# Patient Record
Sex: FEMALE | Race: WHITE | NOT HISPANIC OR LATINO | Employment: OTHER | URBAN - METROPOLITAN AREA
[De-identification: names, ages, dates, MRNs, and addresses within clinical notes are randomized per-mention and may not be internally consistent; named-entity substitution may affect disease eponyms.]

---

## 2017-03-02 ENCOUNTER — ALLSCRIPTS OFFICE VISIT (OUTPATIENT)
Dept: OTHER | Facility: OTHER | Age: 82
End: 2017-03-02

## 2017-03-20 ENCOUNTER — HOSPITAL ENCOUNTER (INPATIENT)
Facility: HOSPITAL | Age: 82
LOS: 10 days | Discharge: HOME HEALTH | DRG: 871 | End: 2017-03-30
Attending: EMERGENCY MEDICINE | Admitting: INTERNAL MEDICINE
Payer: MEDICARE

## 2017-03-20 ENCOUNTER — GENERIC CONVERSION - ENCOUNTER (OUTPATIENT)
Dept: OTHER | Facility: OTHER | Age: 82
End: 2017-03-20

## 2017-03-20 ENCOUNTER — APPOINTMENT (EMERGENCY)
Dept: RADIOLOGY | Facility: HOSPITAL | Age: 82
DRG: 871 | End: 2017-03-20
Payer: MEDICARE

## 2017-03-20 ENCOUNTER — ALLSCRIPTS OFFICE VISIT (OUTPATIENT)
Dept: OTHER | Facility: OTHER | Age: 82
End: 2017-03-20

## 2017-03-20 DIAGNOSIS — I48.0 PAROXYSMAL ATRIAL FIBRILLATION (HCC): ICD-10-CM

## 2017-03-20 DIAGNOSIS — I50.33 ACUTE ON CHRONIC DIASTOLIC HEART FAILURE (HCC): ICD-10-CM

## 2017-03-20 DIAGNOSIS — R53.1 WEAKNESS: ICD-10-CM

## 2017-03-20 DIAGNOSIS — Z86.79 HISTORY OF CHF (CONGESTIVE HEART FAILURE): ICD-10-CM

## 2017-03-20 DIAGNOSIS — J18.9 PNEUMONIA: Primary | ICD-10-CM

## 2017-03-20 DIAGNOSIS — A41.9 SEPSIS (HCC): ICD-10-CM

## 2017-03-20 PROBLEM — I50.30 DIASTOLIC HEART FAILURE (HCC): Status: ACTIVE | Noted: 2017-03-20

## 2017-03-20 PROBLEM — N17.9 AKI (ACUTE KIDNEY INJURY) (HCC): Status: RESOLVED | Noted: 2017-03-20 | Resolved: 2017-03-20

## 2017-03-20 PROBLEM — Z79.01 LONG TERM CURRENT USE OF ANTICOAGULANT: Status: ACTIVE | Noted: 2017-03-20

## 2017-03-20 PROBLEM — I10 ESSENTIAL HYPERTENSION: Status: ACTIVE | Noted: 2017-03-20

## 2017-03-20 PROBLEM — N17.9 AKI (ACUTE KIDNEY INJURY) (HCC): Status: ACTIVE | Noted: 2017-03-20

## 2017-03-20 PROBLEM — N18.30 CHRONIC KIDNEY DISEASE, STAGE III (MODERATE) (HCC): Status: ACTIVE | Noted: 2017-03-20

## 2017-03-20 LAB
ALBUMIN SERPL BCP-MCNC: 3 G/DL (ref 3.5–5)
ALP SERPL-CCNC: 73 U/L (ref 46–116)
ALT SERPL W P-5'-P-CCNC: 22 U/L (ref 12–78)
ANION GAP SERPL CALCULATED.3IONS-SCNC: 7 MMOL/L (ref 4–13)
AST SERPL W P-5'-P-CCNC: 33 U/L (ref 5–45)
ATRIAL RATE: 131 BPM
BASOPHILS # BLD AUTO: 0.04 THOUSANDS/ΜL (ref 0–0.1)
BASOPHILS NFR BLD AUTO: 0 % (ref 0–1)
BILIRUB SERPL-MCNC: 0.7 MG/DL (ref 0.2–1)
BUN SERPL-MCNC: 50 MG/DL (ref 5–25)
CALCIUM SERPL-MCNC: 7.7 MG/DL (ref 8.3–10.1)
CHLORIDE SERPL-SCNC: 101 MMOL/L (ref 100–108)
CO2 SERPL-SCNC: 29 MMOL/L (ref 21–32)
CREAT SERPL-MCNC: 1.85 MG/DL (ref 0.6–1.3)
EOSINOPHIL # BLD AUTO: 0 THOUSAND/ΜL (ref 0–0.61)
EOSINOPHIL NFR BLD AUTO: 0 % (ref 0–6)
ERYTHROCYTE [DISTWIDTH] IN BLOOD BY AUTOMATED COUNT: 15 % (ref 11.6–15.1)
GFR SERPL CREATININE-BSD FRML MDRD: 25.4 ML/MIN/1.73SQ M
GLUCOSE SERPL-MCNC: 110 MG/DL (ref 65–140)
HCT VFR BLD AUTO: 39.4 % (ref 34.8–46.1)
HGB BLD-MCNC: 12.4 G/DL (ref 11.5–15.4)
LACTATE SERPL-SCNC: 1.6 MMOL/L (ref 0.5–2)
LYMPHOCYTES # BLD AUTO: 1.08 THOUSANDS/ΜL (ref 0.6–4.47)
LYMPHOCYTES NFR BLD AUTO: 8 % (ref 14–44)
MCH RBC QN AUTO: 27.9 PG (ref 26.8–34.3)
MCHC RBC AUTO-ENTMCNC: 31.5 G/DL (ref 31.4–37.4)
MCV RBC AUTO: 89 FL (ref 82–98)
MONOCYTES # BLD AUTO: 1.23 THOUSAND/ΜL (ref 0.17–1.22)
MONOCYTES NFR BLD AUTO: 9 % (ref 4–12)
NEUTROPHILS # BLD AUTO: 11.91 THOUSANDS/ΜL (ref 1.85–7.62)
NEUTS SEG NFR BLD AUTO: 83 % (ref 43–75)
NRBC BLD AUTO-RTO: 0 /100 WBCS
NT-PROBNP SERPL-MCNC: 5202 PG/ML
PLATELET # BLD AUTO: 149 THOUSANDS/UL (ref 149–390)
PMV BLD AUTO: 13.7 FL (ref 8.9–12.7)
POTASSIUM SERPL-SCNC: 4.2 MMOL/L (ref 3.5–5.3)
PROT SERPL-MCNC: 6.5 G/DL (ref 6.4–8.2)
QRS AXIS: 67 DEGREES
QRSD INTERVAL: 68 MS
QT INTERVAL: 334 MS
QTC INTERVAL: 462 MS
RBC # BLD AUTO: 4.45 MILLION/UL (ref 3.81–5.12)
SODIUM SERPL-SCNC: 137 MMOL/L (ref 136–145)
T WAVE AXIS: 25 DEGREES
TROPONIN I SERPL-MCNC: 0.02 NG/ML
VENTRICULAR RATE: 115 BPM
WBC # BLD AUTO: 14.37 THOUSAND/UL (ref 4.31–10.16)

## 2017-03-20 PROCEDURE — 94760 N-INVAS EAR/PLS OXIMETRY 1: CPT

## 2017-03-20 PROCEDURE — 93005 ELECTROCARDIOGRAM TRACING: CPT | Performed by: EMERGENCY MEDICINE

## 2017-03-20 PROCEDURE — 84484 ASSAY OF TROPONIN QUANT: CPT | Performed by: EMERGENCY MEDICINE

## 2017-03-20 PROCEDURE — 96375 TX/PRO/DX INJ NEW DRUG ADDON: CPT

## 2017-03-20 PROCEDURE — 36415 COLL VENOUS BLD VENIPUNCTURE: CPT | Performed by: EMERGENCY MEDICINE

## 2017-03-20 PROCEDURE — 99285 EMERGENCY DEPT VISIT HI MDM: CPT

## 2017-03-20 PROCEDURE — 96365 THER/PROPH/DIAG IV INF INIT: CPT

## 2017-03-20 PROCEDURE — 94664 DEMO&/EVAL PT USE INHALER: CPT

## 2017-03-20 PROCEDURE — 94640 AIRWAY INHALATION TREATMENT: CPT

## 2017-03-20 PROCEDURE — 71020 HB CHEST X-RAY 2VW FRONTAL&LATL: CPT

## 2017-03-20 PROCEDURE — 80053 COMPREHEN METABOLIC PANEL: CPT | Performed by: EMERGENCY MEDICINE

## 2017-03-20 PROCEDURE — 87040 BLOOD CULTURE FOR BACTERIA: CPT | Performed by: EMERGENCY MEDICINE

## 2017-03-20 PROCEDURE — 83605 ASSAY OF LACTIC ACID: CPT | Performed by: EMERGENCY MEDICINE

## 2017-03-20 PROCEDURE — 87798 DETECT AGENT NOS DNA AMP: CPT | Performed by: FAMILY MEDICINE

## 2017-03-20 PROCEDURE — 93005 ELECTROCARDIOGRAM TRACING: CPT

## 2017-03-20 PROCEDURE — 85025 COMPLETE CBC W/AUTO DIFF WBC: CPT | Performed by: EMERGENCY MEDICINE

## 2017-03-20 PROCEDURE — 83880 ASSAY OF NATRIURETIC PEPTIDE: CPT | Performed by: FAMILY MEDICINE

## 2017-03-20 RX ORDER — CALCIUM CARBONATE 200(500)MG
1000 TABLET,CHEWABLE ORAL DAILY PRN
Status: DISCONTINUED | OUTPATIENT
Start: 2017-03-20 | End: 2017-03-30 | Stop reason: HOSPADM

## 2017-03-20 RX ORDER — ACETYLCYSTEINE 200 MG/ML
3 SOLUTION ORAL; RESPIRATORY (INHALATION)
Status: DISCONTINUED | OUTPATIENT
Start: 2017-03-20 | End: 2017-03-21

## 2017-03-20 RX ORDER — MECLIZINE HYDROCHLORIDE 25 MG/1
25 TABLET ORAL 2 TIMES DAILY PRN
Status: DISCONTINUED | OUTPATIENT
Start: 2017-03-20 | End: 2017-03-30 | Stop reason: HOSPADM

## 2017-03-20 RX ORDER — HEPARIN SODIUM 5000 [USP'U]/ML
5000 INJECTION, SOLUTION INTRAVENOUS; SUBCUTANEOUS EVERY 8 HOURS SCHEDULED
Status: DISCONTINUED | OUTPATIENT
Start: 2017-03-20 | End: 2017-03-20

## 2017-03-20 RX ORDER — IPRATROPIUM BROMIDE AND ALBUTEROL SULFATE 2.5; .5 MG/3ML; MG/3ML
3 SOLUTION RESPIRATORY (INHALATION)
Status: DISCONTINUED | OUTPATIENT
Start: 2017-03-20 | End: 2017-03-24

## 2017-03-20 RX ORDER — FERROUS SULFATE 325(65) MG
325 TABLET ORAL
Status: DISCONTINUED | OUTPATIENT
Start: 2017-03-21 | End: 2017-03-30 | Stop reason: HOSPADM

## 2017-03-20 RX ORDER — IPRATROPIUM BROMIDE AND ALBUTEROL SULFATE 2.5; .5 MG/3ML; MG/3ML
3 SOLUTION RESPIRATORY (INHALATION)
Status: DISCONTINUED | OUTPATIENT
Start: 2017-03-20 | End: 2017-03-20

## 2017-03-20 RX ORDER — DILTIAZEM HYDROCHLORIDE 5 MG/ML
10 INJECTION INTRAVENOUS ONCE
Status: COMPLETED | OUTPATIENT
Start: 2017-03-20 | End: 2017-03-20

## 2017-03-20 RX ORDER — SODIUM CHLORIDE 9 MG/ML
75 INJECTION, SOLUTION INTRAVENOUS CONTINUOUS
Status: DISCONTINUED | OUTPATIENT
Start: 2017-03-20 | End: 2017-03-22

## 2017-03-20 RX ORDER — ACETYLCYSTEINE 200 MG/ML
3 SOLUTION ORAL; RESPIRATORY (INHALATION)
Status: DISCONTINUED | OUTPATIENT
Start: 2017-03-21 | End: 2017-03-20

## 2017-03-20 RX ORDER — SENNOSIDES 8.6 MG
1 TABLET ORAL
Status: DISCONTINUED | OUTPATIENT
Start: 2017-03-20 | End: 2017-03-30 | Stop reason: HOSPADM

## 2017-03-20 RX ORDER — ONDANSETRON 2 MG/ML
4 INJECTION INTRAMUSCULAR; INTRAVENOUS EVERY 6 HOURS PRN
Status: DISCONTINUED | OUTPATIENT
Start: 2017-03-20 | End: 2017-03-30 | Stop reason: HOSPADM

## 2017-03-20 RX ORDER — LEVALBUTEROL 1.25 MG/.5ML
1.25 SOLUTION, CONCENTRATE RESPIRATORY (INHALATION) EVERY 4 HOURS PRN
Status: DISCONTINUED | OUTPATIENT
Start: 2017-03-20 | End: 2017-03-24

## 2017-03-20 RX ORDER — ACETYLCYSTEINE 200 MG/ML
3 SOLUTION ORAL; RESPIRATORY (INHALATION)
Status: DISCONTINUED | OUTPATIENT
Start: 2017-03-20 | End: 2017-03-20

## 2017-03-20 RX ORDER — GUAIFENESIN/DEXTROMETHORPHAN 100-10MG/5
10 SYRUP ORAL EVERY 4 HOURS PRN
Status: DISCONTINUED | OUTPATIENT
Start: 2017-03-20 | End: 2017-03-30 | Stop reason: HOSPADM

## 2017-03-20 RX ORDER — DILTIAZEM HYDROCHLORIDE 120 MG/1
120 CAPSULE, COATED, EXTENDED RELEASE ORAL DAILY
Status: DISCONTINUED | OUTPATIENT
Start: 2017-03-21 | End: 2017-03-23

## 2017-03-20 RX ORDER — FUROSEMIDE 40 MG/1
40 TABLET ORAL DAILY
Status: DISCONTINUED | OUTPATIENT
Start: 2017-03-21 | End: 2017-03-22

## 2017-03-20 RX ORDER — METOPROLOL SUCCINATE 100 MG/1
100 TABLET, EXTENDED RELEASE ORAL DAILY
Status: DISCONTINUED | OUTPATIENT
Start: 2017-03-21 | End: 2017-03-30 | Stop reason: HOSPADM

## 2017-03-20 RX ADMIN — SODIUM CHLORIDE 75 ML/HR: 0.9 INJECTION, SOLUTION INTRAVENOUS at 18:27

## 2017-03-20 RX ADMIN — IPRATROPIUM BROMIDE AND ALBUTEROL SULFATE 3 ML: .5; 3 SOLUTION RESPIRATORY (INHALATION) at 20:02

## 2017-03-20 RX ADMIN — ACETYLCYSTEINE 600 MG: 200 SOLUTION ORAL; RESPIRATORY (INHALATION) at 20:02

## 2017-03-20 RX ADMIN — APIXABAN 2.5 MG: 2.5 TABLET, FILM COATED ORAL at 18:26

## 2017-03-20 RX ADMIN — DILTIAZEM HYDROCHLORIDE 10 MG: 5 INJECTION INTRAVENOUS at 13:13

## 2017-03-20 RX ADMIN — SODIUM CHLORIDE 1000 ML: 0.9 INJECTION, SOLUTION INTRAVENOUS at 13:06

## 2017-03-20 RX ADMIN — IPRATROPIUM BROMIDE AND ALBUTEROL SULFATE 3 ML: .5; 3 SOLUTION RESPIRATORY (INHALATION) at 18:15

## 2017-03-20 RX ADMIN — CEFTRIAXONE 1000 MG: 1 INJECTION, SOLUTION INTRAVENOUS at 13:24

## 2017-03-20 RX ADMIN — AZITHROMYCIN MONOHYDRATE 500 MG: 500 INJECTION, POWDER, LYOPHILIZED, FOR SOLUTION INTRAVENOUS at 13:37

## 2017-03-21 ENCOUNTER — APPOINTMENT (INPATIENT)
Dept: PHYSICAL THERAPY | Facility: HOSPITAL | Age: 82
DRG: 871 | End: 2017-03-21
Payer: MEDICARE

## 2017-03-21 ENCOUNTER — APPOINTMENT (INPATIENT)
Dept: CT IMAGING | Facility: HOSPITAL | Age: 82
DRG: 871 | End: 2017-03-21
Payer: MEDICARE

## 2017-03-21 LAB
ALBUMIN SERPL BCP-MCNC: 2.9 G/DL (ref 3.5–5)
ALP SERPL-CCNC: 71 U/L (ref 46–116)
ALT SERPL W P-5'-P-CCNC: 22 U/L (ref 12–78)
ANION GAP SERPL CALCULATED.3IONS-SCNC: 11 MMOL/L (ref 4–13)
AST SERPL W P-5'-P-CCNC: 39 U/L (ref 5–45)
BILIRUB SERPL-MCNC: 0.5 MG/DL (ref 0.2–1)
BUN SERPL-MCNC: 49 MG/DL (ref 5–25)
CALCIUM SERPL-MCNC: 7.8 MG/DL (ref 8.3–10.1)
CHLORIDE SERPL-SCNC: 103 MMOL/L (ref 100–108)
CO2 SERPL-SCNC: 28 MMOL/L (ref 21–32)
CREAT SERPL-MCNC: 1.79 MG/DL (ref 0.6–1.3)
ERYTHROCYTE [DISTWIDTH] IN BLOOD BY AUTOMATED COUNT: 15 % (ref 11.6–15.1)
FLUAV AG SPEC QL: DETECTED
FLUBV AG SPEC QL: ABNORMAL
GFR SERPL CREATININE-BSD FRML MDRD: 26.4 ML/MIN/1.73SQ M
GLUCOSE SERPL-MCNC: 99 MG/DL (ref 65–140)
HCT VFR BLD AUTO: 35 % (ref 34.8–46.1)
HGB BLD-MCNC: 10.7 G/DL (ref 11.5–15.4)
MCH RBC QN AUTO: 27.9 PG (ref 26.8–34.3)
MCHC RBC AUTO-ENTMCNC: 30.6 G/DL (ref 31.4–37.4)
MCV RBC AUTO: 91 FL (ref 82–98)
PLATELET # BLD AUTO: 132 THOUSANDS/UL (ref 149–390)
PMV BLD AUTO: 13.1 FL (ref 8.9–12.7)
POTASSIUM SERPL-SCNC: 4.4 MMOL/L (ref 3.5–5.3)
PROT SERPL-MCNC: 6.5 G/DL (ref 6.4–8.2)
RBC # BLD AUTO: 3.83 MILLION/UL (ref 3.81–5.12)
RSV B RNA SPEC QL NAA+PROBE: ABNORMAL
S PNEUM AG UR QL: NEGATIVE
SODIUM SERPL-SCNC: 142 MMOL/L (ref 136–145)
WBC # BLD AUTO: 8.64 THOUSAND/UL (ref 4.31–10.16)

## 2017-03-21 PROCEDURE — G8979 MOBILITY GOAL STATUS: HCPCS

## 2017-03-21 PROCEDURE — 80053 COMPREHEN METABOLIC PANEL: CPT | Performed by: FAMILY MEDICINE

## 2017-03-21 PROCEDURE — 85027 COMPLETE CBC AUTOMATED: CPT | Performed by: FAMILY MEDICINE

## 2017-03-21 PROCEDURE — 87086 URINE CULTURE/COLONY COUNT: CPT | Performed by: FAMILY MEDICINE

## 2017-03-21 PROCEDURE — 94760 N-INVAS EAR/PLS OXIMETRY 1: CPT

## 2017-03-21 PROCEDURE — 94640 AIRWAY INHALATION TREATMENT: CPT

## 2017-03-21 PROCEDURE — 87449 NOS EACH ORGANISM AG IA: CPT | Performed by: FAMILY MEDICINE

## 2017-03-21 PROCEDURE — G8978 MOBILITY CURRENT STATUS: HCPCS

## 2017-03-21 PROCEDURE — 92610 EVALUATE SWALLOWING FUNCTION: CPT

## 2017-03-21 PROCEDURE — 97162 PT EVAL MOD COMPLEX 30 MIN: CPT

## 2017-03-21 PROCEDURE — 94668 MNPJ CHEST WALL SBSQ: CPT

## 2017-03-21 PROCEDURE — 94669 MECHANICAL CHEST WALL OSCILL: CPT

## 2017-03-21 PROCEDURE — 71250 CT THORAX DX C-: CPT

## 2017-03-21 RX ORDER — OSELTAMIVIR PHOSPHATE 30 MG/1
30 CAPSULE ORAL EVERY 24 HOURS
Status: COMPLETED | OUTPATIENT
Start: 2017-03-21 | End: 2017-03-25

## 2017-03-21 RX ADMIN — IPRATROPIUM BROMIDE AND ALBUTEROL SULFATE 3 ML: .5; 3 SOLUTION RESPIRATORY (INHALATION) at 20:23

## 2017-03-21 RX ADMIN — FUROSEMIDE 40 MG: 40 TABLET ORAL at 09:45

## 2017-03-21 RX ADMIN — GUAIFENESIN AND DEXTROMETHORPHAN 10 ML: 100; 10 SYRUP ORAL at 18:42

## 2017-03-21 RX ADMIN — APIXABAN 2.5 MG: 2.5 TABLET, FILM COATED ORAL at 09:45

## 2017-03-21 RX ADMIN — OSELTAMIVIR PHOSPHATE 30 MG: 30 CAPSULE ORAL at 07:37

## 2017-03-21 RX ADMIN — FERROUS SULFATE TAB 325 MG (65 MG ELEMENTAL FE) 325 MG: 325 (65 FE) TAB at 07:37

## 2017-03-21 RX ADMIN — IPRATROPIUM BROMIDE AND ALBUTEROL SULFATE 3 ML: .5; 3 SOLUTION RESPIRATORY (INHALATION) at 14:26

## 2017-03-21 RX ADMIN — GUAIFENESIN AND DEXTROMETHORPHAN 10 ML: 100; 10 SYRUP ORAL at 22:33

## 2017-03-21 RX ADMIN — METOPROLOL SUCCINATE 100 MG: 100 TABLET, FILM COATED, EXTENDED RELEASE ORAL at 09:45

## 2017-03-21 RX ADMIN — AZITHROMYCIN MONOHYDRATE 500 MG: 500 INJECTION, POWDER, LYOPHILIZED, FOR SOLUTION INTRAVENOUS at 09:50

## 2017-03-21 RX ADMIN — SODIUM CHLORIDE 75 ML/HR: 0.9 INJECTION, SOLUTION INTRAVENOUS at 22:02

## 2017-03-21 RX ADMIN — SODIUM CHLORIDE 75 ML/HR: 0.9 INJECTION, SOLUTION INTRAVENOUS at 07:35

## 2017-03-21 RX ADMIN — APIXABAN 2.5 MG: 2.5 TABLET, FILM COATED ORAL at 18:30

## 2017-03-21 RX ADMIN — IPRATROPIUM BROMIDE AND ALBUTEROL SULFATE 3 ML: .5; 3 SOLUTION RESPIRATORY (INHALATION) at 02:24

## 2017-03-21 RX ADMIN — NIFEDIPINE 120 MG: 10 CAPSULE ORAL at 09:45

## 2017-03-21 RX ADMIN — IPRATROPIUM BROMIDE AND ALBUTEROL SULFATE 3 ML: .5; 3 SOLUTION RESPIRATORY (INHALATION) at 07:51

## 2017-03-21 RX ADMIN — CEFTRIAXONE 1000 MG: 1 INJECTION, POWDER, FOR SOLUTION INTRAMUSCULAR; INTRAVENOUS at 09:49

## 2017-03-22 ENCOUNTER — GENERIC CONVERSION - ENCOUNTER (OUTPATIENT)
Dept: OTHER | Facility: OTHER | Age: 82
End: 2017-03-22

## 2017-03-22 ENCOUNTER — APPOINTMENT (INPATIENT)
Dept: NON INVASIVE DIAGNOSTICS | Facility: HOSPITAL | Age: 82
DRG: 871 | End: 2017-03-22
Payer: MEDICARE

## 2017-03-22 LAB
ANION GAP SERPL CALCULATED.3IONS-SCNC: 9 MMOL/L (ref 4–13)
BACTERIA UR CULT: NORMAL
BASOPHILS # BLD MANUAL: 0 THOUSAND/UL (ref 0–0.1)
BASOPHILS NFR MAR MANUAL: 0 % (ref 0–1)
BUN SERPL-MCNC: 39 MG/DL (ref 5–25)
CALCIUM SERPL-MCNC: 7.7 MG/DL (ref 8.3–10.1)
CHLORIDE SERPL-SCNC: 102 MMOL/L (ref 100–108)
CO2 SERPL-SCNC: 26 MMOL/L (ref 21–32)
CREAT SERPL-MCNC: 1.3 MG/DL (ref 0.6–1.3)
EOSINOPHIL # BLD MANUAL: 0 THOUSAND/UL (ref 0–0.4)
EOSINOPHIL NFR BLD MANUAL: 0 % (ref 0–6)
ERYTHROCYTE [DISTWIDTH] IN BLOOD BY AUTOMATED COUNT: 14.9 % (ref 11.6–15.1)
GFR SERPL CREATININE-BSD FRML MDRD: 38.2 ML/MIN/1.73SQ M
GLUCOSE SERPL-MCNC: 87 MG/DL (ref 65–140)
HCT VFR BLD AUTO: 33.9 % (ref 34.8–46.1)
HGB BLD-MCNC: 10.4 G/DL (ref 11.5–15.4)
LYMPHOCYTES # BLD AUTO: 0.85 THOUSAND/UL (ref 0.6–4.47)
LYMPHOCYTES # BLD AUTO: 11 % (ref 14–44)
MCH RBC QN AUTO: 27.7 PG (ref 26.8–34.3)
MCHC RBC AUTO-ENTMCNC: 30.7 G/DL (ref 31.4–37.4)
MCV RBC AUTO: 90 FL (ref 82–98)
MONOCYTES # BLD AUTO: 0.31 THOUSAND/UL (ref 0–1.22)
MONOCYTES NFR BLD: 4 % (ref 4–12)
NEUTROPHILS # BLD MANUAL: 6.38 THOUSAND/UL (ref 1.85–7.62)
NEUTS BAND NFR BLD MANUAL: 1 % (ref 0–8)
NEUTS SEG NFR BLD AUTO: 82 % (ref 43–75)
NRBC BLD AUTO-RTO: 0 /100 WBCS
NT-PROBNP SERPL-MCNC: 3604 PG/ML
PLATELET # BLD AUTO: 152 THOUSANDS/UL (ref 149–390)
PLATELET BLD QL SMEAR: ADEQUATE
PMV BLD AUTO: 13 FL (ref 8.9–12.7)
POTASSIUM SERPL-SCNC: 3.9 MMOL/L (ref 3.5–5.3)
RBC # BLD AUTO: 3.75 MILLION/UL (ref 3.81–5.12)
SODIUM SERPL-SCNC: 137 MMOL/L (ref 136–145)
TOTAL CELLS COUNTED SPEC: 100
VARIANT LYMPHS # BLD AUTO: 2 %
WBC # BLD AUTO: 7.69 THOUSAND/UL (ref 4.31–10.16)

## 2017-03-22 PROCEDURE — 85007 BL SMEAR W/DIFF WBC COUNT: CPT | Performed by: GENERAL PRACTICE

## 2017-03-22 PROCEDURE — 93306 TTE W/DOPPLER COMPLETE: CPT

## 2017-03-22 PROCEDURE — 83880 ASSAY OF NATRIURETIC PEPTIDE: CPT | Performed by: NURSE PRACTITIONER

## 2017-03-22 PROCEDURE — 85027 COMPLETE CBC AUTOMATED: CPT | Performed by: GENERAL PRACTICE

## 2017-03-22 PROCEDURE — 80048 BASIC METABOLIC PNL TOTAL CA: CPT | Performed by: GENERAL PRACTICE

## 2017-03-22 PROCEDURE — 94760 N-INVAS EAR/PLS OXIMETRY 1: CPT

## 2017-03-22 PROCEDURE — 94640 AIRWAY INHALATION TREATMENT: CPT

## 2017-03-22 PROCEDURE — 94668 MNPJ CHEST WALL SBSQ: CPT

## 2017-03-22 RX ORDER — FUROSEMIDE 10 MG/ML
40 INJECTION INTRAMUSCULAR; INTRAVENOUS DAILY
Status: DISCONTINUED | OUTPATIENT
Start: 2017-03-22 | End: 2017-03-24

## 2017-03-22 RX ADMIN — NIFEDIPINE 120 MG: 10 CAPSULE ORAL at 11:43

## 2017-03-22 RX ADMIN — FUROSEMIDE 40 MG: 10 INJECTION, SOLUTION INTRAMUSCULAR; INTRAVENOUS at 11:44

## 2017-03-22 RX ADMIN — METOPROLOL SUCCINATE 100 MG: 100 TABLET, FILM COATED, EXTENDED RELEASE ORAL at 11:43

## 2017-03-22 RX ADMIN — IPRATROPIUM BROMIDE AND ALBUTEROL SULFATE 3 ML: .5; 3 SOLUTION RESPIRATORY (INHALATION) at 01:36

## 2017-03-22 RX ADMIN — APIXABAN 2.5 MG: 2.5 TABLET, FILM COATED ORAL at 17:34

## 2017-03-22 RX ADMIN — OSELTAMIVIR PHOSPHATE 30 MG: 30 CAPSULE ORAL at 11:43

## 2017-03-22 RX ADMIN — IPRATROPIUM BROMIDE AND ALBUTEROL SULFATE 3 ML: .5; 3 SOLUTION RESPIRATORY (INHALATION) at 20:35

## 2017-03-22 RX ADMIN — CEFTRIAXONE 1000 MG: 1 INJECTION, POWDER, FOR SOLUTION INTRAMUSCULAR; INTRAVENOUS at 11:52

## 2017-03-22 RX ADMIN — IPRATROPIUM BROMIDE AND ALBUTEROL SULFATE 3 ML: .5; 3 SOLUTION RESPIRATORY (INHALATION) at 16:19

## 2017-03-22 RX ADMIN — GUAIFENESIN AND DEXTROMETHORPHAN 10 ML: 100; 10 SYRUP ORAL at 16:56

## 2017-03-22 RX ADMIN — APIXABAN 2.5 MG: 2.5 TABLET, FILM COATED ORAL at 11:43

## 2017-03-22 RX ADMIN — FERROUS SULFATE TAB 325 MG (65 MG ELEMENTAL FE) 325 MG: 325 (65 FE) TAB at 11:47

## 2017-03-22 RX ADMIN — IPRATROPIUM BROMIDE AND ALBUTEROL SULFATE 3 ML: .5; 3 SOLUTION RESPIRATORY (INHALATION) at 07:37

## 2017-03-22 RX ADMIN — AZITHROMYCIN MONOHYDRATE 500 MG: 500 INJECTION, POWDER, LYOPHILIZED, FOR SOLUTION INTRAVENOUS at 12:39

## 2017-03-22 RX ADMIN — GUAIFENESIN AND DEXTROMETHORPHAN 10 ML: 100; 10 SYRUP ORAL at 11:43

## 2017-03-23 ENCOUNTER — APPOINTMENT (INPATIENT)
Dept: SPEECH THERAPY | Facility: HOSPITAL | Age: 82
DRG: 871 | End: 2017-03-23
Payer: MEDICARE

## 2017-03-23 ENCOUNTER — APPOINTMENT (INPATIENT)
Dept: PHYSICAL THERAPY | Facility: HOSPITAL | Age: 82
DRG: 871 | End: 2017-03-23
Payer: MEDICARE

## 2017-03-23 PROCEDURE — 97116 GAIT TRAINING THERAPY: CPT

## 2017-03-23 PROCEDURE — 92526 ORAL FUNCTION THERAPY: CPT

## 2017-03-23 PROCEDURE — 97530 THERAPEUTIC ACTIVITIES: CPT

## 2017-03-23 PROCEDURE — 94668 MNPJ CHEST WALL SBSQ: CPT

## 2017-03-23 PROCEDURE — 94669 MECHANICAL CHEST WALL OSCILL: CPT

## 2017-03-23 PROCEDURE — 94760 N-INVAS EAR/PLS OXIMETRY 1: CPT

## 2017-03-23 PROCEDURE — 94640 AIRWAY INHALATION TREATMENT: CPT

## 2017-03-23 PROCEDURE — 97110 THERAPEUTIC EXERCISES: CPT

## 2017-03-23 RX ORDER — DILTIAZEM HYDROCHLORIDE 180 MG/1
180 CAPSULE, COATED, EXTENDED RELEASE ORAL DAILY
Status: DISCONTINUED | OUTPATIENT
Start: 2017-03-24 | End: 2017-03-30 | Stop reason: HOSPADM

## 2017-03-23 RX ORDER — GUAIFENESIN 600 MG
600 TABLET, EXTENDED RELEASE 12 HR ORAL EVERY 12 HOURS SCHEDULED
Status: DISCONTINUED | OUTPATIENT
Start: 2017-03-23 | End: 2017-03-30 | Stop reason: HOSPADM

## 2017-03-23 RX ADMIN — APIXABAN 2.5 MG: 2.5 TABLET, FILM COATED ORAL at 17:26

## 2017-03-23 RX ADMIN — FUROSEMIDE 40 MG: 10 INJECTION, SOLUTION INTRAMUSCULAR; INTRAVENOUS at 10:54

## 2017-03-23 RX ADMIN — GUAIFENESIN 600 MG: 600 TABLET, EXTENDED RELEASE ORAL at 20:23

## 2017-03-23 RX ADMIN — IPRATROPIUM BROMIDE AND ALBUTEROL SULFATE 3 ML: .5; 3 SOLUTION RESPIRATORY (INHALATION) at 13:22

## 2017-03-23 RX ADMIN — METOPROLOL SUCCINATE 100 MG: 100 TABLET, FILM COATED, EXTENDED RELEASE ORAL at 10:53

## 2017-03-23 RX ADMIN — OSELTAMIVIR PHOSPHATE 30 MG: 30 CAPSULE ORAL at 10:58

## 2017-03-23 RX ADMIN — GUAIFENESIN 600 MG: 600 TABLET, EXTENDED RELEASE ORAL at 10:53

## 2017-03-23 RX ADMIN — IPRATROPIUM BROMIDE AND ALBUTEROL SULFATE 3 ML: .5; 3 SOLUTION RESPIRATORY (INHALATION) at 19:17

## 2017-03-23 RX ADMIN — IPRATROPIUM BROMIDE AND ALBUTEROL SULFATE 3 ML: .5; 3 SOLUTION RESPIRATORY (INHALATION) at 02:32

## 2017-03-23 RX ADMIN — GUAIFENESIN AND DEXTROMETHORPHAN 10 ML: 100; 10 SYRUP ORAL at 17:29

## 2017-03-23 RX ADMIN — IPRATROPIUM BROMIDE AND ALBUTEROL SULFATE 3 ML: .5; 3 SOLUTION RESPIRATORY (INHALATION) at 08:22

## 2017-03-23 RX ADMIN — FERROUS SULFATE TAB 325 MG (65 MG ELEMENTAL FE) 325 MG: 325 (65 FE) TAB at 10:54

## 2017-03-23 RX ADMIN — GUAIFENESIN AND DEXTROMETHORPHAN 10 ML: 100; 10 SYRUP ORAL at 11:03

## 2017-03-23 RX ADMIN — APIXABAN 2.5 MG: 2.5 TABLET, FILM COATED ORAL at 10:54

## 2017-03-23 RX ADMIN — NIFEDIPINE 120 MG: 10 CAPSULE ORAL at 10:54

## 2017-03-24 ENCOUNTER — APPOINTMENT (INPATIENT)
Dept: RADIOLOGY | Facility: HOSPITAL | Age: 82
DRG: 871 | End: 2017-03-24
Payer: MEDICARE

## 2017-03-24 LAB
ANION GAP SERPL CALCULATED.3IONS-SCNC: 10 MMOL/L (ref 4–13)
BUN SERPL-MCNC: 20 MG/DL (ref 5–25)
CALCIUM SERPL-MCNC: 8.5 MG/DL (ref 8.3–10.1)
CHLORIDE SERPL-SCNC: 99 MMOL/L (ref 100–108)
CO2 SERPL-SCNC: 28 MMOL/L (ref 21–32)
CREAT SERPL-MCNC: 0.98 MG/DL (ref 0.6–1.3)
GFR SERPL CREATININE-BSD FRML MDRD: 53 ML/MIN/1.73SQ M
GLUCOSE SERPL-MCNC: 143 MG/DL (ref 65–140)
POTASSIUM SERPL-SCNC: 4.5 MMOL/L (ref 3.5–5.3)
SODIUM SERPL-SCNC: 137 MMOL/L (ref 136–145)

## 2017-03-24 PROCEDURE — 80048 BASIC METABOLIC PNL TOTAL CA: CPT | Performed by: NURSE PRACTITIONER

## 2017-03-24 PROCEDURE — 71010 HB CHEST X-RAY 1 VIEW FRONTAL (PORTABLE): CPT

## 2017-03-24 PROCEDURE — 94760 N-INVAS EAR/PLS OXIMETRY 1: CPT

## 2017-03-24 PROCEDURE — 94640 AIRWAY INHALATION TREATMENT: CPT

## 2017-03-24 PROCEDURE — 94669 MECHANICAL CHEST WALL OSCILL: CPT

## 2017-03-24 RX ORDER — LEVALBUTEROL 1.25 MG/.5ML
1.25 SOLUTION, CONCENTRATE RESPIRATORY (INHALATION)
Status: DISCONTINUED | OUTPATIENT
Start: 2017-03-24 | End: 2017-03-24

## 2017-03-24 RX ORDER — IPRATROPIUM BROMIDE AND ALBUTEROL SULFATE 2.5; .5 MG/3ML; MG/3ML
3 SOLUTION RESPIRATORY (INHALATION) EVERY 4 HOURS PRN
Status: DISCONTINUED | OUTPATIENT
Start: 2017-03-24 | End: 2017-03-24

## 2017-03-24 RX ORDER — FUROSEMIDE 10 MG/ML
40 INJECTION INTRAMUSCULAR; INTRAVENOUS
Status: DISCONTINUED | OUTPATIENT
Start: 2017-03-24 | End: 2017-03-28

## 2017-03-24 RX ORDER — LEVALBUTEROL 1.25 MG/.5ML
1.25 SOLUTION, CONCENTRATE RESPIRATORY (INHALATION) EVERY 6 HOURS PRN
Status: DISCONTINUED | OUTPATIENT
Start: 2017-03-24 | End: 2017-03-24

## 2017-03-24 RX ORDER — LEVALBUTEROL 1.25 MG/.5ML
1.25 SOLUTION, CONCENTRATE RESPIRATORY (INHALATION)
Status: DISCONTINUED | OUTPATIENT
Start: 2017-03-24 | End: 2017-03-28

## 2017-03-24 RX ORDER — LEVALBUTEROL 1.25 MG/.5ML
1.25 SOLUTION, CONCENTRATE RESPIRATORY (INHALATION) EVERY 4 HOURS PRN
Status: DISCONTINUED | OUTPATIENT
Start: 2017-03-24 | End: 2017-03-30 | Stop reason: HOSPADM

## 2017-03-24 RX ADMIN — METOPROLOL SUCCINATE 100 MG: 100 TABLET, FILM COATED, EXTENDED RELEASE ORAL at 08:28

## 2017-03-24 RX ADMIN — GUAIFENESIN AND DEXTROMETHORPHAN 10 ML: 100; 10 SYRUP ORAL at 15:37

## 2017-03-24 RX ADMIN — OSELTAMIVIR PHOSPHATE 30 MG: 30 CAPSULE ORAL at 08:28

## 2017-03-24 RX ADMIN — METOPROLOL TARTRATE 2.5 MG: 5 INJECTION, SOLUTION INTRAVENOUS at 10:56

## 2017-03-24 RX ADMIN — IPRATROPIUM BROMIDE AND ALBUTEROL SULFATE 3 ML: .5; 3 SOLUTION RESPIRATORY (INHALATION) at 09:16

## 2017-03-24 RX ADMIN — GUAIFENESIN AND DEXTROMETHORPHAN 10 ML: 100; 10 SYRUP ORAL at 03:31

## 2017-03-24 RX ADMIN — APIXABAN 2.5 MG: 2.5 TABLET, FILM COATED ORAL at 17:07

## 2017-03-24 RX ADMIN — APIXABAN 2.5 MG: 2.5 TABLET, FILM COATED ORAL at 08:28

## 2017-03-24 RX ADMIN — LEVALBUTEROL HYDROCHLORIDE 1.25 MG: 1.25 SOLUTION, CONCENTRATE RESPIRATORY (INHALATION) at 15:13

## 2017-03-24 RX ADMIN — GUAIFENESIN 600 MG: 600 TABLET, EXTENDED RELEASE ORAL at 08:28

## 2017-03-24 RX ADMIN — FERROUS SULFATE TAB 325 MG (65 MG ELEMENTAL FE) 325 MG: 325 (65 FE) TAB at 08:28

## 2017-03-24 RX ADMIN — DILTIAZEM HYDROCHLORIDE 180 MG: 180 CAPSULE, COATED, EXTENDED RELEASE ORAL at 08:28

## 2017-03-24 RX ADMIN — FUROSEMIDE 40 MG: 10 INJECTION, SOLUTION INTRAMUSCULAR; INTRAVENOUS at 15:37

## 2017-03-24 RX ADMIN — IPRATROPIUM BROMIDE 0.5 MG: 0.5 SOLUTION RESPIRATORY (INHALATION) at 19:49

## 2017-03-24 RX ADMIN — IPRATROPIUM BROMIDE 0.5 MG: 0.5 SOLUTION RESPIRATORY (INHALATION) at 15:13

## 2017-03-24 RX ADMIN — LEVALBUTEROL HYDROCHLORIDE 1.25 MG: 1.25 SOLUTION, CONCENTRATE RESPIRATORY (INHALATION) at 19:49

## 2017-03-24 RX ADMIN — IPRATROPIUM BROMIDE AND ALBUTEROL SULFATE 3 ML: .5; 3 SOLUTION RESPIRATORY (INHALATION) at 01:45

## 2017-03-24 RX ADMIN — FUROSEMIDE 40 MG: 10 INJECTION, SOLUTION INTRAMUSCULAR; INTRAVENOUS at 08:28

## 2017-03-24 RX ADMIN — GUAIFENESIN 600 MG: 600 TABLET, EXTENDED RELEASE ORAL at 20:18

## 2017-03-25 LAB
ANION GAP SERPL CALCULATED.3IONS-SCNC: 6 MMOL/L (ref 4–13)
BACTERIA BLD CULT: NORMAL
BACTERIA BLD CULT: NORMAL
BUN SERPL-MCNC: 19 MG/DL (ref 5–25)
CALCIUM SERPL-MCNC: 8.7 MG/DL (ref 8.3–10.1)
CHLORIDE SERPL-SCNC: 98 MMOL/L (ref 100–108)
CO2 SERPL-SCNC: 33 MMOL/L (ref 21–32)
CREAT SERPL-MCNC: 1.02 MG/DL (ref 0.6–1.3)
GFR SERPL CREATININE-BSD FRML MDRD: 50.6 ML/MIN/1.73SQ M
GLUCOSE SERPL-MCNC: 147 MG/DL (ref 65–140)
POTASSIUM SERPL-SCNC: 4.4 MMOL/L (ref 3.5–5.3)
SODIUM SERPL-SCNC: 137 MMOL/L (ref 136–145)

## 2017-03-25 PROCEDURE — 94669 MECHANICAL CHEST WALL OSCILL: CPT

## 2017-03-25 PROCEDURE — 94760 N-INVAS EAR/PLS OXIMETRY 1: CPT

## 2017-03-25 PROCEDURE — 80048 BASIC METABOLIC PNL TOTAL CA: CPT | Performed by: GENERAL PRACTICE

## 2017-03-25 PROCEDURE — 94640 AIRWAY INHALATION TREATMENT: CPT

## 2017-03-25 RX ADMIN — OSELTAMIVIR PHOSPHATE 30 MG: 30 CAPSULE ORAL at 08:06

## 2017-03-25 RX ADMIN — FUROSEMIDE 40 MG: 10 INJECTION, SOLUTION INTRAMUSCULAR; INTRAVENOUS at 08:05

## 2017-03-25 RX ADMIN — METOPROLOL SUCCINATE 100 MG: 100 TABLET, FILM COATED, EXTENDED RELEASE ORAL at 08:06

## 2017-03-25 RX ADMIN — DILTIAZEM HYDROCHLORIDE 180 MG: 180 CAPSULE, COATED, EXTENDED RELEASE ORAL at 08:06

## 2017-03-25 RX ADMIN — IPRATROPIUM BROMIDE 0.5 MG: 0.5 SOLUTION RESPIRATORY (INHALATION) at 13:43

## 2017-03-25 RX ADMIN — APIXABAN 2.5 MG: 2.5 TABLET, FILM COATED ORAL at 08:06

## 2017-03-25 RX ADMIN — SENNOSIDES 8.6 MG: 8.6 TABLET, FILM COATED ORAL at 22:37

## 2017-03-25 RX ADMIN — FERROUS SULFATE TAB 325 MG (65 MG ELEMENTAL FE) 325 MG: 325 (65 FE) TAB at 08:06

## 2017-03-25 RX ADMIN — LEVALBUTEROL HYDROCHLORIDE 1.25 MG: 1.25 SOLUTION, CONCENTRATE RESPIRATORY (INHALATION) at 20:16

## 2017-03-25 RX ADMIN — FUROSEMIDE 40 MG: 10 INJECTION, SOLUTION INTRAMUSCULAR; INTRAVENOUS at 16:18

## 2017-03-25 RX ADMIN — LEVALBUTEROL HYDROCHLORIDE 1.25 MG: 1.25 SOLUTION, CONCENTRATE RESPIRATORY (INHALATION) at 07:24

## 2017-03-25 RX ADMIN — LEVALBUTEROL HYDROCHLORIDE 1.25 MG: 1.25 SOLUTION, CONCENTRATE RESPIRATORY (INHALATION) at 13:43

## 2017-03-25 RX ADMIN — GUAIFENESIN 600 MG: 600 TABLET, EXTENDED RELEASE ORAL at 08:06

## 2017-03-25 RX ADMIN — IPRATROPIUM BROMIDE 0.5 MG: 0.5 SOLUTION RESPIRATORY (INHALATION) at 07:24

## 2017-03-25 RX ADMIN — APIXABAN 2.5 MG: 2.5 TABLET, FILM COATED ORAL at 17:27

## 2017-03-25 RX ADMIN — GUAIFENESIN 600 MG: 600 TABLET, EXTENDED RELEASE ORAL at 22:00

## 2017-03-25 RX ADMIN — IPRATROPIUM BROMIDE 0.5 MG: 0.5 SOLUTION RESPIRATORY (INHALATION) at 20:16

## 2017-03-25 RX ADMIN — LEVALBUTEROL HYDROCHLORIDE 1.25 MG: 1.25 SOLUTION, CONCENTRATE RESPIRATORY (INHALATION) at 02:02

## 2017-03-25 RX ADMIN — IPRATROPIUM BROMIDE 0.5 MG: 0.5 SOLUTION RESPIRATORY (INHALATION) at 02:03

## 2017-03-26 LAB
ANION GAP SERPL CALCULATED.3IONS-SCNC: 4 MMOL/L (ref 4–13)
BASOPHILS # BLD AUTO: 0.04 THOUSANDS/ΜL (ref 0–0.1)
BASOPHILS NFR BLD AUTO: 1 % (ref 0–1)
BUN SERPL-MCNC: 17 MG/DL (ref 5–25)
CALCIUM SERPL-MCNC: 8.4 MG/DL (ref 8.3–10.1)
CHLORIDE SERPL-SCNC: 99 MMOL/L (ref 100–108)
CO2 SERPL-SCNC: 36 MMOL/L (ref 21–32)
CREAT SERPL-MCNC: 0.99 MG/DL (ref 0.6–1.3)
EOSINOPHIL # BLD AUTO: 0.19 THOUSAND/ΜL (ref 0–0.61)
EOSINOPHIL NFR BLD AUTO: 2 % (ref 0–6)
ERYTHROCYTE [DISTWIDTH] IN BLOOD BY AUTOMATED COUNT: 14.7 % (ref 11.6–15.1)
GFR SERPL CREATININE-BSD FRML MDRD: 52.3 ML/MIN/1.73SQ M
GLUCOSE SERPL-MCNC: 90 MG/DL (ref 65–140)
HCT VFR BLD AUTO: 33.8 % (ref 34.8–46.1)
HGB BLD-MCNC: 10.5 G/DL (ref 11.5–15.4)
LYMPHOCYTES # BLD AUTO: 0.62 THOUSANDS/ΜL (ref 0.6–4.47)
LYMPHOCYTES NFR BLD AUTO: 8 % (ref 14–44)
MCH RBC QN AUTO: 27.6 PG (ref 26.8–34.3)
MCHC RBC AUTO-ENTMCNC: 31.1 G/DL (ref 31.4–37.4)
MCV RBC AUTO: 89 FL (ref 82–98)
MONOCYTES # BLD AUTO: 0.8 THOUSAND/ΜL (ref 0.17–1.22)
MONOCYTES NFR BLD AUTO: 10 % (ref 4–12)
NEUTROPHILS # BLD AUTO: 6.16 THOUSANDS/ΜL (ref 1.85–7.62)
NEUTS SEG NFR BLD AUTO: 76 % (ref 43–75)
NRBC BLD AUTO-RTO: 0 /100 WBCS
PLATELET # BLD AUTO: 248 THOUSANDS/UL (ref 149–390)
PMV BLD AUTO: 12.7 FL (ref 8.9–12.7)
POTASSIUM SERPL-SCNC: 3.8 MMOL/L (ref 3.5–5.3)
RBC # BLD AUTO: 3.8 MILLION/UL (ref 3.81–5.12)
SODIUM SERPL-SCNC: 139 MMOL/L (ref 136–145)
WBC # BLD AUTO: 8.16 THOUSAND/UL (ref 4.31–10.16)

## 2017-03-26 PROCEDURE — 94760 N-INVAS EAR/PLS OXIMETRY 1: CPT

## 2017-03-26 PROCEDURE — 94669 MECHANICAL CHEST WALL OSCILL: CPT

## 2017-03-26 PROCEDURE — 94640 AIRWAY INHALATION TREATMENT: CPT

## 2017-03-26 PROCEDURE — 80048 BASIC METABOLIC PNL TOTAL CA: CPT | Performed by: GENERAL PRACTICE

## 2017-03-26 PROCEDURE — 85025 COMPLETE CBC W/AUTO DIFF WBC: CPT | Performed by: GENERAL PRACTICE

## 2017-03-26 RX ADMIN — LEVALBUTEROL HYDROCHLORIDE 1.25 MG: 1.25 SOLUTION, CONCENTRATE RESPIRATORY (INHALATION) at 20:54

## 2017-03-26 RX ADMIN — IPRATROPIUM BROMIDE 0.5 MG: 0.5 SOLUTION RESPIRATORY (INHALATION) at 07:35

## 2017-03-26 RX ADMIN — SENNOSIDES 8.6 MG: 8.6 TABLET, FILM COATED ORAL at 17:22

## 2017-03-26 RX ADMIN — FUROSEMIDE 40 MG: 10 INJECTION, SOLUTION INTRAMUSCULAR; INTRAVENOUS at 17:22

## 2017-03-26 RX ADMIN — LEVALBUTEROL HYDROCHLORIDE 1.25 MG: 1.25 SOLUTION, CONCENTRATE RESPIRATORY (INHALATION) at 07:35

## 2017-03-26 RX ADMIN — APIXABAN 2.5 MG: 2.5 TABLET, FILM COATED ORAL at 10:47

## 2017-03-26 RX ADMIN — DILTIAZEM HYDROCHLORIDE 180 MG: 180 CAPSULE, COATED, EXTENDED RELEASE ORAL at 10:46

## 2017-03-26 RX ADMIN — GUAIFENESIN 600 MG: 600 TABLET, EXTENDED RELEASE ORAL at 20:40

## 2017-03-26 RX ADMIN — IPRATROPIUM BROMIDE 0.5 MG: 0.5 SOLUTION RESPIRATORY (INHALATION) at 20:54

## 2017-03-26 RX ADMIN — LEVALBUTEROL HYDROCHLORIDE 1.25 MG: 1.25 SOLUTION, CONCENTRATE RESPIRATORY (INHALATION) at 13:46

## 2017-03-26 RX ADMIN — GUAIFENESIN 600 MG: 600 TABLET, EXTENDED RELEASE ORAL at 10:47

## 2017-03-26 RX ADMIN — APIXABAN 2.5 MG: 2.5 TABLET, FILM COATED ORAL at 17:22

## 2017-03-26 RX ADMIN — LEVALBUTEROL HYDROCHLORIDE 1.25 MG: 1.25 SOLUTION, CONCENTRATE RESPIRATORY (INHALATION) at 01:45

## 2017-03-26 RX ADMIN — IPRATROPIUM BROMIDE 0.5 MG: 0.5 SOLUTION RESPIRATORY (INHALATION) at 01:45

## 2017-03-26 RX ADMIN — FERROUS SULFATE TAB 325 MG (65 MG ELEMENTAL FE) 325 MG: 325 (65 FE) TAB at 10:46

## 2017-03-26 RX ADMIN — METOPROLOL SUCCINATE 100 MG: 100 TABLET, FILM COATED, EXTENDED RELEASE ORAL at 10:47

## 2017-03-26 RX ADMIN — FUROSEMIDE 40 MG: 10 INJECTION, SOLUTION INTRAMUSCULAR; INTRAVENOUS at 10:46

## 2017-03-26 RX ADMIN — IPRATROPIUM BROMIDE 0.5 MG: 0.5 SOLUTION RESPIRATORY (INHALATION) at 13:46

## 2017-03-27 PROCEDURE — 94640 AIRWAY INHALATION TREATMENT: CPT

## 2017-03-27 PROCEDURE — 94668 MNPJ CHEST WALL SBSQ: CPT

## 2017-03-27 PROCEDURE — 94760 N-INVAS EAR/PLS OXIMETRY 1: CPT

## 2017-03-27 RX ADMIN — LEVALBUTEROL HYDROCHLORIDE 1.25 MG: 1.25 SOLUTION, CONCENTRATE RESPIRATORY (INHALATION) at 02:24

## 2017-03-27 RX ADMIN — FERROUS SULFATE TAB 325 MG (65 MG ELEMENTAL FE) 325 MG: 325 (65 FE) TAB at 08:01

## 2017-03-27 RX ADMIN — SENNOSIDES 8.6 MG: 8.6 TABLET, FILM COATED ORAL at 16:55

## 2017-03-27 RX ADMIN — IPRATROPIUM BROMIDE 0.5 MG: 0.5 SOLUTION RESPIRATORY (INHALATION) at 07:18

## 2017-03-27 RX ADMIN — GUAIFENESIN 600 MG: 600 TABLET, EXTENDED RELEASE ORAL at 20:08

## 2017-03-27 RX ADMIN — LEVALBUTEROL HYDROCHLORIDE 1.25 MG: 1.25 SOLUTION, CONCENTRATE RESPIRATORY (INHALATION) at 13:53

## 2017-03-27 RX ADMIN — METOPROLOL SUCCINATE 100 MG: 100 TABLET, FILM COATED, EXTENDED RELEASE ORAL at 08:01

## 2017-03-27 RX ADMIN — DILTIAZEM HYDROCHLORIDE 180 MG: 180 CAPSULE, COATED, EXTENDED RELEASE ORAL at 08:04

## 2017-03-27 RX ADMIN — GUAIFENESIN 600 MG: 600 TABLET, EXTENDED RELEASE ORAL at 08:01

## 2017-03-27 RX ADMIN — IPRATROPIUM BROMIDE 0.5 MG: 0.5 SOLUTION RESPIRATORY (INHALATION) at 13:53

## 2017-03-27 RX ADMIN — LEVALBUTEROL HYDROCHLORIDE 1.25 MG: 1.25 SOLUTION, CONCENTRATE RESPIRATORY (INHALATION) at 07:18

## 2017-03-27 RX ADMIN — IPRATROPIUM BROMIDE 0.5 MG: 0.5 SOLUTION RESPIRATORY (INHALATION) at 02:25

## 2017-03-27 RX ADMIN — APIXABAN 2.5 MG: 2.5 TABLET, FILM COATED ORAL at 18:23

## 2017-03-27 RX ADMIN — LEVALBUTEROL HYDROCHLORIDE 1.25 MG: 1.25 SOLUTION, CONCENTRATE RESPIRATORY (INHALATION) at 19:28

## 2017-03-27 RX ADMIN — IPRATROPIUM BROMIDE 0.5 MG: 0.5 SOLUTION RESPIRATORY (INHALATION) at 19:28

## 2017-03-27 RX ADMIN — APIXABAN 2.5 MG: 2.5 TABLET, FILM COATED ORAL at 08:01

## 2017-03-27 RX ADMIN — FUROSEMIDE 40 MG: 10 INJECTION, SOLUTION INTRAMUSCULAR; INTRAVENOUS at 08:01

## 2017-03-27 RX ADMIN — FUROSEMIDE 40 MG: 10 INJECTION, SOLUTION INTRAMUSCULAR; INTRAVENOUS at 16:55

## 2017-03-28 ENCOUNTER — APPOINTMENT (INPATIENT)
Dept: PHYSICAL THERAPY | Facility: HOSPITAL | Age: 82
DRG: 871 | End: 2017-03-28
Payer: MEDICARE

## 2017-03-28 LAB
ANION GAP SERPL CALCULATED.3IONS-SCNC: 6 MMOL/L (ref 4–13)
BASOPHILS # BLD AUTO: 0.04 THOUSANDS/ΜL (ref 0–0.1)
BASOPHILS NFR BLD AUTO: 1 % (ref 0–1)
BUN SERPL-MCNC: 18 MG/DL (ref 5–25)
CALCIUM SERPL-MCNC: 8.7 MG/DL (ref 8.3–10.1)
CHLORIDE SERPL-SCNC: 98 MMOL/L (ref 100–108)
CO2 SERPL-SCNC: 36 MMOL/L (ref 21–32)
CREAT SERPL-MCNC: 0.96 MG/DL (ref 0.6–1.3)
EOSINOPHIL # BLD AUTO: 0.3 THOUSAND/ΜL (ref 0–0.61)
EOSINOPHIL NFR BLD AUTO: 4 % (ref 0–6)
ERYTHROCYTE [DISTWIDTH] IN BLOOD BY AUTOMATED COUNT: 14.6 % (ref 11.6–15.1)
GFR SERPL CREATININE-BSD FRML MDRD: 54.2 ML/MIN/1.73SQ M
GLUCOSE SERPL-MCNC: 84 MG/DL (ref 65–140)
HCT VFR BLD AUTO: 36.4 % (ref 34.8–46.1)
HGB BLD-MCNC: 11.2 G/DL (ref 11.5–15.4)
LYMPHOCYTES # BLD AUTO: 0.84 THOUSANDS/ΜL (ref 0.6–4.47)
LYMPHOCYTES NFR BLD AUTO: 12 % (ref 14–44)
MCH RBC QN AUTO: 27.4 PG (ref 26.8–34.3)
MCHC RBC AUTO-ENTMCNC: 30.8 G/DL (ref 31.4–37.4)
MCV RBC AUTO: 89 FL (ref 82–98)
MONOCYTES # BLD AUTO: 0.75 THOUSAND/ΜL (ref 0.17–1.22)
MONOCYTES NFR BLD AUTO: 11 % (ref 4–12)
NEUTROPHILS # BLD AUTO: 4.88 THOUSANDS/ΜL (ref 1.85–7.62)
NEUTS SEG NFR BLD AUTO: 69 % (ref 43–75)
NRBC BLD AUTO-RTO: 0 /100 WBCS
PLATELET # BLD AUTO: 276 THOUSANDS/UL (ref 149–390)
PMV BLD AUTO: 11.7 FL (ref 8.9–12.7)
POTASSIUM SERPL-SCNC: 3.4 MMOL/L (ref 3.5–5.3)
RBC # BLD AUTO: 4.09 MILLION/UL (ref 3.81–5.12)
SODIUM SERPL-SCNC: 140 MMOL/L (ref 136–145)
WBC # BLD AUTO: 7.05 THOUSAND/UL (ref 4.31–10.16)

## 2017-03-28 PROCEDURE — 97110 THERAPEUTIC EXERCISES: CPT

## 2017-03-28 PROCEDURE — 94640 AIRWAY INHALATION TREATMENT: CPT

## 2017-03-28 PROCEDURE — 85025 COMPLETE CBC W/AUTO DIFF WBC: CPT | Performed by: GENERAL PRACTICE

## 2017-03-28 PROCEDURE — 94668 MNPJ CHEST WALL SBSQ: CPT

## 2017-03-28 PROCEDURE — 97116 GAIT TRAINING THERAPY: CPT

## 2017-03-28 PROCEDURE — 94760 N-INVAS EAR/PLS OXIMETRY 1: CPT

## 2017-03-28 PROCEDURE — 80048 BASIC METABOLIC PNL TOTAL CA: CPT | Performed by: GENERAL PRACTICE

## 2017-03-28 RX ORDER — FUROSEMIDE 40 MG/1
40 TABLET ORAL
Status: DISCONTINUED | OUTPATIENT
Start: 2017-03-28 | End: 2017-03-30 | Stop reason: HOSPADM

## 2017-03-28 RX ORDER — POTASSIUM CHLORIDE 20 MEQ/1
40 TABLET, EXTENDED RELEASE ORAL ONCE
Status: COMPLETED | OUTPATIENT
Start: 2017-03-28 | End: 2017-03-28

## 2017-03-28 RX ORDER — LEVALBUTEROL 1.25 MG/.5ML
1.25 SOLUTION, CONCENTRATE RESPIRATORY (INHALATION)
Status: DISCONTINUED | OUTPATIENT
Start: 2017-03-28 | End: 2017-03-30 | Stop reason: HOSPADM

## 2017-03-28 RX ADMIN — FERROUS SULFATE TAB 325 MG (65 MG ELEMENTAL FE) 325 MG: 325 (65 FE) TAB at 08:04

## 2017-03-28 RX ADMIN — GUAIFENESIN 600 MG: 600 TABLET, EXTENDED RELEASE ORAL at 20:14

## 2017-03-28 RX ADMIN — IPRATROPIUM BROMIDE 0.5 MG: 0.5 SOLUTION RESPIRATORY (INHALATION) at 19:36

## 2017-03-28 RX ADMIN — IPRATROPIUM BROMIDE 0.5 MG: 0.5 SOLUTION RESPIRATORY (INHALATION) at 15:00

## 2017-03-28 RX ADMIN — LEVALBUTEROL HYDROCHLORIDE 1.25 MG: 1.25 SOLUTION, CONCENTRATE RESPIRATORY (INHALATION) at 07:26

## 2017-03-28 RX ADMIN — DILTIAZEM HYDROCHLORIDE 180 MG: 180 CAPSULE, COATED, EXTENDED RELEASE ORAL at 08:04

## 2017-03-28 RX ADMIN — POTASSIUM CHLORIDE 40 MEQ: 1500 TABLET, EXTENDED RELEASE ORAL at 12:06

## 2017-03-28 RX ADMIN — IPRATROPIUM BROMIDE 0.5 MG: 0.5 SOLUTION RESPIRATORY (INHALATION) at 07:26

## 2017-03-28 RX ADMIN — METOPROLOL SUCCINATE 100 MG: 100 TABLET, FILM COATED, EXTENDED RELEASE ORAL at 08:04

## 2017-03-28 RX ADMIN — APIXABAN 2.5 MG: 2.5 TABLET, FILM COATED ORAL at 17:11

## 2017-03-28 RX ADMIN — FUROSEMIDE 40 MG: 40 TABLET ORAL at 17:11

## 2017-03-28 RX ADMIN — APIXABAN 2.5 MG: 2.5 TABLET, FILM COATED ORAL at 08:04

## 2017-03-28 RX ADMIN — FUROSEMIDE 40 MG: 10 INJECTION, SOLUTION INTRAMUSCULAR; INTRAVENOUS at 08:04

## 2017-03-28 RX ADMIN — LEVALBUTEROL HYDROCHLORIDE 1.25 MG: 1.25 SOLUTION, CONCENTRATE RESPIRATORY (INHALATION) at 19:36

## 2017-03-28 RX ADMIN — GUAIFENESIN 600 MG: 600 TABLET, EXTENDED RELEASE ORAL at 08:04

## 2017-03-28 RX ADMIN — LEVALBUTEROL HYDROCHLORIDE 1.25 MG: 1.25 SOLUTION, CONCENTRATE RESPIRATORY (INHALATION) at 15:00

## 2017-03-29 ENCOUNTER — APPOINTMENT (INPATIENT)
Dept: PHYSICAL THERAPY | Facility: HOSPITAL | Age: 82
DRG: 871 | End: 2017-03-29
Payer: MEDICARE

## 2017-03-29 ENCOUNTER — APPOINTMENT (INPATIENT)
Dept: RADIOLOGY | Facility: HOSPITAL | Age: 82
DRG: 871 | End: 2017-03-29
Payer: MEDICARE

## 2017-03-29 LAB
ANION GAP SERPL CALCULATED.3IONS-SCNC: 7 MMOL/L (ref 4–13)
BUN SERPL-MCNC: 24 MG/DL (ref 5–25)
CALCIUM SERPL-MCNC: 9 MG/DL (ref 8.3–10.1)
CHLORIDE SERPL-SCNC: 99 MMOL/L (ref 100–108)
CO2 SERPL-SCNC: 32 MMOL/L (ref 21–32)
CREAT SERPL-MCNC: 1.18 MG/DL (ref 0.6–1.3)
GFR SERPL CREATININE-BSD FRML MDRD: 42.7 ML/MIN/1.73SQ M
GLUCOSE SERPL-MCNC: 149 MG/DL (ref 65–140)
POTASSIUM SERPL-SCNC: 4.2 MMOL/L (ref 3.5–5.3)
SODIUM SERPL-SCNC: 138 MMOL/L (ref 136–145)

## 2017-03-29 PROCEDURE — 94760 N-INVAS EAR/PLS OXIMETRY 1: CPT

## 2017-03-29 PROCEDURE — 97110 THERAPEUTIC EXERCISES: CPT

## 2017-03-29 PROCEDURE — 71020 HB CHEST X-RAY 2VW FRONTAL&LATL: CPT

## 2017-03-29 PROCEDURE — 97116 GAIT TRAINING THERAPY: CPT

## 2017-03-29 PROCEDURE — 94668 MNPJ CHEST WALL SBSQ: CPT

## 2017-03-29 PROCEDURE — 94640 AIRWAY INHALATION TREATMENT: CPT

## 2017-03-29 PROCEDURE — 80048 BASIC METABOLIC PNL TOTAL CA: CPT | Performed by: PHYSICIAN ASSISTANT

## 2017-03-29 RX ADMIN — DILTIAZEM HYDROCHLORIDE 180 MG: 180 CAPSULE, COATED, EXTENDED RELEASE ORAL at 08:05

## 2017-03-29 RX ADMIN — APIXABAN 2.5 MG: 2.5 TABLET, FILM COATED ORAL at 19:06

## 2017-03-29 RX ADMIN — METOPROLOL SUCCINATE 100 MG: 100 TABLET, FILM COATED, EXTENDED RELEASE ORAL at 08:05

## 2017-03-29 RX ADMIN — FUROSEMIDE 40 MG: 40 TABLET ORAL at 08:10

## 2017-03-29 RX ADMIN — GUAIFENESIN 600 MG: 600 TABLET, EXTENDED RELEASE ORAL at 08:05

## 2017-03-29 RX ADMIN — IPRATROPIUM BROMIDE 0.5 MG: 0.5 SOLUTION RESPIRATORY (INHALATION) at 19:56

## 2017-03-29 RX ADMIN — APIXABAN 2.5 MG: 2.5 TABLET, FILM COATED ORAL at 08:05

## 2017-03-29 RX ADMIN — IPRATROPIUM BROMIDE 0.5 MG: 0.5 SOLUTION RESPIRATORY (INHALATION) at 07:14

## 2017-03-29 RX ADMIN — GUAIFENESIN 600 MG: 600 TABLET, EXTENDED RELEASE ORAL at 20:36

## 2017-03-29 RX ADMIN — IPRATROPIUM BROMIDE 0.5 MG: 0.5 SOLUTION RESPIRATORY (INHALATION) at 14:29

## 2017-03-29 RX ADMIN — LEVALBUTEROL HYDROCHLORIDE 1.25 MG: 1.25 SOLUTION, CONCENTRATE RESPIRATORY (INHALATION) at 07:14

## 2017-03-29 RX ADMIN — FERROUS SULFATE TAB 325 MG (65 MG ELEMENTAL FE) 325 MG: 325 (65 FE) TAB at 08:05

## 2017-03-29 RX ADMIN — LEVALBUTEROL HYDROCHLORIDE 1.25 MG: 1.25 SOLUTION, CONCENTRATE RESPIRATORY (INHALATION) at 19:55

## 2017-03-29 RX ADMIN — LEVALBUTEROL HYDROCHLORIDE 1.25 MG: 1.25 SOLUTION, CONCENTRATE RESPIRATORY (INHALATION) at 14:29

## 2017-03-29 RX ADMIN — FUROSEMIDE 40 MG: 40 TABLET ORAL at 16:10

## 2017-03-30 ENCOUNTER — APPOINTMENT (INPATIENT)
Dept: PHYSICAL THERAPY | Facility: HOSPITAL | Age: 82
DRG: 871 | End: 2017-03-30
Payer: MEDICARE

## 2017-03-30 VITALS
SYSTOLIC BLOOD PRESSURE: 128 MMHG | WEIGHT: 154.98 LBS | OXYGEN SATURATION: 90 % | DIASTOLIC BLOOD PRESSURE: 70 MMHG | TEMPERATURE: 97.9 F | HEIGHT: 63 IN | HEART RATE: 92 BPM | RESPIRATION RATE: 18 BRPM | BODY MASS INDEX: 27.46 KG/M2

## 2017-03-30 PROCEDURE — 97110 THERAPEUTIC EXERCISES: CPT

## 2017-03-30 PROCEDURE — 94760 N-INVAS EAR/PLS OXIMETRY 1: CPT

## 2017-03-30 PROCEDURE — 94640 AIRWAY INHALATION TREATMENT: CPT

## 2017-03-30 PROCEDURE — 94668 MNPJ CHEST WALL SBSQ: CPT

## 2017-03-30 RX ORDER — DILTIAZEM HYDROCHLORIDE 180 MG/1
180 CAPSULE, COATED, EXTENDED RELEASE ORAL DAILY
Qty: 30 CAPSULE | Refills: 0 | Status: SHIPPED | OUTPATIENT
Start: 2017-03-30 | End: 2018-05-29

## 2017-03-30 RX ORDER — FUROSEMIDE 40 MG/1
40 TABLET ORAL
Qty: 60 TABLET | Refills: 0 | Status: SHIPPED | OUTPATIENT
Start: 2017-03-30 | End: 2018-10-27 | Stop reason: SDUPTHER

## 2017-03-30 RX ADMIN — APIXABAN 2.5 MG: 2.5 TABLET, FILM COATED ORAL at 09:23

## 2017-03-30 RX ADMIN — LEVALBUTEROL HYDROCHLORIDE 1.25 MG: 1.25 SOLUTION, CONCENTRATE RESPIRATORY (INHALATION) at 07:40

## 2017-03-30 RX ADMIN — IPRATROPIUM BROMIDE 0.5 MG: 0.5 SOLUTION RESPIRATORY (INHALATION) at 07:40

## 2017-03-30 RX ADMIN — FERROUS SULFATE TAB 325 MG (65 MG ELEMENTAL FE) 325 MG: 325 (65 FE) TAB at 07:43

## 2017-03-30 RX ADMIN — METOPROLOL SUCCINATE 100 MG: 100 TABLET, FILM COATED, EXTENDED RELEASE ORAL at 09:23

## 2017-03-30 RX ADMIN — DILTIAZEM HYDROCHLORIDE 180 MG: 180 CAPSULE, COATED, EXTENDED RELEASE ORAL at 09:22

## 2017-03-30 RX ADMIN — GUAIFENESIN 600 MG: 600 TABLET, EXTENDED RELEASE ORAL at 09:23

## 2017-03-30 RX ADMIN — FUROSEMIDE 40 MG: 40 TABLET ORAL at 07:43

## 2017-04-07 ENCOUNTER — ALLSCRIPTS OFFICE VISIT (OUTPATIENT)
Dept: OTHER | Facility: OTHER | Age: 82
End: 2017-04-07

## 2017-04-08 ENCOUNTER — ALLSCRIPTS OFFICE VISIT (OUTPATIENT)
Dept: OTHER | Facility: OTHER | Age: 82
End: 2017-04-08

## 2017-04-11 ENCOUNTER — GENERIC CONVERSION - ENCOUNTER (OUTPATIENT)
Dept: OTHER | Facility: OTHER | Age: 82
End: 2017-04-11

## 2017-04-14 ENCOUNTER — GENERIC CONVERSION - ENCOUNTER (OUTPATIENT)
Dept: OTHER | Facility: OTHER | Age: 82
End: 2017-04-14

## 2017-04-20 ENCOUNTER — ALLSCRIPTS OFFICE VISIT (OUTPATIENT)
Dept: OTHER | Facility: OTHER | Age: 82
End: 2017-04-20

## 2017-05-01 ENCOUNTER — ALLSCRIPTS OFFICE VISIT (OUTPATIENT)
Dept: OTHER | Facility: OTHER | Age: 82
End: 2017-05-01

## 2017-05-09 ENCOUNTER — ALLSCRIPTS OFFICE VISIT (OUTPATIENT)
Dept: OTHER | Facility: OTHER | Age: 82
End: 2017-05-09

## 2017-05-22 ENCOUNTER — HOSPITAL ENCOUNTER (OUTPATIENT)
Dept: RADIOLOGY | Facility: HOSPITAL | Age: 82
Discharge: HOME | End: 2017-05-22
Attending: INTERNAL MEDICINE
Payer: MEDICARE

## 2017-05-22 ENCOUNTER — TRANSCRIBE ORDERS (OUTPATIENT)
Dept: ADMINISTRATIVE | Facility: HOSPITAL | Age: 82
End: 2017-05-22

## 2017-05-22 DIAGNOSIS — J18.9 PNEUMONIA: ICD-10-CM

## 2017-05-22 PROCEDURE — 71020 HB CHEST X-RAY 2VW FRONTAL&LATL: CPT

## 2017-05-23 ENCOUNTER — GENERIC CONVERSION - ENCOUNTER (OUTPATIENT)
Dept: OTHER | Facility: OTHER | Age: 82
End: 2017-05-23

## 2017-05-30 ENCOUNTER — ALLSCRIPTS OFFICE VISIT (OUTPATIENT)
Dept: OTHER | Facility: OTHER | Age: 82
End: 2017-05-30

## 2017-08-10 ENCOUNTER — LAB REQUISITION (OUTPATIENT)
Dept: LAB | Facility: HOSPITAL | Age: 82
End: 2017-08-10
Payer: MEDICARE

## 2017-08-10 DIAGNOSIS — L98.9 DISORDER OF SKIN OR SUBCUTANEOUS TISSUE: ICD-10-CM

## 2017-08-10 PROCEDURE — 88305 TISSUE EXAM BY PATHOLOGIST: CPT | Performed by: DERMATOLOGY

## 2017-08-23 ENCOUNTER — GENERIC CONVERSION - ENCOUNTER (OUTPATIENT)
Dept: OTHER | Facility: OTHER | Age: 82
End: 2017-08-23

## 2017-08-31 ENCOUNTER — LAB REQUISITION (OUTPATIENT)
Dept: LAB | Facility: HOSPITAL | Age: 82
End: 2017-08-31
Payer: MEDICARE

## 2017-08-31 ENCOUNTER — ALLSCRIPTS OFFICE VISIT (OUTPATIENT)
Dept: OTHER | Facility: OTHER | Age: 82
End: 2017-08-31

## 2017-08-31 DIAGNOSIS — C44.722 SQUAMOUS CELL CARCINOMA OF SKIN OF RIGHT LOWER LIMB, INCLUDING HIP: ICD-10-CM

## 2017-08-31 PROCEDURE — 88305 TISSUE EXAM BY PATHOLOGIST: CPT | Performed by: DERMATOLOGY

## 2017-09-10 ENCOUNTER — GENERIC CONVERSION - ENCOUNTER (OUTPATIENT)
Dept: OTHER | Facility: OTHER | Age: 82
End: 2017-09-10

## 2017-10-24 ENCOUNTER — LAB REQUISITION (OUTPATIENT)
Dept: LAB | Facility: HOSPITAL | Age: 82
End: 2017-10-24
Payer: MEDICARE

## 2017-10-24 DIAGNOSIS — C44.722 SQUAMOUS CELL CARCINOMA OF SKIN OF RIGHT LOWER LIMB, INCLUDING HIP: ICD-10-CM

## 2017-10-24 PROCEDURE — 88305 TISSUE EXAM BY PATHOLOGIST: CPT | Performed by: DERMATOLOGY

## 2017-10-31 ENCOUNTER — GENERIC CONVERSION - ENCOUNTER (OUTPATIENT)
Dept: OTHER | Facility: OTHER | Age: 82
End: 2017-10-31

## 2018-01-12 NOTE — RESULT NOTES
PFT Results v2:   Diagnosis/Reason For Study: Cough   Referring Provider: Dr Shannon Puckett   Spirometry: Forced vital capacity: 1 31L and 78% Predicted Values  Forced expiratory volume in one second: 0 80L and 66% Predicted Value  FEV1/FVC ratio is 85% Predicted Values  F EF 25-75 percent, 0 42 L, 74% predicted    Post Bronchodilator Spirometry: Forced vital capacity : 1 42L and 85% Predicted Values  Forced expiratory volume in one second : 1 01L and 83% Predicted Value  FEV1/FVC ratio is 99% Predicted Values  F EF 25-75 percent, 0 77 L, 135% predicted    Lung Volumes: Total lung capacity : 3 24L and 70% Predicted Values  RV: 76% Predicted Values  RV/T% Predicted Values  DLCO:   DLCO 53% Predicted Values  DLCO corrected for volume is 89%    PFT Interpretation:   Patient had a full lung function testing with spirometry lung volumes and DLCO  Patient gave a good effort  Results meet the ATS standards for acceptability and repeat ability  The flow volume curve mainly demonstrates a restrictive pattern  There is a moderate restrictive ventilatory limitation with decreased FVC and FEV1 and a normal FEV1 over FVC ratio and decreased lung volumes  The total lung capacity is moderately decreased  There is also mild small airway obstructive ventilatory limitation with an appreciable response to the bronchodilators  The DLCO corrected for volume is normal   Restrictive ventilatory limitation consider CT of the chest  Small airway obstructive ventilatory limitation with response to the bronchodilators consistent with asthma  Clinical correlation is required  Future Appointments    Date/Time Provider Specialty Site   08/10/2016 11:00 AM SHERRY Sahni  Pulmonary Medicine [facility]   2016 10:30 AM SHERRY Jeffries  Internal Medicine [facility]   2016 12:05 PM SHERRY Chavez   Dermatology [facility]      Electronically signed by : SHERRY Whaley ; Jul 28 2016  7:12PM EST                       (Author)

## 2018-01-12 NOTE — RESULT NOTES
Verified Results  (1) TISSUE EXAM [id number] 12:33PM Gabriella Lutz Order Number: [id number]     Test Name Result Flag Reference   LAB AP CASE REPORT (Report)     Surgical Pathology Report             Case: [id number]                   Authorizing Provider: Stefania Delatorre MD     Collected:      10/24/2017 1233        Pathologist:      Lucie Zambrano MD        Received:      10/25/2017 1424        Specimen:  Skin, Other, right lower leg   LAB AP FINAL DIAGNOSIS      A  Skin, right lower leg, shave biopsy:  - Granulation tissue with surface ulceration  Interpretation performed at [facility], [address] [place] [address], [address]        Electronically signed by Lucie Zambrano MD on 10/31/2017 at 3:38 PM   LAB AP SURGICAL ADDITIONAL INFORMATION (Report)     All controls performed with the immunohistochemical stains reported above   reacted appropriately  These tests were developed and their performance   characteristics determined by [facility] or   [employer]  They may not be cleared or approved by the [place] S  Food and Drug Administration  The FDA has determined that such clearance   or approval is not necessary  These tests are used for clinical purposes  They should not be regarded as investigational or for research  This   laboratory has been approved by [facility] 88, designated as a high-complexity   laboratory and is qualified to perform these tests  LAB AP GROSS DESCRIPTION (Report)     A  The specimen is received in formalin, labeled with the patient's name   and hospital number, and is designated right lower leg, is a shave   biopsy of skin measuring 1 5 x 1 4 x 0 2 cm  The epidermal surface is   tan-brown, nodular, and raised  The resection margin is inked green  The   specimen is sectioned and entirely submitted with two pieces per cassette  Two cassettes      Note: The estimated total formalin fixation time based upon information   provided by the submitting clinician and the standard processing schedule   is over 72 hours        RL[facility]   LAB AP CLINICAL INFORMATION      TW Order Number: [id number]    R/O recurrent SCC and excess granulation tissue

## 2018-01-13 VITALS
SYSTOLIC BLOOD PRESSURE: 114 MMHG | DIASTOLIC BLOOD PRESSURE: 70 MMHG | HEART RATE: 96 BPM | WEIGHT: 132.13 LBS | OXYGEN SATURATION: 91 % | BODY MASS INDEX: 23.41 KG/M2 | HEIGHT: 63 IN

## 2018-01-13 VITALS
SYSTOLIC BLOOD PRESSURE: 118 MMHG | DIASTOLIC BLOOD PRESSURE: 70 MMHG | HEART RATE: 94 BPM | BODY MASS INDEX: 23.05 KG/M2 | WEIGHT: 130.13 LBS | OXYGEN SATURATION: 91 %

## 2018-01-13 VITALS
SYSTOLIC BLOOD PRESSURE: 122 MMHG | OXYGEN SATURATION: 95 % | HEART RATE: 80 BPM | WEIGHT: 128.13 LBS | DIASTOLIC BLOOD PRESSURE: 72 MMHG | HEIGHT: 63 IN | BODY MASS INDEX: 22.7 KG/M2

## 2018-01-14 VITALS
RESPIRATION RATE: 18 BRPM | SYSTOLIC BLOOD PRESSURE: 122 MMHG | WEIGHT: 127.38 LBS | HEART RATE: 88 BPM | DIASTOLIC BLOOD PRESSURE: 70 MMHG | TEMPERATURE: 97.5 F | OXYGEN SATURATION: 92 % | BODY MASS INDEX: 22.56 KG/M2

## 2018-01-14 VITALS
WEIGHT: 132.13 LBS | SYSTOLIC BLOOD PRESSURE: 116 MMHG | BODY MASS INDEX: 23.41 KG/M2 | DIASTOLIC BLOOD PRESSURE: 78 MMHG | TEMPERATURE: 98.3 F | HEART RATE: 80 BPM

## 2018-01-14 VITALS
HEIGHT: 63 IN | DIASTOLIC BLOOD PRESSURE: 68 MMHG | BODY MASS INDEX: 23.25 KG/M2 | SYSTOLIC BLOOD PRESSURE: 130 MMHG | HEART RATE: 80 BPM | WEIGHT: 131.19 LBS

## 2018-01-15 NOTE — PROGRESS NOTES
History of Present Illness  Care Coordination Encounter Information:   Type of Encounter: Telephonic   Contact: Initial Contact    Spoke to Patient  Care Coordination SL Nurse Sourav Graf:   The reason for call is to discuss outreach for follow up/needed services and coordination of meeting care plan treatment goals  Patient s/p discharge on 3/30/17 with diagnosis of CHF and Influenza A  Patient alert and in stable condition  Denied pain or discomfort  Stated that she is continuing to take nebulizer 3x of day as ordered  She has been walking better and getting stronger each day  Denied cough or SOB  Stated that she has a good support system at home  Does not have any questions at this time  Attempted to give patient contact information in case any questions or concerns may arise; patient then stated that wont be necessary and then hung up the phone  Active Problems    1  Anticoagulated by anticoagulation treatment (V58 61) (Z79 01)   2  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)   3  Asthmatic bronchitis, severe persistent, with acute exacerbation (493 92) (J45 51)   4  Atrophic vulvovaginitis (627 3) (N95 2)   5  Chronic kidney disease, stage 3 (585 3) (N18 3)   6  Cough variant asthma (493 82) (J45 991)   7  Diastolic congestive heart failure (428 30,428 0) (I50 30)   8  Encounter for screening for other nervous system disorder (V80 09) (Z13 858)   9  Essential (primary) hypertension (401 9) (I10)   10  Gait instability (781 2) (R26 81)   11  History of atrial fibrillation (V12 59) (Z86 79)   12  Inflamed seborrheic keratosis (702 11) (L82 0)   13  Iron deficiency anemia due to chronic blood loss (280 0) (D50 0)   14  Lichen sclerosus et atrophicus (701 0) (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 0)   15  Lung density on x-ray (518 89) (J98 4)   16  Mitral valve insufficiency, acquired (424 0) (I34 0)   17  Need for influenza vaccination (V04 81) (Z23)   18  Other nonspecific abnormal finding of lung field (793 19) (R91 8)   19   Positive H  pylori test (041 86) (A04 8)   20  Postmenopausal bleeding (627 1) (N95 0)   21  Screening for genitourinary condition (V81 6) (Z13 89)   22  Screening for neurological condition (V80 09) (Z13 89)   23  Screening for skin condition (V82 0) (Z13 89)   24  Seborrheic keratosis (702 19) (L82 1)   25  Tricuspid regurgitation (397 0) (I07 1)   26  Urine frequency (788 41) (R35 0)   27  Vertigo (780 4) (R42)    Past Medical History    1  History of Cough (786 2) (R05)   2  H/O nonmelanoma skin cancer (V10 83) (Z98 104)   3  History of atrial fibrillation (V12 59) (Z86 79)   4  History of Hypertension (401 9) (I10)   5  History of Knee pain, left (719 46) (M25 562)   6  History of Spontaneous vaginal delivery (650) (O80)   7  History of Urinary tract infection (599 0) (N39 0)   8  History of Vaginitis (616 10) (N76 0)   9  History of Wound infection (958 3) (T14 8,L08 9)    Surgical History    1  History of Appendectomy   2  History of Hysterectomy    Family History  Mother    1  Family history of Heart abnormality (746 9) (Q24 9)  Father    2  Family history of Heart abnormality (746 9) (Q24 9)    Social History    · Never a smoker   · Not currently sexually active    Current Meds    1  Ferrous Sulfate 325 (65 Fe) MG Oral Tablet; take 1 tablet twice a day; Therapy: 07WND6352 to (Last Rx:10Mar2016)  Requested for: 20Mar2017; Status: ACTIVE   - Renewal Denied Ordered    2  Ipratropium-Albuterol 0 5-2 5 (3) MG/3ML Inhalation Solution; USE 1 VIAL 3 TIMES A DAY   AND AS NEEDED; Therapy: 65JXE1859 to (Last Rx:07Apr2017)  Requested for: 07Apr2017 Ordered   3  Nebulizer Device; USE AS DIRECTED; Therapy: 95FFW0509 to (Last Rx:07Apr2017) Ordered   4  Nebulizer/Tubing/Mouthpiece KIT; USE AS DIRECTED; Therapy: 08UUX9493 to (Last Rx:07Apr2017) Ordered    5  Furosemide 40 MG Oral Tablet; TAKE 1 TABLET TWICE DAILY; Therapy: 86DTY2780 to (Evaluate:92Btz1366)  Requested for: 22YMQ5849; Last   Rx:24Mar2017 Ordered    6   AmLODIPine Besylate 5 MG Oral Tablet; TAKE 1 TABLET DAILY; Therapy: 65Scr0680 to (Evaluate:02Oct2017)  Requested for: 07Oct2016; Last   Rx:07Oct2016 Ordered    7  DilTIAZem HCl ER Coated Beads 120 MG Oral Capsule Extended Release 24 Hour;   TAKE 1 CAPSULE BY MOUTH EVERY DAY; Therapy: 92IAN6781 to (Evaluate:21May2017)  Requested for: 17Ora0232; Last   Rx:65Lqh4849 Ordered   8  Metoprolol Succinate  MG Oral Tablet Extended Release 24 Hour; TAKE 2    TABLETS DAILY; Therapy: 68Vhg7524 to (Evaluate:08Apr2017)  Requested for: 07Apr2017; Last   Rx:07Apr2017 Ordered    9  Eliquis 2 5 MG Oral Tablet; bid; Therapy: 76BNY6273 to (Last Rx:07Apr2017)  Requested for: 07Apr2017 Ordered    Allergies    1  Lactose   2  Penicillins    End of Encounter Meds    1  Ferrous Sulfate 325 (65 Fe) MG Oral Tablet; take 1 tablet twice a day; Therapy: 63YAY3977 to (Last Rx:10Mar2016)  Requested for: 20Mar2017; Status: ACTIVE   - Renewal Denied Ordered    2  Ipratropium-Albuterol 0 5-2 5 (3) MG/3ML Inhalation Solution; USE 1 VIAL 3 TIMES A DAY   AND AS NEEDED; Therapy: 41GRC2099 to (Last Rx:07Apr2017)  Requested for: 07Apr2017 Ordered   3  Nebulizer Device; USE AS DIRECTED; Therapy: 43WWE8205 to (Last Rx:07Apr2017) Ordered   4  Nebulizer/Tubing/Mouthpiece KIT; USE AS DIRECTED; Therapy: 22OCD7385 to (Last Rx:07Apr2017) Ordered    5  Furosemide 40 MG Oral Tablet; TAKE 1 TABLET TWICE DAILY; Therapy: 29HAY4162 to (Evaluate:28Nzg3546)  Requested for: 70VLW7056; Last   Rx:24Mar2017 Ordered    6  AmLODIPine Besylate 5 MG Oral Tablet; TAKE 1 TABLET DAILY; Therapy: 73Gaf6593 to (Evaluate:02Oct2017)  Requested for: 07Oct2016; Last   Rx:07Oct2016 Ordered    7  DilTIAZem HCl ER Coated Beads 120 MG Oral Capsule Extended Release 24 Hour;   TAKE 1 CAPSULE BY MOUTH EVERY DAY; Therapy: 69JOW4939 to (Evaluate:21May2017)  Requested for: 47Fxr1783; Last   Rx:77Xrr3437 Ordered   8   Metoprolol Succinate  MG Oral Tablet Extended Release 24 Hour; TAKE 2    TABLETS DAILY; Therapy: 15Dam6811 to (Evaluate:08Apr2017)  Requested for: 07Apr2017; Last   Rx:07Apr2017 Ordered    9  Eliquis 2 5 MG Oral Tablet; bid; Therapy: 89UGA1869 to (Last Rx:07Apr2017)  Requested for: 07Apr2017 Ordered    Future Appointments    Date/Time Provider Specialty Site   04/20/2017 11:00 AM SHERRY Huang  Pulmonary Medicine [facility] PULMON[facility]   09/06/2017 10:45 AM SHERRY Glynn  Dermatology [address]     Patient Care Team    Care Team Member Role Specialty Office Number   Marta ROWE  Dermatology [phone]   Wallace ROWE    Pulmonary Medicine [phone]     Signatures   Electronically signed by : Yasmin Joyner RN; Apr 14 2017 10:48AM EST                       (Author)

## 2018-01-16 NOTE — MISCELLANEOUS
Message  Return to work or school:  07/08/2016   She is able to return to work on  07/23/2016    She is able to perform activities of daily living without limitations  , She can perform work without limitations     Valdez Wang MD         Signatures   Electronically signed by : SHERRY Zazueta ; Jul 15 2016  5:36PM EST                       (Author)

## 2018-01-17 NOTE — RESULT NOTES
Verified Results  (1) CBC/PLT/DIFF 28Apr2016 09:53AM Noel Chávezidan Arm Order Number: [id number]    TW Order Number: [id number]     Test Name Result Flag Reference   WBC COUNT 6 43 Thousand/uL  4 31-10 16   RBC COUNT 4 84 Million/uL  3 81-5 12   HEMOGLOBIN 12 2 g/dL  11 5-15 4   HEMATOCRIT 40 5 %  34 8-46  1   MCV 84 fL  82-98   MCH 25 2 pg L 26 8-34 3   MCHC 30 1 g/dL L 31 4-37 4   RDW 20 7 % H 11 6-15 1   PLATELET COUNT 687 Thousands/uL  149-390   nRBC AUTOMATED 0 /100 WBCs     NEUTROPHILS RELATIVE PERCENT 72 %  43-75   LYMPHOCYTES RELATIVE PERCENT 13 % L 14-44   MONOCYTES RELATIVE PERCENT 11 %  4-12   EOSINOPHILS RELATIVE PERCENT 3 %  0-6   BASOPHILS RELATIVE PERCENT 1 %  0-1   NEUTROPHILS ABSOLUTE COUNT 4 64 Thousands/µL  1 85-7 62   LYMPHOCYTES ABSOLUTE COUNT 0 84 Thousands/µL  0 60-4 47   MONOCYTES ABSOLUTE COUNT 0 69 Thousand/µL  0 17-1 22   EOSINOPHILS ABSOLUTE COUNT 0 19 Thousand/µL  0 00-0 61   BASOPHILS ABSOLUTE COUNT 0 05 Thousands/µL  0 00-0 10     (1) FERRITIN 28Apr2016 09:53AM Cordnoe [place] Arm Order Number: [id number]    TW Order Number: [id number]     Test Name Result Flag Reference   FERRITIN 50 ng/mL  3-507

## 2018-01-17 NOTE — RESULT NOTES
Verified Results  (1) TISSUE EXAM [id number] 03:07PM Daysi Palomino Order Number: [id number]     Test Name Result Flag Reference   LAB AP CASE REPORT (Report)     Surgical Pathology Report             Case: [id number]                   Authorizing Provider: Wan Dickens MD     Collected:      08/31/2017 1507        Pathologist:      Kaleigh Baca MD        Received:      09/02/2017 7678        Specimen:  Skin, Other, R lower leg   LAB AP FINAL DIAGNOSIS (Report)     A  Skin, R lower leg, excision:  - Prior biopsy site reaction; atypical squamous proliferation   - Inked margins with no tumor seen  Note: See [id number]  Interpretation performed at [facility], [address] [place] [address], [address] [place] [address] Drive        Electronically signed by Kaleigh Baca MD on 9/8/2017 at 3:50 PM   LAB AP SURGICAL ADDITIONAL INFORMATION (Report)     All controls performed with the immunohistochemical stains reported above   reacted appropriately  These tests were developed and their performance   characteristics determined by [facility] or   [facility]  They may not be cleared or approved by the [place]  Food and Drug Administration  The FDA has determined that such clearance   or approval is not necessary  These tests are used for clinical purposes  They should not be regarded as investigational or for research  This   laboratory has been approved by CLIA 88, designated as a high-complexity   laboratory and is qualified to perform these tests  LAB AP GROSS DESCRIPTION (Report)     A  The specimen is received in formalin, labeled with the patient's name   and hospital number, and is designated right lower leg SCC check   margins  The specimen consists of a tan somewhat oval portion of skin   measuring 1 8 x 1 6 cm, excised to a depth of 0 1 cm  The specimen is   marked by one suture which according to the requisition slip indicates   12:00   The specimen is oriented and inked as follows: 12-3:00 yellow,   3-6:00 green, 6-9:00 orange, 9-12:00 blue  The skin surface appears   keratotic; no discrete lesion is seen  The specimen is sectioned in a 12   to 6:00 progression revealing somewhat firm tan cut surfaces  Entirely   submitted  Three cassettes  12 and 6:00 edges in cassette 1, en face;   center sequentially submitted in cassettes 2-3, 2 pieces in each cassette  Note: The estimated total formalin fixation time based upon information   provided by the submitting clinician and the standard processing schedule   is over 72 hours      [facility]   LAB AP CLINICAL INFORMATION       Order Number: [id number]

## 2018-01-17 NOTE — RESULT NOTES
Verified Results  (1) TISSUE EXAM [id number] 03:18PM Kayla Azevedo Order Number: [id number]     Test Name Result Flag Reference   LAB AP CASE REPORT (Report)     Surgical Pathology Report             Case: [id number]                   Authorizing Provider: Abilio Camarena MD     Collected:      08/10/2017 1518        Pathologist:      Samual Ormond, MD      Received:      08/12/2017 0954        Specimen:  Skin, Other, r LOWER LEG   LAB AP FINAL DIAGNOSIS      A  Skin, right lower leg, shave biopsy:  - Invasive squamous cell carcinoma, well-differentiated, present at the   peripheral border   and base of biopsy  Electronically signed by Samual Ormond, MD on 8/18/2017 at 8:33 PM   LAB AP SURGICAL ADDITIONAL INFORMATION (Report)     All controls performed with the immunohistochemical stains reported above   reacted appropriately  These tests were developed and their performance   characteristics determined by [facility] or   [facility]  They may not be cleared or approved by the [place]  Food and Drug Administration  The FDA has determined that such clearance   or approval is not necessary  These tests are used for clinical purposes  They should not be regarded as investigational or for research  This   laboratory has been approved by [place] 88, designated as a high-complexity   laboratory and is qualified to perform these tests  LAB AP GROSS DESCRIPTION (Report)     A  The specimen is received in formalin, labeled with the patient's name   and hospital number, and is designated right lower leg  The specimen   consists of a tan portion of skin measuring 0 8 x 0 7 x 0 1 cm  The   epithelial surface exhibits a tan-brown mottled papule measuring 0 6 x 0 5   x 0 2 cm which abuts the nearest peripheral margin  The apparent margin of   resection is painted with green ink   Entirely submitted in 2 cassettes   with the ends of resection in cassette 1 and the center bisected in   cassette 2 with the papule in both cassettes  Note: The estimated total formalin fixation time based upon information   provided by the submitting clinician and the standard processing schedule   is over 72 hours   [facility]   LAB AP CLINICAL INFORMATION       Order Number: [id number]  Question of verruca R/O atypia

## 2018-01-17 NOTE — MISCELLANEOUS
Assessment    1  Asthmatic bronchitis, severe persistent, with acute exacerbation (493 92) (J45 51)    Plan  Asthmatic bronchitis, severe persistent, with acute exacerbation    · Ipratropium-Albuterol 0 5-2 5 (3) MG/3ML Inhalation Solution; USE 1 VIAL 3 TIMES A  DAY AND AS NEEDED   Rx By: Valerie Bueno; Dispense: 0 Days ; #:2 X 3 ML Plas Cont (60 Plas Conts); Refill: 2; For: Asthmatic bronchitis, severe persistent, with acute exacerbation; MARKO = N; Print Rx   · Nebulizer Device; USE AS DIRECTED   Rx By: Valerie Bueno; Dispense: 0 Days ; #:1 Device; Refill: 0; For: Asthmatic bronchitis, severe persistent, with acute exacerbation; MARKO = N; Print Rx   · Nebulizer/Tubing/Mouthpiece KIT; USE AS DIRECTED   Rx By: Valerie Bueno; Dispense: 0 Days ; #:1 Kit; Refill: 0; For: Asthmatic bronchitis, severe persistent, with acute exacerbation; MARKO = N; Print Rx   · Respiratory Equipment Nebulizer; Status:Complete;   Done: [id number] 12:58PM   Perform:Not Applicable; EOC:[id number];FHOOSUP; For:Asthmatic bronchitis, severe persistent, with acute exacerbation; Ordered By:Aleena Zimmerman;  Health Maintenance    · Metoprolol Succinate  MG Oral Tablet Extended Release 24 Hour; TAKE  2  TABLETS DAILY   Rx By: Valerie Bueno; Dispense: 1 Days ; #:1 Tablet Extended Release 24 Hour; Refill: 0; For: Health Maintenance; MARKO = N; Verified Transmission to [facility] #[id number]; Msg to Pharmacy: This prescription is being sent to document dose change; Last Updated By: System, [employer]; 4/7/2017 12:57:57 PM  PMH: History of atrial fibrillation    · From  Eliquis 2 5 MG Oral Tablet Take 1 tablet twice daily To Eliquis 2 5 MG  Oral Tablet bid   Rx By: Valerie Bueno; Dispense: 0 Days ; #:180 Tablet; Refill: 3; For: PMH: History of atrial fibrillation; MARKO = N; Print Rx    Discussion/Summary  Discussion Summary:   Greatly improved    Reduce metoprolol to 2 tablets daily  I think she would benefit from nebulizer treatment with ipratropium plus albuterol 3 times a day  Follow his pulmonary is recommended  See me back here again in about a month  Come in tomorrow for an appointment with Katrin to demonstrate use of the nebulizer year  History of Present Illness  TCM Communication [facility]: The patient is being contacted for follow-up after hospitalization  She was hospitalized at [facility]  The date of admission: 3/20/17, date of discharge: 3/30/17  Diagnosis: influenza, CHF exac  She was discharged to home, with home health services  Medications reviewed and updated today  The patient is currently asymptomatic  Communication performed and completed by   HPI: Transition of care visit  A 80year-old was admitted to the hospital with dyspnea and wheezing  Found to have bronchitis, bronchopneumonia, concomitant exacerbation of CHF  Treated with antibiotics steroids and nebulizers  Discharged to home  Discharge diagnoses were   Influenza A less likely RLL pneumonia   Acute hypoxemic respiratory insufficiency due to Influenza A and acute on chronic diastolic CHF   Acute on chronic diastolic CHF exacerbation    HTN: Blood pressure stable    Ancillary testing documented:     Echocardiogram: EF 50% Aortic valve exhibiting sclerosis   Chest xray 3/20/17: RLL infiltrate with partial consolidation    CT chest: small bilateral pleural effusions with likely bibasilar compressive atelectasis    Chest xray 3/24/17: Bibasilar opacities with possible effusions and atelectasis  Mild pulmonary interstitial edema   Chest xray 3/29/17: patchy density RLL nearly resolved  Interstitial prominence mild congestion vs hypoinflation  Review of Systems  Complete-Female:   Constitutional: feeling poorly  ENT: no nasal discharge  Cardiovascular: no chest pain  Respiratory: cough, but as noted in HPI  Gastrointestinal: no abdominal pain  Musculoskeletal: arthralgias  Integumentary: no rashes  Neurological: no headache     Psychiatric: no anxiety and no depression  Active Problems    1  Anticoagulated by anticoagulation treatment (V58 61) (Z79 01)   2  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)   3  Asthmatic bronchitis, severe persistent, with acute exacerbation (493 92) (J45 51)   4  Atrophic vulvovaginitis (627 3) (N95 2)   5  Chronic kidney disease, stage 3 (585 3) (N18 3)   6  Cough variant asthma (493 82) (J45 991)   7  Diastolic congestive heart failure (428 30,428 0) (I50 30)   8  Encounter for screening for other nervous system disorder (V80 09) (Z13 858)   9  Essential (primary) hypertension (401 9) (I10)   10  Gait instability (781 2) (R26 81)   11  History of atrial fibrillation (V12 59) (Z86 79)   12  Inflamed seborrheic keratosis (702 11) (L82 0)   13  Iron deficiency anemia due to chronic blood loss (280 0) (D50 0)   14  Lichen sclerosus et atrophicus (701 0) (L90 0)   15  Lung density on x-ray (518 89) (J98 4)   16  Mitral valve insufficiency, acquired (424 0) (I34 0)   17  Need for influenza vaccination (V04 81) (Z23)   18  Other nonspecific abnormal finding of lung field (793 19) (R91 8)   19  Positive H  pylori test (041 86) (A04 8)   20  Postmenopausal bleeding (627 1) (N95 0)   21  Screening for genitourinary condition (V81 6) (Z13 89)   22  Screening for neurological condition (V80 09) (Z13 89)   23  Screening for skin condition (V82 0) (Z13 89)   24  Seborrheic keratosis (702 19) (L82 1)   25  Tricuspid regurgitation (397 0) (I07 1)   26  Urine frequency (788 41) (R35 0)   27  Vertigo (780 4) (R42)    Past Medical History    1  History of Cough (786 2) (R05)   2  H/O nonmelanoma skin cancer (V10 83) (A83 259)   3  History of atrial fibrillation (V12 59) (Z86 79)   4  History of Hypertension (401 9) (I10)   5  History of Knee pain, left (719 46) (M25 562)   6  History of Spontaneous vaginal delivery (650) (O80)   7  History of Urinary tract infection (599 0) (N39 0)   8  History of Vaginitis (616 10) (N76 0)   9   History of Wound infection (958 3) (T14 8,L08 9)    Surgical History    1  History of Appendectomy   2  History of Hysterectomy    Family History  Mother    1  Family history of Heart abnormality (746 9) (Q24 9)  Father    2  Family history of Heart abnormality (746 9) (Q24 9)  Family History Reviewed: The family history was reviewed and updated today  Social History    · Never a smoker   · Not currently sexually active    Current Meds   1  AmLODIPine Besylate 5 MG Oral Tablet; TAKE 1 TABLET DAILY; Therapy: 14Cxe1663 to (Evaluate:02Oct2017)  Requested for: 92YBT0543; Last   Rx:07Oct2016 Ordered   2  DilTIAZem HCl ER Coated Beads 120 MG Oral Capsule Extended Release 24 Hour;   TAKE 1 CAPSULE BY MOUTH EVERY DAY; Therapy: 02SEG9546 to (Evaluate:21May2017)  Requested for: 20Feb2017; Last   Rx:20Feb2017 Ordered   3  Eliquis 2 5 MG Oral Tablet; Take 1 tablet twice daily; Therapy: 08PMX0284 to ([phone])  Requested for: 71IZV9536; Last   Rx:01Jul2016 Ordered   4  Ferrous Sulfate 325 (65 Fe) MG Oral Tablet; take 1 tablet twice a day; Therapy: 60BZV9987 to (Last Rx:10Mar2016)  Requested for: 20Mar2017; Status: ACTIVE   - Renewal Denied Ordered   5  Furosemide 40 MG Oral Tablet; TAKE 1 TABLET TWICE DAILY; Therapy: 33GLI2820 to (Evaluate:22Jul2017)  Requested for: 60NUY2542; Last   Rx:24Mar2017 Ordered   6  Metoprolol Succinate  MG Oral Tablet Extended Release 24 Hour; TAKE 2 5   TABLETS DAILY; Therapy: 38Dvn8717 to (Evaluate:03Apr2017)  Requested for: 21Jan2017; Last   Rx:21Jan2017 Ordered    Allergies    1  Lactose   2  Penicillins    Physical Exam    Constitutional   General appearance: No acute distress, well appearing and well nourished  appears younger than stated age  Ears, Nose, Mouth, and Throat   External inspection of ears and nose: Normal     Pulmonary   Respiratory effort: No increased work of breathing or signs of respiratory distress      Auscultation of lungs: Abnormal   Scattered basilar rhonchi  Cardiovascular   Auscultation of heart: Abnormal   The rhythm was irregularly irregular  Skin   Skin and subcutaneous tissue: Normal without rashes or lesions  Neurologic   Sensation: No sensory loss  Psychiatric   Mood and affect: Normal     Diabetic Foot Screen: Normal        Future Appointments    Date/Time Provider Specialty Site   09/06/2017 10:45 AM SHERRY Chester   Dermatology [facility]     Signatures   Electronically signed by : SHERRY Jain ; Apr 7 2017  1:01PM EST                       (Author)

## 2018-01-22 VITALS
DIASTOLIC BLOOD PRESSURE: 70 MMHG | TEMPERATURE: 97.8 F | SYSTOLIC BLOOD PRESSURE: 136 MMHG | HEART RATE: 64 BPM | OXYGEN SATURATION: 90 % | WEIGHT: 128.25 LBS | BODY MASS INDEX: 22.72 KG/M2

## 2018-02-14 ENCOUNTER — TELEPHONE (OUTPATIENT)
Dept: INTERNAL MEDICINE CLINIC | Facility: CLINIC | Age: 83
End: 2018-02-14

## 2018-02-14 NOTE — TELEPHONE ENCOUNTER
Dr Telma Alvarez asked pt  To inform BM that she'shaving her tooth extracted on 2/15 11:15am  Dara Soulier She has to stop Eloquis today   Any issues, pls call her home [phone]

## 2018-03-09 ENCOUNTER — OFFICE VISIT (OUTPATIENT)
Dept: DERMATOLOGY | Facility: CLINIC | Age: 83
End: 2018-03-09
Payer: MEDICARE

## 2018-03-09 DIAGNOSIS — L30.8 PSORIASIFORM DERMATITIS: Primary | ICD-10-CM

## 2018-03-09 DIAGNOSIS — Z13.89 SCREENING FOR SKIN CONDITION: ICD-10-CM

## 2018-03-09 DIAGNOSIS — L82.0 INFLAMED SEBORRHEIC KERATOSIS: ICD-10-CM

## 2018-03-09 DIAGNOSIS — L82.1 SEBORRHEIC KERATOSIS: ICD-10-CM

## 2018-03-09 DIAGNOSIS — Z85.828 HISTORY OF SKIN CANCER: ICD-10-CM

## 2018-03-09 PROCEDURE — 17110 DESTRUCTION B9 LES UP TO 14: CPT | Performed by: DERMATOLOGY

## 2018-03-09 PROCEDURE — 99213 OFFICE O/P EST LOW 20 MIN: CPT | Performed by: DERMATOLOGY

## 2018-03-09 NOTE — PATIENT INSTRUCTIONS
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  From previous surgical site  With also evidence of question of psoriasis on both elbows   inflamed seborrheic keratosis lesion treated because the patient concern  And irritation  Seborrheic keratosis patient reassured these are normal growths we acquire with age no treatment needed  History of skin cancer in no recurrence nothing else atypical sunblock recommended follow-up in 6 months  Screening for dermatologic disorders nothing else of concern noted on complete exam follow-up in 6 months  Treatment with Cryotherapy    The doctor has treated your skin with nitrogen, which is 320 degrees Fahrenheit below zero  He has given the treated area "frostbite "    Stinging should subside within a few hours  You can take Tylenol for pain, if needed  Over the next few days, it is normal if the area becomes reddened, a blood blister, or swollen with fluid  If the lesion treated was near the eye - you could get a swollen eye over the next few days  Do not panic! This is all temporary, and will resolve with time  There is no special treatment - just keep the area clean  Makeup and BandAids can be used, if preferred  When the area starts to dry up and peel off, using Vaseline can help healing  It usually takes up to a month for it to heal   Some lesions are recurrent and may require repeat treatments  If a lesion has not healed in one month, please don't hesitate to contact us  If you have any further questions that are not answered here, please call us  [phone]    Thank you for allowing us to care for you

## 2018-03-09 NOTE — PROGRESS NOTES
[address] [facility] [address] [facility] DERMATOLOGY  [address] [place] [address]     MRN: [id number] : 1924  Encounter: [id number]  Patient Information: Marcos Akhtar  Chief complaint:  Irritation at previous surgical site +6 months checkup  History of present illness: 40-year-old female presents for overall checkup previous history of skin cancer patient concerned that the area we had previously treated on her right chin has not completely healed  Patient denies putting anything on it other than Vaseline does not scratch or rub at the area  In addition patient concerned regarding it irritated growth  On her left breast   Nothing else of concern noted   Past Medical History:   Diagnosis Date    A-fib ([address] )     Anemia     Asteatotic eczema     CHF (congestive heart failure) (HCC)     Hypercholesterolemia     Lichen sclerosus et atrophicus     Mitral valve insufficiency     Seborrheic keratoses     Tricuspid regurgitation     Vertigo      Past Surgical History:   Procedure Laterality Date    APPENDECTOMY      HYSTERECTOMY      DE COLONOSCOPY FLX DX W/COLLJ SPEC WHEN PFRMD N/A 2016    Procedure: EGD AND COLONOSCOPY;  Surgeon: Dinora Carmona MD;  [facility]; Service: Gastroenterology     Social History   History   Alcohol Use No     History   Drug Use No     History   Smoking Status    Never Smoker   Smokeless Tobacco    Never Used     Family History   Problem Relation Age of Onset    No Known Problems Mother     No Known Problems Father      Meds/Allergies   Allergies   Allergen Reactions    Lactose Intolerance (Gi)     Penicillins        Meds:  Prior to Admission medications    Medication Sig Start Date End Date Taking? Authorizing Provider   apixaban (ELIQUIS) 2 5 mg Take by mouth 2 (two) times a day      Historical Provider, MD   diltiazem (CARDIZEM CD) 180 mg 24 hr capsule Take 1 capsule by mouth daily for 30 days 3/30/17 4/29/17  GUNJAN Alvarez   ferrous sulfate 325 (65 Fe) mg tablet Take 325 mg by mouth daily with breakfast     Historical Provider, MD   furosemide (LASIX) 40 mg tablet Take 1 tablet by mouth 2 (two) times a day for 30 days 3/30/17 4/29/17  GUNJAN Alvarez   meclizine (ANTIVERT) 25 mg tablet Take 25 mg by mouth 2 (two) times a day as needed for dizziness  Historical Provider, MD   metoprolol succinate (TOPROL-XL) 100 mg 24 hr tablet Take 100 mg by mouth daily      Historical Provider, MD       Subjective:     Review of Systems:    General: negative for - chills, fatigue, fever,  weight gain or weight loss  Psychological: negative for - anxiety, behavioral disorder, concentration difficulties, decreased libido, depression, irritability, memory difficulties, mood swings, sleep disturbances or suicidal ideation  ENT: negative for - hearing difficulties , nasal congestion, nasal discharge, oral lesions, sinus pain, sneezing, sore throat  Allergy and Immunology: negative for - hives, insect bite sensitivity,  Hematological and Lymphatic: negative for - bleeding problems, blood clots,bruising, swollen lymph nodes  Endocrine: negative for - hair pattern changes, hot flashes, malaise/lethargy, mood swings, palpitations, polydipsia/polyuria, skin changes, temperature intolerance or unexpected weight change  Respiratory: negative for - cough, hemoptysis, orthopnea, shortness of breath, or wheezing  Cardiovascular: negative for - chest pain, dyspnea on exertion, edema,  Gastrointestinal: negative for - abdominal pain, nausea/vomiting  Genito-Urinary: negative for - dysuria, incontinence, irregular/heavy menses or urinary frequency/urgency  Musculoskeletal: negative for - gait disturbance, joint pain, joint stiffness, joint swelling, muscle pain, muscular weakness  Dermatological:  As in HPI  Neurological: negative for confusion, dizziness, headaches, impaired coordination/balance, memory loss, numbness/tingling, seizures, speech problems, tremors or weakness       Objective:   LMP  (LMP Unknown)     Physical Exam:    General Appearance:    Alert, cooperative, no distress   Head:    Normocephalic, without obvious abnormality, atraumatic           Skin:   A full skin exam was performed including scalp, head scalp, eyes, ears, nose, lips, neck, chest, axilla, abdomen, back, buttocks, bilateral upper extremities, bilateral lower extremities, hands, feet, fingers, toes, fingernails, and toenails  Previous sites of skin cancer well healed without recurrence scaly erythematous well-demarcated patch noted on the right chin also little bit on the elbows normal keratotic papules with greasy stuck on appearance it slightly inflamed keratotic papuleleft breast measure about 4 mm size     Assessment:     1  Psoriasiform dermatitis  triamcinolone (KENALOG) 0 1 % ointment   2  Inflamed seborrheic keratosis     3  Seborrheic keratosis     4  History of skin cancer     5  Screening for skin condition           Plan:    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  From previous surgical site  With also evidence of question of psoriasis on both elbows   inflamed seborrheic keratosis lesion treated because the patient concern  And irritation  Seborrheic keratosis patient reassured these are normal growths we acquire with age no treatment needed  History of skin cancer in no recurrence nothing else atypical sunblock recommended follow-up in 6 months  Screening for dermatologic disorders nothing else of concern noted on complete exam follow-up in 6 months    Scarlett Gray MD  3/9/2018,10:44 AM    Portions of the record may have been created with voice recognition software   Occasional wrong word or "sound a like" substitutions may have occurred due to the inherent limitations of voice recognition software   Read the chart carefully and recognize, using context, where substitutions have occurred

## 2018-03-12 DIAGNOSIS — I10 ESSENTIAL HYPERTENSION: Primary | ICD-10-CM

## 2018-03-13 ENCOUNTER — TELEPHONE (OUTPATIENT)
Dept: DERMATOLOGY | Facility: CLINIC | Age: 83
End: 2018-03-13

## 2018-03-13 DIAGNOSIS — L30.8 PSORIASIFORM DERMATITIS: ICD-10-CM

## 2018-03-13 RX ORDER — METOPROLOL SUCCINATE 100 MG/1
TABLET, EXTENDED RELEASE ORAL
Qty: 180 TABLET | Refills: 2 | Status: SHIPPED | OUTPATIENT
Start: 2018-03-13 | End: 2018-12-19 | Stop reason: SDUPTHER

## 2018-03-14 DIAGNOSIS — L30.8 PSORIASIFORM DERMATITIS: ICD-10-CM

## 2018-05-25 RX ORDER — HYDROCODONE BITARTRATE AND ACETAMINOPHEN 5; 300 MG/1; MG/1
TABLET ORAL
Refills: 0 | COMMUNITY
Start: 2018-02-15 | End: 2018-05-29 | Stop reason: ALTCHOICE

## 2018-05-25 RX ORDER — IPRATROPIUM BROMIDE AND ALBUTEROL SULFATE 2.5; .5 MG/3ML; MG/3ML
SOLUTION RESPIRATORY (INHALATION)
COMMUNITY
Start: 2017-04-07 | End: 2018-05-29 | Stop reason: CLARIF

## 2018-05-25 RX ORDER — SOFT LENS DISINFECTANT
SOLUTION, NON-ORAL MISCELLANEOUS
COMMUNITY
Start: 2017-04-07 | End: 2018-05-29 | Stop reason: CLARIF

## 2018-05-25 RX ORDER — FERROUS SULFATE 325(65) MG
1 TABLET ORAL 2 TIMES DAILY
COMMUNITY
Start: 2016-03-10 | End: 2018-05-29 | Stop reason: CLARIF

## 2018-05-25 RX ORDER — AMLODIPINE BESYLATE 5 MG/1
1 TABLET ORAL DAILY
COMMUNITY
Start: 2016-09-02 | End: 2018-12-01 | Stop reason: SDUPTHER

## 2018-05-25 RX ORDER — NEBULIZER ACCESSORIES
KIT MISCELLANEOUS
COMMUNITY
Start: 2017-04-07 | End: 2018-05-29 | Stop reason: CLARIF

## 2018-05-25 RX ORDER — CLINDAMYCIN HYDROCHLORIDE 150 MG/1
CAPSULE ORAL
Refills: 0 | COMMUNITY
Start: 2018-02-15 | End: 2018-05-29 | Stop reason: ALTCHOICE

## 2018-05-25 RX ORDER — DILTIAZEM HYDROCHLORIDE 120 MG/1
1 CAPSULE, COATED, EXTENDED RELEASE ORAL DAILY
COMMUNITY
Start: 2016-01-04 | End: 2018-07-29 | Stop reason: SDUPTHER

## 2018-05-29 ENCOUNTER — OFFICE VISIT (OUTPATIENT)
Dept: INTERNAL MEDICINE CLINIC | Facility: CLINIC | Age: 83
End: 2018-05-29
Payer: MEDICARE

## 2018-05-29 ENCOUNTER — APPOINTMENT (OUTPATIENT)
Dept: LAB | Facility: CLINIC | Age: 83
End: 2018-05-29
Payer: MEDICARE

## 2018-05-29 VITALS
HEIGHT: 63 IN | WEIGHT: 137 LBS | OXYGEN SATURATION: 92 % | HEART RATE: 69 BPM | SYSTOLIC BLOOD PRESSURE: 106 MMHG | BODY MASS INDEX: 24.27 KG/M2 | DIASTOLIC BLOOD PRESSURE: 58 MMHG

## 2018-05-29 DIAGNOSIS — E78.00 HYPERCHOLESTEROLEMIA: ICD-10-CM

## 2018-05-29 DIAGNOSIS — I48.0 PAROXYSMAL ATRIAL FIBRILLATION (HCC): ICD-10-CM

## 2018-05-29 DIAGNOSIS — I10 ESSENTIAL HYPERTENSION: ICD-10-CM

## 2018-05-29 DIAGNOSIS — I34.0 NON-RHEUMATIC MITRAL REGURGITATION: ICD-10-CM

## 2018-05-29 DIAGNOSIS — I50.33 ACUTE ON CHRONIC DIASTOLIC HEART FAILURE (HCC): Primary | ICD-10-CM

## 2018-05-29 DIAGNOSIS — Z79.01 LONG TERM CURRENT USE OF ANTICOAGULANT: ICD-10-CM

## 2018-05-29 DIAGNOSIS — I50.33 ACUTE ON CHRONIC DIASTOLIC HEART FAILURE (HCC): ICD-10-CM

## 2018-05-29 DIAGNOSIS — L30.8 PSORIASIFORM DERMATITIS: ICD-10-CM

## 2018-05-29 DIAGNOSIS — N18.30 CHRONIC KIDNEY DISEASE, STAGE III (MODERATE) (HCC): ICD-10-CM

## 2018-05-29 PROBLEM — I50.9 CHF (CONGESTIVE HEART FAILURE) (HCC): Status: ACTIVE | Noted: 2018-05-29

## 2018-05-29 LAB
ALBUMIN SERPL BCP-MCNC: 3.9 G/DL (ref 3.5–5)
ALP SERPL-CCNC: 91 U/L (ref 46–116)
ALT SERPL W P-5'-P-CCNC: 23 U/L (ref 12–78)
ANION GAP SERPL CALCULATED.3IONS-SCNC: 7 MMOL/L (ref 4–13)
AST SERPL W P-5'-P-CCNC: 27 U/L (ref 5–45)
BASOPHILS # BLD AUTO: 0.05 THOUSANDS/ΜL (ref 0–0.1)
BASOPHILS NFR BLD AUTO: 1 % (ref 0–1)
BILIRUB SERPL-MCNC: 0.48 MG/DL (ref 0.2–1)
BILIRUB UR QL STRIP: NEGATIVE
BUN SERPL-MCNC: 33 MG/DL (ref 5–25)
CALCIUM SERPL-MCNC: 9.3 MG/DL (ref 8.3–10.1)
CHLORIDE SERPL-SCNC: 101 MMOL/L (ref 100–108)
CLARITY UR: ABNORMAL
CO2 SERPL-SCNC: 33 MMOL/L (ref 21–32)
COLOR UR: YELLOW
CREAT SERPL-MCNC: 1.64 MG/DL (ref 0.6–1.3)
EOSINOPHIL # BLD AUTO: 0.23 THOUSAND/ΜL (ref 0–0.61)
EOSINOPHIL NFR BLD AUTO: 4 % (ref 0–6)
ERYTHROCYTE [DISTWIDTH] IN BLOOD BY AUTOMATED COUNT: 14.8 % (ref 11.6–15.1)
EST. AVERAGE GLUCOSE BLD GHB EST-MCNC: 117 MG/DL
GFR SERPL CREATININE-BSD FRML MDRD: 27 ML/MIN/1.73SQ M
GLUCOSE SERPL-MCNC: 97 MG/DL (ref 65–140)
GLUCOSE UR STRIP-MCNC: NEGATIVE MG/DL
HBA1C MFR BLD: 5.7 % (ref 4.2–6.3)
HCT VFR BLD AUTO: 37.8 % (ref 34.8–46.1)
HGB BLD-MCNC: 12 G/DL (ref 11.5–15.4)
HGB UR QL STRIP.AUTO: NEGATIVE
KETONES UR STRIP-MCNC: NEGATIVE MG/DL
LEUKOCYTE ESTERASE UR QL STRIP: ABNORMAL
LYMPHOCYTES # BLD AUTO: 0.93 THOUSANDS/ΜL (ref 0.6–4.47)
LYMPHOCYTES NFR BLD AUTO: 16 % (ref 14–44)
MCH RBC QN AUTO: 28.6 PG (ref 26.8–34.3)
MCHC RBC AUTO-ENTMCNC: 31.7 G/DL (ref 31.4–37.4)
MCV RBC AUTO: 90 FL (ref 82–98)
MONOCYTES # BLD AUTO: 0.73 THOUSAND/ΜL (ref 0.17–1.22)
MONOCYTES NFR BLD AUTO: 12 % (ref 4–12)
NEUTROPHILS # BLD AUTO: 4.03 THOUSANDS/ΜL (ref 1.85–7.62)
NEUTS SEG NFR BLD AUTO: 67 % (ref 43–75)
NITRITE UR QL STRIP: NEGATIVE
NRBC BLD AUTO-RTO: 0 /100 WBCS
NT-PROBNP SERPL-MCNC: 2826 PG/ML
PH UR STRIP.AUTO: 6 [PH] (ref 4.5–8)
PLATELET # BLD AUTO: 199 THOUSANDS/UL (ref 149–390)
PMV BLD AUTO: 13.4 FL (ref 8.9–12.7)
POTASSIUM SERPL-SCNC: 4.6 MMOL/L (ref 3.5–5.3)
PROT SERPL-MCNC: 7.7 G/DL (ref 6.4–8.2)
PROT UR STRIP-MCNC: NEGATIVE MG/DL
RBC # BLD AUTO: 4.19 MILLION/UL (ref 3.81–5.12)
SODIUM SERPL-SCNC: 141 MMOL/L (ref 136–145)
SP GR UR STRIP.AUTO: 1.01 (ref 1–1.03)
TSH SERPL DL<=0.05 MIU/L-ACNC: 1.78 UIU/ML (ref 0.36–3.74)
UROBILINOGEN UR QL STRIP.AUTO: 0.2 E.U./DL
WBC # BLD AUTO: 6 THOUSAND/UL (ref 4.31–10.16)

## 2018-05-29 PROCEDURE — 36415 COLL VENOUS BLD VENIPUNCTURE: CPT

## 2018-05-29 PROCEDURE — 99214 OFFICE O/P EST MOD 30 MIN: CPT | Performed by: INTERNAL MEDICINE

## 2018-05-29 PROCEDURE — 83036 HEMOGLOBIN GLYCOSYLATED A1C: CPT

## 2018-05-29 PROCEDURE — 80053 COMPREHEN METABOLIC PANEL: CPT

## 2018-05-29 PROCEDURE — 83880 ASSAY OF NATRIURETIC PEPTIDE: CPT

## 2018-05-29 PROCEDURE — 81001 URINALYSIS AUTO W/SCOPE: CPT | Performed by: INTERNAL MEDICINE

## 2018-05-29 PROCEDURE — 85025 COMPLETE CBC W/AUTO DIFF WBC: CPT

## 2018-05-29 PROCEDURE — 84443 ASSAY THYROID STIM HORMONE: CPT

## 2018-05-29 NOTE — PATIENT INSTRUCTIONS
89 but acting younger  Looking younger  I would like to see some routine laboratory testing but also echocardiogram to interrogate the state of the heart valves  Follow-up here in 6 months  Call if problems develop

## 2018-05-29 NOTE — PROGRESS NOTES
Assessment/Plan:       Diagnoses and all orders for this visit:    Acute on chronic diastolic heart failure (HCC)    Paroxysmal atrial fibrillation (HCC)    Essential hypertension    Chronic kidney disease, stage III (moderate)    Hypercholesterolemia    Long term current use of anticoagulant    Other orders  -     amLODIPine (NORVASC) 5 mg tablet; Take 1 tablet by mouth daily  -     diltiazem (CARDIZEM CD) 120 mg 24 hr capsule; Take 1 capsule by mouth daily  -     Discontinue: ciclopirox (LOPROX) 0 77 % cream;   -     Discontinue: clindamycin (CLEOCIN) 150 mg capsule; TAKE ONE CAPSULE 3 TIMES A DAY UNTIL FINISHED  -     Discontinue: ferrous sulfate 325 (65 Fe) mg tablet; Take 1 tablet by mouth 2 (two) times a day  -     Discontinue: HYDROcodone-acetaminophen (XODOL) 5-300 MG per tablet; TAKE 1/2-1 EVERY 6 HOURS AS NEEDED FOR PAIN  -     Discontinue: ipratropium-albuterol (DUO-NEB) 0 5-2 5 mg/3 mL; Inhale  -     Discontinue: Respiratory Therapy Supplies (NEBULIZER) JOSÉ MIGUEL; by Does not apply route  -     Discontinue: Respiratory Therapy Supplies (NEBULIZER/TUBING/MOUTHPIECE) KIT; by Does not apply route  -     triamcinolone (KENALOG) 0 1 % ointment; Apply topically 2 (two) times a day              Subjective:      Patient ID: Vincenzo Cazares is a 80 y o  female  Follow-up visit of a 68-year-old female who looks in X 20 years younger than stated age  The patient is hypertensive with paroxysmal atrial fibrillation  Her only complaint is that of feeling tired  She is on multiple medications on an noted that her blood pressure is a little bit low    Otherwise, feels well, active, independent in activities of daily living  The following portions of the patient's history were reviewed and updated as appropriate:   She has a past medical history of Anemia; Asteatotic eczema; Hypercholesterolemia;  Lichen sclerosus et atrophicus; Mitral valve insufficiency; Seborrheic keratoses; Tricuspid regurgitation; and Vertigo  ,   does not have any pertinent problems on file  ,   has a past surgical history that includes Appendectomy (11/14/1939); Hysterectomy (11/14/1962); and pr colonoscopy flx dx w/collj spec when pfrmd (N/A, 5/19/2016)  ,  family history includes Heart disease in her father and mother  ,   reports that she has never smoked  She has never used smokeless tobacco  She reports that she does not drink alcohol or use drugs  ,  is allergic to lactose intolerance (gi) and penicillins     Current Outpatient Prescriptions   Medication Sig Dispense Refill    amLODIPine (NORVASC) 5 mg tablet Take 1 tablet by mouth daily      apixaban (ELIQUIS) 2 5 mg Take by mouth 2 (two) times a day   diltiazem (CARDIZEM CD) 120 mg 24 hr capsule Take 1 capsule by mouth daily      ferrous sulfate 325 (65 Fe) mg tablet Take 325 mg by mouth daily with breakfast       furosemide (LASIX) 40 mg tablet Take 1 tablet by mouth 2 (two) times a day for 30 days 60 tablet 0    metoprolol succinate (TOPROL-XL) 100 mg 24 hr tablet TAKE 2 TABLETS EVERY  tablet 2    triamcinolone (KENALOG) 0 1 % ointment Apply topically 2 (two) times a day       No current facility-administered medications for this visit  Review of Systems   Constitutional: Positive for fatigue  Negative for chills and fever  HENT: Negative for sore throat and trouble swallowing  Eyes: Negative for pain  Respiratory: Negative for cough, shortness of breath and wheezing  Cardiovascular: Negative for chest pain and palpitations  Gastrointestinal: Negative for abdominal pain, diarrhea, nausea and vomiting  Endocrine: Negative for cold intolerance and heat intolerance  Genitourinary: Negative for dysuria, frequency and pelvic pain  Musculoskeletal: Negative for arthralgias and joint swelling  Skin: Negative for rash and wound  Allergic/Immunologic: Negative for immunocompromised state     Neurological: Negative for dizziness, seizures, syncope and headaches  Psychiatric/Behavioral: Negative for dysphoric mood  The patient is not nervous/anxious  Objective:  Vitals:    05/29/18 1509   BP: 106/58   Pulse: 69   SpO2: 92%      Physical Exam   Constitutional: She is oriented to person, place, and time  She appears well-developed and well-nourished  Alert and cooperative  Appears younger than stated age  In no distress at all  HENT:   Head: Normocephalic and atraumatic  Eyes: EOM are normal  Pupils are equal, round, and reactive to light  Neck: Normal range of motion  Neck supple  No tracheal deviation present  No thyromegaly present  Cardiovascular: Normal rate, regular rhythm and normal heart sounds  Exam reveals no gallop  No murmur heard  Pulmonary/Chest: No respiratory distress  She has no wheezes  She has no rales  Abdominal: Soft  Bowel sounds are normal  There is no tenderness  Musculoskeletal: Normal range of motion  She exhibits no tenderness or deformity  Neurological: She is alert and oriented to person, place, and time  Coordination normal    Skin: Skin is warm  Psychiatric: She has a normal mood and affect   Judgment normal

## 2018-05-30 ENCOUNTER — TELEPHONE (OUTPATIENT)
Dept: INTERNAL MEDICINE CLINIC | Facility: CLINIC | Age: 83
End: 2018-05-30

## 2018-05-30 DIAGNOSIS — N18.30 STAGE 3 CHRONIC KIDNEY DISEASE (HCC): Primary | ICD-10-CM

## 2018-05-30 LAB
BACTERIA UR QL AUTO: ABNORMAL /HPF
HYALINE CASTS #/AREA URNS LPF: ABNORMAL /LPF
MUCOUS THREADS UR QL AUTO: ABNORMAL
NON-SQ EPI CELLS URNS QL MICRO: ABNORMAL /HPF
RBC #/AREA URNS AUTO: ABNORMAL /HPF
WBC #/AREA URNS AUTO: ABNORMAL /HPF

## 2018-05-30 NOTE — TELEPHONE ENCOUNTER
----- Message from Lexie José MD sent at 5/30/2018  9:05 AM EDT -----  Since her last blood work, she has had a fall in kidney function and has progressed to chronic kidney disease stage 3  Also, microscopic blood noted in the urine  She needs to see a nephrologist for a consultative visit  Consult with Dr Miki Corona in the system

## 2018-06-14 ENCOUNTER — HOSPITAL ENCOUNTER (OUTPATIENT)
Dept: NON INVASIVE DIAGNOSTICS | Facility: CLINIC | Age: 83
Discharge: HOME | End: 2018-06-14
Payer: MEDICARE

## 2018-06-14 DIAGNOSIS — I10 ESSENTIAL HYPERTENSION: ICD-10-CM

## 2018-06-14 DIAGNOSIS — I50.33 ACUTE ON CHRONIC DIASTOLIC HEART FAILURE (HCC): ICD-10-CM

## 2018-06-14 DIAGNOSIS — E78.00 HYPERCHOLESTEROLEMIA: ICD-10-CM

## 2018-06-14 PROCEDURE — 93306 TTE W/DOPPLER COMPLETE: CPT

## 2018-06-15 PROCEDURE — 93306 TTE W/DOPPLER COMPLETE: CPT | Performed by: INTERNAL MEDICINE

## 2018-07-23 DIAGNOSIS — D50.9 IRON DEFICIENCY ANEMIA, UNSPECIFIED IRON DEFICIENCY ANEMIA TYPE: Primary | ICD-10-CM

## 2018-07-23 RX ORDER — FERROUS SULFATE 325(65) MG
TABLET ORAL
Qty: 60 TABLET | Refills: 3 | Status: SHIPPED | OUTPATIENT
Start: 2018-07-23 | End: 2019-05-18 | Stop reason: SDUPTHER

## 2018-07-29 DIAGNOSIS — I10 ESSENTIAL HYPERTENSION: Primary | ICD-10-CM

## 2018-07-30 RX ORDER — DILTIAZEM HYDROCHLORIDE 120 MG/1
CAPSULE, COATED, EXTENDED RELEASE ORAL
Qty: 90 CAPSULE | Refills: 1 | Status: SHIPPED | OUTPATIENT
Start: 2018-07-30 | End: 2019-01-29 | Stop reason: SDUPTHER

## 2018-08-10 ENCOUNTER — OFFICE VISIT (OUTPATIENT)
Dept: NEPHROLOGY | Facility: CLINIC | Age: 83
End: 2018-08-10
Payer: MEDICARE

## 2018-08-10 VITALS
TEMPERATURE: 97.9 F | HEIGHT: 64 IN | SYSTOLIC BLOOD PRESSURE: 120 MMHG | HEART RATE: 80 BPM | DIASTOLIC BLOOD PRESSURE: 80 MMHG | BODY MASS INDEX: 22.84 KG/M2 | WEIGHT: 133.8 LBS | RESPIRATION RATE: 16 BRPM

## 2018-08-10 DIAGNOSIS — I10 ESSENTIAL HYPERTENSION: ICD-10-CM

## 2018-08-10 DIAGNOSIS — E78.00 HYPERCHOLESTEROLEMIA: ICD-10-CM

## 2018-08-10 DIAGNOSIS — I48.0 PAROXYSMAL ATRIAL FIBRILLATION (HCC): ICD-10-CM

## 2018-08-10 DIAGNOSIS — N18.30 STAGE 3 CHRONIC KIDNEY DISEASE (HCC): ICD-10-CM

## 2018-08-10 DIAGNOSIS — N17.9 AKI (ACUTE KIDNEY INJURY) (HCC): ICD-10-CM

## 2018-08-10 DIAGNOSIS — N18.30 CHRONIC KIDNEY DISEASE, STAGE III (MODERATE) (HCC): Primary | ICD-10-CM

## 2018-08-10 PROCEDURE — 99214 OFFICE O/P EST MOD 30 MIN: CPT | Performed by: INTERNAL MEDICINE

## 2018-08-10 NOTE — LETTER
August 10, 2018     Davion Lemus MD  [address] [place] [address]    Patient: Ciara Puga   YOB: 1924   Date of Visit: 8/10/2018       Dear Dr Martin Vivas: Thank you for referring Radha Osorio to me for evaluation  Below are my notes for this consultation  If you have questions, please do not hesitate to call me  I look forward to following your patient along with you  Sincerely,        Annia Britt MD        CC: No Recipients  Annia Britt MD  8/10/2018 10:06 AM  Sign at close encounter  [address] 80 y o  female MRN: [id number]    Encounter: [id number] 8/10/2018    REASON FOR VISIT: Ciara Puga is a 80 y  o female who was referred by Davion Lemus MD for evaluation of No chief complaint on file  Jessa Sole HPI:    Tori Apt is 75-year-old woman with history of hypertension and atrial fibrillation also has CKD with recent worsening kidney function so she was advised to see me    She looks great for her age  Denies any complaint  Denies any changing in medication  Denies taking any nonsteroidal pain killer  Her weight is steady  Denies any nausea vomiting diarrhea  Denies any recent sickness  She does have history of atrial fibrillation and for that she is on beta-blocker and diltiazem along with blood thinner    She denies any shortness of breath or leg swelling but she is on diuretic        REVIEW OF SYSTEMS:    Review of Systems   Constitutional: Negative for activity change, appetite change, fatigue and unexpected weight change  HENT: Negative for congestion, ear discharge, postnasal drip, sinus pain and sinus pressure  Eyes: Negative for photophobia, pain and visual disturbance  Respiratory: Negative for apnea, cough, choking, chest tightness, shortness of breath and wheezing  Cardiovascular: Negative for chest pain, palpitations and leg swelling     Gastrointestinal: Negative for abdominal distention, abdominal pain, blood in stool, constipation, diarrhea and nausea  Endocrine: Negative for heat intolerance, polyphagia and polyuria  Genitourinary: Negative for decreased urine volume, difficulty urinating, flank pain and urgency  Musculoskeletal: Negative for arthralgias, back pain, gait problem, neck pain and neck stiffness  Skin: Negative for color change and wound  Allergic/Immunologic: Negative for food allergies and immunocompromised state  Neurological: Negative for tremors, seizures, facial asymmetry and weakness  Hematological: Negative for adenopathy  Does not bruise/bleed easily  Psychiatric/Behavioral: Negative for self-injury and suicidal ideas  PAST MEDICAL HISTORY:  Past Medical History:   Diagnosis Date    Anemia     Asteatotic eczema     Chronic kidney disease     Hypercholesterolemia     Lichen sclerosus et atrophicus     Mitral valve insufficiency     Seborrheic keratoses     Tricuspid regurgitation     Vertigo        PAST SURGICAL HISTORY:  Past Surgical History:   Procedure Laterality Date    APPENDECTOMY  11/14/1939    HYSTERECTOMY  11/14/1962    MS COLONOSCOPY FLX DX W/COLLJ SPEC WHEN PFRMD N/A 5/19/2016    Procedure: EGD AND COLONOSCOPY;  Surgeon: Jose Calixto MD;  Location: [facility];   Service: Gastroenterology       SOCIAL HISTORY:  History   Alcohol Use No     History   Drug Use No     History   Smoking Status    Never Smoker   Smokeless Tobacco    Never Used       FAMILY HISTORY:  Family History   Problem Relation Age of Onset    Heart disease Mother         Abnormality    Heart disease Father         Abnormality       MEDICATIONS:    Current Outpatient Prescriptions:     amLODIPine (NORVASC) 5 mg tablet, Take 1 tablet by mouth daily, Disp: , Rfl:     apixaban (ELIQUIS) 2 5 mg, Take by mouth 2 (two) times a day , Disp: , Rfl:     diltiazem (CARDIZEM CD) 120 mg 24 hr capsule, TAKE ONE CAPSULE BY MOUTH TWICE A DAY, Disp: 90 capsule, Rfl: 1    ferrous sulfate 325 (65 Fe) mg tablet, TAKE 1 TABLET TWICE A DAY, Disp: 60 tablet, Rfl: 3    furosemide (LASIX) 40 mg tablet, Take 1 tablet by mouth 2 (two) times a day for 30 days, Disp: 60 tablet, Rfl: 0    metoprolol succinate (TOPROL-XL) 100 mg 24 hr tablet, TAKE 2 TABLETS EVERY DAY, Disp: 180 tablet, Rfl: 2    triamcinolone (KENALOG) 0 1 % ointment, APPLY TOPICALLY 2 (TWO) TIMES A DAY TO RASH ON THE RIGHT LOWER LEG, Disp: 30 g, Rfl: 1    triamcinolone (KENALOG) 0 1 % ointment, Apply topically 2 (two) times a day, Disp: , Rfl:     PHYSICAL EXAM:  Vitals:    08/10/18 0954   BP: 120/80   BP Location: Right arm   Patient Position: Sitting   Pulse: 80   Resp: 16   Temp: 97 9 °F (36 6 °C)   TempSrc: Tympanic   Weight: 60 7 kg (133 lb 12 8 oz)   Height: 5' 4" (1 626 m)     Body mass index is 22 97 kg/m²  Physical Exam   Constitutional: She is oriented to person, place, and time  She appears well-developed  No distress  HENT:   Head: Normocephalic and atraumatic  Mouth/Throat: Oropharynx is clear and moist    Eyes: Conjunctivae and EOM are normal  Pupils are equal, round, and reactive to light  No scleral icterus  Neck: Normal range of motion  Neck supple  No JVD present  Cardiovascular: Normal rate, regular rhythm, normal heart sounds and intact distal pulses  No murmur heard  Pulmonary/Chest: Effort normal and breath sounds normal  No respiratory distress  She has no wheezes  She has no rales  Abdominal: Soft  Bowel sounds are normal  She exhibits no mass  There is no tenderness  Musculoskeletal: Normal range of motion  She exhibits no edema  Neurological: She is alert and oriented to person, place, and time  Skin: Skin is warm  No rash noted  Psychiatric: She has a normal mood and affect   Her behavior is normal        LAB RESULTS:  Results for orders placed or performed in visit on 05/29/18   CBC and differential   Result Value Ref Range    WBC 6 00 4 31 - 10 16 Thousand/uL    RBC 4 19 3 81 - 5 12 Million/uL    Hemoglobin 12 0 11 5 - 15 4 g/dL    Hematocrit 37 8 34 8 - 46 1 %    MCV 90 82 - 98 fL    MCH 28 6 26 8 - 34 3 pg    MCHC 31 7 31 4 - 37 4 g/dL    RDW 14 8 11 6 - 15 1 %    MPV 13 4 (H) 8 9 - 12 7 fL    Platelets 396 563 - 270 Thousands/uL    nRBC 0 /100 WBCs    Neutrophils Relative 67 43 - 75 %    Lymphocytes Relative 16 14 - 44 %    Monocytes Relative 12 4 - 12 %    Eosinophils Relative 4 0 - 6 %    Basophils Relative 1 0 - 1 %    Neutrophils Absolute 4 03 1 85 - 7 62 Thousands/µL    Lymphocytes Absolute 0 93 0 60 - 4 47 Thousands/µL    Monocytes Absolute 0 73 0 17 - 1 22 Thousand/µL    Eosinophils Absolute 0 23 0 00 - 0 61 Thousand/µL    Basophils Absolute 0 05 0 00 - 0 10 Thousands/µL   Comprehensive metabolic panel   Result Value Ref Range    Sodium 141 136 - 145 mmol/L    Potassium 4 6 3 5 - 5 3 mmol/L    Chloride 101 100 - 108 mmol/L    CO2 33 (H) 21 - 32 mmol/L    Anion Gap 7 4 - 13 mmol/L    BUN 33 (H) 5 - 25 mg/dL    Creatinine 1 64 (H) 0 60 - 1 30 mg/dL    Glucose 97 65 - 140 mg/dL    Calcium 9 3 8 3 - 10 1 mg/dL    AST 27 5 - 45 U/L    ALT 23 12 - 78 U/L    Alkaline Phosphatase 91 46 - 116 U/L    Total Protein 7 7 6 4 - 8 2 g/dL    Albumin 3 9 3 5 - 5 0 g/dL    Total Bilirubin 0 48 0 20 - 1 00 mg/dL    eGFR 27 ml/min/1 73sq m   TSH, 3rd generation with T4 reflex   Result Value Ref Range    TSH 3RD GENERATON 1 780 0 358 - 3 740 uIU/mL   HEMOGLOBIN A1C W/ EAG ESTIMATION   Result Value Ref Range    Hemoglobin A1C 5 7 4 2 - 6 3 %     mg/dl   NT-BNP PRO   Result Value Ref Range    NT-proBNP 2,826 (H) <450 pg/mL       ASSESSMENT and PLAN:    MARIA DEL CARMEN (acute kidney injury) (HCC)  Her baseline creatinine is about 1 but recent blood test shows about 1 6  Etiology seems to be unclear  There is no change in medication  She is on diuretic twice a day but she is taking that for long time  She is very reluctant to change any medication  I will repeat blood test and also get kidney ultrasound  Advised to continue what she is doing  If kidney function continue to deteriorate I make cut back diuretic as I do not see any signs of volume overload on her  I discussed that at length with her  Asymptomatic and progressive nature of kidney disease also discussed with  Chronic kidney disease, stage III (moderate)  Her baseline creatinine is about 0 9-1 making it stage 3 kidney disease  It may be age-related decline along with cardiac disease that may have cause it    Paroxysmal atrial fibrillation (HCC)  Rate is very well control       I will repeat blood test and urine test along with kidney ultrasound  Will not change anything at this point as she is not very interested in doing it  But if kidney function deteriorated I will cut back on diuretic  I discussed that with her at length  Thank you very much for the consultation I will see her back in couple of months    Portions of the record may have been created with voice recognition software  Occasional wrong word or "sound a like" substitutions may have occurred due to the inherent limitations of voice recognition software  Read the chart carefully and recognize, using context, where substitutions have occurred  If you have any questions, please contact the dictating provider

## 2018-08-10 NOTE — ASSESSMENT & PLAN NOTE
Her baseline creatinine is about 0 9-1 making it stage 3 kidney disease    It may be age-related decline along with cardiac disease that may have cause it

## 2018-08-10 NOTE — ASSESSMENT & PLAN NOTE
Her baseline creatinine is about 1 but recent blood test shows about 1 6  Etiology seems to be unclear  There is no change in medication  She is on diuretic twice a day but she is taking that for long time  She is very reluctant to change any medication  I will repeat blood test and also get kidney ultrasound  Advised to continue what she is doing  If kidney function continue to deteriorate I make cut back diuretic as I do not see any signs of volume overload on her  I discussed that at length with her  Asymptomatic and progressive nature of kidney disease also discussed with

## 2018-08-10 NOTE — PATIENT INSTRUCTIONS
Thank you for enrolling in [facility]  Please follow the instructions below to securely access your online medical record  [employer] allows you to send messages to your doctor, view your test results, renew your prescriptions, schedule appointments, and more  [address] [facility] uses Single Sign on (SSO) Technology for you to log in and access our [address]  [employer], including [employer]  No more remembering multiple user names and passwords! We are going to guide you through, step by step, to help you set up your [employer] account which will provide access to your Mobile [employer] account  How Do I Sign Up? 1  In your Internet browser, go to [email or web address]       2  Click on the [facility] patient account and then click Dont have an                 Account? Create one now      3  Enter your demographic information and chose a user name (email address) and password  Think of one that is secure and easy to remember  Enter a Referral code if you have one (this is not your [employer]Chart code ) Accept the Terms and Conditions and the Privacy Policy  4  Select your security questions that you will use to reset your password should you forget it  Click Submit  5  Enter your Mobile [employer] Activation Code exactly as it appears below  You will not need to use this code after you have completed the sign-up process  If you do not sign up before the expiration date, you must request a new code  [employer] Activation Code: [email or web address]  Expires: 8/24/2018  9:27 AM    6  Confirm your email address  An email confirmation was sent to you  Please open that email and click Confirm your Email   You should then be redirected to our [employer] Single sign on page, where you will log on with the user name and password you created! Proceed to the [employer] Icon to view your personal health information          Additional Information  If you have questions, you can e-mail [email or web address] or call [phone] to talk to our customer support staff  Remember, MyChart is NOT to be used for urgent needs  For medical emergencies, dial 911

## 2018-08-10 NOTE — PROGRESS NOTES
NEPHROLOGY OFFICE CONSULT  Ying Britt 80 y o  female MRN: [id number]    Encounter: [id number] 8/10/2018    REASON FOR VISIT: Ying Britt is a 80 y  o female who was referred by Candis Nicholson MD for evaluation of No chief complaint on file  Saida Perkins HPI:    Chon Puckett is 78-year-old woman with history of hypertension and atrial fibrillation also has CKD with recent worsening kidney function so she was advised to see me    She looks great for her age  Denies any complaint  Denies any changing in medication  Denies taking any nonsteroidal pain killer  Her weight is steady  Denies any nausea vomiting diarrhea  Denies any recent sickness  She does have history of atrial fibrillation and for that she is on beta-blocker and diltiazem along with blood thinner    She denies any shortness of breath or leg swelling but she is on diuretic        REVIEW OF SYSTEMS:    Review of Systems   Constitutional: Negative for activity change, appetite change, fatigue and unexpected weight change  HENT: Negative for congestion, ear discharge, postnasal drip, sinus pain and sinus pressure  Eyes: Negative for photophobia, pain and visual disturbance  Respiratory: Negative for apnea, cough, choking, chest tightness, shortness of breath and wheezing  Cardiovascular: Negative for chest pain, palpitations and leg swelling  Gastrointestinal: Negative for abdominal distention, abdominal pain, blood in stool, constipation, diarrhea and nausea  Endocrine: Negative for heat intolerance, polyphagia and polyuria  Genitourinary: Negative for decreased urine volume, difficulty urinating, flank pain and urgency  Musculoskeletal: Negative for arthralgias, back pain, gait problem, neck pain and neck stiffness  Skin: Negative for color change and wound  Allergic/Immunologic: Negative for food allergies and immunocompromised state  Neurological: Negative for tremors, seizures, facial asymmetry and weakness     Hematological: Negative for adenopathy  Does not bruise/bleed easily  Psychiatric/Behavioral: Negative for self-injury and suicidal ideas  PAST MEDICAL HISTORY:  Past Medical History:   Diagnosis Date    Anemia     Asteatotic eczema     Chronic kidney disease     Hypercholesterolemia     Lichen sclerosus et atrophicus     Mitral valve insufficiency     Seborrheic keratoses     Tricuspid regurgitation     Vertigo        PAST SURGICAL HISTORY:  Past Surgical History:   Procedure Laterality Date    APPENDECTOMY  11/14/1939    HYSTERECTOMY  11/14/1962    CT COLONOSCOPY FLX DX W/COLLJ SPEC WHEN PFRMD N/A 5/19/2016    Procedure: EGD AND COLONOSCOPY;  Surgeon: Uri Houston MD;  [facility];   Service: Gastroenterology       SOCIAL HISTORY:  History   Alcohol Use No     History   Drug Use No     History   Smoking Status    Never Smoker   Smokeless Tobacco    Never Used       FAMILY HISTORY:  Family History   Problem Relation Age of Onset    Heart disease Mother         Abnormality    Heart disease Father         Abnormality       MEDICATIONS:    Current Outpatient Prescriptions:     amLODIPine (NORVASC) 5 mg tablet, Take 1 tablet by mouth daily, Disp: , Rfl:     apixaban (ELIQUIS) 2 5 mg, Take by mouth 2 (two) times a day , Disp: , Rfl:     diltiazem (CARDIZEM CD) 120 mg 24 hr capsule, TAKE ONE CAPSULE BY MOUTH TWICE A DAY, Disp: 90 capsule, Rfl: 1    ferrous sulfate 325 (65 Fe) mg tablet, TAKE 1 TABLET TWICE A DAY, Disp: 60 tablet, Rfl: 3    furosemide (LASIX) 40 mg tablet, Take 1 tablet by mouth 2 (two) times a day for 30 days, Disp: 60 tablet, Rfl: 0    metoprolol succinate (TOPROL-XL) 100 mg 24 hr tablet, TAKE 2 TABLETS EVERY DAY, Disp: 180 tablet, Rfl: 2    triamcinolone (KENALOG) 0 1 % ointment, APPLY TOPICALLY 2 (TWO) TIMES A DAY TO RASH ON THE RIGHT LOWER LEG, Disp: 30 g, Rfl: 1    triamcinolone (KENALOG) 0 1 % ointment, Apply topically 2 (two) times a day, Disp: , Rfl:     PHYSICAL EXAM:  Vitals:    08/10/18 0954   BP: 120/80   BP Location: Right arm   Patient Position: Sitting   Pulse: 80   Resp: 16   Temp: 97 9 °F (36 6 °C)   TempSrc: Tympanic   Weight: 60 7 kg (133 lb 12 8 oz)   Height: 5' 4" (1 626 m)     Body mass index is 22 97 kg/m²  Physical Exam   Constitutional: She is oriented to person, place, and time  She appears well-developed  No distress  HENT:   Head: Normocephalic and atraumatic  Mouth/Throat: Oropharynx is clear and moist    Eyes: Conjunctivae and EOM are normal  Pupils are equal, round, and reactive to light  No scleral icterus  Neck: Normal range of motion  Neck supple  No JVD present  Cardiovascular: Normal rate, regular rhythm, normal heart sounds and intact distal pulses  No murmur heard  Pulmonary/Chest: Effort normal and breath sounds normal  No respiratory distress  She has no wheezes  She has no rales  Abdominal: Soft  Bowel sounds are normal  She exhibits no mass  There is no tenderness  Musculoskeletal: Normal range of motion  She exhibits no edema  Neurological: She is alert and oriented to person, place, and time  Skin: Skin is warm  No rash noted  Psychiatric: She has a normal mood and affect   Her behavior is normal        LAB RESULTS:  Results for orders placed or performed in visit on 05/29/18   CBC and differential   Result Value Ref Range    WBC 6 00 4 31 - 10 16 Thousand/uL    RBC 4 19 3 81 - 5 12 Million/uL    Hemoglobin 12 0 11 5 - 15 4 g/dL    Hematocrit 37 8 34 8 - 46 1 %    MCV 90 82 - 98 fL    MCH 28 6 26 8 - 34 3 pg    MCHC 31 7 31 4 - 37 4 g/dL    RDW 14 8 11 6 - 15 1 %    MPV 13 4 (H) 8 9 - 12 7 fL    Platelets 572 671 - 255 Thousands/uL    nRBC 0 /100 WBCs    Neutrophils Relative 67 43 - 75 %    Lymphocytes Relative 16 14 - 44 %    Monocytes Relative 12 4 - 12 %    Eosinophils Relative 4 0 - 6 %    Basophils Relative 1 0 - 1 %    Neutrophils Absolute 4 03 1 85 - 7 62 Thousands/µL    Lymphocytes Absolute 0 93 0 60 - 4 47 Thousands/µL    Monocytes Absolute 0 73 0 17 - 1 22 Thousand/µL    Eosinophils Absolute 0 23 0 00 - 0 61 Thousand/µL    Basophils Absolute 0 05 0 00 - 0 10 Thousands/µL   Comprehensive metabolic panel   Result Value Ref Range    Sodium 141 136 - 145 mmol/L    Potassium 4 6 3 5 - 5 3 mmol/L    Chloride 101 100 - 108 mmol/L    CO2 33 (H) 21 - 32 mmol/L    Anion Gap 7 4 - 13 mmol/L    BUN 33 (H) 5 - 25 mg/dL    Creatinine 1 64 (H) 0 60 - 1 30 mg/dL    Glucose 97 65 - 140 mg/dL    Calcium 9 3 8 3 - 10 1 mg/dL    AST 27 5 - 45 U/L    ALT 23 12 - 78 U/L    Alkaline Phosphatase 91 46 - 116 U/L    Total Protein 7 7 6 4 - 8 2 g/dL    Albumin 3 9 3 5 - 5 0 g/dL    Total Bilirubin 0 48 0 20 - 1 00 mg/dL    eGFR 27 ml/min/1 73sq m   TSH, 3rd generation with T4 reflex   Result Value Ref Range    TSH 3RD GENERATON 1 780 0 358 - 3 740 uIU/mL   HEMOGLOBIN A1C W/ EAG ESTIMATION   Result Value Ref Range    Hemoglobin A1C 5 7 4 2 - 6 3 %     mg/dl   NT-BNP PRO   Result Value Ref Range    NT-proBNP 2,826 (H) <450 pg/mL       ASSESSMENT and PLAN:    MARIA DEL CARMEN (acute kidney injury) (HCC)  Her baseline creatinine is about 1 but recent blood test shows about 1 6  Etiology seems to be unclear  There is no change in medication  She is on diuretic twice a day but she is taking that for long time  She is very reluctant to change any medication  I will repeat blood test and also get kidney ultrasound  Advised to continue what she is doing  If kidney function continue to deteriorate I make cut back diuretic as I do not see any signs of volume overload on her  I discussed that at length with her  Asymptomatic and progressive nature of kidney disease also discussed with  Chronic kidney disease, stage III (moderate)  Her baseline creatinine is about 0 9-1 making it stage 3 kidney disease    It may be age-related decline along with cardiac disease that may have cause it    Paroxysmal atrial fibrillation (HCC)  Rate is very well control I will repeat blood test and urine test along with kidney ultrasound  Will not change anything at this point as she is not very interested in doing it  But if kidney function deteriorated I will cut back on diuretic  I discussed that with her at length  Thank you very much for the consultation I will see her back in couple of months    Portions of the record may have been created with voice recognition software  Occasional wrong word or "sound a like" substitutions may have occurred due to the inherent limitations of voice recognition software  Read the chart carefully and recognize, using context, where substitutions have occurred  If you have any questions, please contact the dictating provider

## 2018-09-13 ENCOUNTER — OFFICE VISIT (OUTPATIENT)
Dept: DERMATOLOGY | Facility: CLINIC | Age: 83
End: 2018-09-13
Payer: MEDICARE

## 2018-09-13 DIAGNOSIS — Z13.89 SCREENING FOR SKIN CONDITION: ICD-10-CM

## 2018-09-13 DIAGNOSIS — L30.8 PSORIASIFORM DERMATITIS: Primary | ICD-10-CM

## 2018-09-13 DIAGNOSIS — L82.1 VERRUCOUS KERATOSIS: ICD-10-CM

## 2018-09-13 DIAGNOSIS — L82.1 SEBORRHEIC KERATOSIS: ICD-10-CM

## 2018-09-13 DIAGNOSIS — Z85.828 HISTORY OF SKIN CANCER: ICD-10-CM

## 2018-09-13 PROCEDURE — 99213 OFFICE O/P EST LOW 20 MIN: CPT | Performed by: DERMATOLOGY

## 2018-09-13 PROCEDURE — 17110 DESTRUCTION B9 LES UP TO 14: CPT | Performed by: DERMATOLOGY

## 2018-09-13 NOTE — PROGRESS NOTES
[address] [facility] DERMATOLOGY  [facility] [address]  [facility] [address]  [phone]  [phone]     MRN: [id number] : 1924  Encounter: [id number]  Patient Information: Judy Garza  Chief complaint:6 month skin check    History of present illness:  55-year-old female with previous history of skin cancer presents for overall checkup still irritated by the lesion on her right leg will we had previously excised the skin cancer now and complain of a new growth on her left posterior thigh  Past Medical History:   Diagnosis Date    Anemia     Asteatotic eczema     Chronic kidney disease     Hypercholesterolemia     Lichen sclerosus et atrophicus     Mitral valve insufficiency     Seborrheic keratoses     Tricuspid regurgitation     Vertigo      Past Surgical History:   Procedure Laterality Date    APPENDECTOMY  1939    HYSTERECTOMY  1962    IN COLONOSCOPY FLX DX W/COLLJ SPEC WHEN PFRMD N/A 2016    Procedure: EGD AND COLONOSCOPY;  Surgeon: Felice Martinez MD;  [facility]; Service: Gastroenterology     Social History   History   Alcohol Use No     History   Drug Use No     History   Smoking Status    Never Smoker   Smokeless Tobacco    Never Used     Family History   Problem Relation Age of Onset    Heart disease Mother         Abnormality    Heart disease Father         Abnormality     Meds/Allergies   Allergies   Allergen Reactions    Lactose Intolerance (Gi)     Penicillins        Meds:  Prior to Admission medications    Medication Sig Start Date End Date Taking? Authorizing Provider   amLODIPine (NORVASC) 5 mg tablet Take 1 tablet by mouth daily 16   Historical Provider, MD   apixaban (ELIQUIS) 2 5 mg Take by mouth 2 (two) times a day      Historical Provider, MD   diltiazem (CARDIZEM CD) 120 mg 24 hr capsule TAKE ONE CAPSULE BY MOUTH TWICE A DAY 18   Claudia Piper MD   ferrous sulfate 325 (65 Fe) mg tablet TAKE 1 TABLET TWICE A DAY 7/23/18   Maxime Burrows PA-C   furosemide (LASIX) 40 mg tablet Take 1 tablet by mouth 2 (two) times a day for 30 days 3/30/17 8/10/18  GUNJAN [place]   metoprolol succinate (TOPROL-XL) 100 mg 24 hr tablet TAKE 2 TABLETS EVERY DAY 3/13/18   Maxime Burrows PA-C   triamcinolone (KENALOG) 0 1 % ointment APPLY TOPICALLY 2 (TWO) TIMES A DAY TO RASH ON THE RIGHT LOWER LEG 5/29/18   Ariana Clark MD   triamcinolone (KENALOG) 0 1 % ointment Apply topically 2 (two) times a day    Historical Provider, MD       Subjective:     Review of Systems:    General: negative for - chills, fatigue, fever,  weight gain or weight loss  Psychological: negative for - anxiety, behavioral disorder, concentration difficulties, decreased libido, depression, irritability, memory difficulties, mood swings, sleep disturbances or suicidal ideation  ENT: negative for - hearing difficulties , nasal congestion, nasal discharge, oral lesions, sinus pain, sneezing, sore throat  Allergy and Immunology: negative for - hives, insect bite sensitivity,  Hematological and Lymphatic: negative for - bleeding problems, blood clots,bruising, swollen lymph nodes  Endocrine: negative for - hair pattern changes, hot flashes, malaise/lethargy, mood swings, palpitations, polydipsia/polyuria, skin changes, temperature intolerance or unexpected weight change  Respiratory: negative for - cough, hemoptysis, orthopnea, shortness of breath, or wheezing  Cardiovascular: negative for - chest pain, dyspnea on exertion, edema,  Gastrointestinal: negative for - abdominal pain, nausea/vomiting  Genito-Urinary: negative for - dysuria, incontinence, irregular/heavy menses or urinary frequency/urgency  Musculoskeletal: negative for - gait disturbance, joint pain, joint stiffness, joint swelling, muscle pain, muscular weakness  Dermatological:  As in HPI  Neurological: negative for confusion, dizziness, headaches, impaired coordination/balance, memory loss, numbness/tingling, seizures, speech problems, tremors or weakness       Objective:   LMP  (LMP Unknown)     Physical Exam:    General Appearance:    Alert, cooperative, no distress   Head:    Normocephalic, without obvious abnormality, atraumatic           Skin:   A full skin exam was performed including scalp, head scalp, eyes, ears, nose, lips, neck, chest, axilla, abdomen, back, buttocks, bilateral upper extremities, bilateral lower extremities, hands, feet, fingers, toes, fingernails, and toenails  Normal keratotic papules with greasy stuck on appearance area again on the right scalp leg scar appears slightly inflamed keratotic no sign of any recurrence lesion verrucous keratotic area noted on left posterior thigh nothing else atypical noted  Cryotherapy Procedure Note    Pre-operative Diagnosis: verrucous keratosis    Plan:  1  Instructed to keep the area dry and clean thereafter  Apply petrolatum if area gets crusty  2  Recommended that the patient use acetaminophen  as needed for pain  Locations:  Left posterior thigh    Indications: Destruction of  Skin lesion secondary to irritation    Patient informed of risks (permanent scarring, infection, light or dark discoloration, bleeding, infection, weakness, numbness and recurrence of the lesion) and benefits of the procedure and verbal informed consent obtained  The areas are treated with liquid nitrogen therapy, frozen until ice ball extended 2 mm beyond lesion, allowed to thaw, and treated again  The patient tolerated procedure well  The patient was instructed on post-op care, warned that there may be blister formation, redness and pain  Recommend OTC analgesia as needed for pain  Condition:  Stable    Complications:  none  Assessment:     1  Psoriasiform dermatitis     2  Verrucous keratosis     3  Screening for skin condition     4  History of skin cancer     5   Seborrheic keratosis           Plan:    psoriasiform dermatitis still looks inflamed probably from patient irritation will continue with topical steroid at this time no other intervention necessary   verrucous keratosis lesion treated because the patient concern and irritation  Seborrheic keratosis patient reassured these are normal growths we acquire with age no treatment needed  History of skin cancer in no recurrence nothing else atypical sunblock recommended follow-up in 6 months  Screening for dermatologic disorders nothing else of concern noted on complete exam follow-up in 6 months    Addie Israel MD  9/13/2018,11:08 AM    Portions of the record may have been created with voice recognition software   Occasional wrong word or "sound a like" substitutions may have occurred due to the inherent limitations of voice recognition software   Read the chart carefully and recognize, using context, where substitutions have occurred

## 2018-09-13 NOTE — PATIENT INSTRUCTIONS
psoriasiform dermatitis still looks inflamed probably from patient irritation will continue with topical steroid at this time no other intervention necessary   verrucous keratosis lesion treated because the patient concern and irritation  Seborrheic keratosis patient reassured these are normal growths we acquire with age no treatment needed  History of skin cancer in no recurrence nothing else atypical sunblock recommended follow-up in 6 months  Screening for dermatologic disorders nothing else of concern noted on complete exam follow-up in 6 months  Treatment with Cryotherapy    The doctor has treated your skin with nitrogen, which is 320 degrees Fahrenheit below zero  He has given the treated area "frostbite "    Stinging should subside within a few hours  You can take Tylenol for pain, if needed  Over the next few days, it is normal if the area becomes reddened, a blood blister, or swollen with fluid  If the lesion treated was near the eye - you could get a swollen eye over the next few days  Do not panic! This is all temporary, and will resolve with time  There is no special treatment - just keep the area clean  Makeup and BandAids can be used, if preferred  When the area starts to dry up and peel off, using Vaseline can help healing  It usually takes up to a month for it to heal   Some lesions are recurrent and may require repeat treatments  If a lesion has not healed in one month, please don't hesitate to contact us  If you have any further questions that are not answered here, please call us  [phone]    Thank you for allowing us to care for you

## 2018-09-27 ENCOUNTER — HOSPITAL ENCOUNTER (OUTPATIENT)
Dept: ULTRASOUND IMAGING | Facility: HOSPITAL | Age: 83
Discharge: HOME | End: 2018-09-27
Attending: INTERNAL MEDICINE
Payer: MEDICARE

## 2018-09-27 DIAGNOSIS — N18.30 STAGE 3 CHRONIC KIDNEY DISEASE (HCC): ICD-10-CM

## 2018-09-27 PROCEDURE — 76770 US EXAM ABDO BACK WALL COMP: CPT

## 2018-10-10 ENCOUNTER — APPOINTMENT (OUTPATIENT)
Dept: LAB | Facility: CLINIC | Age: 83
End: 2018-10-10
Payer: MEDICARE

## 2018-10-10 ENCOUNTER — TRANSCRIBE ORDERS (OUTPATIENT)
Dept: LAB | Facility: CLINIC | Age: 83
End: 2018-10-10

## 2018-10-10 DIAGNOSIS — N18.30 CHRONIC KIDNEY DISEASE, STAGE III (MODERATE) (HCC): Primary | ICD-10-CM

## 2018-10-10 DIAGNOSIS — N18.30 CHRONIC KIDNEY DISEASE, STAGE III (MODERATE) (HCC): ICD-10-CM

## 2018-10-10 DIAGNOSIS — N18.30 STAGE 3 CHRONIC KIDNEY DISEASE (HCC): ICD-10-CM

## 2018-10-10 LAB
ANION GAP SERPL CALCULATED.3IONS-SCNC: 7 MMOL/L (ref 4–13)
BASOPHILS # BLD AUTO: 0.05 THOUSANDS/ΜL (ref 0–0.1)
BASOPHILS NFR BLD AUTO: 1 % (ref 0–1)
BUN SERPL-MCNC: 32 MG/DL (ref 5–25)
CALCIUM SERPL-MCNC: 9.2 MG/DL (ref 8.3–10.1)
CHLORIDE SERPL-SCNC: 100 MMOL/L (ref 100–108)
CO2 SERPL-SCNC: 31 MMOL/L (ref 21–32)
CREAT SERPL-MCNC: 1.48 MG/DL (ref 0.6–1.3)
EOSINOPHIL # BLD AUTO: 0.13 THOUSAND/ΜL (ref 0–0.61)
EOSINOPHIL NFR BLD AUTO: 2 % (ref 0–6)
ERYTHROCYTE [DISTWIDTH] IN BLOOD BY AUTOMATED COUNT: 16.6 % (ref 11.6–15.1)
GFR SERPL CREATININE-BSD FRML MDRD: 30 ML/MIN/1.73SQ M
GLUCOSE P FAST SERPL-MCNC: 95 MG/DL (ref 65–99)
HCT VFR BLD AUTO: 32.5 % (ref 34.8–46.1)
HGB BLD-MCNC: 9.7 G/DL (ref 11.5–15.4)
IMM GRANULOCYTES # BLD AUTO: 0.06 THOUSAND/UL (ref 0–0.2)
IMM GRANULOCYTES NFR BLD AUTO: 1 % (ref 0–2)
LYMPHOCYTES # BLD AUTO: 0.69 THOUSANDS/ΜL (ref 0.6–4.47)
LYMPHOCYTES NFR BLD AUTO: 10 % (ref 14–44)
MCH RBC QN AUTO: 27.9 PG (ref 26.8–34.3)
MCHC RBC AUTO-ENTMCNC: 29.8 G/DL (ref 31.4–37.4)
MCV RBC AUTO: 93 FL (ref 82–98)
MONOCYTES # BLD AUTO: 0.68 THOUSAND/ΜL (ref 0.17–1.22)
MONOCYTES NFR BLD AUTO: 10 % (ref 4–12)
NEUTROPHILS # BLD AUTO: 5.1 THOUSANDS/ΜL (ref 1.85–7.62)
NEUTS SEG NFR BLD AUTO: 76 % (ref 43–75)
NRBC BLD AUTO-RTO: 0 /100 WBCS
PHOSPHATE SERPL-MCNC: 3.5 MG/DL (ref 2.3–4.1)
PLATELET # BLD AUTO: 190 THOUSANDS/UL (ref 149–390)
PMV BLD AUTO: 13.3 FL (ref 8.9–12.7)
POTASSIUM SERPL-SCNC: 3.3 MMOL/L (ref 3.5–5.3)
PTH-INTACT SERPL-MCNC: 90.6 PG/ML (ref 18.4–80.1)
RBC # BLD AUTO: 3.48 MILLION/UL (ref 3.81–5.12)
SODIUM SERPL-SCNC: 138 MMOL/L (ref 136–145)
WBC # BLD AUTO: 6.71 THOUSAND/UL (ref 4.31–10.16)

## 2018-10-10 PROCEDURE — 36415 COLL VENOUS BLD VENIPUNCTURE: CPT

## 2018-10-10 PROCEDURE — 83970 ASSAY OF PARATHORMONE: CPT

## 2018-10-10 PROCEDURE — 84100 ASSAY OF PHOSPHORUS: CPT

## 2018-10-10 PROCEDURE — 85025 COMPLETE CBC W/AUTO DIFF WBC: CPT

## 2018-10-10 PROCEDURE — 80048 BASIC METABOLIC PNL TOTAL CA: CPT

## 2018-10-11 ENCOUNTER — APPOINTMENT (OUTPATIENT)
Dept: LAB | Facility: CLINIC | Age: 83
End: 2018-10-11
Payer: MEDICARE

## 2018-10-11 LAB
BACTERIA UR QL AUTO: ABNORMAL /HPF
BILIRUB UR QL STRIP: NEGATIVE
CLARITY UR: ABNORMAL
COLOR UR: YELLOW
CREAT UR-MCNC: 99.3 MG/DL
GLUCOSE UR STRIP-MCNC: NEGATIVE MG/DL
HGB UR QL STRIP.AUTO: NEGATIVE
HYALINE CASTS #/AREA URNS LPF: ABNORMAL /LPF
KETONES UR STRIP-MCNC: NEGATIVE MG/DL
LEUKOCYTE ESTERASE UR QL STRIP: ABNORMAL
NITRITE UR QL STRIP: NEGATIVE
NON-SQ EPI CELLS URNS QL MICRO: ABNORMAL /HPF
PH UR STRIP.AUTO: 6 [PH] (ref 4.5–8)
PROT UR STRIP-MCNC: NEGATIVE MG/DL
PROT UR-MCNC: 14 MG/DL
PROT/CREAT UR: 0.14 MG/G{CREAT} (ref 0–0.1)
RBC #/AREA URNS AUTO: ABNORMAL /HPF
SP GR UR STRIP.AUTO: 1.02 (ref 1–1.03)
UROBILINOGEN UR QL STRIP.AUTO: 0.2 E.U./DL
WBC #/AREA URNS AUTO: ABNORMAL /HPF

## 2018-10-11 PROCEDURE — 84156 ASSAY OF PROTEIN URINE: CPT | Performed by: INTERNAL MEDICINE

## 2018-10-11 PROCEDURE — 82570 ASSAY OF URINE CREATININE: CPT | Performed by: INTERNAL MEDICINE

## 2018-10-11 PROCEDURE — 81001 URINALYSIS AUTO W/SCOPE: CPT | Performed by: INTERNAL MEDICINE

## 2018-10-18 ENCOUNTER — OFFICE VISIT (OUTPATIENT)
Dept: NEPHROLOGY | Facility: CLINIC | Age: 83
End: 2018-10-18
Payer: MEDICARE

## 2018-10-18 VITALS
SYSTOLIC BLOOD PRESSURE: 130 MMHG | WEIGHT: 136 LBS | TEMPERATURE: 97.7 F | BODY MASS INDEX: 23.22 KG/M2 | RESPIRATION RATE: 16 BRPM | DIASTOLIC BLOOD PRESSURE: 70 MMHG | HEART RATE: 80 BPM | HEIGHT: 64 IN

## 2018-10-18 DIAGNOSIS — I48.0 PAROXYSMAL ATRIAL FIBRILLATION (HCC): ICD-10-CM

## 2018-10-18 DIAGNOSIS — N18.30 ANEMIA DUE TO STAGE 3 CHRONIC KIDNEY DISEASE (HCC): ICD-10-CM

## 2018-10-18 DIAGNOSIS — D63.1 ANEMIA DUE TO STAGE 3 CHRONIC KIDNEY DISEASE (HCC): ICD-10-CM

## 2018-10-18 DIAGNOSIS — I10 ESSENTIAL HYPERTENSION: ICD-10-CM

## 2018-10-18 DIAGNOSIS — N18.30 CHRONIC KIDNEY DISEASE, STAGE III (MODERATE) (HCC): Primary | ICD-10-CM

## 2018-10-18 PROBLEM — N18.9 ANEMIA DUE TO CHRONIC KIDNEY DISEASE: Status: ACTIVE | Noted: 2018-10-18

## 2018-10-18 PROCEDURE — 99213 OFFICE O/P EST LOW 20 MIN: CPT | Performed by: INTERNAL MEDICINE

## 2018-10-18 NOTE — PROGRESS NOTES
NEPHROLOGY OFFICE FOLLOW UP  Irma Fall 80 y o  female MRN: [id number]    Encounter: [id number] 10/18/2018    REASON FOR VISIT: Irma Fall is a 80 y o  female who is here on 10/18/2018 for Follow-up and CKD III       HPI:    Catherine Tiwari came in today for follow-up of CK D  80year-old woman looks quite good for her age  Denies any complaint        REVIEW OF SYSTEMS:    Review of Systems   Constitutional: Negative for activity change and fatigue  HENT: Negative for congestion and ear discharge  Eyes: Negative for photophobia, pain and visual disturbance  Respiratory: Negative for apnea, choking, chest tightness and wheezing  Cardiovascular: Negative for chest pain, palpitations and leg swelling  Gastrointestinal: Negative for abdominal distention, abdominal pain, blood in stool and nausea  Endocrine: Negative for heat intolerance and polyphagia  Genitourinary: Negative for difficulty urinating, flank pain and urgency  Musculoskeletal: Positive for arthralgias and back pain  Negative for neck pain and neck stiffness  Skin: Negative for color change and wound  Allergic/Immunologic: Negative for food allergies and immunocompromised state  Neurological: Negative for seizures and facial asymmetry  Hematological: Negative for adenopathy  Does not bruise/bleed easily  Psychiatric/Behavioral: Negative for self-injury and suicidal ideas           PAST MEDICAL HISTORY:  Past Medical History:   Diagnosis Date    Anemia     Asteatotic eczema     Chronic kidney disease     Hypercholesterolemia     Hypertension     Lichen sclerosus et atrophicus     Mitral valve insufficiency     Seborrheic keratoses     Tricuspid regurgitation     Vertigo        PAST SURGICAL HISTORY:  Past Surgical History:   Procedure Laterality Date    APPENDECTOMY  11/14/1939    HYSTERECTOMY  11/14/1962    MD COLONOSCOPY FLX DX W/COLLJ SPEC WHEN PFRMD N/A 5/19/2016    Procedure: EGD AND COLONOSCOPY;  Surgeon: Jean Carlos Bellamy MD;  [facility]; Service: Gastroenterology       SOCIAL HISTORY:  History   Alcohol Use No     History   Drug Use No     History   Smoking Status    Never Smoker   Smokeless Tobacco    Never Used       FAMILY HISTORY:  Family History   Problem Relation Age of Onset    Heart disease Mother         Abnormality    Heart disease Father         Abnormality       MEDICATIONS:    Current Outpatient Prescriptions:     amLODIPine (NORVASC) 5 mg tablet, Take 1 tablet by mouth daily, Disp: , Rfl:     apixaban (ELIQUIS) 2 5 mg, Take by mouth 2 (two) times a day , Disp: , Rfl:     diltiazem (CARDIZEM CD) 120 mg 24 hr capsule, TAKE ONE CAPSULE BY MOUTH TWICE A DAY, Disp: 90 capsule, Rfl: 1    ferrous sulfate 325 (65 Fe) mg tablet, TAKE 1 TABLET TWICE A DAY, Disp: 60 tablet, Rfl: 3    furosemide (LASIX) 40 mg tablet, Take 1 tablet by mouth 2 (two) times a day for 30 days, Disp: 60 tablet, Rfl: 0    metoprolol succinate (TOPROL-XL) 100 mg 24 hr tablet, TAKE 2 TABLETS EVERY DAY, Disp: 180 tablet, Rfl: 2    triamcinolone (KENALOG) 0 1 % ointment, APPLY TOPICALLY 2 (TWO) TIMES A DAY TO RASH ON THE RIGHT LOWER LEG, Disp: 30 g, Rfl: 1    PHYSICAL EXAM:  Vitals:    10/18/18 0841   BP: 130/70   BP Location: Right arm   Patient Position: Sitting   Pulse: 80   Resp: 16   Temp: 97 7 °F (36 5 °C)   TempSrc: Oral   Weight: 61 7 kg (136 lb)   Height: 5' 4" (1 626 m)     Body mass index is 23 34 kg/m²  Physical Exam   Constitutional: She is oriented to person, place, and time  She appears well-developed  No distress  HENT:   Head: Normocephalic  Mouth/Throat: Oropharynx is clear and moist    Eyes: Pupils are equal, round, and reactive to light  Conjunctivae are normal  No scleral icterus  Neck: Neck supple  No JVD present  Cardiovascular: Normal rate, regular rhythm and normal heart sounds  No murmur heard  Pulmonary/Chest: Effort normal and breath sounds normal  No respiratory distress   She has no wheezes  She has no rales  Abdominal: Soft  Bowel sounds are normal  She exhibits no distension and no mass  There is no tenderness  Musculoskeletal: Normal range of motion  She exhibits no edema  Neurological: She is alert and oriented to person, place, and time  Skin: Skin is warm  No rash noted  Psychiatric: She has a normal mood and affect   Her behavior is normal        LAB RESULTS:  Results for orders placed or performed in visit on 97/13/88   Basic metabolic panel   Result Value Ref Range    Sodium 138 136 - 145 mmol/L    Potassium 3 3 (L) 3 5 - 5 3 mmol/L    Chloride 100 100 - 108 mmol/L    CO2 31 21 - 32 mmol/L    ANION GAP 7 4 - 13 mmol/L    BUN 32 (H) 5 - 25 mg/dL    Creatinine 1 48 (H) 0 60 - 1 30 mg/dL    Glucose, Fasting 95 65 - 99 mg/dL    Calcium 9 2 8 3 - 10 1 mg/dL    eGFR 30 ml/min/1 73sq m   CBC and differential   Result Value Ref Range    WBC 6 71 4 31 - 10 16 Thousand/uL    RBC 3 48 (L) 3 81 - 5 12 Million/uL    Hemoglobin 9 7 (L) 11 5 - 15 4 g/dL    Hematocrit 32 5 (L) 34 8 - 46 1 %    MCV 93 82 - 98 fL    MCH 27 9 26 8 - 34 3 pg    MCHC 29 8 (L) 31 4 - 37 4 g/dL    RDW 16 6 (H) 11 6 - 15 1 %    MPV 13 3 (H) 8 9 - 12 7 fL    Platelets 641 656 - 865 Thousands/uL    nRBC 0 /100 WBCs    Neutrophils Relative 76 (H) 43 - 75 %    Immat GRANS % 1 0 - 2 %    Lymphocytes Relative 10 (L) 14 - 44 %    Monocytes Relative 10 4 - 12 %    Eosinophils Relative 2 0 - 6 %    Basophils Relative 1 0 - 1 %    Neutrophils Absolute 5 10 1 85 - 7 62 Thousands/µL    Immature Grans Absolute 0 06 0 00 - 0 20 Thousand/uL    Lymphocytes Absolute 0 69 0 60 - 4 47 Thousands/µL    Monocytes Absolute 0 68 0 17 - 1 22 Thousand/µL    Eosinophils Absolute 0 13 0 00 - 0 61 Thousand/µL    Basophils Absolute 0 05 0 00 - 0 10 Thousands/µL   Phosphorus   Result Value Ref Range    Phosphorus 3 5 2 3 - 4 1 mg/dL   PTH, intact   Result Value Ref Range    PTH 90 6 (H) 18 4 - 80 1 pg/mL       ASSESSMENT and PLAN:      Chronic kidney disease, stage III (moderate)  Renal function is actually better with GFR of 30  Possible hypertensive nephrosclerosis  Renal ultrasound significant for cyst only  Discuss hydration with her and avoiding any nephrotoxic medicine    Essential hypertension  Very well controlled with present medication which I will continue    Anemia due to chronic kidney disease  Her hemoglobin is decreasing  She denies any acute loss of blood  Advised her to follow up with primary doctor  I will see her back in 6 months  I will get blood test before that visit  Will advise to monitor hemoglobin closely        Portions of the record may have been created with voice recognition software  Occasional wrong word or "sound a like" substitutions may have occurred due to the inherent limitations of voice recognition software  Read the chart carefully and recognize, using context, where substitutions have occurred  If you have any questions, please contact the dictating provider

## 2018-10-18 NOTE — PATIENT INSTRUCTIONS
Chronic Kidney Disease   AMBULATORY CARE:   Chronic kidney disease (CKD)  is the gradual and permanent loss of kidney function  Normally, the kidneys remove fluid, chemicals, and waste from your blood  These wastes are turned into urine by your kidneys  CKD may worsen over time and lead to kidney failure  Common symptoms include the following:   · Changes in how often you need to urinate    · Swelling in your arms, legs, or feet    · Shortness of breath    · Fatigue or weakness    · Bad or bitter taste in your mouth    · Nausea, vomiting, or loss of appetite  Seek care immediately if:   · You are confused and very drowsy  · You have a seizure  · You have shortness of breath  Contact your healthcare provider if:   · You suddenly gain or lose more weight than your healthcare provider has told you is okay  · You have itchy skin or a rash  · You urinate more or less than you normally do  · You have blood in your urine  · You have nausea and repeated vomiting  · You have fatigue or muscle weakness  · You have hiccups that will not stop  · You have questions or concerns about your condition or care  Treatment for CKD:  Medicines may be given to decrease blood pressure and get rid of extra fluid  You may also receive medicine to manage health conditions that may occur with CKD  Dialysis is a treatment to remove chemicals and waste from your blood when your kidneys can no longer do this  Surgery may be needed to create an arteriovenous fistula (AVF) in your arm or insert a catheter into your abdomen so that you can receive dialysis  A kidney transplant may be done if your CKD becomes severe  Manage CKD:   · Maintain a healthy weight  Ask your healthcare provider how much you should weigh  Ask him to help you create a weight loss plan if you are overweight  · Exercise 30 to 60 minutes a day, 4 to 7 times a week, or as directed  Ask about the best exercise plan for you   Regular exercise can help you manage CKD, high blood pressure, and diabetes  · Follow your healthcare provider's advice about what to eat and drink  He may tell you to eat food low in sodium (salt), potassium, phosphorus, or protein  You may need to see a dietitian if you need help planning meals  Ask how much liquid to drink each day and which liquids are best for you  · Limit alcohol  Ask how much alcohol is safe for you to drink  A drink of alcohol is 12 ounces of beer, 5 ounces of wine, or 1½ ounces of liquor  · Do not smoke  Nicotine and other chemicals in cigarettes and cigars can cause lung and kidney damage  Ask your healthcare provider for information if you currently smoke and need help to quit  E-cigarettes or smokeless tobacco still contain nicotine  Talk to your healthcare provider before you use these products  · Ask your healthcare provider if you need vaccines  Infections such as pneumonia, influenza, and hepatitis can be more harmful or more likely to occur in a person who has CKD  Vaccines reduce your risk of infection with these viruses  Follow up with your healthcare provider as directed:  Write down your questions so you remember to ask them during your visits  © 2017 [employer] Information is for End User's use only and may not be sold, redistributed or otherwise used for commercial purposes  All illustrations and images included in CareNotes® are the copyrighted property of [employer]  or Mihcael Martinez  The above information is an  only  It is not intended as medical advice for individual conditions or treatments  Talk to your doctor, nurse or pharmacist before following any medical regimen to see if it is safe and effective for you

## 2018-10-18 NOTE — LETTER
October 18, 2018     Beau Canseco MD  [address] [facility] [address]    Patient: Buster Weber   YOB: 1924   Date of Visit: 10/18/2018       Dear Dr Enrike Valencia: Thank you for referring Cara Salazar to me for evaluation  Below are my notes for this consultation  If you have questions, please do not hesitate to call me  I look forward to following your patient along with you  Sincerely,        Марина Eagle MD        CC: No Recipients  Марина Eagle MD  10/18/2018  9:06 AM  Sign at close encounter  [address] 80 y o  female MRN: [id number]    Encounter: [id number] 10/18/2018    REASON FOR VISIT: Buster Weber is a 80 y o  female who is here on 10/18/2018 for Follow-up and CKD III       HPI:    Sveta Gleason came in today for follow-up of CK D  80year-old woman looks quite good for her age  Denies any complaint        REVIEW OF SYSTEMS:    Review of Systems   Constitutional: Negative for activity change and fatigue  HENT: Negative for congestion and ear discharge  Eyes: Negative for photophobia, pain and visual disturbance  Respiratory: Negative for apnea, choking, chest tightness and wheezing  Cardiovascular: Negative for chest pain, palpitations and leg swelling  Gastrointestinal: Negative for abdominal distention, abdominal pain, blood in stool and nausea  Endocrine: Negative for heat intolerance and polyphagia  Genitourinary: Negative for difficulty urinating, flank pain and urgency  Musculoskeletal: Positive for arthralgias and back pain  Negative for neck pain and neck stiffness  Skin: Negative for color change and wound  Allergic/Immunologic: Negative for food allergies and immunocompromised state  Neurological: Negative for seizures and facial asymmetry  Hematological: Negative for adenopathy  Does not bruise/bleed easily  Psychiatric/Behavioral: Negative for self-injury and suicidal ideas           PAST MEDICAL HISTORY:  Past Medical History:   Diagnosis Date    Anemia     Asteatotic eczema     Chronic kidney disease     Hypercholesterolemia     Hypertension     Lichen sclerosus et atrophicus     Mitral valve insufficiency     Seborrheic keratoses     Tricuspid regurgitation     Vertigo        PAST SURGICAL HISTORY:  Past Surgical History:   Procedure Laterality Date    APPENDECTOMY  11/14/1939    HYSTERECTOMY  11/14/1962    MI COLONOSCOPY FLX DX W/COLLJ SPEC WHEN PFRMD N/A 5/19/2016    Procedure: EGD AND COLONOSCOPY;  Surgeon: Angel Harper MD;  Location: [facility];   Service: Gastroenterology       SOCIAL HISTORY:  History   Alcohol Use No     History   Drug Use No     History   Smoking Status    Never Smoker   Smokeless Tobacco    Never Used       FAMILY HISTORY:  Family History   Problem Relation Age of Onset    Heart disease Mother         Abnormality    Heart disease Father         Abnormality       MEDICATIONS:    Current Outpatient Prescriptions:     amLODIPine (NORVASC) 5 mg tablet, Take 1 tablet by mouth daily, Disp: , Rfl:     apixaban (ELIQUIS) 2 5 mg, Take by mouth 2 (two) times a day , Disp: , Rfl:     diltiazem (CARDIZEM CD) 120 mg 24 hr capsule, TAKE ONE CAPSULE BY MOUTH TWICE A DAY, Disp: 90 capsule, Rfl: 1    ferrous sulfate 325 (65 Fe) mg tablet, TAKE 1 TABLET TWICE A DAY, Disp: 60 tablet, Rfl: 3    furosemide (LASIX) 40 mg tablet, Take 1 tablet by mouth 2 (two) times a day for 30 days, Disp: 60 tablet, Rfl: 0    metoprolol succinate (TOPROL-XL) 100 mg 24 hr tablet, TAKE 2 TABLETS EVERY DAY, Disp: 180 tablet, Rfl: 2    triamcinolone (KENALOG) 0 1 % ointment, APPLY TOPICALLY 2 (TWO) TIMES A DAY TO RASH ON THE RIGHT LOWER LEG, Disp: 30 g, Rfl: 1    PHYSICAL EXAM:  Vitals:    10/18/18 0841   BP: 130/70   BP Location: Right arm   Patient Position: Sitting   Pulse: 80   Resp: 16   Temp: 97 7 °F (36 5 °C)   TempSrc: Oral   Weight: 61 7 kg (136 lb)   Height: 5' 4" (1 626 m) Body mass index is 23 34 kg/m²  Physical Exam   Constitutional: She is oriented to person, place, and time  She appears well-developed  No distress  HENT:   Head: Normocephalic  Mouth/Throat: Oropharynx is clear and moist    Eyes: Pupils are equal, round, and reactive to light  Conjunctivae are normal  No scleral icterus  Neck: Neck supple  No JVD present  Cardiovascular: Normal rate, regular rhythm and normal heart sounds  No murmur heard  Pulmonary/Chest: Effort normal and breath sounds normal  No respiratory distress  She has no wheezes  She has no rales  Abdominal: Soft  Bowel sounds are normal  She exhibits no distension and no mass  There is no tenderness  Musculoskeletal: Normal range of motion  She exhibits no edema  Neurological: She is alert and oriented to person, place, and time  Skin: Skin is warm  No rash noted  Psychiatric: She has a normal mood and affect   Her behavior is normal        LAB RESULTS:  Results for orders placed or performed in visit on 20/40/42   Basic metabolic panel   Result Value Ref Range    Sodium 138 136 - 145 mmol/L    Potassium 3 3 (L) 3 5 - 5 3 mmol/L    Chloride 100 100 - 108 mmol/L    CO2 31 21 - 32 mmol/L    ANION GAP 7 4 - 13 mmol/L    BUN 32 (H) 5 - 25 mg/dL    Creatinine 1 48 (H) 0 60 - 1 30 mg/dL    Glucose, Fasting 95 65 - 99 mg/dL    Calcium 9 2 8 3 - 10 1 mg/dL    eGFR 30 ml/min/1 73sq m   CBC and differential   Result Value Ref Range    WBC 6 71 4 31 - 10 16 Thousand/uL    RBC 3 48 (L) 3 81 - 5 12 Million/uL    Hemoglobin 9 7 (L) 11 5 - 15 4 g/dL    Hematocrit 32 5 (L) 34 8 - 46 1 %    MCV 93 82 - 98 fL    MCH 27 9 26 8 - 34 3 pg    MCHC 29 8 (L) 31 4 - 37 4 g/dL    RDW 16 6 (H) 11 6 - 15 1 %    MPV 13 3 (H) 8 9 - 12 7 fL    Platelets 570 821 - 053 Thousands/uL    nRBC 0 /100 WBCs    Neutrophils Relative 76 (H) 43 - 75 %    Immat GRANS % 1 0 - 2 %    Lymphocytes Relative 10 (L) 14 - 44 %    Monocytes Relative 10 4 - 12 %    Eosinophils Relative 2 0 - 6 %    Basophils Relative 1 0 - 1 %    Neutrophils Absolute 5 10 1 85 - 7 62 Thousands/µL    Immature Grans Absolute 0 06 0 00 - 0 20 Thousand/uL    Lymphocytes Absolute 0 69 0 60 - 4 47 Thousands/µL    Monocytes Absolute 0 68 0 17 - 1 22 Thousand/µL    Eosinophils Absolute 0 13 0 00 - 0 61 Thousand/µL    Basophils Absolute 0 05 0 00 - 0 10 Thousands/µL   Phosphorus   Result Value Ref Range    Phosphorus 3 5 2 3 - 4 1 mg/dL   PTH, intact   Result Value Ref Range    PTH 90 6 (H) 18 4 - 80 1 pg/mL       ASSESSMENT and PLAN:      Chronic kidney disease, stage III (moderate)  Renal function is actually better with GFR of 30  Possible hypertensive nephrosclerosis  Renal ultrasound significant for cyst only  Discuss hydration with her and avoiding any nephrotoxic medicine    Essential hypertension  Very well controlled with present medication which I will continue    Anemia due to chronic kidney disease  Her hemoglobin is decreasing  She denies any acute loss of blood  Advised her to follow up with primary doctor  I will see her back in 6 months  I will get blood test before that visit  Will advise to monitor hemoglobin closely        Portions of the record may have been created with voice recognition software  Occasional wrong word or "sound a like" substitutions may have occurred due to the inherent limitations of voice recognition software  Read the chart carefully and recognize, using context, where substitutions have occurred  If you have any questions, please contact the dictating provider

## 2018-10-18 NOTE — ASSESSMENT & PLAN NOTE
Her hemoglobin is decreasing  She denies any acute loss of blood  Advised her to follow up with primary doctor

## 2018-10-18 NOTE — ASSESSMENT & PLAN NOTE
Renal function is actually better with GFR of 30  Possible hypertensive nephrosclerosis  Renal ultrasound significant for cyst only    Discuss hydration with her and avoiding any nephrotoxic medicine

## 2018-10-23 DIAGNOSIS — I48.20 CHRONIC ATRIAL FIBRILLATION (HCC): Primary | ICD-10-CM

## 2018-10-23 NOTE — TELEPHONE ENCOUNTER
Patient has an appointment on 11/26 with Dr Nkechi Adkins but states she needs an application to renew Apixaban (Eliquis) 2 5 mg

## 2018-10-27 DIAGNOSIS — R60.9 EDEMA, UNSPECIFIED TYPE: Primary | ICD-10-CM

## 2018-10-29 RX ORDER — FUROSEMIDE 40 MG/1
TABLET ORAL
Qty: 60 TABLET | Refills: 3 | Status: SHIPPED | OUTPATIENT
Start: 2018-10-29 | End: 2019-06-30 | Stop reason: SDUPTHER

## 2018-11-05 DIAGNOSIS — I48.20 CHRONIC ATRIAL FIBRILLATION (HCC): ICD-10-CM

## 2018-11-05 NOTE — TELEPHONE ENCOUNTER
PT IS HERE NOW -   WILL WAIT FOR RX    NEED WRITTEN RX FOR ELIQUIS 2 5 MG -  PT WILL SEND TO [facility]  -   SAID SHE DOES THIS EVERY YEAR    RX MUST BE WRITTEN FOR 1 YEAR

## 2018-11-05 NOTE — PROGRESS NOTES
Assessment/Plan:       Diagnoses and all orders for this visit:    Chronic atrial fibrillation (HCC)  -     apixaban (ELIQUIS) 2 5 mg; Take 1 tablet (2 5 mg total) by mouth 2 (two) times a day          There are no Patient Instructions on file for this visit  Subjective:      Patient ID: Marcos Akhtar is a 80 y o  female  HPI    The following portions of the patient's history were reviewed and updated as appropriate:   She has a past medical history of Anemia; Asteatotic eczema; Chronic kidney disease; Hypercholesterolemia; Hypertension; Lichen sclerosus et atrophicus; Mitral valve insufficiency; Seborrheic keratoses; Tricuspid regurgitation; and Vertigo  ,   does not have any pertinent problems on file  ,   has a past surgical history that includes Appendectomy (11/14/1939); Hysterectomy (11/14/1962); and pr colonoscopy flx dx w/collj spec when pfrmd (N/A, 5/19/2016)  ,  family history includes Heart disease in her father and mother  ,   reports that she has never smoked  She has never used smokeless tobacco  She reports that she does not drink alcohol or use drugs  ,  is allergic to lactose intolerance (gi) and penicillins     Current Outpatient Prescriptions   Medication Sig Dispense Refill    amLODIPine (NORVASC) 5 mg tablet Take 1 tablet by mouth daily      apixaban (ELIQUIS) 2 5 mg Take 1 tablet (2 5 mg total) by mouth 2 (two) times a day 180 tablet 0    diltiazem (CARDIZEM CD) 120 mg 24 hr capsule TAKE ONE CAPSULE BY MOUTH TWICE A DAY 90 capsule 1    ferrous sulfate 325 (65 Fe) mg tablet TAKE 1 TABLET TWICE A DAY 60 tablet 3    furosemide (LASIX) 40 mg tablet TAKE 1 TABLET TWICE DAILY  60 tablet 3    metoprolol succinate (TOPROL-XL) 100 mg 24 hr tablet TAKE 2 TABLETS EVERY  tablet 2    triamcinolone (KENALOG) 0 1 % ointment APPLY TOPICALLY 2 (TWO) TIMES A DAY TO RASH ON THE RIGHT LOWER LEG 30 g 1     No current facility-administered medications for this visit          Review of Systems Objective: There were no vitals filed for this visit     Physical Exam

## 2018-11-23 RX ORDER — A/SINGAPORE/GP1908/2015 IVR-180 (H1N1) (AN A/MICHIGAN/45/2015 (H1N1)PDM09-LIKE VIRUS), A/HONG KONG/4801/2014, NYMC X-263B (H3N2) (AN A/HONG KONG/4801/2014-LIKE VIRUS), AND B/BRISBANE/60/2008, WILD TYPE (A B/BRISBANE/60/2008-LIKE VIRUS) 15; 15; 15 UG/.5ML; UG/.5ML; UG/.5ML
INJECTION, SUSPENSION INTRAMUSCULAR
Refills: 0 | COMMUNITY
Start: 2018-10-26 | End: 2018-11-26 | Stop reason: ALTCHOICE

## 2018-11-26 ENCOUNTER — OFFICE VISIT (OUTPATIENT)
Dept: INTERNAL MEDICINE CLINIC | Facility: CLINIC | Age: 83
End: 2018-11-26
Payer: MEDICARE

## 2018-11-26 VITALS
WEIGHT: 134 LBS | DIASTOLIC BLOOD PRESSURE: 66 MMHG | HEART RATE: 64 BPM | OXYGEN SATURATION: 91 % | SYSTOLIC BLOOD PRESSURE: 130 MMHG | BODY MASS INDEX: 22.88 KG/M2 | HEIGHT: 64 IN

## 2018-11-26 DIAGNOSIS — I51.89 DIASTOLIC DYSFUNCTION: ICD-10-CM

## 2018-11-26 DIAGNOSIS — I48.0 PAROXYSMAL ATRIAL FIBRILLATION (HCC): Primary | ICD-10-CM

## 2018-11-26 DIAGNOSIS — I34.0 NON-RHEUMATIC MITRAL REGURGITATION: ICD-10-CM

## 2018-11-26 DIAGNOSIS — Z79.01 LONG TERM CURRENT USE OF ANTICOAGULANT: ICD-10-CM

## 2018-11-26 DIAGNOSIS — I10 ESSENTIAL HYPERTENSION: ICD-10-CM

## 2018-11-26 DIAGNOSIS — N18.30 CHRONIC KIDNEY DISEASE, STAGE III (MODERATE) (HCC): ICD-10-CM

## 2018-11-26 PROBLEM — N17.9 AKI (ACUTE KIDNEY INJURY) (HCC): Status: RESOLVED | Noted: 2017-03-20 | Resolved: 2018-11-26

## 2018-11-26 PROCEDURE — 99214 OFFICE O/P EST MOD 30 MIN: CPT | Performed by: INTERNAL MEDICINE

## 2018-11-26 NOTE — PROGRESS NOTES
Assessment/Plan:       Diagnoses and all orders for this visit:    Paroxysmal atrial fibrillation ([address] )    Essential hypertension    Non-rheumatic mitral regurgitation    Chronic kidney disease, stage III (moderate) (HCC)    Long term current use of anticoagulant    Diastolic dysfunction    Other orders  -     Discontinue: FLUAD 0 5 ML PRADEEP; inject 0 5 milliliter intramuscularly          Patient Instructions   Doing well  Eliquis is renewed  Follow-up here yearly but continue to follow with Nephrology  Subjective:      Patient ID: Ron Angeles is a 80 y o  female  A 19-year-old female who is chronic diagnoses which are treated and stable  Essential hypertension  Chronic kidney disease stage 3 which is followed by Nephrology  Some mild osteoarthritis  Paroxysmal atrial fibrillation, currently in sinus rhythm  On a 2 5 mg Eliquis twice a day for stroke prophylaxis  Globally she feels well and has no complaints to offer  The following portions of the patient's history were reviewed and updated as appropriate:   She has a past medical history of Anemia; Asteatotic eczema; Chronic kidney disease; Hypercholesterolemia; Lichen sclerosus et atrophicus; Mitral valve insufficiency; Seborrheic keratoses; Tricuspid regurgitation; and Vertigo  ,   does not have any pertinent problems on file  ,   has a past surgical history that includes Appendectomy (11/14/1939); Hysterectomy (11/14/1962); and pr colonoscopy flx dx w/collj spec when pfrmd (N/A, 5/19/2016)  ,  family history includes Heart disease in her father and mother  ,   reports that she has never smoked  She has never used smokeless tobacco  She reports that she does not drink alcohol or use drugs  ,  is allergic to lactose intolerance (gi) and penicillins     Current Outpatient Prescriptions   Medication Sig Dispense Refill    amLODIPine (NORVASC) 5 mg tablet Take 1 tablet by mouth daily      apixaban (ELIQUIS) 2 5 mg Take 1 tablet (2 5 mg total) by mouth 2 (two) times a day 180 tablet 0    diltiazem (CARDIZEM CD) 120 mg 24 hr capsule TAKE ONE CAPSULE BY MOUTH TWICE A DAY 90 capsule 1    ferrous sulfate 325 (65 Fe) mg tablet TAKE 1 TABLET TWICE A DAY 60 tablet 3    furosemide (LASIX) 40 mg tablet TAKE 1 TABLET TWICE DAILY  60 tablet 3    metoprolol succinate (TOPROL-XL) 100 mg 24 hr tablet TAKE 2 TABLETS EVERY  tablet 2    triamcinolone (KENALOG) 0 1 % ointment APPLY TOPICALLY 2 (TWO) TIMES A DAY TO RASH ON THE RIGHT LOWER LEG 30 g 1     No current facility-administered medications for this visit  Review of Systems   Constitutional: Negative for chills and fever  HENT: Negative for sore throat and trouble swallowing  Eyes: Negative for pain  Respiratory: Negative for cough, shortness of breath and wheezing  Cardiovascular: Negative for chest pain and leg swelling  Gastrointestinal: Negative for abdominal pain, diarrhea, nausea and vomiting  Endocrine: Negative for cold intolerance and heat intolerance  Genitourinary: Negative for dysuria, frequency and pelvic pain  Musculoskeletal: Negative for arthralgias and joint swelling  Skin: Negative for rash and wound  Allergic/Immunologic: Negative for immunocompromised state  Neurological: Negative for dizziness, seizures, syncope and headaches  Psychiatric/Behavioral: Negative for dysphoric mood  The patient is not nervous/anxious  Objective:  Vitals:    11/26/18 1304   BP: 130/66   Pulse: 64   SpO2: 91%      Physical Exam   Constitutional: She is oriented to person, place, and time  She appears well-developed and well-nourished  No distress  Elderly patient who appears stated age  HENT:   Head: Normocephalic and atraumatic  Eyes: Pupils are equal, round, and reactive to light  EOM are normal    Neck: Normal range of motion  Neck supple  No tracheal deviation present  No thyromegaly present     Cardiovascular: Normal rate, regular rhythm and normal heart sounds  Exam reveals no gallop  No murmur heard  Pulmonary/Chest: No respiratory distress  She has no wheezes  She has no rales  Abdominal: Soft  Bowel sounds are normal  There is no tenderness  Musculoskeletal: Normal range of motion  She exhibits edema and deformity  She exhibits no tenderness  Mild osteoarthrosis deformities    Edema of both ankles  Neurological: She is alert and oriented to person, place, and time  Coordination normal    Skin: Skin is warm  Psychiatric: She has a normal mood and affect   Judgment normal

## 2018-11-27 ENCOUNTER — OFFICE VISIT (OUTPATIENT)
Dept: DERMATOLOGY | Facility: CLINIC | Age: 83
End: 2018-11-27
Payer: MEDICARE

## 2018-11-27 DIAGNOSIS — Z85.828 HISTORY OF SKIN CANCER: ICD-10-CM

## 2018-11-27 DIAGNOSIS — L98.9 UNKNOWN SKIN LESION: Primary | ICD-10-CM

## 2018-11-27 DIAGNOSIS — L82.1 SEBORRHEIC KERATOSIS: ICD-10-CM

## 2018-11-27 DIAGNOSIS — Z13.89 SCREENING FOR SKIN CONDITION: ICD-10-CM

## 2018-11-27 PROCEDURE — 88305 TISSUE EXAM BY PATHOLOGIST: CPT | Performed by: PATHOLOGY

## 2018-11-27 PROCEDURE — 11100 PR BIOPSY OF SKIN LESION: CPT | Performed by: DERMATOLOGY

## 2018-11-27 PROCEDURE — 99213 OFFICE O/P EST LOW 20 MIN: CPT | Performed by: DERMATOLOGY

## 2018-11-27 NOTE — PROGRESS NOTES
[address] [facility]  [address] [place] [address]  [phone]  [phone]     MRN: [id number] : 1924  Encounter: [id number]  Patient Information: Bart Hwang  Chief complaint:  Skin lesion on the left ankle    History of present illness:  17-year-old female presents for overall skin check with previous history of skin cancer patient concerned regarding lesion that she had pointed out to me when she was here in September which has not healed and has appear to break down further  In addition patient concerned regarding lesion on her right posterior leg as well  Past Medical History:   Diagnosis Date    Anemia     Asteatotic eczema     Chronic kidney disease     Hypercholesterolemia     Lichen sclerosus et atrophicus     Mitral valve insufficiency     Seborrheic keratoses     Tricuspid regurgitation     Vertigo      Past Surgical History:   Procedure Laterality Date    APPENDECTOMY  1939    HYSTERECTOMY  1962    TX COLONOSCOPY FLX DX W/COLLJ SPEC WHEN PFRMD N/A 2016    Procedure: EGD AND COLONOSCOPY;  Surgeon: Vero Goldberg MD;  Location[facility]; Service: Gastroenterology     Social History   History   Alcohol Use No     History   Drug Use No     History   Smoking Status    Never Smoker   Smokeless Tobacco    Never Used     Family History   Problem Relation Age of Onset    Heart disease Mother         Abnormality    Heart disease Father         Abnormality     Meds/Allergies   Allergies   Allergen Reactions    Lactose Intolerance (Gi)     Penicillins        Meds:  Prior to Admission medications    Medication Sig Start Date End Date Taking?  Authorizing Provider   amLODIPine (NORVASC) 5 mg tablet Take 1 tablet by mouth daily 16  Yes Historical Provider, MD   apixaban (ELIQUIS) 2 5 mg Take 1 tablet (2 5 mg total) by mouth 2 (two) times a day 18  Yes Roberto Spence MD   diltiazem (CARDIZEM CD) 120 mg 24 hr capsule TAKE ONE CAPSULE BY MOUTH TWICE A DAY 7/30/18  Yes Syed Das MD   ferrous sulfate 325 (65 Fe) mg tablet TAKE 1 TABLET TWICE A DAY 7/23/18  Yes Justin Barboza PA-C   furosemide (LASIX) 40 mg tablet TAKE 1 TABLET TWICE DAILY   10/29/18  Yes Jordan Osborne MD   metoprolol succinate (TOPROL-XL) 100 mg 24 hr tablet TAKE 2 TABLETS EVERY DAY 3/13/18  Yes Roddy Santacruz PA-C   triamcinolone (KENALOG) 0 1 % ointment APPLY TOPICALLY 2 (TWO) TIMES A DAY TO RASH ON THE RIGHT LOWER LEG 5/29/18  Yes Mumtaz Bajwa MD       Subjective:     Review of Systems:    General: negative for - chills, fatigue, fever,  weight gain or weight loss  Psychological: negative for - anxiety, behavioral disorder, concentration difficulties, decreased libido, depression, irritability, memory difficulties, mood swings, sleep disturbances or suicidal ideation  ENT: negative for - hearing difficulties , nasal congestion, nasal discharge, oral lesions, sinus pain, sneezing, sore throat  Allergy and Immunology: negative for - hives, insect bite sensitivity,  Hematological and Lymphatic: negative for - bleeding problems, blood clots,bruising, swollen lymph nodes  Endocrine: negative for - hair pattern changes, hot flashes, malaise/lethargy, mood swings, palpitations, polydipsia/polyuria, skin changes, temperature intolerance or unexpected weight change  Respiratory: negative for - cough, hemoptysis, orthopnea, shortness of breath, or wheezing  Cardiovascular: negative for - chest pain, dyspnea on exertion, edema,  Gastrointestinal: negative for - abdominal pain, nausea/vomiting  Genito-Urinary: negative for - dysuria, incontinence, irregular/heavy menses or urinary frequency/urgency  Musculoskeletal: negative for - gait disturbance, joint pain, joint stiffness, joint swelling, muscle pain, muscular weakness  Dermatological:  As in HPI  Neurological: negative for confusion, dizziness, headaches, impaired coordination/balance, memory loss, numbness/tingling, seizures, speech problems, tremors or weakness       Objective:   LMP  (LMP Unknown)     Physical Exam:    General Appearance:    Alert, cooperative, no distress   Head:    Normocephalic, without obvious abnormality, atraumatic           Skin:   A full skin exam was performed including scalp, head scalp, eyes, ears, nose, lips, neck, chest, axilla, abdomen, back, buttocks, bilateral upper extremities, bilateral lower extremities, hands, feet, fingers, toes, fingernails, and toenails 12 mm erythematous eroded area noted on the left ankle normal keratotic papules greasy stuck on appearance previous site of skin cancer well healed keratotic erythematous area noted on the right posterior leg as well      Shave Biopsy Procedure Note    Pre-operative Diagnosis:  Rule out basal cell carcinoma    Plan:  1  Instructed to keep the wound dry and covered for 24 and clean thereafter  2  Warning signs of infection were reviewed  3  Recommended that the patient use OTC acetaminophen as needed for pain  4  Return  Pending results of biopsy(ies)    Locations:  Left ankle    Indications:  Nonhealing wound    Anesthesia: Lidocaine 1% without epinephrine without added sodium bicarbonate    Procedure Details     Patient informed of the risks (including bleeding and infection) and benefits of the   procedure and Verbal informed consent obtained  The lesion and surrounding area were given a sterile prep using alcohol and draped in the usual sterile fashion  A Blue blade razor was used to obtain a specimen  Hemostasis achieved with aluminum chloride  Petrolatum and a sterile dressing applied  The specimen was sent for pathologic examination  The patient tolerated the procedure(s) well  Complications:  none  Assessment:     1  Unknown skin lesion     2  Seborrheic keratosis     3  Screening for skin condition     4   History of skin cancer           Plan:   Skin lesion  area on the left leg could be just an ulceration and has not healed from trauma since that this is not healed we went ahead and biopsy this to rule out malignancy lesion on the right posterior leg could be psoriasiform in nature will hold off on biopsy at this time and if not resolving will consider biopsy if we find out the diagnosis of the left leg  Seborrheic keratosis patient reassured these are normal growths we acquire with age no treatment needed  History of skin cancer in no recurrence nothing else atypical sunblock recommended follow-up in 6 months  Screening for dermatologic disorders nothing else of concern noted on complete exam follow-up in 6 months  Ha Mcclain MD  11/27/2018,3:55 PM    Portions of the record may have been created with voice recognition software   Occasional wrong word or "sound a like" substitutions may have occurred due to the inherent limitations of voice recognition software   Read the chart carefully and recognize, using context, where substitutions have occurred

## 2018-11-27 NOTE — PATIENT INSTRUCTIONS
Skin lesion  area on the left leg could be just an ulceration and has not healed from trauma since that this is not healed we went ahead and biopsy this to rule out malignancy lesion on the right posterior leg could be psoriasiform in nature will hold off on biopsy at this time and if not resolving will consider biopsy if we find out the diagnosis of the left leg  Seborrheic keratosis patient reassured these are normal growths we acquire with age no treatment needed  History of skin cancer in no recurrence nothing else atypical sunblock recommended follow-up in 6 months  Screening for dermatologic disorders nothing else of concern noted on complete exam follow-up in 6 months

## 2018-12-01 DIAGNOSIS — I10 ESSENTIAL HYPERTENSION: Primary | ICD-10-CM

## 2018-12-03 ENCOUNTER — TELEPHONE (OUTPATIENT)
Dept: DERMATOLOGY | Facility: CLINIC | Age: 83
End: 2018-12-03

## 2018-12-03 RX ORDER — AMLODIPINE BESYLATE 5 MG/1
TABLET ORAL
Qty: 90 TABLET | Refills: 3 | Status: SHIPPED | OUTPATIENT
Start: 2018-12-03 | End: 2019-12-30 | Stop reason: SDUPTHER

## 2018-12-03 NOTE — TELEPHONE ENCOUNTER
Notified patient  She is wondering what she should do because the sore on her ankle is not going away

## 2018-12-03 NOTE — TELEPHONE ENCOUNTER
----- Message from Corey Ramírez MD sent at 11/30/2018 12:45 PM EST -----  Please call her of normal pathology

## 2018-12-19 ENCOUNTER — CLINICAL SUPPORT (OUTPATIENT)
Dept: DERMATOLOGY | Facility: CLINIC | Age: 83
End: 2018-12-19
Payer: MEDICARE

## 2018-12-19 DIAGNOSIS — I10 ESSENTIAL HYPERTENSION: ICD-10-CM

## 2018-12-19 DIAGNOSIS — L30.8 PSORIASIFORM DERMATITIS: Primary | ICD-10-CM

## 2018-12-19 DIAGNOSIS — L97.321 SKIN ULCER OF LEFT ANKLE, LIMITED TO BREAKDOWN OF SKIN (HCC): ICD-10-CM

## 2018-12-19 PROCEDURE — 99213 OFFICE O/P EST LOW 20 MIN: CPT | Performed by: DERMATOLOGY

## 2018-12-19 RX ORDER — BETAMETHASONE DIPROPIONATE 0.5 MG/G
OINTMENT TOPICAL 2 TIMES DAILY
Qty: 15 G | Refills: 1 | Status: SHIPPED | OUTPATIENT
Start: 2018-12-19 | End: 2019-12-05 | Stop reason: SDUPTHER

## 2018-12-19 NOTE — PATIENT INSTRUCTIONS
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

## 2018-12-19 NOTE — PROGRESS NOTES
[facility] DERMATOLOGY  [address] [facility] [address]  [phone]  [phone]     MRN: [id number] : 1924  Encounter: [id number]  Patient Information: Tamia Tapia    Subjective:   51-year-old female presents for follow-up for the area on her left ankle that has not healed biopsy revealed an ulcer no sign of any significant pathology     Objective:   LMP  (LMP Unknown)     Physical Exam:    General Appearance:    Alert, cooperative, no distress   Skin:  Erosion noted on left ankle also scaling erythema excoriated area on the right shin     Assessment:     1  Psoriasiform dermatitis  betamethasone, augmented, (DIPROLENE) 0 05 % ointment   2  Skin ulcer of left ankle, limited to breakdown of skin ([address] )           Plan:   Psoriasiform dermatitis will go ahead and switch her to a stronger topical steroid to see if this will help  Skin ulcer probable pressure ulcer will go ahead and place an Allevyn dressing and see if this will help to heal will recheck in 1 week      Prior to Admission medications    Medication Sig Start Date End Date Taking? Authorizing Provider   amLODIPine (NORVASC) 5 mg tablet TAKE 1 TABLET DAILY  12/3/18   Sigrid Zimmer PA-C   apixaban (ELIQUIS) 2 5 mg Take 1 tablet (2 5 mg total) by mouth 2 (two) times a day 18   Arlin Booker MD   diltiazem (CARDIZEM CD) 120 mg 24 hr capsule TAKE ONE CAPSULE BY MOUTH TWICE A DAY 18   Arlin Booker MD   ferrous sulfate 325 (65 Fe) mg tablet TAKE 1 TABLET TWICE A DAY 18   Sigrid Zimmer PA-C   furosemide (LASIX) 40 mg tablet TAKE 1 TABLET TWICE DAILY   10/29/18   Guanako Moser MD   metoprolol succinate (TOPROL-XL) 100 mg 24 hr tablet TAKE 2 TABLETS EVERY DAY 3/13/18   Sigrid Zimmer PA-C   triamcinolone (KENALOG) 0 1 % ointment APPLY TOPICALLY 2 (TWO) TIMES A DAY TO RASH ON THE RIGHT LOWER LEG 18   Herb Morrow MD     Allergies   Allergen Reactions    Lactose Intolerance (Gi) Fidencio Green MD  12/19/2018,11:10 AM    Portions of the record may have been created with voice recognition software   Occasional wrong word or "sound a like" substitutions may have occurred due to the inherent limitations of voice recognition software   Read the chart carefully and recognize, using context, where substitutions have occurred

## 2018-12-20 RX ORDER — METOPROLOL SUCCINATE 100 MG/1
TABLET, EXTENDED RELEASE ORAL
Qty: 180 TABLET | Refills: 2 | Status: SHIPPED | OUTPATIENT
Start: 2018-12-20 | End: 2019-09-28 | Stop reason: SDUPTHER

## 2019-01-03 ENCOUNTER — CLINICAL SUPPORT (OUTPATIENT)
Dept: DERMATOLOGY | Facility: CLINIC | Age: 84
End: 2019-01-03
Payer: MEDICARE

## 2019-01-03 DIAGNOSIS — I87.322 STASIS DERMATITIS OF LEFT LOWER EXTREMITY DUE TO PERIPHERAL VENOUS HYPERTENSION: ICD-10-CM

## 2019-01-03 DIAGNOSIS — L30.8 PSORIASIFORM DERMATITIS: Primary | ICD-10-CM

## 2019-01-03 PROCEDURE — 99212 OFFICE O/P EST SF 10 MIN: CPT | Performed by: DERMATOLOGY

## 2019-01-03 NOTE — PATIENT INSTRUCTIONS
Psoriasiform process improved patient advise issues petrolatum on the area  Stasis problem the areas improved again advised just use petrolatum on the area if his area still looks suspicious may clinically will take a look again in 3 months if this still a problem may consider another biopsy

## 2019-01-03 NOTE — PROGRESS NOTES
[facility] DERMATOLOGY  [address] [facility] [address]  [phone]  [phone]     MRN: [id number] : 1924  Encounter: [id number]  Patient Information: Sonido Whittington  Chief complaint:  Recheck of lesion on left leg and also psoriasiform rash on the right  shin  History of present illness:  71-year-old female presents for follow-up for previously biopsied area on the left ankle  Biopsy did not show any signs of any atypia except consistent with the old stasis process  Psoriasiform process has improved on the shin  Past Medical History:   Diagnosis Date    Anemia     Asteatotic eczema     Chronic kidney disease     Hypercholesterolemia     Lichen sclerosus et atrophicus     Mitral valve insufficiency     Seborrheic keratoses     Tricuspid regurgitation     Vertigo      Past Surgical History:   Procedure Laterality Date    APPENDECTOMY  1939    HYSTERECTOMY  1962    MT COLONOSCOPY FLX DX W/COLLJ SPEC WHEN PFRMD N/A 2016    Procedure: EGD AND COLONOSCOPY;  Surgeon: Rosaura Sykes MD;  [facility]; Service: Gastroenterology     Social History   History   Alcohol Use No     History   Drug Use No     History   Smoking Status    Never Smoker   Smokeless Tobacco    Never Used     Family History   Problem Relation Age of Onset    Heart disease Mother         Abnormality    Heart disease Father         Abnormality     Meds/Allergies   Allergies   Allergen Reactions    Lactose Intolerance (Gi)     Penicillins        Meds:  Prior to Admission medications    Medication Sig Start Date End Date Taking? Authorizing Provider   amLODIPine (NORVASC) 5 mg tablet TAKE 1 TABLET DAILY   12/3/18   [place] Dominique PA-C   apixaban (ELIQUIS) 2 5 mg Take 1 tablet (2 5 mg total) by mouth 2 (two) times a day 18   Avani Garcia MD   betamethasone, augmented, (DIPROLENE) 0 05 % ointment Apply topically 2 (two) times a day Rash on right leg 12/19/18   Leo Padilla MD   diltiazem (CARDIZEM CD) 120 mg 24 hr capsule TAKE ONE CAPSULE BY MOUTH TWICE A DAY 7/30/18   Whitney Renee MD   ferrous sulfate 325 (65 Fe) mg tablet TAKE 1 TABLET TWICE A DAY 7/23/18   Deyvi Shipman PA-C   furosemide (LASIX) 40 mg tablet TAKE 1 TABLET TWICE DAILY   10/29/18   Elfego Bear MD   metoprolol succinate (TOPROL-XL) 100 mg 24 hr tablet TAKE 2 TABLETS EVERY DAY 12/20/18   Deyvi Shipman PA-C   triamcinolone (KENALOG) 0 1 % ointment APPLY TOPICALLY 2 (TWO) TIMES A DAY TO RASH ON THE RIGHT LOWER LEG 5/29/18   Leo Padilla MD       Subjective:     Review of Systems:    General: negative for - chills, fatigue, fever,  weight gain or weight loss  Psychological: negative for - anxiety, behavioral disorder, concentration difficulties, decreased libido, depression, irritability, memory difficulties, mood swings, sleep disturbances or suicidal ideation  ENT: negative for - hearing difficulties , nasal congestion, nasal discharge, oral lesions, sinus pain, sneezing, sore throat  Allergy and Immunology: negative for - hives, insect bite sensitivity,  Hematological and Lymphatic: negative for - bleeding problems, blood clots,bruising, swollen lymph nodes  Endocrine: negative for - hair pattern changes, hot flashes, malaise/lethargy, mood swings, palpitations, polydipsia/polyuria, skin changes, temperature intolerance or unexpected weight change  Respiratory: negative for - cough, hemoptysis, orthopnea, shortness of breath, or wheezing  Cardiovascular: negative for - chest pain, dyspnea on exertion, edema,  Gastrointestinal: negative for - abdominal pain, nausea/vomiting  Genito-Urinary: negative for - dysuria, incontinence, irregular/heavy menses or urinary frequency/urgency  Musculoskeletal: negative for - gait disturbance, joint pain, joint stiffness, joint swelling, muscle pain, muscular weakness  Dermatological:  As in HPI  Neurological: negative for confusion, dizziness, headaches, impaired coordination/balance, memory loss, numbness/tingling, seizures, speech problems, tremors or weakness       Objective:   LMP  (LMP Unknown)     Physical Exam:    General Appearance:    Alert, cooperative, no distress   Head:    Normocephalic, without obvious abnormality, atraumatic           Skin:   A full skin exam was performed including scalp, head scalp, eyes, ears, nose, lips, neck, chest, axilla, abdomen, back, buttocks, bilateral upper extremities, bilateral lower extremities, hands, feet, fingers, toes, fingernails, and toenails area on right shin appears to be improved with postinflammatory hyperpigmentation no signs of any rash at this time area on the left leg has he healed over no sign of any on ulceration at this point the skin still looks shiny     Assessment:     1  Psoriasiform dermatitis     2  Stasis dermatitis of left lower extremity due to peripheral venous hypertension           Plan:   Psoriasiform process improved patient advise issues petrolatum on the area  Stasis problem the areas improved again advised just use petrolatum on the area if his area still looks suspicious may clinically will take a look again in 3 months if this still a problem may consider another biopsy    Dimple Acosta MD  1/3/2019,2:24 PM    Portions of the record may have been created with voice recognition software   Occasional wrong word or "sound a like" substitutions may have occurred due to the inherent limitations of voice recognition software   Read the chart carefully and recognize, using context, where substitutions have occurred

## 2019-01-11 ENCOUNTER — TELEPHONE (OUTPATIENT)
Dept: NEPHROLOGY | Facility: CLINIC | Age: 84
End: 2019-01-11

## 2019-01-11 NOTE — TELEPHONE ENCOUNTER
LMOM for patient to call the office to reschedule the appointment that was canceled dated 4/18/19 due to the provider will not be in the office

## 2019-01-29 DIAGNOSIS — I10 ESSENTIAL HYPERTENSION: ICD-10-CM

## 2019-01-29 RX ORDER — DILTIAZEM HYDROCHLORIDE 120 MG/1
CAPSULE, COATED, EXTENDED RELEASE ORAL
Qty: 90 CAPSULE | Refills: 1 | Status: SHIPPED | OUTPATIENT
Start: 2019-01-29 | End: 2019-02-14 | Stop reason: SDUPTHER

## 2019-02-14 ENCOUNTER — CONSULT (OUTPATIENT)
Dept: INTERNAL MEDICINE CLINIC | Facility: CLINIC | Age: 84
End: 2019-02-14
Payer: MEDICARE

## 2019-02-14 ENCOUNTER — TELEPHONE (OUTPATIENT)
Dept: INTERNAL MEDICINE CLINIC | Facility: CLINIC | Age: 84
End: 2019-02-14

## 2019-02-14 VITALS
BODY MASS INDEX: 23.21 KG/M2 | TEMPERATURE: 97.8 F | HEART RATE: 70 BPM | WEIGHT: 135.2 LBS | OXYGEN SATURATION: 94 % | DIASTOLIC BLOOD PRESSURE: 70 MMHG | SYSTOLIC BLOOD PRESSURE: 120 MMHG

## 2019-02-14 DIAGNOSIS — I10 ESSENTIAL HYPERTENSION: ICD-10-CM

## 2019-02-14 DIAGNOSIS — Z01.818 PREOPERATIVE CLEARANCE: Primary | ICD-10-CM

## 2019-02-14 DIAGNOSIS — H26.9 CATARACT OF BOTH EYES, UNSPECIFIED CATARACT TYPE: ICD-10-CM

## 2019-02-14 PROCEDURE — 99214 OFFICE O/P EST MOD 30 MIN: CPT | Performed by: PHYSICIAN ASSISTANT

## 2019-02-14 RX ORDER — DILTIAZEM HYDROCHLORIDE 120 MG/1
120 CAPSULE, COATED, EXTENDED RELEASE ORAL 2 TIMES DAILY
Qty: 60 CAPSULE | Refills: 2 | Status: SHIPPED | OUTPATIENT
Start: 2019-02-14 | End: 2019-06-30 | Stop reason: SDUPTHER

## 2019-02-14 NOTE — PROGRESS NOTES
Assessment/Plan:    Preoperative clearance-cataract surgery, right eye on February 20th, left eye on March 6th  Dr Mary Reno  I see no contraindications for the patient to proceed with the planned surgery  The patient will not be stopping her Eliquis  No problem-specific Assessment & Plan notes found for this encounter  Diagnoses and all orders for this visit:    Essential hypertension  -     diltiazem (CARDIZEM CD) 120 mg 24 hr capsule; Take 1 capsule (120 mg total) by mouth 2 (two) times a day          Subjective:      Patient ID: Sonido Whittington is a 80 y o  female  The patient is here for surgical clearance  She will be having bilateral cataract surgery, right eye on February 20th, left eye on March 6th, Dr Mary Reno  She feels well and has no acute complaints  This patient will be turning 89 on February 17th  She looks great! The following portions of the patient's history were reviewed and updated as appropriate: allergies, current medications, past medical history, past social history and problem list     Review of Systems   Constitutional: Negative for chills, fatigue and fever  HENT: Negative for congestion, ear pain and sore throat  Eyes: Positive for visual disturbance  Respiratory: Negative for cough and shortness of breath  Cardiovascular: Negative for chest pain and palpitations  Gastrointestinal: Negative for abdominal pain, diarrhea and nausea  Objective:      /70 (BP Location: Left arm, Patient Position: Sitting, Cuff Size: Standard)   Pulse 70   Temp 97 8 °F (36 6 °C)   Wt 61 3 kg (135 lb 3 2 oz)   LMP  (LMP Unknown)   SpO2 94%   BMI 23 21 kg/m²          Physical Exam   Constitutional: She appears well-developed and well-nourished  HENT:   Head: Normocephalic and atraumatic  Mouth/Throat: Oropharynx is clear and moist  No oropharyngeal exudate  Neck: Normal range of motion     Cardiovascular: Normal rate, regular rhythm and normal heart sounds  Pulmonary/Chest: Effort normal and breath sounds normal  No respiratory distress  Abdominal: Soft  Bowel sounds are normal  There is no tenderness  Lymphadenopathy:     She has no cervical adenopathy  Neurological: She is alert  Vitals reviewed

## 2019-02-15 ENCOUNTER — DOCUMENTATION (OUTPATIENT)
Dept: INTERNAL MEDICINE CLINIC | Facility: CLINIC | Age: 84
End: 2019-02-15

## 2019-03-29 ENCOUNTER — OFFICE VISIT (OUTPATIENT)
Dept: DERMATOLOGY | Facility: CLINIC | Age: 84
End: 2019-03-29
Payer: MEDICARE

## 2019-03-29 DIAGNOSIS — Z85.828 HISTORY OF SKIN CANCER: ICD-10-CM

## 2019-03-29 DIAGNOSIS — Z13.89 SCREENING FOR SKIN CONDITION: ICD-10-CM

## 2019-03-29 DIAGNOSIS — L82.1 SEBORRHEIC KERATOSIS: ICD-10-CM

## 2019-03-29 DIAGNOSIS — L30.8 PSORIASIFORM DERMATITIS: ICD-10-CM

## 2019-03-29 DIAGNOSIS — L30.8 ASTEATOTIC DERMATITIS: Primary | ICD-10-CM

## 2019-03-29 PROCEDURE — 99213 OFFICE O/P EST LOW 20 MIN: CPT | Performed by: DERMATOLOGY

## 2019-03-29 NOTE — PATIENT INSTRUCTIONS
Asteatotic dermatitis suggested just petrolatum or will also use triamcinolone at this point to get this under control  Seborrheic keratosis patient reassured these are normal growths we acquire with age no treatment needed  History of skin cancer in no recurrence nothing else atypical sunblock recommended follow-up in 6 months  Screening for dermatologic disorders nothing else of concern noted on complete exam follow-up in 6 months

## 2019-03-29 NOTE — PROGRESS NOTES
[facility] DERMATOLOGY  [address] [place] [address]  [phone]     MRN: [id number] : 1924  Encounter: [id number]  Patient Information: Tamia Tapia  Chief complaint:  Skin cancer checkup    History of present illness:  61-year-old female with previous history of skin cancer an Ace asteatotic type eczema presents for overall checkup concerned regarding dry skin on her legs and itching no other concerns noted  Past Medical History:   Diagnosis Date    Anemia     Asteatotic eczema     Chronic kidney disease     Hypercholesterolemia     Lichen sclerosus et atrophicus     Mitral valve insufficiency     Seborrheic keratoses     Tricuspid regurgitation     Vertigo      Past Surgical History:   Procedure Laterality Date    APPENDECTOMY  1939    HYSTERECTOMY  1962    CT COLONOSCOPY FLX DX W/COLLJ SPEC WHEN PFRMD N/A 2016    Procedure: EGD AND COLONOSCOPY;  Surgeon: Lexus Crawford MD;  [facility]; Service: Gastroenterology     Social History   Social History     Substance and Sexual Activity   Alcohol Use No     Social History     Substance and Sexual Activity   Drug Use No     Social History     Tobacco Use   Smoking Status Never Smoker   Smokeless Tobacco Never Used     Family History   Problem Relation Age of Onset    Heart disease Mother         Abnormality    Heart disease Father         Abnormality     Meds/Allergies   Allergies   Allergen Reactions    Lactose Intolerance (Gi)     Penicillins        Meds:  Prior to Admission medications    Medication Sig Start Date End Date Taking? Authorizing Provider   amLODIPine (NORVASC) 5 mg tablet TAKE 1 TABLET DAILY   12/3/18   Sigrid Zimmer PA-C   apixaban (ELIQUIS) 2 5 mg Take 1 tablet (2 5 mg total) by mouth 2 (two) times a day 18   Arlin Booker MD   betamethasone, augmented, (DIPROLENE) 0 05 % ointment Apply topically 2 (two) times a day Rash on right leg 12/19/18   Scarlett Gray MD   diltiazem [facility] CD) 120 mg 24 hr capsule Take 1 capsule (120 mg total) by mouth 2 (two) times a day 2/14/19   Katrin Rodriguez PA-C   ferrous sulfate 325 (65 Fe) mg tablet TAKE 1 TABLET TWICE A DAY 7/23/18   Jensen Sultana PA-C   furosemide (LASIX) 40 mg tablet TAKE 1 TABLET TWICE DAILY   10/29/18   Olivia Diaz MD   metoprolol succinate (TOPROL-XL) 100 mg 24 hr tablet TAKE 2 TABLETS EVERY DAY 12/20/18   Jensen Sultana PA-C   triamcinolone (KENALOG) 0 1 % ointment APPLY TOPICALLY 2 (TWO) TIMES A DAY TO RASH ON THE RIGHT LOWER LEG  Patient not taking: Reported on 2/14/2019 5/29/18   Scarlett Gray MD       Subjective:     Review of Systems:    General: negative for - chills, fatigue, fever,  weight gain or weight loss  Psychological: negative for - anxiety, behavioral disorder, concentration difficulties, decreased libido, depression, irritability, memory difficulties, mood swings, sleep disturbances or suicidal ideation  ENT: negative for - hearing difficulties , nasal congestion, nasal discharge, oral lesions, sinus pain, sneezing, sore throat  Allergy and Immunology: negative for - hives, insect bite sensitivity,  Hematological and Lymphatic: negative for - bleeding problems, blood clots,bruising, swollen lymph nodes  Endocrine: negative for - hair pattern changes, hot flashes, malaise/lethargy, mood swings, palpitations, polydipsia/polyuria, skin changes, temperature intolerance or unexpected weight change  Respiratory: negative for - cough, hemoptysis, orthopnea, shortness of breath, or wheezing  Cardiovascular: negative for - chest pain, dyspnea on exertion, edema,  Gastrointestinal: negative for - abdominal pain, nausea/vomiting  Genito-Urinary: negative for - dysuria, incontinence, irregular/heavy menses or urinary frequency/urgency  Musculoskeletal: negative for - gait disturbance, joint pain, joint stiffness, joint swelling, muscle pain, muscular weakness  Dermatological:  As in HPI  Neurological: negative for confusion, dizziness, headaches, impaired coordination/balance, memory loss, numbness/tingling, seizures, speech problems, tremors or weakness       Objective:   LMP  (LMP Unknown)     Physical Exam:    General Appearance:    Alert, cooperative, no distress   Head:    Normocephalic, without obvious abnormality, atraumatic           Skin:   A full skin exam was performed including scalp, head scalp, eyes, ears, nose, lips, neck, chest, axilla, abdomen, back, buttocks, bilateral upper extremities, bilateral lower extremities, hands, feet, fingers, toes, fingernails, and toenails previous sites of skin cancer well healed without recurrence normal keratotic papules with greasy stuck on appearance overall dry scaly platelike scaling especially on the lower legs with some excoriations and erythema     Assessment:     1  Asteatotic dermatitis     2  Screening for skin condition     3  Seborrheic keratosis     4  History of skin cancer           Plan:   Asteatotic dermatitis suggested just petrolatum or will also use triamcinolone at this point to get this under control  Seborrheic keratosis patient reassured these are normal growths we acquire with age no treatment needed  History of skin cancer in no recurrence nothing else atypical sunblock recommended follow-up in 6 months  Screening for dermatologic disorders nothing else of concern noted on complete exam follow-up in 6 months    Gelacio Elizabeth MD  3/29/2019,12:03 PM    Portions of the record may have been created with voice recognition software   Occasional wrong word or "sound a like" substitutions may have occurred due to the inherent limitations of voice recognition software   Read the chart carefully and recognize, using context, where substitutions have occurred

## 2019-04-22 ENCOUNTER — TELEPHONE (OUTPATIENT)
Dept: NEPHROLOGY | Facility: CLINIC | Age: 84
End: 2019-04-22

## 2019-05-07 ENCOUNTER — APPOINTMENT (OUTPATIENT)
Dept: LAB | Facility: CLINIC | Age: 84
End: 2019-05-07
Payer: MEDICARE

## 2019-05-07 ENCOUNTER — TELEPHONE (OUTPATIENT)
Dept: NEPHROLOGY | Facility: CLINIC | Age: 84
End: 2019-05-07

## 2019-05-07 DIAGNOSIS — N18.30 CKD (CHRONIC KIDNEY DISEASE) STAGE 3, GFR 30-59 ML/MIN (HCC): Primary | ICD-10-CM

## 2019-05-07 LAB
ANION GAP SERPL CALCULATED.3IONS-SCNC: 5 MMOL/L (ref 4–13)
BACTERIA UR QL AUTO: ABNORMAL /HPF
BASOPHILS # BLD AUTO: 0.05 THOUSANDS/ΜL (ref 0–0.1)
BASOPHILS NFR BLD AUTO: 1 % (ref 0–1)
BILIRUB UR QL STRIP: NEGATIVE
BUN SERPL-MCNC: 32 MG/DL (ref 5–25)
CALCIUM SERPL-MCNC: 8.9 MG/DL (ref 8.3–10.1)
CHLORIDE SERPL-SCNC: 101 MMOL/L (ref 100–108)
CLARITY UR: ABNORMAL
CO2 SERPL-SCNC: 30 MMOL/L (ref 21–32)
COLOR UR: YELLOW
CREAT SERPL-MCNC: 1.53 MG/DL (ref 0.6–1.3)
CREAT UR-MCNC: 52.6 MG/DL
EOSINOPHIL # BLD AUTO: 0.24 THOUSAND/ΜL (ref 0–0.61)
EOSINOPHIL NFR BLD AUTO: 4 % (ref 0–6)
ERYTHROCYTE [DISTWIDTH] IN BLOOD BY AUTOMATED COUNT: 14.8 % (ref 11.6–15.1)
GFR SERPL CREATININE-BSD FRML MDRD: 29 ML/MIN/1.73SQ M
GLUCOSE P FAST SERPL-MCNC: 101 MG/DL (ref 65–99)
GLUCOSE UR STRIP-MCNC: NEGATIVE MG/DL
HCT VFR BLD AUTO: 34.6 % (ref 34.8–46.1)
HGB BLD-MCNC: 10.6 G/DL (ref 11.5–15.4)
HGB UR QL STRIP.AUTO: ABNORMAL
IMM GRANULOCYTES # BLD AUTO: 0.04 THOUSAND/UL (ref 0–0.2)
IMM GRANULOCYTES NFR BLD AUTO: 1 % (ref 0–2)
KETONES UR STRIP-MCNC: NEGATIVE MG/DL
LEUKOCYTE ESTERASE UR QL STRIP: ABNORMAL
LYMPHOCYTES # BLD AUTO: 0.54 THOUSANDS/ΜL (ref 0.6–4.47)
LYMPHOCYTES NFR BLD AUTO: 9 % (ref 14–44)
MCH RBC QN AUTO: 27.4 PG (ref 26.8–34.3)
MCHC RBC AUTO-ENTMCNC: 30.6 G/DL (ref 31.4–37.4)
MCV RBC AUTO: 89 FL (ref 82–98)
MONOCYTES # BLD AUTO: 0.8 THOUSAND/ΜL (ref 0.17–1.22)
MONOCYTES NFR BLD AUTO: 13 % (ref 4–12)
NEUTROPHILS # BLD AUTO: 4.55 THOUSANDS/ΜL (ref 1.85–7.62)
NEUTS SEG NFR BLD AUTO: 72 % (ref 43–75)
NITRITE UR QL STRIP: NEGATIVE
NON-SQ EPI CELLS URNS QL MICRO: ABNORMAL /HPF
NRBC BLD AUTO-RTO: 0 /100 WBCS
PH UR STRIP.AUTO: 7 [PH]
PHOSPHATE SERPL-MCNC: 3.9 MG/DL (ref 2.3–4.1)
PLATELET # BLD AUTO: 198 THOUSANDS/UL (ref 149–390)
PMV BLD AUTO: 13.2 FL (ref 8.9–12.7)
POTASSIUM SERPL-SCNC: 4.2 MMOL/L (ref 3.5–5.3)
PROT UR STRIP-MCNC: NEGATIVE MG/DL
PROT UR-MCNC: 14 MG/DL
PROT/CREAT UR: 0.27 MG/G{CREAT} (ref 0–0.1)
PTH-INTACT SERPL-MCNC: 104.9 PG/ML (ref 18.4–80.1)
RBC # BLD AUTO: 3.87 MILLION/UL (ref 3.81–5.12)
RBC #/AREA URNS AUTO: ABNORMAL /HPF
SODIUM SERPL-SCNC: 136 MMOL/L (ref 136–145)
SP GR UR STRIP.AUTO: 1.01 (ref 1–1.03)
UROBILINOGEN UR QL STRIP.AUTO: 0.2 E.U./DL
WBC # BLD AUTO: 6.22 THOUSAND/UL (ref 4.31–10.16)
WBC #/AREA URNS AUTO: ABNORMAL /HPF

## 2019-05-07 PROCEDURE — 81001 URINALYSIS AUTO W/SCOPE: CPT | Performed by: INTERNAL MEDICINE

## 2019-05-07 PROCEDURE — 82570 ASSAY OF URINE CREATININE: CPT | Performed by: INTERNAL MEDICINE

## 2019-05-07 PROCEDURE — 84100 ASSAY OF PHOSPHORUS: CPT

## 2019-05-07 PROCEDURE — 83970 ASSAY OF PARATHORMONE: CPT

## 2019-05-07 PROCEDURE — 80048 BASIC METABOLIC PNL TOTAL CA: CPT

## 2019-05-07 PROCEDURE — 84156 ASSAY OF PROTEIN URINE: CPT | Performed by: INTERNAL MEDICINE

## 2019-05-07 PROCEDURE — 36415 COLL VENOUS BLD VENIPUNCTURE: CPT

## 2019-05-07 PROCEDURE — 85025 COMPLETE CBC W/AUTO DIFF WBC: CPT

## 2019-05-14 ENCOUNTER — OFFICE VISIT (OUTPATIENT)
Dept: NEPHROLOGY | Facility: CLINIC | Age: 84
End: 2019-05-14
Payer: MEDICARE

## 2019-05-14 VITALS
SYSTOLIC BLOOD PRESSURE: 120 MMHG | WEIGHT: 128 LBS | HEIGHT: 64 IN | DIASTOLIC BLOOD PRESSURE: 80 MMHG | RESPIRATION RATE: 18 BRPM | HEART RATE: 68 BPM | BODY MASS INDEX: 21.85 KG/M2 | TEMPERATURE: 98 F

## 2019-05-14 DIAGNOSIS — D63.1 ANEMIA DUE TO STAGE 3 CHRONIC KIDNEY DISEASE (HCC): ICD-10-CM

## 2019-05-14 DIAGNOSIS — I10 ESSENTIAL HYPERTENSION: ICD-10-CM

## 2019-05-14 DIAGNOSIS — N18.30 ANEMIA DUE TO STAGE 3 CHRONIC KIDNEY DISEASE (HCC): ICD-10-CM

## 2019-05-14 DIAGNOSIS — N18.30 CHRONIC KIDNEY DISEASE, STAGE III (MODERATE) (HCC): Primary | ICD-10-CM

## 2019-05-14 DIAGNOSIS — I48.0 PAROXYSMAL ATRIAL FIBRILLATION (HCC): ICD-10-CM

## 2019-05-14 PROCEDURE — 99213 OFFICE O/P EST LOW 20 MIN: CPT | Performed by: INTERNAL MEDICINE

## 2019-05-14 RX ORDER — BRINZOLAMIDE/BRIMONIDINE TARTRATE 10; 2 MG/ML; MG/ML
SUSPENSION/ DROPS OPHTHALMIC
Refills: 2 | COMMUNITY
Start: 2019-02-26 | End: 2021-10-06

## 2019-05-18 DIAGNOSIS — D50.9 IRON DEFICIENCY ANEMIA, UNSPECIFIED IRON DEFICIENCY ANEMIA TYPE: ICD-10-CM

## 2019-05-20 RX ORDER — FERROUS SULFATE 325(65) MG
TABLET ORAL
Qty: 60 TABLET | Refills: 3 | Status: SHIPPED | OUTPATIENT
Start: 2019-05-20 | End: 2020-02-17

## 2019-06-30 DIAGNOSIS — I10 ESSENTIAL HYPERTENSION: ICD-10-CM

## 2019-06-30 DIAGNOSIS — R60.9 EDEMA, UNSPECIFIED TYPE: ICD-10-CM

## 2019-07-01 RX ORDER — FUROSEMIDE 40 MG/1
TABLET ORAL
Qty: 60 TABLET | Refills: 3 | Status: SHIPPED | OUTPATIENT
Start: 2019-07-01 | End: 2021-07-02 | Stop reason: SDUPTHER

## 2019-07-01 RX ORDER — DILTIAZEM HYDROCHLORIDE 120 MG/1
120 CAPSULE, COATED, EXTENDED RELEASE ORAL 2 TIMES DAILY
Qty: 60 CAPSULE | Refills: 2 | Status: SHIPPED | OUTPATIENT
Start: 2019-07-01 | End: 2020-01-02

## 2019-09-28 DIAGNOSIS — I10 ESSENTIAL HYPERTENSION: ICD-10-CM

## 2019-09-30 DIAGNOSIS — I10 ESSENTIAL HYPERTENSION: ICD-10-CM

## 2019-09-30 RX ORDER — METOPROLOL SUCCINATE 100 MG/1
TABLET, EXTENDED RELEASE ORAL
Qty: 180 TABLET | Refills: 2 | Status: SHIPPED | OUTPATIENT
Start: 2019-09-30 | End: 2019-09-30 | Stop reason: SDUPTHER

## 2019-09-30 RX ORDER — METOPROLOL SUCCINATE 100 MG/1
200 TABLET, EXTENDED RELEASE ORAL DAILY
Qty: 180 TABLET | Refills: 2 | Status: SHIPPED | OUTPATIENT
Start: 2019-09-30 | End: 2020-02-11 | Stop reason: SDUPTHER

## 2019-10-10 ENCOUNTER — OFFICE VISIT (OUTPATIENT)
Dept: DERMATOLOGY | Facility: CLINIC | Age: 84
End: 2019-10-10
Payer: MEDICARE

## 2019-10-10 DIAGNOSIS — Z85.828 HISTORY OF SKIN CANCER: ICD-10-CM

## 2019-10-10 DIAGNOSIS — L30.8 ASTEATOTIC DERMATITIS: ICD-10-CM

## 2019-10-10 DIAGNOSIS — L57.0 ACTINIC KERATOSIS: Primary | ICD-10-CM

## 2019-10-10 DIAGNOSIS — Z13.89 SCREENING FOR SKIN CONDITION: ICD-10-CM

## 2019-10-10 DIAGNOSIS — L82.1 SEBORRHEIC KERATOSIS: ICD-10-CM

## 2019-10-10 PROCEDURE — 17000 DESTRUCT PREMALG LESION: CPT | Performed by: DERMATOLOGY

## 2019-10-10 PROCEDURE — 99213 OFFICE O/P EST LOW 20 MIN: CPT | Performed by: DERMATOLOGY

## 2019-10-10 NOTE — PATIENT INSTRUCTIONS
Actinic Keratosis:  Patient advised lesions are precancers  These should resolve with cryosurgery if not to let us know sun block recommended  Asteatotic eczema lichen simplex chronicus advised patient to use the topical salve until this process resolved  Seborrheic keratosis patient reassured these are normal growths we acquire with age no treatment needed  History of skin cancer in no recurrence nothing else atypical sunblock recommended follow-up in 6 months  Screening for dermatologic disorders nothing else of concern noted on complete exam follow-up in 6 months  Treatment with Cryotherapy    The doctor has treated your skin with nitrogen, which is 320 degrees Fahrenheit below zero  He has given the treated area "frostbite "    Stinging should subside within a few hours  You can take Tylenol for pain, if needed  Over the next few days, it is normal if the area becomes reddened, a blood blister, or swollen with fluid  If the lesion treated was near the eye - you could get a swollen eye over the next few days  Do not panic! This is all temporary, and will resolve with time  There is no special treatment - just keep the area clean  Makeup and BandAids can be used, if preferred  When the area starts to dry up and peel off, using Vaseline can help healing  It usually takes up to a month for it to heal   Some lesions are recurrent and may require repeat treatments  If a lesion has not healed in one month, please don't hesitate to contact us  If you have any further questions that are not answered here, please call us  [phone]    Thank you for allowing us to care for you

## 2019-10-10 NOTE — PROGRESS NOTES
[facility] DERMATOLOGY  [address] [place] [address]  [phone]  [phone]     MRN: [id number] : 1924  Encounter: [id number]  Patient Information: Marius Schaumann  Chief complaint:  Itching lesion on left lower leg and six-month checkup for history of skin cancer    History of present illness:  27-year-old female with previous history of skin cancer presents for overall checkup patient continues to have problems with irritation in the instep of her left foot  Also concerns regarding potential skin cancer no specific concerns or changes noted  Past Medical History:   Diagnosis Date    Anemia     Asteatotic eczema     Atrial fibrillation (HCC)     Chronic kidney disease     Hypercholesterolemia     Hypertension     Lichen sclerosus et atrophicus     Mitral valve insufficiency     Seborrheic keratoses     Tricuspid regurgitation     Vertigo      Past Surgical History:   Procedure Laterality Date    APPENDECTOMY  1939    CATARACT EXTRACTION, BILATERAL  2019    HYSTERECTOMY  1962    WA COLONOSCOPY FLX DX W/COLLJ SPEC WHEN PFRMD N/A 2016    Procedure: EGD AND COLONOSCOPY;  Surgeon: Isaias Brown MD;  [facility]; Service: Gastroenterology     Social History   Social History     Substance and Sexual Activity   Alcohol Use No     Social History     Substance and Sexual Activity   Drug Use No     Social History     Tobacco Use   Smoking Status Never Smoker   Smokeless Tobacco Never Used     Family History   Problem Relation Age of Onset    Heart disease Mother         Abnormality    Heart disease Father         Abnormality    No Known Problems Sister     No Known Problems Brother      Meds/Allergies   Allergies   Allergen Reactions    Lactose Intolerance (Gi)     Penicillins        Meds:  Prior to Admission medications    Medication Sig Start Date End Date Taking?  Authorizing Provider   amLODIPine (NORVASC) 5 mg tablet TAKE 1 TABLET DAILY  12/3/18  Yes Niharika Roldan PA-C   apixaban (ELIQUIS) 2 5 mg Take 1 tablet (2 5 mg total) by mouth 2 (two) times a day 11/26/18  Yes Judi Starks MD   betamethasone, augmented, (DIPROLENE) 0 05 % ointment Apply topically 2 (two) times a day Rash on right leg 12/19/18  Yes Jesusita Gallagher MD   diltiazem (CARDIZEM CD) 120 mg 24 hr capsule TAKE 1 CAPSULE (120 MG TOTAL) BY MOUTH 2 (TWO) TIMES A DAY 7/1/19  Yes Niharika Roldan PA-C   ferrous sulfate 325 (65 Fe) mg tablet TAKE 1 TABLET TWICE A DAY 5/20/19  Yes Justin Barboza PA-C   furosemide (LASIX) 40 mg tablet TAKE 1 TABLET TWICE DAILY   7/1/19  Yes Nikki Valle MD   metoprolol succinate (TOPROL-XL) 100 mg 24 hr tablet Take 2 tablets (200 mg total) by mouth daily 9/30/19  Yes Niharika Roldan PA-C   SIMBRINZA 1-0 2 % SUSP INSTILL 1 DROP INTO BOTH EYES TWICE A DAY 2/26/19  Yes Charley Garcia MD   triamcinolone (KENALOG) 0 1 % ointment Apply topically 2 (two) times a day To rash on the right lower leg 3/29/19  Yes Jesusita Gallagher MD       Subjective:     Review of Systems:    General: negative for - chills, fatigue, fever,  weight gain or weight loss  Psychological: negative for - anxiety, behavioral disorder, concentration difficulties, decreased libido, depression, irritability, memory difficulties, mood swings, sleep disturbances or suicidal ideation  ENT: negative for - hearing difficulties , nasal congestion, nasal discharge, oral lesions, sinus pain, sneezing, sore throat  Allergy and Immunology: negative for - hives, insect bite sensitivity,  Hematological and Lymphatic: negative for - bleeding problems, blood clots,bruising, swollen lymph nodes  Endocrine: negative for - hair pattern changes, hot flashes, malaise/lethargy, mood swings, palpitations, polydipsia/polyuria, skin changes, temperature intolerance or unexpected weight change  Respiratory: negative for - cough, hemoptysis, orthopnea, shortness of breath, or wheezing  Cardiovascular: negative for - chest pain, dyspnea on exertion, edema,  Gastrointestinal: negative for - abdominal pain, nausea/vomiting  Genito-Urinary: negative for - dysuria, incontinence, irregular/heavy menses or urinary frequency/urgency  Musculoskeletal: negative for - gait disturbance, joint pain, joint stiffness, joint swelling, muscle pain, muscular weakness  Dermatological:  As in HPI  Neurological: negative for confusion, dizziness, headaches, impaired coordination/balance, memory loss, numbness/tingling, seizures, speech problems, tremors or weakness       Objective:   LMP  (LMP Unknown)     Physical Exam:    General Appearance:    Alert, cooperative, no distress   Head:    Normocephalic, without obvious abnormality, atraumatic           Skin:   A full skin exam was performed including scalp, head scalp, eyes, ears, nose, lips, neck, chest, axilla, abdomen, back, buttocks, bilateral upper extremities, bilateral lower extremities, hands, feet, fingers, toes, fingernails, and toenails scaling erythematous area noted on the left upper eyelid normal keratotic papules with greasy stuck on appearance previous sites skin cancer well healed scaling lichenification noted on the left instep and several spots on the legs nothing else remarkable on exam  Physical Exam   HENT:   Head:          Cryotherapy Procedure Note    Pre-operative Diagnosis: actinic keratosis    Plan:  1  Instructed to keep the area dry and clean thereafter  Apply petrolatum if area gets crusty  2  Recommended that the patient use acetaminophen  as needed for pain  Locations:  Left upper eyelid    Indications: Destruction of actinic keratosis x1    Patient informed of risks (permanent scarring, infection, light or dark discoloration, bleeding, infection, weakness, numbness and recurrence of the lesion) and benefits of the procedure and verbal informed consent obtained      The areas are treated with liquid nitrogen therapy, frozen until ice ball extended 2 mm beyond lesion, allowed to thaw, and treated again  The patient tolerated procedure well  The patient was instructed on post-op care, warned that there may be blister formation, redness and pain  Recommend OTC analgesia as needed for pain  Condition:  Stable    Complications:  none  Assessment:     1  Actinic keratosis     2  Seborrheic keratosis     3  Screening for skin condition     4  History of skin cancer     5  Asteatotic dermatitis           Plan:   Actinic Keratosis:  Patient advised lesions are precancers  These should resolve with cryosurgery if not to let us know sun block recommended  Asteatotic eczema lichen simplex chronicus advised patient to use the topical salve until this process resolved  Seborrheic keratosis patient reassured these are normal growths we acquire with age no treatment needed  History of skin cancer in no recurrence nothing else atypical sunblock recommended follow-up in 6 months  Screening for dermatologic disorders nothing else of concern noted on complete exam follow-up in 6 months      Valencia Au MD  10/10/2019,10:26 AM    Portions of the record may have been created with voice recognition software   Occasional wrong word or "sound a like" substitutions may have occurred due to the inherent limitations of voice recognition software   Read the chart carefully and recognize, using context, where substitutions have occurred

## 2019-11-12 ENCOUNTER — TELEPHONE (OUTPATIENT)
Dept: NEPHROLOGY | Facility: CLINIC | Age: 84
End: 2019-11-12

## 2019-11-12 NOTE — TELEPHONE ENCOUNTER
Patient of Dr Jayesh Lauernt called to reschedule her appointment for 2019 6 month follow with Dr Jayesh Laurent because of her having family coming up  Patient needs to have new lab orders put in the system because of the ones that she has are going to  on 2019 and the patients new appointment is for 2019  Patient understands that her orders are in the system and she will go a couple of day before appointment to have the blood work done   Jasmyn Kinney,

## 2019-11-13 NOTE — TELEPHONE ENCOUNTER
Patient has a nose bleed on Saturday and also on Monday    She on blood thinner "Eliquis" wants to know if she should keep taking Eliquis

## 2019-11-13 NOTE — TELEPHONE ENCOUNTER
If the nosebleed has  She should probably restart the Eliquis and see him tomorrow    She has not seen him in a year

## 2019-11-14 ENCOUNTER — OFFICE VISIT (OUTPATIENT)
Dept: INTERNAL MEDICINE CLINIC | Facility: CLINIC | Age: 84
End: 2019-11-14
Payer: MEDICARE

## 2019-11-14 ENCOUNTER — APPOINTMENT (OUTPATIENT)
Dept: LAB | Facility: CLINIC | Age: 84
End: 2019-11-14
Payer: MEDICARE

## 2019-11-14 VITALS
BODY MASS INDEX: 22.98 KG/M2 | HEIGHT: 64 IN | HEART RATE: 65 BPM | WEIGHT: 134.6 LBS | OXYGEN SATURATION: 90 % | DIASTOLIC BLOOD PRESSURE: 68 MMHG | SYSTOLIC BLOOD PRESSURE: 140 MMHG

## 2019-11-14 DIAGNOSIS — I48.0 PAROXYSMAL ATRIAL FIBRILLATION (HCC): ICD-10-CM

## 2019-11-14 DIAGNOSIS — I10 ESSENTIAL HYPERTENSION: ICD-10-CM

## 2019-11-14 DIAGNOSIS — Z79.01 LONG TERM CURRENT USE OF ANTICOAGULANT: ICD-10-CM

## 2019-11-14 DIAGNOSIS — N18.30 CHRONIC KIDNEY DISEASE, STAGE III (MODERATE) (HCC): ICD-10-CM

## 2019-11-14 DIAGNOSIS — R04.0 EPISTAXIS: ICD-10-CM

## 2019-11-14 LAB
25(OH)D3 SERPL-MCNC: 27.7 NG/ML (ref 30–100)
ANION GAP SERPL CALCULATED.3IONS-SCNC: 8 MMOL/L (ref 4–13)
BACTERIA UR QL AUTO: ABNORMAL /HPF
BASOPHILS # BLD AUTO: 0.05 THOUSANDS/ΜL (ref 0–0.1)
BASOPHILS NFR BLD AUTO: 1 % (ref 0–1)
BILIRUB UR QL STRIP: NEGATIVE
BUN SERPL-MCNC: 22 MG/DL (ref 5–25)
CALCIUM SERPL-MCNC: 9.1 MG/DL (ref 8.3–10.1)
CHLORIDE SERPL-SCNC: 103 MMOL/L (ref 100–108)
CLARITY UR: ABNORMAL
CO2 SERPL-SCNC: 30 MMOL/L (ref 21–32)
COLOR UR: YELLOW
CREAT SERPL-MCNC: 1.39 MG/DL (ref 0.6–1.3)
CREAT UR-MCNC: 47.3 MG/DL
EOSINOPHIL # BLD AUTO: 0.14 THOUSAND/ΜL (ref 0–0.61)
EOSINOPHIL NFR BLD AUTO: 2 % (ref 0–6)
ERYTHROCYTE [DISTWIDTH] IN BLOOD BY AUTOMATED COUNT: 16 % (ref 11.6–15.1)
GFR SERPL CREATININE-BSD FRML MDRD: 32 ML/MIN/1.73SQ M
GLUCOSE P FAST SERPL-MCNC: 87 MG/DL (ref 65–99)
GLUCOSE UR STRIP-MCNC: NEGATIVE MG/DL
HCT VFR BLD AUTO: 38.8 % (ref 34.8–46.1)
HGB BLD-MCNC: 11.4 G/DL (ref 11.5–15.4)
HGB UR QL STRIP.AUTO: NEGATIVE
HYALINE CASTS #/AREA URNS LPF: ABNORMAL /LPF
IMM GRANULOCYTES # BLD AUTO: 0.04 THOUSAND/UL (ref 0–0.2)
IMM GRANULOCYTES NFR BLD AUTO: 1 % (ref 0–2)
KETONES UR STRIP-MCNC: NEGATIVE MG/DL
LEUKOCYTE ESTERASE UR QL STRIP: ABNORMAL
LYMPHOCYTES # BLD AUTO: 0.67 THOUSANDS/ΜL (ref 0.6–4.47)
LYMPHOCYTES NFR BLD AUTO: 8 % (ref 14–44)
MCH RBC QN AUTO: 27.1 PG (ref 26.8–34.3)
MCHC RBC AUTO-ENTMCNC: 29.4 G/DL (ref 31.4–37.4)
MCV RBC AUTO: 92 FL (ref 82–98)
MONOCYTES # BLD AUTO: 1.13 THOUSAND/ΜL (ref 0.17–1.22)
MONOCYTES NFR BLD AUTO: 14 % (ref 4–12)
NEUTROPHILS # BLD AUTO: 6.17 THOUSANDS/ΜL (ref 1.85–7.62)
NEUTS SEG NFR BLD AUTO: 74 % (ref 43–75)
NITRITE UR QL STRIP: NEGATIVE
NON-SQ EPI CELLS URNS QL MICRO: ABNORMAL /HPF
NRBC BLD AUTO-RTO: 0 /100 WBCS
PH UR STRIP.AUTO: 7 [PH]
PHOSPHATE SERPL-MCNC: 3.3 MG/DL (ref 2.3–4.1)
PLATELET # BLD AUTO: 179 THOUSANDS/UL (ref 149–390)
PMV BLD AUTO: 12.8 FL (ref 8.9–12.7)
POTASSIUM SERPL-SCNC: 4.4 MMOL/L (ref 3.5–5.3)
PROT UR STRIP-MCNC: NEGATIVE MG/DL
PROT UR-MCNC: 18 MG/DL
PROT/CREAT UR: 0.38 MG/G{CREAT} (ref 0–0.1)
PTH-INTACT SERPL-MCNC: 143.4 PG/ML (ref 18.4–80.1)
RBC # BLD AUTO: 4.21 MILLION/UL (ref 3.81–5.12)
RBC #/AREA URNS AUTO: ABNORMAL /HPF
SODIUM SERPL-SCNC: 141 MMOL/L (ref 136–145)
SP GR UR STRIP.AUTO: 1.01 (ref 1–1.03)
UROBILINOGEN UR QL STRIP.AUTO: 0.2 E.U./DL
WBC # BLD AUTO: 8.2 THOUSAND/UL (ref 4.31–10.16)
WBC #/AREA URNS AUTO: ABNORMAL /HPF

## 2019-11-14 PROCEDURE — 85025 COMPLETE CBC W/AUTO DIFF WBC: CPT

## 2019-11-14 PROCEDURE — 82306 VITAMIN D 25 HYDROXY: CPT

## 2019-11-14 PROCEDURE — 36415 COLL VENOUS BLD VENIPUNCTURE: CPT

## 2019-11-14 PROCEDURE — 83970 ASSAY OF PARATHORMONE: CPT

## 2019-11-14 PROCEDURE — 99213 OFFICE O/P EST LOW 20 MIN: CPT | Performed by: INTERNAL MEDICINE

## 2019-11-14 PROCEDURE — 82570 ASSAY OF URINE CREATININE: CPT

## 2019-11-14 PROCEDURE — 81001 URINALYSIS AUTO W/SCOPE: CPT

## 2019-11-14 PROCEDURE — 84100 ASSAY OF PHOSPHORUS: CPT

## 2019-11-14 PROCEDURE — 80048 BASIC METABOLIC PNL TOTAL CA: CPT

## 2019-11-14 PROCEDURE — 84156 ASSAY OF PROTEIN URINE: CPT

## 2019-11-14 NOTE — PROGRESS NOTES
Assessment/Plan:       Diagnoses and all orders for this visit:    Epistaxis    Long term current use of anticoagulant    Essential hypertension    Paroxysmal atrial fibrillation (HCC)                Subjective:      Patient ID: Bethany Vega is a 80 y o  female  A  77-year-old female who looks and acts to decade younger presents with complaint of 2 episodes of nosebleed   Her concern is that she takes Eliquis  This happened a week ago; 1st on 1 not side and then on another  There was some associated stuffy nose symptoms  They have not recurred  Indication is paroxysmal atrial fibrillation    Globally she looks well and has no complaints today at all  The following portions of the patient's history were reviewed and updated as appropriate:   She has a past medical history of Anemia, Asteatotic eczema, Hypercholesterolemia, Lichen sclerosus et atrophicus, Mitral valve insufficiency, Seborrheic keratoses, Tricuspid regurgitation, and Vertigo  ,  does not have any pertinent problems on file  ,   has a past surgical history that includes Appendectomy (11/14/1939); Hysterectomy (11/14/1962); pr colonoscopy flx dx w/collj spec when pfrmd (N/A, 5/19/2016); and Cataract extraction, bilateral (04/2019)  ,  family history includes Heart disease in her father and mother; No Known Problems in her brother and sister  ,   reports that she has never smoked  She has never used smokeless tobacco  She reports that she does not drink alcohol or use drugs  ,  is allergic to lactose intolerance (gi) and penicillins     Current Outpatient Medications   Medication Sig Dispense Refill    amLODIPine (NORVASC) 5 mg tablet TAKE 1 TABLET DAILY   90 tablet 3    apixaban (ELIQUIS) 2 5 mg Take 1 tablet (2 5 mg total) by mouth 2 (two) times a day 180 tablet 3    betamethasone, augmented, (DIPROLENE) 0 05 % ointment Apply topically 2 (two) times a day Rash on right leg 15 g 1    diltiazem (CARDIZEM CD) 120 mg 24 hr capsule TAKE 1 CAPSULE (120 MG TOTAL) BY MOUTH 2 (TWO) TIMES A DAY 60 capsule 2    ferrous sulfate 325 (65 Fe) mg tablet TAKE 1 TABLET TWICE A DAY 60 tablet 3    furosemide (LASIX) 40 mg tablet TAKE 1 TABLET TWICE DAILY  60 tablet 3    metoprolol succinate (TOPROL-XL) 100 mg 24 hr tablet Take 2 tablets (200 mg total) by mouth daily 180 tablet 2    SIMBRINZA 1-0 2 % SUSP INSTILL 1 DROP INTO BOTH EYES TWICE A DAY  2    triamcinolone (KENALOG) 0 1 % ointment Apply topically 2 (two) times a day To rash on the right lower leg 30 g 1     No current facility-administered medications for this visit  Review of Systems   HENT: Positive for nosebleeds  Respiratory: Negative for cough, shortness of breath and wheezing  Cardiovascular: Negative for chest pain, palpitations and leg swelling  Neurological: Negative for headaches  Objective:  Vitals:    11/14/19 1020   BP: 140/68   Pulse: 65   SpO2: 90%      Physical Exam   Constitutional: She is oriented to person, place, and time  Alert female patient who looks decade younger than stated age   Cardiovascular: Normal rate and regular rhythm  Pulmonary/Chest: Effort normal and breath sounds normal    Neurological: She is alert and oriented to person, place, and time  Patient Instructions    2 episodes of epistaxis resolved spontaneously with no recurrence      No reason to discontinue Eliquis; if this recurs please call me and I will send her to ENT for cauterization

## 2019-11-14 NOTE — PATIENT INSTRUCTIONS
2 episodes of epistaxis resolved spontaneously with no recurrence      No reason to discontinue Eliquis; if this recurs please call me and I will send her to ENT for cauterization

## 2019-11-22 DIAGNOSIS — I48.20 CHRONIC ATRIAL FIBRILLATION (HCC): ICD-10-CM

## 2019-11-22 RX ORDER — APIXABAN 2.5 MG/1
TABLET, FILM COATED ORAL
Qty: 180 TABLET | Refills: 3 | Status: SHIPPED | OUTPATIENT
Start: 2019-11-22 | End: 2019-11-26 | Stop reason: SDUPTHER

## 2019-11-26 ENCOUNTER — OFFICE VISIT (OUTPATIENT)
Dept: NEPHROLOGY | Facility: CLINIC | Age: 84
End: 2019-11-26
Payer: MEDICARE

## 2019-11-26 VITALS
HEART RATE: 68 BPM | DIASTOLIC BLOOD PRESSURE: 70 MMHG | SYSTOLIC BLOOD PRESSURE: 120 MMHG | HEIGHT: 61 IN | BODY MASS INDEX: 23.03 KG/M2 | RESPIRATION RATE: 16 BRPM | WEIGHT: 122 LBS | TEMPERATURE: 97.8 F

## 2019-11-26 DIAGNOSIS — I10 ESSENTIAL HYPERTENSION: ICD-10-CM

## 2019-11-26 DIAGNOSIS — I48.0 PAROXYSMAL ATRIAL FIBRILLATION (HCC): ICD-10-CM

## 2019-11-26 DIAGNOSIS — M89.9 CHRONIC KIDNEY DISEASE-MINERAL AND BONE DISORDER: ICD-10-CM

## 2019-11-26 DIAGNOSIS — N18.30 CHRONIC KIDNEY DISEASE, STAGE III (MODERATE) (HCC): Primary | ICD-10-CM

## 2019-11-26 DIAGNOSIS — N18.9 CHRONIC KIDNEY DISEASE-MINERAL AND BONE DISORDER: ICD-10-CM

## 2019-11-26 DIAGNOSIS — E83.9 CHRONIC KIDNEY DISEASE-MINERAL AND BONE DISORDER: ICD-10-CM

## 2019-11-26 PROCEDURE — 99213 OFFICE O/P EST LOW 20 MIN: CPT | Performed by: INTERNAL MEDICINE

## 2019-11-26 NOTE — ASSESSMENT & PLAN NOTE
Creatinine stable at 1 39 with GFR of 32   Advised continues hydration and avoidance of any nephrotoxic medicine

## 2019-11-26 NOTE — PATIENT INSTRUCTIONS
Chronic Kidney Disease   AMBULATORY CARE:   Chronic kidney disease (CKD)  is the gradual and permanent loss of kidney function  Normally, the kidneys remove fluid, chemicals, and waste from your blood  These wastes are turned into urine by your kidneys  CKD may worsen over time and lead to kidney failure  Common symptoms include the following:   · Changes in how often you need to urinate    · Swelling in your arms, legs, or feet    · Shortness of breath    · Fatigue or weakness    · Bad or bitter taste in your mouth    · Nausea, vomiting, or loss of appetite  Seek care immediately if:   · You are confused and very drowsy  · You have a seizure  · You have shortness of breath  Contact your healthcare provider if:   · You suddenly gain or lose more weight than your healthcare provider has told you is okay  · You have itchy skin or a rash  · You urinate more or less than you normally do  · You have blood in your urine  · You have nausea and repeated vomiting  · You have fatigue or muscle weakness  · You have hiccups that will not stop  · You have questions or concerns about your condition or care  Treatment for CKD:  Medicines may be given to decrease blood pressure and get rid of extra fluid  You may also receive medicine to manage health conditions that may occur with CKD  Dialysis is a treatment to remove chemicals and waste from your blood when your kidneys can no longer do this  Surgery may be needed to create an arteriovenous fistula (AVF) in your arm or insert a catheter into your abdomen so that you can receive dialysis  A kidney transplant may be done if your CKD becomes severe  Manage CKD:   · Maintain a healthy weight  Ask your healthcare provider how much you should weigh  Ask him to help you create a weight loss plan if you are overweight  · Exercise 30 to 60 minutes a day, 4 to 7 times a week, or as directed  Ask about the best exercise plan for you   Regular exercise can help you manage CKD, high blood pressure, and diabetes  · Follow your healthcare provider's advice about what to eat and drink  He may tell you to eat food low in sodium (salt), potassium, phosphorus, or protein  You may need to see a dietitian if you need help planning meals  Ask how much liquid to drink each day and which liquids are best for you  · Limit alcohol  Ask how much alcohol is safe for you to drink  A drink of alcohol is 12 ounces of beer, 5 ounces of wine, or 1½ ounces of liquor  · Do not smoke  Nicotine and other chemicals in cigarettes and cigars can cause lung and kidney damage  Ask your healthcare provider for information if you currently smoke and need help to quit  E-cigarettes or smokeless tobacco still contain nicotine  Talk to your healthcare provider before you use these products  · Ask your healthcare provider if you need vaccines  Infections such as pneumonia, influenza, and hepatitis can be more harmful or more likely to occur in a person who has CKD  Vaccines reduce your risk of infection with these viruses  Follow up with your healthcare provider as directed:  Write down your questions so you remember to ask them during your visits  © 2017 [employer] Information is for End User's use only and may not be sold, redistributed or otherwise used for commercial purposes  All illustrations and images included in CareNotes® are the copyrighted property of [employer]  or Michael Martinez  The above information is an  only  It is not intended as medical advice for individual conditions or treatments  Talk to your doctor, nurse or pharmacist before following any medical regimen to see if it is safe and effective for you

## 2019-11-29 ENCOUNTER — TELEPHONE (OUTPATIENT)
Dept: INTERNAL MEDICINE CLINIC | Facility: CLINIC | Age: 84
End: 2019-11-29

## 2019-11-29 NOTE — TELEPHONE ENCOUNTER
Patient states that Dr Walt Beard told her to let him know if she has another nose bleed  She states she had a bad one last night  So let her know what she needs to do    [phone]

## 2019-12-02 ENCOUNTER — TELEPHONE (OUTPATIENT)
Dept: INTERNAL MEDICINE CLINIC | Facility: CLINIC | Age: 84
End: 2019-12-02

## 2019-12-05 DIAGNOSIS — L30.8 PSORIASIFORM DERMATITIS: ICD-10-CM

## 2019-12-05 RX ORDER — BETAMETHASONE DIPROPIONATE 0.5 MG/G
OINTMENT TOPICAL 2 TIMES DAILY
Qty: 15 G | Refills: 1 | Status: SHIPPED | OUTPATIENT
Start: 2019-12-05 | End: 2020-02-11 | Stop reason: ALTCHOICE

## 2019-12-10 ENCOUNTER — TELEPHONE (OUTPATIENT)
Dept: INTERNAL MEDICINE CLINIC | Facility: CLINIC | Age: 84
End: 2019-12-10

## 2019-12-10 NOTE — TELEPHONE ENCOUNTER
Pt left form in bin - today - REGINE Lopez    Needs filled out and signed by Dr Shane Shelton    Please fax when completed - last form was not signed by dr Mayela Welch -     Please call pt to let her know this has been done

## 2019-12-12 NOTE — TELEPHONE ENCOUNTER
Patient called requesting the update on the form she dropped off on 12/10      Please contact Bell Mack when completed at  [phone]

## 2019-12-14 NOTE — TELEPHONE ENCOUNTER
Please call pt first Monday morning   Very concerned about her RX being faxed to [facility] [place]  [phone]

## 2019-12-16 ENCOUNTER — TELEPHONE (OUTPATIENT)
Dept: INTERNAL MEDICINE CLINIC | Facility: CLINIC | Age: 84
End: 2019-12-16

## 2019-12-30 ENCOUNTER — OFFICE VISIT (OUTPATIENT)
Dept: DERMATOLOGY | Facility: CLINIC | Age: 84
End: 2019-12-30
Payer: MEDICARE

## 2019-12-30 DIAGNOSIS — L98.9 UNKNOWN SKIN LESION: Primary | ICD-10-CM

## 2019-12-30 DIAGNOSIS — I10 ESSENTIAL HYPERTENSION: ICD-10-CM

## 2019-12-30 PROCEDURE — 88305 TISSUE EXAM BY PATHOLOGIST: CPT | Performed by: PATHOLOGY

## 2019-12-30 PROCEDURE — 99212 OFFICE O/P EST SF 10 MIN: CPT | Performed by: DERMATOLOGY

## 2019-12-30 PROCEDURE — 11102 TANGNTL BX SKIN SINGLE LES: CPT | Performed by: DERMATOLOGY

## 2019-12-30 RX ORDER — AMLODIPINE BESYLATE 5 MG/1
TABLET ORAL
Qty: 90 TABLET | Refills: 3 | Status: SHIPPED | OUTPATIENT
Start: 2019-12-30 | End: 2020-02-11

## 2019-12-30 NOTE — PATIENT INSTRUCTIONS
Skin lesion question porokeratosis await results of biopsy if further treatment indicated  Wound care instructions given to patient

## 2019-12-30 NOTE — PROGRESS NOTES
[facility] DERMATOLOGY  [address] [place] [address]  [phone]     MRN: [id number] : 1924  Encounter: [id number]  Patient Information: Omari Morgan    Subjective:     26-year-old female who was here 2 months ago overall checkup presents for concerns regarding a new spot that developed on right lower leg  Patient notes that this is been present for few weeks     Objective:   LMP  (LMP Unknown)     Physical Exam:    General Appearance:    Alert, cooperative, no distress   Skin:   12 mm erythematous scaling keratotic area noted on the right leg    Shave Biopsy Procedure Note    Pre-operative Diagnosis:  Rule out porokeratosis    Plan:  1  Instructed to keep the wound dry and covered for 24 and clean thereafter  2  Warning signs of infection were reviewed  3  Recommended that the patient use OTC acetaminophen as needed for pain  4  Return  Pending results of biopsy(ies)    Locations:  Right lower leg    Indications: * delineate lesion rule out atypia    Anesthesia: Lidocaine 1% with epinephrine without added sodium bicarbonate    Procedure Details     Patient informed of the risks (including bleeding and infection) and benefits of the   procedure and Verbal informed consent obtained  The lesion and surrounding area were given a sterile prep using alcohol and draped in the usual sterile fashion  A Blue blade razor was used to obtain a specimen  Hemostasis achieved with aluminum chloride  Petrolatum and a sterile dressing applied  The specimen was sent for pathologic examination  The patient tolerated the procedure(s) well  Complications:  none  Assessment:     1  Unknown skin lesion           Plan:   Skin lesion question porokeratosis await results of biopsy if further treatment indicated  Wound care instructions given to patient        Prior to Admission medications    Medication Sig Start Date End Date Taking?  Authorizing Provider   amLODIPine (NORVASC) 5 mg tablet TAKE 1 TABLET DAILY  12/30/19  Yes Deloris Sanchez MD   apixaban (ELIQUIS) 2 5 mg Take 1 tablet (2 5 mg total) by mouth 2 (two) times a day 11/26/18  Yes Deloris Sanchez MD   betamethasone, augmented, (DIPROLENE) 0 05 % ointment Apply topically 2 (two) times a day Rash on right leg 12/5/19  Yes Dio Jerome MD   diltiazem (CARDIZEM CD) 120 mg 24 hr capsule TAKE 1 CAPSULE (120 MG TOTAL) BY MOUTH 2 (TWO) TIMES A DAY 7/1/19  Yes Madina Cobos PA-C   ferrous sulfate 325 (65 Fe) mg tablet TAKE 1 TABLET TWICE A DAY 5/20/19  Yes Madina Cobos PA-C   furosemide (LASIX) 40 mg tablet TAKE 1 TABLET TWICE DAILY  7/1/19  Yes Princess Jaffe MD   metoprolol succinate (TOPROL-XL) 100 mg 24 hr tablet Take 2 tablets (200 mg total) by mouth daily 9/30/19  Yes Madina Cobos PA-C   SIMBRINZA 1-0 2 % SUSP INSTILL 1 DROP INTO BOTH EYES TWICE A DAY 2/26/19  Yes Historical Provider, MD   triamcinolone (KENALOG) 0 1 % ointment Apply topically 2 (two) times a day To rash on the right lower leg 3/29/19  Yes Dio Jerome MD   amLODIPine (NORVASC) 5 mg tablet TAKE 1 TABLET DAILY  12/3/18 12/30/19  Madina Cobos PA-C     Allergies   Allergen Reactions    Lactose Intolerance (Gi)     Penicillins        Dio Jerome MD  12/30/2019,3:22 PM    Portions of the record may have been created with voice recognition software   Occasional wrong word or "sound a like" substitutions may have occurred due to the inherent limitations of voice recognition software   Read the chart carefully and recognize, using context, where substitutions have occurred

## 2020-01-02 DIAGNOSIS — I10 ESSENTIAL HYPERTENSION: ICD-10-CM

## 2020-01-02 RX ORDER — DILTIAZEM HYDROCHLORIDE 120 MG/1
CAPSULE, EXTENDED RELEASE ORAL
Qty: 60 CAPSULE | Refills: 0 | Status: SHIPPED | OUTPATIENT
Start: 2020-01-02 | End: 2020-01-31

## 2020-01-06 ENCOUNTER — OFFICE VISIT (OUTPATIENT)
Dept: DERMATOLOGY | Facility: CLINIC | Age: 85
End: 2020-01-06

## 2020-01-06 DIAGNOSIS — L98.9 UNKNOWN SKIN LESION: Primary | ICD-10-CM

## 2020-01-06 PROCEDURE — 99024 POSTOP FOLLOW-UP VISIT: CPT | Performed by: DERMATOLOGY

## 2020-01-06 NOTE — PROGRESS NOTES
[facility] DERMATOLOGY  [address] [place] [address]  [phone]  [phone]     MRN: [id number] : 1924  Encounter: [id number]  Patient Information: Stephanie Melvin    Subjective:     60-year-old female presents for follow-up secondary to previously biopsied question of porokeratosis on her right lower leg patient was concerned that the areas red     Objective:   LMP  (LMP Unknown)     Physical Exam:    General Appearance:    Alert, cooperative, no distress   Skin:   Previous site of biopsy with crusting noted with some redness around the area     Assessment:     1  Unknown skin lesion           Plan:   Previous site of biopsy advised patient that if the inflammation is reactive to the biopsy site advised her to clean the area daily with peroxide place petrolatum on the area and cover with a Band-Aid until completely heals and this may take several weeks we will call her with results of biopsy when it is available      Prior to Admission medications    Medication Sig Start Date End Date Taking? Authorizing Provider   amLODIPine (NORVASC) 5 mg tablet TAKE 1 TABLET DAILY  19  Yes Delores Savage MD   apixaban (ELIQUIS) 2 5 mg Take 1 tablet (2 5 mg total) by mouth 2 (two) times a day 18  Yes Delores Savage MD   betamethasone, augmented, (DIPROLENE) 0 05 % ointment Apply topically 2 (two) times a day Rash on right leg 19  Yes Aslhy Shelby MD   DILT- MG 24 hr capsule TAKE 1 CAPSULE (120 MG TOTAL) BY MOUTH 2 (TWO) TIMES A DAY 20  Yes Ion Aleman PA-C   ferrous sulfate 325 (65 Fe) mg tablet TAKE 1 TABLET TWICE A DAY 19  Yes Ion Aleman PA-C   furosemide (LASIX) 40 mg tablet TAKE 1 TABLET TWICE DAILY   19  Yes Stefano Alston MD   metoprolol succinate (TOPROL-XL) 100 mg 24 hr tablet Take 2 tablets (200 mg total) by mouth daily 19  Yes Ion Aleman PA-C   SIMBRINZA 1-0 2 % SUSP INSTILL 1 DROP INTO BOTH EYES TWICE A DAY 2/26/19  Yes Historical Provider, MD   triamcinolone (KENALOG) 0 1 % ointment Apply topically 2 (two) times a day To rash on the right lower leg 3/29/19  Yes Norma Murphy MD     Allergies   Allergen Reactions    Lactose Intolerance (Gi)     Penicillins        Norma Murphy MD  1/6/2020,3:50 PM    Portions of the record may have been created with voice recognition software   Occasional wrong word or "sound a like" substitutions may have occurred due to the inherent limitations of voice recognition software   Read the chart carefully and recognize, using context, where substitutions have occurred

## 2020-01-06 NOTE — PATIENT INSTRUCTIONS
Previous site of biopsy advised patient that if the inflammation is reactive to the biopsy site advised her to clean the area daily with peroxide place petrolatum on the area and cover with a Band-Aid until completely heals and this may take several weeks we will call her with results of biopsy when it is available

## 2020-01-20 ENCOUNTER — TELEPHONE (OUTPATIENT)
Dept: DERMATOLOGY | Facility: CLINIC | Age: 85
End: 2020-01-20

## 2020-01-20 NOTE — TELEPHONE ENCOUNTER
----- Message from Jesusita Gallagher MD sent at 1/18/2020 11:46 AM EST -----  Please call her of normal pathology

## 2020-01-31 DIAGNOSIS — I10 ESSENTIAL HYPERTENSION: ICD-10-CM

## 2020-01-31 RX ORDER — DILTIAZEM HYDROCHLORIDE 120 MG/1
CAPSULE, EXTENDED RELEASE ORAL
Qty: 60 CAPSULE | Refills: 0 | Status: SHIPPED | OUTPATIENT
Start: 2020-01-31 | End: 2020-02-11 | Stop reason: SDUPTHER

## 2020-02-07 ENCOUNTER — TELEPHONE (OUTPATIENT)
Dept: INTERNAL MEDICINE CLINIC | Facility: CLINIC | Age: 85
End: 2020-02-07

## 2020-02-07 NOTE — TELEPHONE ENCOUNTER
Pt had left hip surgery got 3 pins put in because she had fractured it  She also had a slight fracture in her right knee as well  Daughter of the patient called requesting that Dr Bambi Neely see her on the 11th around 11am because she has an appt at 10am with the ortho surgeon Dr Lore Cruz and she does not want to have to keep bringing her mom out  Can Dr Bambi Neely see Claire Romero around that time on 2/11?     Please contact Nae Casarez and let her know at  [phone]

## 2020-02-11 ENCOUNTER — OFFICE VISIT (OUTPATIENT)
Dept: INTERNAL MEDICINE CLINIC | Facility: CLINIC | Age: 85
End: 2020-02-11
Payer: MEDICARE

## 2020-02-11 VITALS — SYSTOLIC BLOOD PRESSURE: 142 MMHG | DIASTOLIC BLOOD PRESSURE: 72 MMHG | HEART RATE: 61 BPM | OXYGEN SATURATION: 93 %

## 2020-02-11 DIAGNOSIS — I10 ESSENTIAL HYPERTENSION: ICD-10-CM

## 2020-02-11 DIAGNOSIS — N18.30 CHRONIC KIDNEY DISEASE, STAGE III (MODERATE) (HCC): ICD-10-CM

## 2020-02-11 DIAGNOSIS — I34.0 NONRHEUMATIC MITRAL VALVE REGURGITATION: Primary | ICD-10-CM

## 2020-02-11 PROCEDURE — 3078F DIAST BP <80 MM HG: CPT | Performed by: INTERNAL MEDICINE

## 2020-02-11 PROCEDURE — 99213 OFFICE O/P EST LOW 20 MIN: CPT | Performed by: INTERNAL MEDICINE

## 2020-02-11 PROCEDURE — 3077F SYST BP >= 140 MM HG: CPT | Performed by: INTERNAL MEDICINE

## 2020-02-11 PROCEDURE — 4040F PNEUMOC VAC/ADMIN/RCVD: CPT | Performed by: INTERNAL MEDICINE

## 2020-02-11 PROCEDURE — 1160F RVW MEDS BY RX/DR IN RCRD: CPT | Performed by: INTERNAL MEDICINE

## 2020-02-11 PROCEDURE — 1036F TOBACCO NON-USER: CPT | Performed by: INTERNAL MEDICINE

## 2020-02-11 RX ORDER — LIDOCAINE 4 G/G
1 PATCH TOPICAL DAILY
COMMUNITY
Start: 2020-02-06 | End: 2021-10-06

## 2020-02-11 RX ORDER — DILTIAZEM HYDROCHLORIDE 120 MG/1
120 CAPSULE, EXTENDED RELEASE ORAL DAILY
Qty: 90 CAPSULE | Refills: 3
Start: 2020-02-11 | End: 2021-05-03 | Stop reason: SDUPTHER

## 2020-02-11 RX ORDER — DOCUSATE SODIUM 100 MG/1
100 CAPSULE, LIQUID FILLED ORAL 2 TIMES DAILY
COMMUNITY
Start: 2020-01-19 | End: 2021-10-06

## 2020-02-11 RX ORDER — METOPROLOL SUCCINATE 100 MG/1
100 TABLET, EXTENDED RELEASE ORAL DAILY
Qty: 90 TABLET | Refills: 3
Start: 2020-02-11 | End: 2020-10-05

## 2020-02-11 RX ORDER — OXYCODONE HYDROCHLORIDE AND ACETAMINOPHEN 5; 325 MG/1; MG/1
1 TABLET ORAL EVERY 4 HOURS PRN
COMMUNITY
Start: 2020-02-05 | End: 2020-02-15

## 2020-02-11 RX ORDER — POTASSIUM CHLORIDE 750 MG/1
TABLET, EXTENDED RELEASE ORAL
COMMUNITY
Start: 2020-02-05 | End: 2020-02-20 | Stop reason: SDUPTHER

## 2020-02-11 NOTE — PATIENT INSTRUCTIONS
Doing well status post ORIF left hip  Antihypertensives have been adjusted and blood pressure control is good  On Eliquis for anticoagulation at an appropriate age related dose     Revisit with me in several months

## 2020-02-15 DIAGNOSIS — D50.9 IRON DEFICIENCY ANEMIA, UNSPECIFIED IRON DEFICIENCY ANEMIA TYPE: ICD-10-CM

## 2020-02-17 RX ORDER — FERROUS SULFATE 325(65) MG
TABLET ORAL
Qty: 60 TABLET | Refills: 3 | Status: SHIPPED | OUTPATIENT
Start: 2020-02-17 | End: 2020-12-22

## 2020-02-20 DIAGNOSIS — I10 ESSENTIAL HYPERTENSION: Primary | ICD-10-CM

## 2020-02-20 RX ORDER — POTASSIUM CHLORIDE 750 MG/1
10 TABLET, EXTENDED RELEASE ORAL DAILY
Qty: 90 TABLET | Refills: 1 | Status: SHIPPED | OUTPATIENT
Start: 2020-02-20 | End: 2021-01-05

## 2020-02-20 NOTE — TELEPHONE ENCOUNTER
Requesting a refill of KLOR-CON M10 10 MEQ tablet     Sent to [facility] in [place]

## 2020-07-23 ENCOUNTER — TELEPHONE (OUTPATIENT)
Dept: INTERNAL MEDICINE CLINIC | Facility: CLINIC | Age: 85
End: 2020-07-23

## 2020-07-23 ENCOUNTER — APPOINTMENT (OUTPATIENT)
Dept: LAB | Facility: CLINIC | Age: 85
End: 2020-07-23
Payer: MEDICARE

## 2020-07-23 ENCOUNTER — OFFICE VISIT (OUTPATIENT)
Dept: INTERNAL MEDICINE CLINIC | Facility: CLINIC | Age: 85
End: 2020-07-23
Payer: MEDICARE

## 2020-07-23 VITALS
BODY MASS INDEX: 22.66 KG/M2 | HEART RATE: 86 BPM | WEIGHT: 120 LBS | DIASTOLIC BLOOD PRESSURE: 84 MMHG | SYSTOLIC BLOOD PRESSURE: 126 MMHG | OXYGEN SATURATION: 94 % | HEIGHT: 61 IN

## 2020-07-23 DIAGNOSIS — E78.00 HYPERCHOLESTEROLEMIA: ICD-10-CM

## 2020-07-23 DIAGNOSIS — N18.30 CHRONIC KIDNEY DISEASE, STAGE III (MODERATE) (HCC): ICD-10-CM

## 2020-07-23 DIAGNOSIS — I48.0 PAROXYSMAL ATRIAL FIBRILLATION (HCC): ICD-10-CM

## 2020-07-23 DIAGNOSIS — S02.85XA FRACTURE OF RIGHT ORBITAL WALL (HCC): ICD-10-CM

## 2020-07-23 DIAGNOSIS — R34 OLIGURIA: ICD-10-CM

## 2020-07-23 DIAGNOSIS — D63.1 ANEMIA DUE TO STAGE 3 CHRONIC KIDNEY DISEASE (HCC): ICD-10-CM

## 2020-07-23 DIAGNOSIS — N39.0 URINARY TRACT INFECTION WITHOUT HEMATURIA, SITE UNSPECIFIED: ICD-10-CM

## 2020-07-23 DIAGNOSIS — N39.0 URINARY TRACT INFECTION WITHOUT HEMATURIA, SITE UNSPECIFIED: Primary | ICD-10-CM

## 2020-07-23 DIAGNOSIS — N18.30 ANEMIA DUE TO STAGE 3 CHRONIC KIDNEY DISEASE (HCC): ICD-10-CM

## 2020-07-23 DIAGNOSIS — Z79.01 LONG TERM CURRENT USE OF ANTICOAGULANT: ICD-10-CM

## 2020-07-23 DIAGNOSIS — I10 ESSENTIAL HYPERTENSION: ICD-10-CM

## 2020-07-23 LAB
ANION GAP SERPL CALCULATED.3IONS-SCNC: 8 MMOL/L (ref 4–13)
BASOPHILS # BLD AUTO: 0.03 THOUSANDS/ΜL (ref 0–0.1)
BASOPHILS NFR BLD AUTO: 0 % (ref 0–1)
BUN SERPL-MCNC: 41 MG/DL (ref 5–25)
CALCIUM SERPL-MCNC: 9.4 MG/DL (ref 8.3–10.1)
CHLORIDE SERPL-SCNC: 97 MMOL/L (ref 100–108)
CO2 SERPL-SCNC: 29 MMOL/L (ref 21–32)
CREAT SERPL-MCNC: 1.68 MG/DL (ref 0.6–1.3)
EOSINOPHIL # BLD AUTO: 0.11 THOUSAND/ΜL (ref 0–0.61)
EOSINOPHIL NFR BLD AUTO: 2 % (ref 0–6)
ERYTHROCYTE [DISTWIDTH] IN BLOOD BY AUTOMATED COUNT: 14.9 % (ref 11.6–15.1)
GFR SERPL CREATININE-BSD FRML MDRD: 25 ML/MIN/1.73SQ M
GLUCOSE P FAST SERPL-MCNC: 116 MG/DL (ref 65–99)
HCT VFR BLD AUTO: 39.2 % (ref 34.8–46.1)
HGB BLD-MCNC: 12 G/DL (ref 11.5–15.4)
IMM GRANULOCYTES # BLD AUTO: 0.07 THOUSAND/UL (ref 0–0.2)
IMM GRANULOCYTES NFR BLD AUTO: 1 % (ref 0–2)
LYMPHOCYTES # BLD AUTO: 0.75 THOUSANDS/ΜL (ref 0.6–4.47)
LYMPHOCYTES NFR BLD AUTO: 10 % (ref 14–44)
MCH RBC QN AUTO: 28.7 PG (ref 26.8–34.3)
MCHC RBC AUTO-ENTMCNC: 30.6 G/DL (ref 31.4–37.4)
MCV RBC AUTO: 94 FL (ref 82–98)
MONOCYTES # BLD AUTO: 0.51 THOUSAND/ΜL (ref 0.17–1.22)
MONOCYTES NFR BLD AUTO: 7 % (ref 4–12)
NEUTROPHILS # BLD AUTO: 5.92 THOUSANDS/ΜL (ref 1.85–7.62)
NEUTS SEG NFR BLD AUTO: 80 % (ref 43–75)
NRBC BLD AUTO-RTO: 0 /100 WBCS
PLATELET # BLD AUTO: 175 THOUSANDS/UL (ref 149–390)
PMV BLD AUTO: 13.8 FL (ref 8.9–12.7)
POTASSIUM SERPL-SCNC: 3.9 MMOL/L (ref 3.5–5.3)
RBC # BLD AUTO: 4.18 MILLION/UL (ref 3.81–5.12)
SODIUM SERPL-SCNC: 134 MMOL/L (ref 136–145)
WBC # BLD AUTO: 7.39 THOUSAND/UL (ref 4.31–10.16)

## 2020-07-23 PROCEDURE — 3079F DIAST BP 80-89 MM HG: CPT | Performed by: PHYSICIAN ASSISTANT

## 2020-07-23 PROCEDURE — 4040F PNEUMOC VAC/ADMIN/RCVD: CPT | Performed by: PHYSICIAN ASSISTANT

## 2020-07-23 PROCEDURE — 1036F TOBACCO NON-USER: CPT | Performed by: PHYSICIAN ASSISTANT

## 2020-07-23 PROCEDURE — 85025 COMPLETE CBC W/AUTO DIFF WBC: CPT

## 2020-07-23 PROCEDURE — 1160F RVW MEDS BY RX/DR IN RCRD: CPT | Performed by: PHYSICIAN ASSISTANT

## 2020-07-23 PROCEDURE — 80048 BASIC METABOLIC PNL TOTAL CA: CPT

## 2020-07-23 PROCEDURE — 3008F BODY MASS INDEX DOCD: CPT | Performed by: PHYSICIAN ASSISTANT

## 2020-07-23 PROCEDURE — 36415 COLL VENOUS BLD VENIPUNCTURE: CPT

## 2020-07-23 PROCEDURE — 3074F SYST BP LT 130 MM HG: CPT | Performed by: PHYSICIAN ASSISTANT

## 2020-07-23 PROCEDURE — 99214 OFFICE O/P EST MOD 30 MIN: CPT | Performed by: PHYSICIAN ASSISTANT

## 2020-07-23 NOTE — TELEPHONE ENCOUNTER

## 2020-07-23 NOTE — TELEPHONE ENCOUNTER
Patient is calling back only wants to see Dr Saloni Beck ,needs to know when she when can she be see, has a  waiting   Yosvany Collazo

## 2020-07-23 NOTE — TELEPHONE ENCOUNTER
COMING  IN  AT  2  TO  SEE  ANASTACIO   SHOULD  SHE  HAVE   A URINE  TEST  DONE  BEFORE  HER  APPT   THE  LAB  CLOSES  AT  3:00

## 2020-07-23 NOTE — PROGRESS NOTES
Assessment/Plan: currently comfortable  Workup as below follow-up depending on her symptoms and labs       Diagnoses and all orders for this visit:    Urinary tract infection without hematuria, site unspecified  -     UA w Reflex to Microscopic w Reflex to Culture  -     CBC and differential; Future  -     Basic metabolic panel; Future    Essential hypertension    Paroxysmal atrial fibrillation (HCC)    Chronic kidney disease, stage III (moderate) (HCC)    Anemia due to stage 3 chronic kidney disease (HCC)    Hypercholesterolemia    Long term current use of anticoagulant    Fracture of right orbital wall (HCC)    Oliguria  -     CBC and differential; Future  -     Basic metabolic panel; Future    Other orders  -     Cancel: UA w Reflex to Microscopic w Reflex to Culture        No problem-specific Assessment & Plan notes found for this encounter  Subjective:      Patient ID: Nathalie Osman is a 80 y o  female  She has noted he decreasing volume of urine  Also decreasing amount of nocturia  She last voided about 3 hours before this visit  She is wondering if she might have a urinary tract infection she has no burning with urination or blood in the urine she notes that her urine color is somewhat light  No back pain she feels very well otherwise good appetite sleeping well ambulatory with a walker  Had a fractured hip a few months ago still using walker  Anticoagulated because of Afib hypertension well controlled with meds      The following portions of the patient's history were reviewed and updated as appropriate:   She has a past medical history of Anemia, Asteatotic eczema, Hypercholesterolemia, Lichen sclerosus et atrophicus, Mitral valve insufficiency, Seborrheic keratoses, Tricuspid regurgitation, and Vertigo  ,  does not have any pertinent problems on file  ,   has a past surgical history that includes Appendectomy (11/14/1939);  Hysterectomy (11/14/1962); pr colonoscopy flx dx w/collj spec when pfrmd (N/A, 5/19/2016); Cataract extraction, bilateral (04/2019); and ORIF hip fracture (Left, 2020)  ,  family history includes Heart disease in her father and mother; No Known Problems in her brother and sister  ,   reports that she has never smoked  She has never used smokeless tobacco  She reports that she does not drink alcohol or use drugs  ,  is allergic to lactose intolerance (gi) and penicillins     Current Outpatient Medications   Medication Sig Dispense Refill    apixaban (ELIQUIS) 2 5 mg Take 1 tablet (2 5 mg total) by mouth 2 (two) times a day 180 tablet 3    diltiazem (DILT-XR) 120 MG 24 hr capsule Take 1 capsule (120 mg total) by mouth daily 90 capsule 3    docusate sodium (COLACE) 100 mg capsule Take 100 mg by mouth 2 (two) times a day      ferrous sulfate 325 (65 Fe) mg tablet TAKE 1 TABLET TWICE A DAY 60 tablet 3    furosemide (LASIX) 40 mg tablet TAKE 1 TABLET TWICE DAILY  60 tablet 3    KLOR-CON M10 10 MEQ tablet Take 1 tablet (10 mEq total) by mouth daily 90 tablet 1    Lidocaine 4 % PTCH Place 1 patch on the skin daily      metoprolol succinate (TOPROL-XL) 100 mg 24 hr tablet Take 1 tablet (100 mg total) by mouth daily 90 tablet 3    SIMBRINZA 1-0 2 % SUSP INSTILL 1 DROP INTO BOTH EYES TWICE A DAY  2     No current facility-administered medications for this visit  Review of Systems   Constitutional: Negative for activity change, appetite change, chills, diaphoresis, fatigue, fever and unexpected weight change  HENT: Negative for congestion, dental problem, drooling, ear discharge, ear pain, facial swelling, hearing loss, nosebleeds, postnasal drip, rhinorrhea, sinus pressure, sinus pain, sneezing, sore throat, tinnitus, trouble swallowing and voice change  Eyes: Negative for photophobia, pain, discharge, redness, itching and visual disturbance  Respiratory: Negative for apnea, cough, choking, chest tightness, shortness of breath, wheezing and stridor      Cardiovascular: Negative for chest pain, palpitations and leg swelling  Gastrointestinal: Negative for abdominal distention, abdominal pain, anal bleeding, blood in stool, constipation, diarrhea, nausea, rectal pain and vomiting  Endocrine: Negative for cold intolerance, heat intolerance, polydipsia, polyphagia and polyuria  Genitourinary: Positive for decreased urine volume  Negative for difficulty urinating, dysuria, enuresis, flank pain, frequency, genital sores, hematuria and urgency  Musculoskeletal: Positive for gait problem  Negative for arthralgias, back pain, joint swelling, myalgias, neck pain and neck stiffness  Skin: Negative for color change, pallor, rash and wound  Neurological: Negative for dizziness, tremors, seizures, syncope, facial asymmetry, speech difficulty, weakness, light-headedness, numbness and headaches  Hematological: Negative for adenopathy  Does not bruise/bleed easily  Psychiatric/Behavioral: Negative for agitation, behavioral problems, confusion, decreased concentration, dysphoric mood, hallucinations, self-injury, sleep disturbance and suicidal ideas  The patient is not nervous/anxious and is not hyperactive  Objective:  Vitals:    07/23/20 1359   BP: 126/84   BP Location: Left arm   Patient Position: Sitting   Cuff Size: Standard   Pulse: 86   SpO2: 94%   Weight: 54 4 kg (120 lb)   Height: 5' 1" (1 549 m)     Body mass index is 22 67 kg/m²  Physical Exam   Constitutional: She is oriented to person, place, and time  She appears well-developed  Walking with a walker   HENT:   Head: Normocephalic  Right Ear: External ear normal    Left Ear: External ear normal    Mouth/Throat: Oropharynx is clear and moist    Eyes: Pupils are equal, round, and reactive to light  Conjunctivae are normal    Neck: Neck supple  No JVD present  No thyromegaly present  Cardiovascular: Normal rate, regular rhythm and intact distal pulses  Murmur heard    Pulmonary/Chest: Effort normal and breath sounds normal  No stridor  No respiratory distress  She has no wheezes  Abdominal: Soft  Bowel sounds are normal  She exhibits no distension  There is no tenderness  There is no guarding  Musculoskeletal: Normal range of motion  She exhibits no edema  Lymphadenopathy:     She has no cervical adenopathy  Neurological: She is alert and oriented to person, place, and time  She has normal reflexes  She displays normal reflexes  No cranial nerve deficit or sensory deficit  She exhibits normal muscle tone  Coordination normal    Skin: Skin is warm and dry  Psychiatric: She has a normal mood and affect  Her behavior is normal  Judgment and thought content normal    Vitals reviewed

## 2020-07-23 NOTE — TELEPHONE ENCOUNTER
Patient states she is having a problem urinating and needs to see Dr Arboleda Life  She asked that we let him know  She has someone that can bring her in between 1 and 2PM     Let her know if she can come in

## 2020-07-24 ENCOUNTER — TELEPHONE (OUTPATIENT)
Dept: INTERNAL MEDICINE CLINIC | Facility: CLINIC | Age: 85
End: 2020-07-24

## 2020-07-24 ENCOUNTER — APPOINTMENT (OUTPATIENT)
Dept: LAB | Facility: CLINIC | Age: 85
End: 2020-07-24
Payer: MEDICARE

## 2020-07-24 DIAGNOSIS — N39.0 URINARY TRACT INFECTION WITHOUT HEMATURIA, SITE UNSPECIFIED: Primary | ICD-10-CM

## 2020-07-24 DIAGNOSIS — N39.0 URINARY TRACT INFECTION WITHOUT HEMATURIA, SITE UNSPECIFIED: ICD-10-CM

## 2020-07-24 LAB
BACTERIA UR QL AUTO: ABNORMAL /HPF
BILIRUB UR QL STRIP: NEGATIVE
CLARITY UR: ABNORMAL
COLOR UR: YELLOW
GLUCOSE UR STRIP-MCNC: NEGATIVE MG/DL
HGB UR QL STRIP.AUTO: ABNORMAL
HYALINE CASTS #/AREA URNS LPF: ABNORMAL /LPF
KETONES UR STRIP-MCNC: NEGATIVE MG/DL
LEUKOCYTE ESTERASE UR QL STRIP: ABNORMAL
NITRITE UR QL STRIP: NEGATIVE
NON-SQ EPI CELLS URNS QL MICRO: ABNORMAL /HPF
PH UR STRIP.AUTO: 6 [PH]
PROT UR STRIP-MCNC: ABNORMAL MG/DL
RBC #/AREA URNS AUTO: ABNORMAL /HPF
SP GR UR STRIP.AUTO: 1.01 (ref 1–1.03)
UROBILINOGEN UR QL STRIP.AUTO: 0.2 E.U./DL
WBC #/AREA URNS AUTO: ABNORMAL /HPF

## 2020-07-24 PROCEDURE — 87086 URINE CULTURE/COLONY COUNT: CPT

## 2020-07-24 PROCEDURE — 81001 URINALYSIS AUTO W/SCOPE: CPT | Performed by: PHYSICIAN ASSISTANT

## 2020-07-24 RX ORDER — CIPROFLOXACIN 250 MG/1
250 TABLET, FILM COATED ORAL EVERY 24 HOURS
Qty: 5 TABLET | Refills: 0 | Status: SHIPPED | OUTPATIENT
Start: 2020-07-24 | End: 2020-07-29

## 2020-07-24 RX ORDER — CIPROFLOXACIN 250 MG/1
250 TABLET, FILM COATED ORAL EVERY 24 HOURS
Qty: 5 TABLET | Refills: 0 | Status: SHIPPED | OUTPATIENT
Start: 2020-07-24 | End: 2020-07-24 | Stop reason: SDUPTHER

## 2020-07-24 NOTE — TELEPHONE ENCOUNTER
----- Message from Victorina Thomas PA-C sent at 7/24/2020 10:58 AM EDT -----    UA is positive I sent in Cipro please call her

## 2020-07-24 NOTE — TELEPHONE ENCOUNTER
----- Message from Colton Dominguez PA-C sent at 7/24/2020 10:58 AM EDT -----   UA is positive I sent in Cipro please call her

## 2020-07-28 LAB
BACTERIA UR CULT: ABNORMAL
BACTERIA UR CULT: ABNORMAL

## 2020-10-03 DIAGNOSIS — I10 ESSENTIAL HYPERTENSION: ICD-10-CM

## 2020-10-05 RX ORDER — METOPROLOL SUCCINATE 100 MG/1
TABLET, EXTENDED RELEASE ORAL
Qty: 180 TABLET | Refills: 0 | Status: SHIPPED | OUTPATIENT
Start: 2020-10-05 | End: 2021-01-11

## 2020-10-12 ENCOUNTER — OFFICE VISIT (OUTPATIENT)
Dept: INTERNAL MEDICINE CLINIC | Facility: CLINIC | Age: 85
End: 2020-10-12
Payer: MEDICARE

## 2020-10-12 VITALS
BODY MASS INDEX: 23.41 KG/M2 | DIASTOLIC BLOOD PRESSURE: 80 MMHG | OXYGEN SATURATION: 92 % | WEIGHT: 124 LBS | SYSTOLIC BLOOD PRESSURE: 120 MMHG | HEART RATE: 110 BPM | HEIGHT: 61 IN | TEMPERATURE: 98 F

## 2020-10-12 DIAGNOSIS — R39.9 UTI SYMPTOMS: Primary | ICD-10-CM

## 2020-10-12 PROCEDURE — 99213 OFFICE O/P EST LOW 20 MIN: CPT | Performed by: INTERNAL MEDICINE

## 2020-10-12 RX ORDER — CIPROFLOXACIN 250 MG/1
250 TABLET, FILM COATED ORAL EVERY 12 HOURS SCHEDULED
Qty: 14 TABLET | Refills: 0 | Status: SHIPPED | OUTPATIENT
Start: 2020-10-12 | End: 2020-10-19

## 2020-12-04 DIAGNOSIS — I48.0 PAROXYSMAL ATRIAL FIBRILLATION (HCC): ICD-10-CM

## 2020-12-22 DIAGNOSIS — D50.9 IRON DEFICIENCY ANEMIA, UNSPECIFIED IRON DEFICIENCY ANEMIA TYPE: ICD-10-CM

## 2020-12-22 RX ORDER — FERROUS SULFATE 325(65) MG
TABLET ORAL
Qty: 180 TABLET | Refills: 1 | Status: SHIPPED | OUTPATIENT
Start: 2020-12-22 | End: 2021-05-25

## 2021-01-03 DIAGNOSIS — I10 ESSENTIAL HYPERTENSION: ICD-10-CM

## 2021-01-05 RX ORDER — POTASSIUM CHLORIDE 750 MG/1
TABLET, EXTENDED RELEASE ORAL
Qty: 90 TABLET | Refills: 1 | Status: SHIPPED | OUTPATIENT
Start: 2021-01-05 | End: 2022-01-26

## 2021-01-11 DIAGNOSIS — I10 ESSENTIAL HYPERTENSION: ICD-10-CM

## 2021-01-11 RX ORDER — METOPROLOL SUCCINATE 100 MG/1
TABLET, EXTENDED RELEASE ORAL
Qty: 180 TABLET | Refills: 0 | Status: SHIPPED | OUTPATIENT
Start: 2021-01-11 | End: 2021-11-02

## 2021-03-08 ENCOUNTER — TELEPHONE (OUTPATIENT)
Dept: INTERNAL MEDICINE CLINIC | Facility: CLINIC | Age: 86
End: 2021-03-08

## 2021-03-08 NOTE — TELEPHONE ENCOUNTER
Patient has dental appointment for fillings on 3/17/2021 with Dr Brian Elizabeth   They are requesting a letter if she needs to be  premedicate before her procedure  because she of her  hip replacement         the office fax number is  [phone]

## 2021-03-22 NOTE — RESULT NOTES
A&O x4, VSS on 3L/NC per home 02 baseline with sat in the 90s. LS diminished crackles at the bases. RLE drsg CDI with rookie boots on. 2+ doppler PD/PT. Denies SOB/numbness/tingling with extremities. Up with  2 assist/lift. Pain managed with prn Dilaudid po. Plans for possible discharge today.    Verified Results  (1) COMPREHENSIVE METABOLIC PANEL [id number] 57:37XV Navdeep Amor Order Number: [id number]      National Kidney Disease Education Program recommendations are as follows:  GFR calculation is accurate only with a steady state creatinine  Chronic Kidney disease less than 60 ml/min/1 73 sq  meters  Kidney failure less than 15 ml/min/1 73 sq  meters  Test Name Result Flag Reference   GLUCOSE,RANDM 116 mg/dL     SODIUM 141 mmol/L  136-145   POTASSIUM 4 1 mmol/L  3 5-5 3   CHLORIDE 106 mmol/L  100-108   CARBON DIOXIDE 28 mmol/L  21-32   ANION GAP (CALC) 7 mmol/L  4-13   BLOOD UREA NITROGEN 31 mg/dL H 5-25   CREATININE 1 30 mg/dL  0 60-1 30   Standardized to IDMS reference method   CALCIUM 8 3 mg/dL  8 3-10 1   BILI, TOTAL 0 77 mg/dL  0 20-1 00   ALK PHOSPHATAS 88 U/L     ALT (SGPT) 20 U/L  12-78   AST(SGOT) 23 U/L  5-45   ALBUMIN 3 6 g/dL  3 5-5 0   TOTAL PROTEIN 6 9 g/dL  6 4-8 2   eGFR Non-African American 38 3 ml/min/1 73sq m       (1) CBC/PLT/DIFF [id number] 11:03AM [facility] Order Number: [id number]     Order Number: [id number]     Test Name Result Flag Reference   WBC COUNT 5 53 Thousand/uL  4 31-10 16   RBC COUNT 4 03 Million/uL  3 81-5 12   HEMOGLOBIN 9 2 g/dL L 11 5-15 4   HEMATOCRIT 31 3 % L 34 8-46  1   MCV 78 fL L 82-98   MCH 22 8 pg L 26 8-34 3   MCHC 29 4 g/dL L 31 4-37 4   RDW 18 3 % H 11 6-15 1   MPV 11 4 fL  8 9-12 7   PLATELET COUNT 067 Thousands/uL  149-390   nRBC AUTOMATED 0 /100 WBCs     NEUTROPHILS RELATIVE PERCENT 74 %  43-75   LYMPHOCYTES RELATIVE PERCENT 12 % L 14-44   MONOCYTES RELATIVE PERCENT 11 %  4-12   EOSINOPHILS RELATIVE PERCENT 2 %  0-6   BASOPHILS RELATIVE PERCENT 1 %  0-1   NEUTROPHILS ABSOLUTE COUNT 4 03 Thousands/µL  1 85-7 62   LYMPHOCYTES ABSOLUTE COUNT 0 65 Thousands/µL  0 60-4 47   MONOCYTES ABSOLUTE COUNT 0 62 Thousand/µL  0 17-1 22   EOSINOPHILS ABSOLUTE COUNT 0 13 Thousand/µL  0 00-0 61   BASOPHILS ABSOLUTE COUNT 0 06 Thousands/µL  0 00-0 10

## 2021-04-22 ENCOUNTER — APPOINTMENT (OUTPATIENT)
Dept: LAB | Facility: CLINIC | Age: 86
End: 2021-04-22
Payer: MEDICARE

## 2021-04-22 ENCOUNTER — OFFICE VISIT (OUTPATIENT)
Dept: INTERNAL MEDICINE CLINIC | Facility: CLINIC | Age: 86
End: 2021-04-22
Payer: MEDICARE

## 2021-04-22 VITALS
TEMPERATURE: 96.1 F | HEART RATE: 76 BPM | BODY MASS INDEX: 24.62 KG/M2 | OXYGEN SATURATION: 94 % | SYSTOLIC BLOOD PRESSURE: 162 MMHG | WEIGHT: 130.4 LBS | DIASTOLIC BLOOD PRESSURE: 72 MMHG | HEIGHT: 61 IN

## 2021-04-22 DIAGNOSIS — I10 ESSENTIAL HYPERTENSION: Primary | ICD-10-CM

## 2021-04-22 DIAGNOSIS — R53.83 FATIGUE, UNSPECIFIED TYPE: ICD-10-CM

## 2021-04-22 DIAGNOSIS — I10 ESSENTIAL HYPERTENSION: ICD-10-CM

## 2021-04-22 DIAGNOSIS — E78.00 HYPERCHOLESTEROLEMIA: ICD-10-CM

## 2021-04-22 DIAGNOSIS — I50.32 CHRONIC DIASTOLIC CONGESTIVE HEART FAILURE (HCC): ICD-10-CM

## 2021-04-22 DIAGNOSIS — I48.0 PAROXYSMAL ATRIAL FIBRILLATION (HCC): ICD-10-CM

## 2021-04-22 DIAGNOSIS — R53.83 OTHER FATIGUE: ICD-10-CM

## 2021-04-22 DIAGNOSIS — N18.30 STAGE 3 CHRONIC KIDNEY DISEASE, UNSPECIFIED WHETHER STAGE 3A OR 3B CKD (HCC): ICD-10-CM

## 2021-04-22 DIAGNOSIS — S02.85XA FRACTURE OF RIGHT ORBITAL WALL (HCC): ICD-10-CM

## 2021-04-22 DIAGNOSIS — Z23 ENCOUNTER FOR IMMUNIZATION: ICD-10-CM

## 2021-04-22 DIAGNOSIS — R05.9 COUGH: ICD-10-CM

## 2021-04-22 LAB
ALBUMIN SERPL BCP-MCNC: 4 G/DL (ref 3.5–5)
ALP SERPL-CCNC: 81 U/L (ref 46–116)
ALT SERPL W P-5'-P-CCNC: 24 U/L (ref 12–78)
ANION GAP SERPL CALCULATED.3IONS-SCNC: 10 MMOL/L (ref 4–13)
AST SERPL W P-5'-P-CCNC: 25 U/L (ref 5–45)
BACTERIA UR QL AUTO: ABNORMAL /HPF
BASOPHILS # BLD AUTO: 0.08 THOUSANDS/ΜL (ref 0–0.1)
BASOPHILS NFR BLD AUTO: 1 % (ref 0–1)
BILIRUB SERPL-MCNC: 0.71 MG/DL (ref 0.2–1)
BILIRUB UR QL STRIP: NEGATIVE
BUN SERPL-MCNC: 44 MG/DL (ref 5–25)
CALCIUM SERPL-MCNC: 9.4 MG/DL (ref 8.3–10.1)
CHLORIDE SERPL-SCNC: 100 MMOL/L (ref 100–108)
CHOLEST SERPL-MCNC: 151 MG/DL (ref 50–200)
CLARITY UR: CLEAR
CO2 SERPL-SCNC: 26 MMOL/L (ref 21–32)
COLOR UR: YELLOW
CREAT SERPL-MCNC: 2.08 MG/DL (ref 0.6–1.3)
EOSINOPHIL # BLD AUTO: 0.09 THOUSAND/ΜL (ref 0–0.61)
EOSINOPHIL NFR BLD AUTO: 1 % (ref 0–6)
ERYTHROCYTE [DISTWIDTH] IN BLOOD BY AUTOMATED COUNT: 16.3 % (ref 11.6–15.1)
GFR SERPL CREATININE-BSD FRML MDRD: 20 ML/MIN/1.73SQ M
GLUCOSE P FAST SERPL-MCNC: 115 MG/DL (ref 65–99)
GLUCOSE UR STRIP-MCNC: NEGATIVE MG/DL
HCT VFR BLD AUTO: 32.4 % (ref 34.8–46.1)
HDLC SERPL-MCNC: 69 MG/DL
HGB BLD-MCNC: 9.5 G/DL (ref 11.5–15.4)
HGB UR QL STRIP.AUTO: NEGATIVE
HYALINE CASTS #/AREA URNS LPF: ABNORMAL /LPF
IMM GRANULOCYTES # BLD AUTO: 0.14 THOUSAND/UL (ref 0–0.2)
IMM GRANULOCYTES NFR BLD AUTO: 2 % (ref 0–2)
KETONES UR STRIP-MCNC: NEGATIVE MG/DL
LDLC SERPL CALC-MCNC: 69 MG/DL (ref 0–100)
LEUKOCYTE ESTERASE UR QL STRIP: ABNORMAL
LYMPHOCYTES # BLD AUTO: 0.77 THOUSANDS/ΜL (ref 0.6–4.47)
LYMPHOCYTES NFR BLD AUTO: 9 % (ref 14–44)
MCH RBC QN AUTO: 27.6 PG (ref 26.8–34.3)
MCHC RBC AUTO-ENTMCNC: 29.3 G/DL (ref 31.4–37.4)
MCV RBC AUTO: 94 FL (ref 82–98)
MONOCYTES # BLD AUTO: 0.9 THOUSAND/ΜL (ref 0.17–1.22)
MONOCYTES NFR BLD AUTO: 10 % (ref 4–12)
NEUTROPHILS # BLD AUTO: 7.12 THOUSANDS/ΜL (ref 1.85–7.62)
NEUTS SEG NFR BLD AUTO: 77 % (ref 43–75)
NITRITE UR QL STRIP: NEGATIVE
NON-SQ EPI CELLS URNS QL MICRO: ABNORMAL /HPF
NONHDLC SERPL-MCNC: 82 MG/DL
NRBC BLD AUTO-RTO: 0 /100 WBCS
PH UR STRIP.AUTO: 6 [PH]
PLATELET # BLD AUTO: 243 THOUSANDS/UL (ref 149–390)
PMV BLD AUTO: 13 FL (ref 8.9–12.7)
POTASSIUM SERPL-SCNC: 4.5 MMOL/L (ref 3.5–5.3)
PROT SERPL-MCNC: 7.6 G/DL (ref 6.4–8.2)
PROT UR STRIP-MCNC: ABNORMAL MG/DL
RBC # BLD AUTO: 3.44 MILLION/UL (ref 3.81–5.12)
RBC #/AREA URNS AUTO: ABNORMAL /HPF
SODIUM SERPL-SCNC: 136 MMOL/L (ref 136–145)
SP GR UR STRIP.AUTO: 1.01 (ref 1–1.03)
TRIGL SERPL-MCNC: 64 MG/DL
TSH SERPL DL<=0.05 MIU/L-ACNC: 2.44 UIU/ML (ref 0.36–3.74)
UROBILINOGEN UR QL STRIP.AUTO: 0.2 E.U./DL
WBC # BLD AUTO: 9.1 THOUSAND/UL (ref 4.31–10.16)
WBC #/AREA URNS AUTO: ABNORMAL /HPF

## 2021-04-22 PROCEDURE — 85025 COMPLETE CBC W/AUTO DIFF WBC: CPT

## 2021-04-22 PROCEDURE — U0003 INFECTIOUS AGENT DETECTION BY NUCLEIC ACID (DNA OR RNA); SEVERE ACUTE RESPIRATORY SYNDROME CORONAVIRUS 2 (SARS-COV-2) (CORONAVIRUS DISEASE [COVID-19]), AMPLIFIED PROBE TECHNIQUE, MAKING USE OF HIGH THROUGHPUT TECHNOLOGIES AS DESCRIBED BY CMS-2020-01-R: HCPCS | Performed by: PHYSICIAN ASSISTANT

## 2021-04-22 PROCEDURE — 80053 COMPREHEN METABOLIC PANEL: CPT

## 2021-04-22 PROCEDURE — 84443 ASSAY THYROID STIM HORMONE: CPT

## 2021-04-22 PROCEDURE — 36415 COLL VENOUS BLD VENIPUNCTURE: CPT

## 2021-04-22 PROCEDURE — 81001 URINALYSIS AUTO W/SCOPE: CPT | Performed by: PHYSICIAN ASSISTANT

## 2021-04-22 PROCEDURE — 80061 LIPID PANEL: CPT

## 2021-04-22 PROCEDURE — 99214 OFFICE O/P EST MOD 30 MIN: CPT | Performed by: PHYSICIAN ASSISTANT

## 2021-04-22 PROCEDURE — U0005 INFEC AGEN DETEC AMPLI PROBE: HCPCS | Performed by: PHYSICIAN ASSISTANT

## 2021-04-22 NOTE — PROGRESS NOTES
Assessment/Plan: exhaustion  Physical exam unremarkable no other symptoms getting a workup including a COVID test follow-up depending on test results  Puffy upper eyelids could be allergic recommend that she see an eye doctor       Diagnoses and all orders for this visit:    Essential hypertension  -     CBC and differential; Future  -     Comprehensive metabolic panel; Future  -     Lipid panel; Future  -     UA w Reflex to Microscopic w Reflex to Culture  -     TSH, 3rd generation; Future    Encounter for immunization    Paroxysmal atrial fibrillation (HCC)  -     CBC and differential; Future  -     Comprehensive metabolic panel; Future  -     Lipid panel; Future  -     UA w Reflex to Microscopic w Reflex to Culture  -     TSH, 3rd generation; Future    Stage 3 chronic kidney disease, unspecified whether stage 3a or 3b CKD  -     CBC and differential; Future  -     Comprehensive metabolic panel; Future  -     Lipid panel; Future  -     UA w Reflex to Microscopic w Reflex to Culture  -     TSH, 3rd generation; Future    Hypercholesterolemia  -     CBC and differential; Future  -     Comprehensive metabolic panel; Future  -     Lipid panel; Future  -     UA w Reflex to Microscopic w Reflex to Culture  -     TSH, 3rd generation; Future    Other fatigue  -     CBC and differential; Future  -     Comprehensive metabolic panel; Future  -     Lipid panel; Future  -     UA w Reflex to Microscopic w Reflex to Culture  -     TSH, 3rd generation; Future    Chronic diastolic congestive heart failure (HCC)  -     CBC and differential; Future  -     Comprehensive metabolic panel; Future  -     Lipid panel; Future  -     UA w Reflex to Microscopic w Reflex to Culture  -     TSH, 3rd generation;  Future    Fracture of right orbital wall (HCC)    Cough  -     Novel Coronavirus (COVID-19), PCR [facility] Collected in Office    Fatigue, unspecified type  -     Novel Coronavirus (COVID-19), PCR [facility] Collected in Office  -     Novel Coronavirus (Covid-19),PCR [facility] - Collected in Office        No problem-specific Assessment & Plan notes found for this encounter  Subjective:      Patient ID: Manav Nascimento is a 80 y o  female  She has been feeling very tired for the past month  Exhausted for the past week  She sleeps well she feels tired all the time her appetite is good no trouble with urination or bowel movement weight has been stable  She has no complain of any pain number shortness of breath coughing chest pain palpitations or dizziness history of Afib anticoagulated  History of CKD 3 which has been followed and stable she has noted puffy upper eyelids for the past week  She uses eyedrops for glaucoma every day  No redness irritation of her eyes no change in her vision      The following portions of the patient's history were reviewed and updated as appropriate:   She has a past medical history of Anemia, Asteatotic eczema, Hypercholesterolemia, Lichen sclerosus et atrophicus, Mitral valve insufficiency, Seborrheic keratoses, Tricuspid regurgitation, and Vertigo  ,  does not have any pertinent problems on file  ,   has a past surgical history that includes Appendectomy (11/14/1939); Hysterectomy (11/14/1962); pr colonoscopy flx dx w/collj spec when pfrmd (N/A, 5/19/2016); Cataract extraction, bilateral (04/2019); and ORIF hip fracture (Left, 2020)  ,  family history includes Heart disease in her father and mother; No Known Problems in her brother and sister  ,   reports that she has never smoked  She has never used smokeless tobacco  She reports that she does not drink alcohol or use drugs  ,  is allergic to lactose intolerance (gi) - food allergy and penicillins     Current Outpatient Medications   Medication Sig Dispense Refill    apixaban (Eliquis) 2 5 mg Take 1 tablet (2 5 mg total) by mouth 2 (two) times a day 180 tablet 3    diltiazem (DILT-XR) 120 MG 24 hr capsule Take 1 capsule (120 mg total) by mouth daily 90 capsule 3    docusate sodium (COLACE) 100 mg capsule Take 100 mg by mouth 2 (two) times a day       ferrous sulfate 325 (65 Fe) mg tablet TAKE 1 TABLET TWICE A  tablet 1    furosemide (LASIX) 40 mg tablet TAKE 1 TABLET TWICE DAILY  60 tablet 3    Klor-Con M10 10 MEQ tablet TAKE 1 TABLET BY MOUTH EVERY DAY 90 tablet 1    metoprolol succinate (TOPROL-XL) 100 mg 24 hr tablet TAKE 2 TABLETS BY MOUTH EVERY  tablet 0    SIMBRINZA 1-0 2 % SUSP INSTILL 1 DROP INTO BOTH EYES TWICE A DAY  2    Lidocaine 4 % PTCH Place 1 patch on the skin daily       No current facility-administered medications for this visit  Review of Systems   Constitutional: Positive for fatigue  Negative for chills and fever  HENT: Negative for ear pain and sore throat  Eyes: Negative for pain and visual disturbance  Respiratory: Negative for cough and shortness of breath  Cardiovascular: Negative for chest pain and palpitations  Gastrointestinal: Negative for abdominal pain and vomiting  Genitourinary: Negative for dysuria and hematuria  Musculoskeletal: Negative for arthralgias and back pain  Skin: Negative for color change and rash  Neurological: Negative for seizures and syncope  All other systems reviewed and are negative  Objective:  Vitals:    04/22/21 1400   BP: 162/72   Pulse: 76   Temp: (!) 96 1 °F (35 6 °C)   TempSrc: Tympanic   SpO2: 94%   Weight: 59 1 kg (130 lb 6 4 oz)   Height: 5' 1" (1 549 m)     Body mass index is 24 64 kg/m²  Physical Exam  Vitals signs reviewed  Constitutional:       Appearance: She is well-developed  HENT:      Head: Normocephalic  Right Ear: Tympanic membrane and external ear normal       Left Ear: Tympanic membrane and external ear normal       Nose: Nose normal       Mouth/Throat:      Mouth: Mucous membranes are moist    Eyes:      Extraocular Movements: Extraocular movements intact        Conjunctiva/sclera: Conjunctivae normal       Pupils: Pupils are equal, round, and reactive to light  Comments:  Puffy upper eyelids   Neck:      Musculoskeletal: Normal range of motion and neck supple  Thyroid: No thyromegaly  Vascular: No carotid bruit  Cardiovascular:      Rate and Rhythm: Normal rate  Rhythm irregular  Pulses: Normal pulses  Heart sounds: Murmur present  Pulmonary:      Effort: Pulmonary effort is normal  No respiratory distress  Breath sounds: Normal breath sounds  No stridor  No wheezing, rhonchi or rales  Abdominal:      General: Abdomen is flat  Bowel sounds are normal  There is no distension  Palpations: Abdomen is soft  Tenderness: There is no abdominal tenderness  There is no guarding  Musculoskeletal: Normal range of motion  General: No swelling  Lymphadenopathy:      Cervical: No cervical adenopathy  Skin:     General: Skin is warm and dry  Coloration: Skin is not jaundiced  Neurological:      General: No focal deficit present  Mental Status: She is alert and oriented to person, place, and time  Deep Tendon Reflexes: Reflexes are normal and symmetric  Psychiatric:         Mood and Affect: Mood normal          Thought Content:  Thought content normal          Judgment: Judgment normal

## 2021-04-23 DIAGNOSIS — N18.30 ANEMIA DUE TO STAGE 3 CHRONIC KIDNEY DISEASE, UNSPECIFIED WHETHER STAGE 3A OR 3B CKD (HCC): Primary | ICD-10-CM

## 2021-04-23 DIAGNOSIS — D63.1 ANEMIA DUE TO STAGE 3 CHRONIC KIDNEY DISEASE, UNSPECIFIED WHETHER STAGE 3A OR 3B CKD (HCC): Primary | ICD-10-CM

## 2021-04-23 LAB — SARS-COV-2 RNA RESP QL NAA+PROBE: NEGATIVE

## 2021-04-29 ENCOUNTER — TELEPHONE (OUTPATIENT)
Dept: SURGICAL ONCOLOGY | Facility: CLINIC | Age: 86
End: 2021-04-29

## 2021-04-29 NOTE — TELEPHONE ENCOUNTER
New Patient Encounter    New Patient Intake Form   Patient Details:  Ron Angeles  2/17/1924  [id number]    Background Information:  [address] starts by opening a telephone encounter and gathering the following information   Who is calling to schedule? If not self, relationship to patient? self   Referring Provider Dr Wanda Aguila   What is the diagnosis? anemia   Is this diagnosis confirmed? Yes   When was the diagnosis? 4/2021   Is there a confirmed diagnosis from a biopsy/tissue reviewed by pathology? Were outside slides requested? NA   Is patient aware of diagnosis? Yes   Is there a personal history and what kind? No   Is there a family history and what kind? No   Reason for visit? New Diagnosis   Have you had any imaging or labs done? If so: when, where? yes  [facility]   Are records in [facility]? yes   If patient has a prior history of breast cancer were old records obtained? NA   Was the patient told to bring a disk? No   Does the patient smoke or Vape? No   If yes, how many packs or cartridges per day? Scheduling Information:   Preferred [place]:  [facility]     Are there any dates/time the patient cannot be seen? Miscellaneous:    After completing the above information, please route to Financial Counselor and the appropriate Nurse Navigator for review

## 2021-05-03 DIAGNOSIS — I10 ESSENTIAL HYPERTENSION: ICD-10-CM

## 2021-05-03 RX ORDER — DILTIAZEM HYDROCHLORIDE 120 MG/1
120 CAPSULE, EXTENDED RELEASE ORAL DAILY
Qty: 90 CAPSULE | Refills: 3
Start: 2021-05-03 | End: 2021-05-06 | Stop reason: SDUPTHER

## 2021-05-06 NOTE — TELEPHONE ENCOUNTER
PT  CALLING   BACK  UPSET  CALLED   Monday  NEEDS  HER  RX SHE  IS  OUT  OF  IT PT  WANTS  A  CALL TODAY  SAYING  RX  WAS  TAKEN  CARE  OF  [phone]

## 2021-05-07 RX ORDER — DILTIAZEM HYDROCHLORIDE 120 MG/1
120 CAPSULE, EXTENDED RELEASE ORAL DAILY
Qty: 90 CAPSULE | Refills: 1 | Status: SHIPPED | OUTPATIENT
Start: 2021-05-07 | End: 2021-10-11

## 2021-05-07 NOTE — TELEPHONE ENCOUNTER
Patient sill waiting for refill of   diltiazem (Dilt-XR) 120 MG    Send to [facility] in [place]

## 2021-05-14 ENCOUNTER — TELEPHONE (OUTPATIENT)
Dept: NEPHROLOGY | Facility: CLINIC | Age: 86
End: 2021-05-14

## 2021-05-14 ENCOUNTER — APPOINTMENT (OUTPATIENT)
Dept: LAB | Facility: HOSPITAL | Age: 86
End: 2021-05-14
Payer: MEDICARE

## 2021-05-14 ENCOUNTER — CONSULT (OUTPATIENT)
Dept: HEMATOLOGY ONCOLOGY | Facility: CLINIC | Age: 86
End: 2021-05-14
Payer: MEDICARE

## 2021-05-14 VITALS
RESPIRATION RATE: 16 BRPM | DIASTOLIC BLOOD PRESSURE: 70 MMHG | TEMPERATURE: 98.3 F | HEART RATE: 64 BPM | WEIGHT: 127 LBS | HEIGHT: 61 IN | BODY MASS INDEX: 23.98 KG/M2 | OXYGEN SATURATION: 94 % | SYSTOLIC BLOOD PRESSURE: 130 MMHG

## 2021-05-14 DIAGNOSIS — D51.8 OTHER VITAMIN B12 DEFICIENCY ANEMIAS: ICD-10-CM

## 2021-05-14 DIAGNOSIS — N18.30 STAGE 3 CHRONIC KIDNEY DISEASE, UNSPECIFIED WHETHER STAGE 3A OR 3B CKD (HCC): ICD-10-CM

## 2021-05-14 DIAGNOSIS — D64.9 ANEMIA, UNSPECIFIED TYPE: ICD-10-CM

## 2021-05-14 DIAGNOSIS — D64.9 ANEMIA, UNSPECIFIED TYPE: Primary | ICD-10-CM

## 2021-05-14 LAB
BASOPHILS # BLD AUTO: 0.05 THOUSANDS/ΜL (ref 0–0.1)
BASOPHILS NFR BLD AUTO: 1 % (ref 0–1)
EOSINOPHIL # BLD AUTO: 0.19 THOUSAND/ΜL (ref 0–0.61)
EOSINOPHIL NFR BLD AUTO: 3 % (ref 0–6)
ERYTHROCYTE [DISTWIDTH] IN BLOOD BY AUTOMATED COUNT: 15.2 % (ref 11.6–15.1)
FERRITIN SERPL-MCNC: 58 NG/ML (ref 8–388)
HCT VFR BLD AUTO: 38.7 % (ref 34.8–46.1)
HGB BLD-MCNC: 11.3 G/DL (ref 11.5–15.4)
IMM GRANULOCYTES # BLD AUTO: 0.02 THOUSAND/UL (ref 0–0.2)
IMM GRANULOCYTES NFR BLD AUTO: 0 % (ref 0–2)
IRON SATN MFR SERPL: 8 %
IRON SERPL-MCNC: 39 UG/DL (ref 50–170)
LYMPHOCYTES # BLD AUTO: 0.7 THOUSANDS/ΜL (ref 0.6–4.47)
LYMPHOCYTES NFR BLD AUTO: 12 % (ref 14–44)
MCH RBC QN AUTO: 27.3 PG (ref 26.8–34.3)
MCHC RBC AUTO-ENTMCNC: 29.2 G/DL (ref 31.4–37.4)
MCV RBC AUTO: 94 FL (ref 82–98)
MONOCYTES # BLD AUTO: 0.54 THOUSAND/ΜL (ref 0.17–1.22)
MONOCYTES NFR BLD AUTO: 9 % (ref 4–12)
NEUTROPHILS # BLD AUTO: 4.3 THOUSANDS/ΜL (ref 1.85–7.62)
NEUTS SEG NFR BLD AUTO: 75 % (ref 43–75)
NRBC BLD AUTO-RTO: 0 /100 WBCS
PLATELET # BLD AUTO: 210 THOUSANDS/UL (ref 149–390)
PMV BLD AUTO: 11.8 FL (ref 8.9–12.7)
RBC # BLD AUTO: 4.14 MILLION/UL (ref 3.81–5.12)
RETICS # AUTO: NORMAL 10*3/UL (ref 14097–95744)
RETICS # CALC: 1.59 % (ref 0.37–1.87)
TIBC SERPL-MCNC: 499 UG/DL (ref 250–450)
VIT B12 SERPL-MCNC: 692 PG/ML (ref 100–900)
WBC # BLD AUTO: 5.8 THOUSAND/UL (ref 4.31–10.16)

## 2021-05-14 PROCEDURE — 84165 PROTEIN E-PHORESIS SERUM: CPT | Performed by: PATHOLOGY

## 2021-05-14 PROCEDURE — 83550 IRON BINDING TEST: CPT

## 2021-05-14 PROCEDURE — 85045 AUTOMATED RETICULOCYTE COUNT: CPT

## 2021-05-14 PROCEDURE — 36415 COLL VENOUS BLD VENIPUNCTURE: CPT

## 2021-05-14 PROCEDURE — 1160F RVW MEDS BY RX/DR IN RCRD: CPT | Performed by: INTERNAL MEDICINE

## 2021-05-14 PROCEDURE — 82668 ASSAY OF ERYTHROPOIETIN: CPT

## 2021-05-14 PROCEDURE — 99204 OFFICE O/P NEW MOD 45 MIN: CPT | Performed by: INTERNAL MEDICINE

## 2021-05-14 PROCEDURE — 82728 ASSAY OF FERRITIN: CPT

## 2021-05-14 PROCEDURE — 84165 PROTEIN E-PHORESIS SERUM: CPT

## 2021-05-14 PROCEDURE — 85025 COMPLETE CBC W/AUTO DIFF WBC: CPT

## 2021-05-14 PROCEDURE — 82607 VITAMIN B-12: CPT

## 2021-05-14 PROCEDURE — 83540 ASSAY OF IRON: CPT

## 2021-05-14 NOTE — PROGRESS NOTES
Hematology/Oncology Outpatient Consult  Giovany Danielle 80 y o  female MRN: @ Encounter: [id number]    Date:  5/13/2021      Assessment and Plan:    1  Anemia, unspecified type  2  Stage 3 chronic kidney disease, unspecified whether stage 3a or 3b CKD ([facility] [address] )  This is a very pleasant 26-year-old female with a past medical history of AFib, hypertension, mitral valve insufficiency, CHF, chronic kidney disease, hyperholesterolemia presenting for consultation regarding anemia  Patient has had on and off normocytic anemia since March 2017  On 04/22/2021 hemoglobin was 9 5 grams/deciliter, MCV 94, platelet 316, diff unremarkable  Patient's baseline hemoglobin appears to be about 9-11 grams/deciliter over the last few years  Patient feels well, no complaints  No bleeding, other symptoms as below  Likely anemia from CKD  Will order below labs to R/O other etiologies  She had a recent U/A showing no blood  Discussed possible EPO injection if labs suggest good clinical response  Discussed with patient that risks of injection include VTE; however, she is taking Eliquis 2 5mg BID for afib already  I will call patient once labs resulted, she is agreeable to procrit injection if needed  For now she will continue twice daily oral iron  F/U in 2 months with CBC prior     - CBC and differential; Future  - Erythropoietin; Future  - Protein electrophoresis, serum; Future  - Reticulocytes; Future  - Occult Blood, Fecal Immunochemical; Future  - Vitamin B12; Future  - Iron Panel (Includes Ferritin, Iron Sat%, Iron, and TIBC); Future  - CBC and differential; Future    Patient voiced understanding and agreement to the above  The patient knows to call the office with any questions or concerns regarding the above      I have spent 60 minutes with Patient  today in which greater than 50% of this time was spent in counseling/coordination of care regarding Diagnostic results, Risks and benefits of tx options, Intructions for management, Patient and family education, Importance of tx compliance, Risk factor reductions and Impressions  HPI:   This is a 59-year-old female with a past medical history of AFib, hypertension, mitral valve insufficiency, CHF, chronic kidney disease, hyperholesterolemia presenting for consultation regarding anemia  1  Acute on Chronic Normocytic Anemia   2  History of CKD   - on/off anemia since at least 3/2017  Baseline Hgb usually 9-11g/dL over last few years  - she denies any bleeding into urine, stools  No PICA, increased shortness of breath with exertion or fatigue  Diet is well balanced with at least 2-3 meals a day including chicken, fish  Does not consume red meats  She denies history of malabsorptive conditions, GI bleed  She is currently taking twice daily oral iron  Recent Labs:  4/22/21: WBC 9 10, Hgb 9 5g/dL, MCV 94, platelet 323, diff unremarkable    U/A negative for blood  BUN 44, Cr 2 08  7/23/20: WBC 7 39, Hgb 12 0g/dL, MCV 94, platelet 084, diff unremarkable    3  Chronic Anticoagulation - Afib  - Eliquis 2 5mg BID    Patient does not smoke, drink  No history of cancer, no family history of cancer  ROS: Review of Systems   Constitutional: Negative for activity change, appetite change, chills, diaphoresis, fatigue, fever and unexpected weight change  HENT: Negative for mouth sores and nosebleeds  Eyes: Negative for visual disturbance  Respiratory: Negative for apnea, cough, choking, chest tightness and shortness of breath  Cardiovascular: Negative for chest pain, palpitations and leg swelling  Gastrointestinal: Negative for abdominal pain, anal bleeding, blood in stool, constipation, diarrhea, nausea and vomiting  Endocrine: Negative for cold intolerance  Genitourinary: Negative for hematuria, menstrual problem and vaginal bleeding  Musculoskeletal: Negative for arthralgias  Skin: Negative for color change, pallor and rash     Neurological: Negative for dizziness, syncope, light-headedness and headaches  Hematological: Negative for adenopathy  Does not bruise/bleed easily  Psychiatric/Behavioral: Negative for sleep disturbance  Past Medical History:   Diagnosis Date    Anemia     Asteatotic eczema     Hypercholesterolemia     Lichen sclerosus et atrophicus     Mitral valve insufficiency     Seborrheic keratoses     Tricuspid regurgitation     Vertigo        Past Surgical History:   Procedure Laterality Date    APPENDECTOMY  11/14/1939    CATARACT EXTRACTION, BILATERAL  04/2019    HYSTERECTOMY  11/14/1962    ORIF HIP FRACTURE Left 2020    MD COLONOSCOPY FLX DX W/COLLJ SPEC WHEN PFRMD N/A 5/19/2016    Procedure: EGD AND COLONOSCOPY;  Surgeon: Israel Kinsey MD;  Location: [facility]; Service: Gastroenterology       Social History     Socioeconomic History    Marital status:       Spouse name: Not on file    Number of children: Not on file    Years of education: Not on file    Highest education level: Not on file   Occupational History    Not on file   Social Needs    Financial resource strain: Not on file    Food insecurity     Worry: Not on file     Inability: Not on file    Transportation needs     Medical: Not on file     Non-medical: Not on file   Tobacco Use    Smoking status: Never Smoker    Smokeless tobacco: Never Used   Substance and Sexual Activity    Alcohol use: No    Drug use: No    Sexual activity: Not Currently   Lifestyle    Physical activity     Days per week: 0 days     Minutes per session: 0 min    Stress: Not at all   Relationships    Social connections     Talks on phone: Not on file     Gets together: Not on file     Attends Yazidism service: Not on file     Active member of club or organization: Not on file     Attends meetings of clubs or organizations: Not on file     Relationship status: Not on file    Intimate partner violence     Fear of current or ex partner: Not on file     Emotionally abused: Not on file     Physically abused: Not on file     Forced sexual activity: Not on file   Other Topics Concern    Not on file   Social History Narrative    Advance directive declined by patient       Family History   Problem Relation Age of Onset    Heart disease Mother         Abnormality    Heart disease Father         Abnormality    No Known Problems Sister     No Known Problems Brother        Allergies   Allergen Reactions    Lactose Intolerance (Gi) - Food Allergy     Penicillins          Current Outpatient Medications:     apixaban (Eliquis) 2 5 mg, Take 1 tablet (2 5 mg total) by mouth 2 (two) times a day, Disp: 180 tablet, Rfl: 3    diltiazem (Dilt-XR) 120 MG 24 hr capsule, Take 1 capsule (120 mg total) by mouth daily, Disp: 90 capsule, Rfl: 1    docusate sodium (COLACE) 100 mg capsule, Take 100 mg by mouth 2 (two) times a day , Disp: , Rfl:     ferrous sulfate 325 (65 Fe) mg tablet, TAKE 1 TABLET TWICE A DAY, Disp: 180 tablet, Rfl: 1    furosemide (LASIX) 40 mg tablet, TAKE 1 TABLET TWICE DAILY  , Disp: 60 tablet, Rfl: 3    Klor-Con M10 10 MEQ tablet, TAKE 1 TABLET BY MOUTH EVERY DAY, Disp: 90 tablet, Rfl: 1    Lidocaine 4 % PTCH, Place 1 patch on the skin daily, Disp: , Rfl:     metoprolol succinate (TOPROL-XL) 100 mg 24 hr tablet, TAKE 2 TABLETS BY MOUTH EVERY DAY, Disp: 180 tablet, Rfl: 0    SIMBRINZA 1-0 2 % SUSP, INSTILL 1 DROP INTO BOTH EYES TWICE A DAY, Disp: , Rfl: 2    (Not in a hospital admission)        Physical Exam:  LMP  (LMP Unknown)     Physical Exam  Constitutional:       General: She is not in acute distress  Appearance: Normal appearance  She is normal weight  She is not ill-appearing, toxic-appearing or diaphoretic  Comments: Ambulates with cane   HENT:      Head: Normocephalic and atraumatic  Eyes:      General: No scleral icterus  Extraocular Movements: Extraocular movements intact        Conjunctiva/sclera: Conjunctivae normal       Pupils: Pupils are equal, round, and reactive to light  Neck:      Musculoskeletal: Normal range of motion and neck supple  Cardiovascular:      Rate and Rhythm: Normal rate and regular rhythm  Heart sounds: Normal heart sounds  Pulmonary:      Effort: Pulmonary effort is normal  No respiratory distress  Breath sounds: Normal breath sounds  No stridor  No wheezing, rhonchi or rales  Abdominal:      Palpations: Abdomen is soft  Tenderness: There is no abdominal tenderness  Musculoskeletal: Normal range of motion  General: No tenderness  Right lower leg: No edema  Left lower leg: No edema  Lymphadenopathy:      Cervical: No cervical adenopathy  Skin:     General: Skin is warm and dry  Coloration: Skin is not jaundiced or pale  Findings: No erythema or rash  Neurological:      General: No focal deficit present  Mental Status: She is alert  Mental status is at baseline  Cranial Nerves: No cranial nerve deficit  Motor: No weakness  Psychiatric:         Mood and Affect: Mood normal          Behavior: Behavior normal          Thought Content: Thought content normal          Judgment: Judgment normal            Labs:  Lab Results   Component Value Date    WBC 9 10 04/22/2021    HGB 9 5 (L) 04/22/2021    HCT 32 4 (L) 04/22/2021    MCV 94 04/22/2021     04/22/2021     Lab Results   Component Value Date     10/26/2015    K 4 5 04/22/2021     04/22/2021    CO2 26 04/22/2021    ANIONGAP 5 10/26/2015    BUN 44 (H) 04/22/2021    CREATININE 2 08 (H) 04/22/2021    GLUCOSE 87 10/26/2015    GLUF 115 (H) 04/22/2021    CALCIUM 9 4 04/22/2021    AST 25 04/22/2021    ALT 24 04/22/2021    ALKPHOS 81 04/22/2021    PROT 7 9 10/15/2015    BILITOT 0 70 10/15/2015    EGFR 20 04/22/2021       Patient voiced understanding and agreement in the above discussion  Aware to contact our office with questions/symptoms in the interim

## 2021-05-15 LAB — EPO SERPL-ACNC: 14.7 MIU/ML (ref 2.6–18.5)

## 2021-05-17 ENCOUNTER — APPOINTMENT (OUTPATIENT)
Dept: LAB | Facility: HOSPITAL | Age: 86
End: 2021-05-17
Payer: MEDICARE

## 2021-05-17 ENCOUNTER — OFFICE VISIT (OUTPATIENT)
Dept: NEPHROLOGY | Facility: CLINIC | Age: 86
End: 2021-05-17
Payer: COMMERCIAL

## 2021-05-17 VITALS
DIASTOLIC BLOOD PRESSURE: 80 MMHG | BODY MASS INDEX: 25.13 KG/M2 | HEART RATE: 68 BPM | SYSTOLIC BLOOD PRESSURE: 140 MMHG | RESPIRATION RATE: 16 BRPM | HEIGHT: 60 IN | TEMPERATURE: 96.8 F | WEIGHT: 128 LBS

## 2021-05-17 DIAGNOSIS — D63.1 ANEMIA DUE TO STAGE 3 CHRONIC KIDNEY DISEASE, UNSPECIFIED WHETHER STAGE 3A OR 3B CKD (HCC): ICD-10-CM

## 2021-05-17 DIAGNOSIS — I50.32 CHRONIC DIASTOLIC CONGESTIVE HEART FAILURE (HCC): ICD-10-CM

## 2021-05-17 DIAGNOSIS — M89.9 CHRONIC KIDNEY DISEASE-MINERAL AND BONE DISORDER: ICD-10-CM

## 2021-05-17 DIAGNOSIS — I10 ESSENTIAL HYPERTENSION: ICD-10-CM

## 2021-05-17 DIAGNOSIS — N18.30 STAGE 3 CHRONIC KIDNEY DISEASE, UNSPECIFIED WHETHER STAGE 3A OR 3B CKD (HCC): Primary | ICD-10-CM

## 2021-05-17 DIAGNOSIS — N18.9 CHRONIC KIDNEY DISEASE-MINERAL AND BONE DISORDER: ICD-10-CM

## 2021-05-17 DIAGNOSIS — I48.0 PAROXYSMAL ATRIAL FIBRILLATION (HCC): ICD-10-CM

## 2021-05-17 DIAGNOSIS — E83.9 CHRONIC KIDNEY DISEASE-MINERAL AND BONE DISORDER: ICD-10-CM

## 2021-05-17 DIAGNOSIS — N18.30 ANEMIA DUE TO STAGE 3 CHRONIC KIDNEY DISEASE, UNSPECIFIED WHETHER STAGE 3A OR 3B CKD (HCC): ICD-10-CM

## 2021-05-17 LAB
ALBUMIN SERPL ELPH-MCNC: 4.27 G/DL (ref 3.5–5)
ALBUMIN SERPL ELPH-MCNC: 60.2 % (ref 52–65)
ALPHA1 GLOB SERPL ELPH-MCNC: 0.39 G/DL (ref 0.1–0.4)
ALPHA1 GLOB SERPL ELPH-MCNC: 5.5 % (ref 2.5–5)
ALPHA2 GLOB SERPL ELPH-MCNC: 0.8 G/DL (ref 0.4–1.2)
ALPHA2 GLOB SERPL ELPH-MCNC: 11.2 % (ref 7–13)
BETA GLOB ABNORMAL SERPL ELPH-MCNC: 0.56 G/DL (ref 0.4–0.8)
BETA1 GLOB SERPL ELPH-MCNC: 7.9 % (ref 5–13)
BETA2 GLOB SERPL ELPH-MCNC: 4.6 % (ref 2–8)
BETA2+GAMMA GLOB SERPL ELPH-MCNC: 0.33 G/DL (ref 0.2–0.5)
GAMMA GLOB ABNORMAL SERPL ELPH-MCNC: 0.75 G/DL (ref 0.5–1.6)
GAMMA GLOB SERPL ELPH-MCNC: 10.6 % (ref 12–22)
HEMOCCULT STL QL IA: POSITIVE
IGG/ALB SER: 1.51 {RATIO} (ref 1.1–1.8)
PROT PATTERN SERPL ELPH-IMP: ABNORMAL
PROT SERPL-MCNC: 7.1 G/DL (ref 6.4–8.2)

## 2021-05-17 PROCEDURE — 99214 OFFICE O/P EST MOD 30 MIN: CPT | Performed by: INTERNAL MEDICINE

## 2021-05-17 PROCEDURE — G0328 FECAL BLOOD SCRN IMMUNOASSAY: HCPCS

## 2021-05-17 NOTE — ASSESSMENT & PLAN NOTE
Wt Readings from Last 3 Encounters:   05/17/21 58 1 kg (128 lb)   05/14/21 57 6 kg (127 lb)   04/22/21 59 1 kg (130 lb 6 4 oz)          she is asymptomatic  She is on diuretic    As I am reducing diuretic advised to monitor signs and symptoms of volume overload status

## 2021-05-17 NOTE — PROGRESS NOTES
NEPHROLOGY OFFICE FOLLOW UP  Bettina Armijo 80 y o  female MRN: [id number]    Encounter: [id number] 5/17/2021    REASON FOR VISIT: Bettina Armijo is a 80 y o  female who is here on 5/17/2021 for Chronic Kidney Disease and Follow-up    HPI:    Shannan Daniel came in today for follow-up of CKD  Since I saw her lot she is being worked up for the anemia  She is 80 year woman who looks quite good for her age  Still quite active    Denies any acute complaint    Recent decided blood test done which showed lowering of the hemoglobin so she was seen by hematologist and being worked up with stool occult test and  Also taking iron tablet     She denies any chest pain palpitation or shortness of breath     Denies any nausea vomiting or any back pain     Denies taking nonsteroidal pain killer      REVIEW OF SYSTEMS:    Review of Systems   Constitutional: Negative for activity change and fatigue  HENT: Negative for congestion and ear discharge  Eyes: Negative for photophobia and pain  Respiratory: Negative for apnea and choking  Cardiovascular: Negative for chest pain and palpitations  Gastrointestinal: Negative for abdominal distention and blood in stool  Endocrine: Negative for heat intolerance and polyphagia  Genitourinary: Negative for flank pain and urgency  Musculoskeletal: Negative for neck pain and neck stiffness  Skin: Negative for color change and wound  Allergic/Immunologic: Negative for food allergies and immunocompromised state  Neurological: Negative for seizures and facial asymmetry  Hematological: Negative for adenopathy  Does not bruise/bleed easily  Psychiatric/Behavioral: Negative for self-injury and suicidal ideas           PAST MEDICAL HISTORY:  Past Medical History:   Diagnosis Date    Anemia     Asteatotic eczema     Chronic kidney disease     Hypercholesterolemia     Lichen sclerosus et atrophicus     Mitral valve insufficiency     Seborrheic keratoses     Tricuspid regurgitation     Vertigo        PAST SURGICAL HISTORY:  Past Surgical History:   Procedure Laterality Date    APPENDECTOMY  11/14/1939    CATARACT EXTRACTION, BILATERAL  04/2019    HYSTERECTOMY  11/14/1962    ORIF HIP FRACTURE Left 2020    SD COLONOSCOPY FLX DX W/COLLJ SPEC WHEN PFRMD N/A 5/19/2016    Procedure: EGD AND COLONOSCOPY;  Surgeon: Yared Knapp MD;  Location: [facility]; Service: Gastroenterology       SOCIAL HISTORY:  Social History     Substance and Sexual Activity   Alcohol Use No     Social History     Substance and Sexual Activity   Drug Use No     Social History     Tobacco Use   Smoking Status Never Smoker   Smokeless Tobacco Never Used       FAMILY HISTORY:  Family History   Problem Relation Age of Onset    Heart disease Mother         Abnormality    Heart disease Father         Abnormality    No Known Problems Sister     No Known Problems Brother        MEDICATIONS:    Current Outpatient Medications:     apixaban (Eliquis) 2 5 mg, Take 1 tablet (2 5 mg total) by mouth 2 (two) times a day, Disp: 180 tablet, Rfl: 3    diltiazem (Dilt-XR) 120 MG 24 hr capsule, Take 1 capsule (120 mg total) by mouth daily, Disp: 90 capsule, Rfl: 1    ferrous sulfate 325 (65 Fe) mg tablet, TAKE 1 TABLET TWICE A DAY, Disp: 180 tablet, Rfl: 1    furosemide (LASIX) 40 mg tablet, TAKE 1 TABLET TWICE DAILY   (Patient taking differently: daily ), Disp: 60 tablet, Rfl: 3    Klor-Con M10 10 MEQ tablet, TAKE 1 TABLET BY MOUTH EVERY DAY, Disp: 90 tablet, Rfl: 1    metoprolol succinate (TOPROL-XL) 100 mg 24 hr tablet, TAKE 2 TABLETS BY MOUTH EVERY DAY, Disp: 180 tablet, Rfl: 0    docusate sodium (COLACE) 100 mg capsule, Take 100 mg by mouth 2 (two) times a day , Disp: , Rfl:     Lidocaine 4 % PTCH, Place 1 patch on the skin daily, Disp: , Rfl:     SIMBRINZA 1-0 2 % SUSP, INSTILL 1 DROP INTO BOTH EYES TWICE A DAY, Disp: , Rfl: 2    PHYSICAL EXAM:  Vitals:    05/17/21 0944   BP: 140/80   BP Location: Right arm   Patient Position: Sitting   Pulse: 68   Resp: 16   Temp: (!) 96 8 °F (36 °C)   TempSrc: Temporal   Weight: 58 1 kg (128 lb)   Height: 5' (1 524 m)     Body mass index is 25 kg/m²  Physical Exam  Constitutional:       General: She is not in acute distress  Appearance: She is well-developed  HENT:      Head: Normocephalic  Mouth/Throat:      Mouth: Mucous membranes are moist    Eyes:      General: No scleral icterus  Conjunctiva/sclera: Conjunctivae normal    Neck:      Musculoskeletal: Neck supple  Vascular: No JVD  Cardiovascular:      Rate and Rhythm: Normal rate  Heart sounds: Normal heart sounds  Pulmonary:      Effort: Pulmonary effort is normal       Breath sounds: Normal breath sounds  No wheezing  Abdominal:      General: Bowel sounds are normal       Palpations: Abdomen is soft  Tenderness: There is no abdominal tenderness  Musculoskeletal: Normal range of motion  Skin:     General: Skin is warm  Findings: No rash  Neurological:      General: No focal deficit present  Mental Status: She is alert and oriented to person, place, and time     Psychiatric:         Behavior: Behavior normal          LAB RESULTS:  Results for orders placed or performed in visit on 05/14/21   CBC and differential   Result Value Ref Range    WBC 5 80 4 31 - 10 16 Thousand/uL    RBC 4 14 3 81 - 5 12 Million/uL    Hemoglobin 11 3 (L) 11 5 - 15 4 g/dL    Hematocrit 38 7 34 8 - 46 1 %    MCV 94 82 - 98 fL    MCH 27 3 26 8 - 34 3 pg    MCHC 29 2 (L) 31 4 - 37 4 g/dL    RDW 15 2 (H) 11 6 - 15 1 %    MPV 11 8 8 9 - 12 7 fL    Platelets 680 016 - 745 Thousands/uL    nRBC 0 /100 WBCs    Neutrophils Relative 75 43 - 75 %    Immat GRANS % 0 0 - 2 %    Lymphocytes Relative 12 (L) 14 - 44 %    Monocytes Relative 9 4 - 12 %    Eosinophils Relative 3 0 - 6 %    Basophils Relative 1 0 - 1 %    Neutrophils Absolute 4 30 1 85 - 7 62 Thousands/µL    Immature Grans Absolute 0 02 0 00 - 0 20 Thousand/uL    Lymphocytes Absolute 0 70 0 60 - 4 47 Thousands/µL    Monocytes Absolute 0 54 0 17 - 1 22 Thousand/µL    Eosinophils Absolute 0 19 0 00 - 0 61 Thousand/µL    Basophils Absolute 0 05 0 00 - 0 10 Thousands/µL   Erythropoietin   Result Value Ref Range    Erythropoietin 14 7 2 6 - 18 5 mIU/mL   Reticulocytes   Result Value Ref Range    Retic Ct Abs 65,800 14,097-95,744    Retic Ct Pct 1 59 0 37 - 1 87 %   Vitamin B12   Result Value Ref Range    Vitamin B-12 692 100 - 900 pg/mL   Iron Saturation %   Result Value Ref Range    Iron Saturation 8 %    TIBC 499 (H) 250 - 450 ug/dL    Iron 39 (L) 50 - 170 ug/dL   Ferritin   Result Value Ref Range    Ferritin 58 8 - 388 ng/mL       ASSESSMENT and PLAN:      Chronic kidney disease, stage III (moderate)  Lab Results   Component Value Date    EGFR 20 04/22/2021    EGFR 25 07/23/2020    EGFR 32 11/14/2019    CREATININE 2 08 (H) 04/22/2021    CREATININE 1 68 (H) 07/23/2020    CREATININE 1 39 (H) 11/14/2019    patient GFR is slowly deteriorating at 20  Not sure worse etiology  She is taking furosemide twice a day  Will advised to take it once a day  Signs and symptoms of volume overload discussed with  Advised to check weight every day    Chronic kidney disease-mineral and bone disorder  Lab Results   Component Value Date    EGFR 20 04/22/2021    EGFR 25 07/23/2020    EGFR 32 11/14/2019    CREATININE 2 08 (H) 04/22/2021    CREATININE 1 68 (H) 07/23/2020    CREATININE 1 39 (H) 11/14/2019    PTH and phosphorus along with vitamin-D are within acceptable range and will continue to monitor    Chronic diastolic congestive heart failure (HCC)  Wt Readings from Last 3 Encounters:   05/17/21 58 1 kg (128 lb)   05/14/21 57 6 kg (127 lb)   04/22/21 59 1 kg (130 lb 6 4 oz)          she is asymptomatic  She is on diuretic    As I am reducing diuretic advised to monitor signs and symptoms of volume overload status    Essential hypertension   Very well control    Paroxysmal atrial fibrillation ([address] )   Rate is very well       everything discussed at length with her  I will see her back in 3 months as part of the close follow-up  We will get blood test before that visit        Portions of the record may have been created with voice recognition software  Occasional wrong word or "sound a like" substitutions may have occurred due to the inherent limitations of voice recognition software  Read the chart carefully and recognize, using context, where substitutions have occurred  If you have any questions, please contact the dictating provider

## 2021-05-17 NOTE — ASSESSMENT & PLAN NOTE
Lab Results   Component Value Date    EGFR 20 04/22/2021    EGFR 25 07/23/2020    EGFR 32 11/14/2019    CREATININE 2 08 (H) 04/22/2021    CREATININE 1 68 (H) 07/23/2020    CREATININE 1 39 (H) 11/14/2019    PTH and phosphorus along with vitamin-D are within acceptable range and will continue to monitor

## 2021-05-17 NOTE — ASSESSMENT & PLAN NOTE
Lab Results   Component Value Date    EGFR 20 04/22/2021    EGFR 25 07/23/2020    EGFR 32 11/14/2019    CREATININE 2 08 (H) 04/22/2021    CREATININE 1 68 (H) 07/23/2020    CREATININE 1 39 (H) 11/14/2019    patient GFR is slowly deteriorating at 20  Not sure worse etiology  She is taking furosemide twice a day  Will advised to take it once a day  Signs and symptoms of volume overload discussed with    Advised to check weight every day

## 2021-05-17 NOTE — PATIENT INSTRUCTIONS

## 2021-05-19 ENCOUNTER — TELEPHONE (OUTPATIENT)
Dept: HEMATOLOGY ONCOLOGY | Facility: CLINIC | Age: 86
End: 2021-05-19

## 2021-05-25 DIAGNOSIS — D50.9 IRON DEFICIENCY ANEMIA, UNSPECIFIED IRON DEFICIENCY ANEMIA TYPE: ICD-10-CM

## 2021-05-25 RX ORDER — FERROUS SULFATE 325(65) MG
TABLET ORAL
Qty: 180 TABLET | Refills: 1 | Status: SHIPPED | OUTPATIENT
Start: 2021-05-25 | End: 2021-09-20

## 2021-05-28 ENCOUNTER — TELEPHONE (OUTPATIENT)
Dept: HEMATOLOGY ONCOLOGY | Facility: CLINIC | Age: 86
End: 2021-05-28

## 2021-05-28 NOTE — TELEPHONE ENCOUNTER
Left multiple voicemails to patient regarding lab  Called nat [facility] regarding results  Hgb did increase well to 11 3g/dL  However, fecal occult test was positive for blood  He informed me that his sister and him call Sveta Gleason every day and a medical assistant checks in on her once a week  Patient is very independent, lives alone  He will inform Sveta Gleason of these results and let her know to watch for additional anemic symptoms and to call us if these symptoms occur so we can obtain earlier CBC  Otherwise she will follow up as scheduled 7/15/21  Son appreciated call

## 2021-06-10 ENCOUNTER — APPOINTMENT (OUTPATIENT)
Dept: LAB | Facility: CLINIC | Age: 86
End: 2021-06-10
Payer: MEDICARE

## 2021-06-10 ENCOUNTER — OFFICE VISIT (OUTPATIENT)
Dept: INTERNAL MEDICINE CLINIC | Facility: CLINIC | Age: 86
End: 2021-06-10
Payer: MEDICARE

## 2021-06-10 VITALS
SYSTOLIC BLOOD PRESSURE: 130 MMHG | BODY MASS INDEX: 25 KG/M2 | HEART RATE: 70 BPM | DIASTOLIC BLOOD PRESSURE: 72 MMHG | RESPIRATION RATE: 16 BRPM | TEMPERATURE: 98.3 F | WEIGHT: 128 LBS

## 2021-06-10 DIAGNOSIS — N30.00 ACUTE CYSTITIS WITHOUT HEMATURIA: ICD-10-CM

## 2021-06-10 DIAGNOSIS — N30.00 ACUTE CYSTITIS WITHOUT HEMATURIA: Primary | ICD-10-CM

## 2021-06-10 LAB
BACTERIA UR QL AUTO: ABNORMAL /HPF
BILIRUB UR QL STRIP: NEGATIVE
CLARITY UR: ABNORMAL
COLOR UR: ABNORMAL
GLUCOSE UR STRIP-MCNC: NEGATIVE MG/DL
HGB UR QL STRIP.AUTO: ABNORMAL
HYALINE CASTS #/AREA URNS LPF: ABNORMAL /LPF
KETONES UR STRIP-MCNC: NEGATIVE MG/DL
LEUKOCYTE ESTERASE UR QL STRIP: ABNORMAL
NITRITE UR QL STRIP: NEGATIVE
NON-SQ EPI CELLS URNS QL MICRO: ABNORMAL /HPF
PH UR STRIP.AUTO: 7 [PH]
PROT UR STRIP-MCNC: ABNORMAL MG/DL
RBC #/AREA URNS AUTO: ABNORMAL /HPF
SP GR UR STRIP.AUTO: 1.01 (ref 1–1.03)
UROBILINOGEN UR QL STRIP.AUTO: 0.2 E.U./DL
WBC #/AREA URNS AUTO: ABNORMAL /HPF

## 2021-06-10 PROCEDURE — 87086 URINE CULTURE/COLONY COUNT: CPT

## 2021-06-10 PROCEDURE — 1036F TOBACCO NON-USER: CPT | Performed by: INTERNAL MEDICINE

## 2021-06-10 PROCEDURE — 99213 OFFICE O/P EST LOW 20 MIN: CPT | Performed by: INTERNAL MEDICINE

## 2021-06-10 PROCEDURE — 1160F RVW MEDS BY RX/DR IN RCRD: CPT | Performed by: INTERNAL MEDICINE

## 2021-06-10 PROCEDURE — 81001 URINALYSIS AUTO W/SCOPE: CPT | Performed by: INTERNAL MEDICINE

## 2021-06-10 RX ORDER — SULFAMETHOXAZOLE AND TRIMETHOPRIM 800; 160 MG/1; MG/1
1 TABLET ORAL EVERY 12 HOURS SCHEDULED
Qty: 14 TABLET | Refills: 0 | Status: SHIPPED | OUTPATIENT
Start: 2021-06-10 | End: 2021-06-17

## 2021-06-10 NOTE — PROGRESS NOTES
Assessment/Plan:       Diagnoses and all orders for this visit:    Acute cystitis without hematuria  -     UA (URINE) with reflex to Scope  -     Urine culture; Future  -     sulfamethoxazole-trimethoprim (BACTRIM DS) 800-160 mg per tablet; Take 1 tablet by mouth every 12 (twelve) hours for 7 days                Subjective:      Patient ID: Justen Ellis is a 80 y o  female  No sweats   No flank pain   She brings in a vial showing cloudy urine  The following portions of the patient's history were reviewed and updated as appropriate:   She has a past medical history of Anemia, Asteatotic eczema, Chronic kidney disease, Hypercholesterolemia, Lichen sclerosus et atrophicus, Mitral valve insufficiency, Seborrheic keratoses, Tricuspid regurgitation, and Vertigo  ,  does not have any pertinent problems on file  ,   has a past surgical history that includes Appendectomy (11/14/1939); Hysterectomy (11/14/1962); pr colonoscopy flx dx w/collj spec when pfrmd (N/A, 5/19/2016); Cataract extraction, bilateral (04/2019); and ORIF hip fracture (Left, 2020)  ,  family history includes Heart disease in her father and mother; No Known Problems in her brother and sister  ,   reports that she has never smoked  She has never used smokeless tobacco  She reports that she does not drink alcohol or use drugs  ,  is allergic to lactose intolerance (gi) - food allergy and penicillins     Current Outpatient Medications   Medication Sig Dispense Refill    apixaban (Eliquis) 2 5 mg Take 1 tablet (2 5 mg total) by mouth 2 (two) times a day 180 tablet 3    diltiazem (Dilt-XR) 120 MG 24 hr capsule Take 1 capsule (120 mg total) by mouth daily 90 capsule 1    ferrous sulfate 325 (65 Fe) mg tablet TAKE 1 TABLET TWICE A  tablet 1    furosemide (LASIX) 40 mg tablet TAKE 1 TABLET TWICE DAILY   (Patient taking differently: daily ) 60 tablet 3    Klor-Con M10 10 MEQ tablet TAKE 1 TABLET BY MOUTH EVERY DAY 90 tablet 1    metoprolol succinate (TOPROL-XL) 100 mg 24 hr tablet TAKE 2 TABLETS BY MOUTH EVERY  tablet 0    SIMBRINZA 1-0 2 % SUSP INSTILL 1 DROP INTO BOTH EYES TWICE A DAY  2    docusate sodium (COLACE) 100 mg capsule Take 100 mg by mouth 2 (two) times a day       Lidocaine 4 % PTCH Place 1 patch on the skin daily      sulfamethoxazole-trimethoprim (BACTRIM DS) 800-160 mg per tablet Take 1 tablet by mouth every 12 (twelve) hours for 7 days 14 tablet 0     No current facility-administered medications for this visit  Review of Systems   Genitourinary: Positive for difficulty urinating and urgency  All other systems reviewed and are negative  Objective:  Vitals:    06/10/21 1115   BP: 130/72   Pulse: 70   Resp: 16   Temp: 98 3 °F (36 8 °C)      Physical Exam  Constitutional:       General: She is not in acute distress  Appearance: Normal appearance  She is not toxic-appearing  Comments: Looks 21 years younger than stated age  Not toxic at all  Abdominal:      General: Abdomen is flat  Tenderness: There is no right CVA tenderness or left CVA tenderness  Patient Instructions   Cystitis symptoms  Urine culture  Initiate Bactrim

## 2021-06-12 LAB — BACTERIA UR CULT: NORMAL

## 2021-06-14 ENCOUNTER — TELEPHONE (OUTPATIENT)
Dept: HEMATOLOGY ONCOLOGY | Facility: CLINIC | Age: 86
End: 2021-06-14

## 2021-06-14 NOTE — TELEPHONE ENCOUNTER
Appointment Confirmation     Appointment with  Chon Burleson    Appointment date & time  7-15-21 @ 9:00am    Location [place]    Patient verbilized Understanding

## 2021-07-02 DIAGNOSIS — R60.9 EDEMA, UNSPECIFIED TYPE: ICD-10-CM

## 2021-07-06 RX ORDER — FUROSEMIDE 40 MG/1
40 TABLET ORAL DAILY
Qty: 100 TABLET | Refills: 0 | Status: SHIPPED | OUTPATIENT
Start: 2021-07-06 | End: 2021-10-04

## 2021-07-07 DIAGNOSIS — R60.9 EDEMA, UNSPECIFIED TYPE: ICD-10-CM

## 2021-07-07 RX ORDER — FUROSEMIDE 40 MG/1
40 TABLET ORAL DAILY
Qty: 100 TABLET | Refills: 0 | OUTPATIENT
Start: 2021-07-07

## 2021-07-09 ENCOUNTER — APPOINTMENT (OUTPATIENT)
Dept: LAB | Facility: HOSPITAL | Age: 86
End: 2021-07-09
Payer: MEDICARE

## 2021-07-09 DIAGNOSIS — D64.9 ANEMIA, UNSPECIFIED TYPE: ICD-10-CM

## 2021-07-09 LAB
BASOPHILS # BLD AUTO: 0.04 THOUSANDS/ΜL (ref 0–0.1)
BASOPHILS NFR BLD AUTO: 1 % (ref 0–1)
EOSINOPHIL # BLD AUTO: 0.22 THOUSAND/ΜL (ref 0–0.61)
EOSINOPHIL NFR BLD AUTO: 4 % (ref 0–6)
ERYTHROCYTE [DISTWIDTH] IN BLOOD BY AUTOMATED COUNT: 16.3 % (ref 11.6–15.1)
HCT VFR BLD AUTO: 34.4 % (ref 34.8–46.1)
HGB BLD-MCNC: 10.3 G/DL (ref 11.5–15.4)
IMM GRANULOCYTES # BLD AUTO: 0.03 THOUSAND/UL (ref 0–0.2)
IMM GRANULOCYTES NFR BLD AUTO: 1 % (ref 0–2)
LYMPHOCYTES # BLD AUTO: 0.62 THOUSANDS/ΜL (ref 0.6–4.47)
LYMPHOCYTES NFR BLD AUTO: 11 % (ref 14–44)
MCH RBC QN AUTO: 27.4 PG (ref 26.8–34.3)
MCHC RBC AUTO-ENTMCNC: 29.9 G/DL (ref 31.4–37.4)
MCV RBC AUTO: 92 FL (ref 82–98)
MONOCYTES # BLD AUTO: 0.64 THOUSAND/ΜL (ref 0.17–1.22)
MONOCYTES NFR BLD AUTO: 11 % (ref 4–12)
NEUTROPHILS # BLD AUTO: 4.07 THOUSANDS/ΜL (ref 1.85–7.62)
NEUTS SEG NFR BLD AUTO: 72 % (ref 43–75)
NRBC BLD AUTO-RTO: 0 /100 WBCS
PLATELET # BLD AUTO: 151 THOUSANDS/UL (ref 149–390)
PMV BLD AUTO: 12.6 FL (ref 8.9–12.7)
RBC # BLD AUTO: 3.76 MILLION/UL (ref 3.81–5.12)
WBC # BLD AUTO: 5.62 THOUSAND/UL (ref 4.31–10.16)

## 2021-07-09 PROCEDURE — 36415 COLL VENOUS BLD VENIPUNCTURE: CPT

## 2021-07-09 PROCEDURE — 85025 COMPLETE CBC W/AUTO DIFF WBC: CPT

## 2021-07-14 NOTE — PROGRESS NOTES
HEMATOLOGY CLINIC NOTE    Primary Care Provider: Slade Tom MD  Referring Provider:    MRN: [id number]  : 1924    Assessment / Plan:     1  Iron deficiency anemia due to chronic blood loss  2  Stage 3 chronic kidney disease, unspecified whether stage 3a or 3b CKD ([facility] )   Patient presents with on and off normocytic anemia since 3/2017  Patient appears to have multifactorial anemia with contributing causes like CKD stage 3 and iron deficiency anemia due to possible GI bleeding  Full workup as below in HPI all came back unremarkable  21 Hgb 10 3g/dL which dropped from  at 11 3g/dL  Patient had a fecal occult test which was positive for blood  Given her age, comorbidities patient does not want a colonoscopy, but still wants to speak with GI regarding stool test  We will refer her  She will obtain iron panel today to ensure continued suspicion of ELDON  We will schedule her for Feraheme 510mg x2  F/U in 2 months with repeat CBC, iron panel  3  Fecal occult blood test positive  - as above     - Iron Panel (Includes Ferritin, Iron Sat%, Iron, and TIBC); Future  - Ambulatory referral to Gastroenterology; Future  - CBC and differential; Future  - Iron Panel (Includes Ferritin, Iron Sat%, Iron, and TIBC); Future    We did discuss IV iron replacement  Potential side effects of IV iron could include but may not be limited to:  change in taste, diarrhea, muscle cramps, nausea or vomiting, pain in the arms or legs, pain or burning sensation in the injection site, allergic reaction  The patient verbalized understanding and wishes to proceed  Patient voiced understanding and agreement to the above  The patient knows to call the office with any questions or concerns regarding the above      I have spent 30 minutes with Patient  today in which greater than 50% of this time was spent in counseling/coordination of care regarding Diagnostic results, Risks and benefits of tx options, Intructions for management, Patient and family education, Importance of tx compliance, Risk factor reductions and Impressions  Reason for visit:       Chief Complaint   Patient presents with    Follow-up       History of Hematology Illness:     Satya Banerjee is a 80 y o  female who came in for follow up  1  Chronic Normocytic Anemia   - secondary to CKD & Iron Deficiency from possible GI bleed  - on/off anemia since at least 3/2017  Baseline Hgb usually 9-11g/dL over last few years  - 4/2021 (Hgb 9 5g/dL) --> 5/2021 (Hgb 11 3g/dL) --> 7/2021 (Hgb 10 3g/dL)  - c/s labs: + fecal occult test, ferritin 58, Iron 39, TIBC 499, Iron sat 8%  Vitamin B12, Retic, SPEP, EPO WNL    2  Chronic Anticoagulation - Afib   - Eliquis 2 5mg BID    Interval History:   5/14/2021: This is a 80-year-old female with a past medical history of AFib, hypertension, mitral valve insufficiency, CHF, chronic kidney disease, hyperholesterolemia presenting for consultation regarding anemia  She denies any bleeding into urine, stools  No PICA, increased shortness of breath with exertion or fatigue  Diet is well balanced with at least 2-3 meals a day including chicken, fish  Does not consume red meats  She denies history of malabsorptive conditions, GI bleed  She is currently taking twice daily oral iron  Patient does not smoke, drink  No history of cancer, no family history of cancer  7/15/2021: Patient came in for follow up  She denies any bleeding into urine or stools  Feels well, offers no complaints       Problem list:       Patient Active Problem List   Diagnosis    Paroxysmal atrial fibrillation ([address] )    Long term current use of anticoagulant    Chronic kidney disease, stage III (moderate) (HCC)    Essential hypertension    Mitral valve insufficiency    Hypercholesterolemia    Anemia due to chronic kidney disease    Diastolic dysfunction    Epistaxis    Chronic kidney disease-mineral and bone disorder    Fracture of right orbital wall (HCC)    Chronic diastolic congestive heart failure (HCC)    Iron deficiency anemia    Fecal occult blood test positive       REVIEW OF SYMPTOMS:   Review of Systems   Constitutional: Negative for activity change, appetite change, chills, diaphoresis, fatigue, fever and unexpected weight change  Ambulating with cane   HENT: Negative for mouth sores and nosebleeds  Eyes: Negative for visual disturbance  Respiratory: Negative for apnea, cough, choking, chest tightness and shortness of breath  Cardiovascular: Negative for chest pain, palpitations and leg swelling  Gastrointestinal: Positive for blood in stool ( fecal occult test )  Negative for abdominal pain, anal bleeding, constipation, diarrhea, nausea and vomiting  Endocrine: Negative for cold intolerance  Genitourinary: Negative for hematuria, menstrual problem and vaginal bleeding  Musculoskeletal: Negative for arthralgias  Skin: Negative for color change, pallor and rash  Neurological: Negative for dizziness, syncope, light-headedness and headaches  Hematological: Negative for adenopathy  Does not bruise/bleed easily  Psychiatric/Behavioral: Negative for sleep disturbance  PHYSICAL EXAMINATION:     Vital Signs:   /60 (BP Location: Left arm, Patient Position: Sitting, Cuff Size: Adult)   Pulse (!) 49   Temp 97 8 °F (36 6 °C)   Resp 16   Ht 5' (1 524 m)   Wt 58 5 kg (129 lb)   LMP  (LMP Unknown)   SpO2 96%   BMI 25 19 kg/m²   Body surface area is 1 55 meters squared     Ht Readings from Last 8 Encounters:   07/15/21 5' (1 524 m)   05/17/21 5' (1 524 m)   05/14/21 5' 1" (1 549 m)   04/22/21 5' 1" (1 549 m)   10/12/20 5' 1" (1 549 m)   07/23/20 5' 1" (1 549 m)   11/26/19 5' 1" (1 549 m)   11/14/19 5' 4" (1 626 m)       Wt Readings from Last 8 Encounters:   07/15/21 58 5 kg (129 lb)   06/10/21 58 1 kg (128 lb)   05/17/21 58 1 kg (128 lb)   05/14/21 57 6 kg (127 lb)   04/22/21 59 1 kg (130 lb 6 4 oz)   10/12/20 56 2 kg (124 lb)   07/23/20 54 4 kg (120 lb)   11/26/19 55 3 kg (122 lb)          Physical Exam  Constitutional:       General: She is not in acute distress  Appearance: Normal appearance  She is not ill-appearing, toxic-appearing or diaphoretic  Comments: Ambulating with cane   HENT:      Head: Normocephalic and atraumatic  Eyes:      General: No scleral icterus  Extraocular Movements: Extraocular movements intact  Conjunctiva/sclera: Conjunctivae normal       Pupils: Pupils are equal, round, and reactive to light  Cardiovascular:      Rate and Rhythm: Normal rate and regular rhythm  Heart sounds: Normal heart sounds  No murmur heard  Pulmonary:      Effort: Pulmonary effort is normal       Breath sounds: Normal breath sounds  Abdominal:      Palpations: Abdomen is soft  Tenderness: There is no abdominal tenderness  Musculoskeletal:         General: No tenderness  Normal range of motion  Cervical back: Normal range of motion and neck supple  Right lower leg: No edema  Left lower leg: No edema  Lymphadenopathy:      Cervical: No cervical adenopathy  Skin:     General: Skin is warm and dry  Coloration: Skin is not jaundiced or pale  Findings: No bruising, erythema or rash  Neurological:      General: No focal deficit present  Mental Status: She is alert and oriented to person, place, and time  Mental status is at baseline  Cranial Nerves: No cranial nerve deficit  Motor: No weakness  Psychiatric:         Mood and Affect: Mood normal          Behavior: Behavior normal          Thought Content: Thought content normal          Judgment: Judgment normal        Reviewed historical information        PAST MEDICAL HISTORY:    Past Medical History:   Diagnosis Date    Anemia     Asteatotic eczema     Chronic kidney disease     Hypercholesterolemia     Lichen sclerosus et atrophicus     Mitral valve insufficiency  Heart disease Father         Abnormality    No Known Problems Sister     No Known Problems Brother    olerance (Gi) - Food Allergy     Penicillins        Lab Re  Lab Results   Component Value Date    WBC 5 62 07/09/2021    HGB 10 3 (L) 07/09/2021    HCT 34 4 (L) 07/09/2021    MCV 92 07/09/2021     07/09/2021   sults   Component Value Date    WBC 5 62 07/09/2021    HGB 10 3 (L) 07/09/2021    HCT 34 4 (L) 07/09/2021    MCV 92 07/09/2021     07/09/2021      Component Value Date     10/26/2015    SODIUM 136 04/22/2021    K 4 5 04/22/2021     04/22/2021    CO2 26 04/22/2021    ANIONGAP 5 10/26/2015    AGAP 10 04/22/2021    BUN 44 (H) 04/22/2021    CREATININE 2 08 (H) 04/22/2021    GLUC 97 05/29/2018    GLUF 115 (H) 04/22/2021    CALCIUM 9 4 04/22/2021    AST 25 04/22/2021    ALT 24 04/22/2021    ALKPHOS 81 04/22/2021    PROT 7 9 10/15/2015    TP 7 1 05/14/2021    BILITOT 0 70 10/15/2015    TBILI 0 71 04/22/2021    EGFR 20 04/22/2021       IMAGING:  US kidney and bladder  Narrative: RENAL ULTRASOUND    INDICATION:   N18 3: Chronic kidney disease, stage 3 (moderate)  COMPARISON: None    TECHNIQUE:   Ultrasound of the retroperitoneum was performed with a curvilinear transducer utilizing volumetric sweeps and still imaging techniques  FINDINGS:    KIDNEYS:  Renal cortices are echogenic bilaterally consistent with medical renal disease  Numerous prominent simple appearing cysts of varying sizes are noted bilaterally including a 4 6 cm cortical cyst in the midportion of the right kidney and an exophytic simple 8 6 cm cyst at the lower pole the left kidney  No sonographically detectable solid renal mass or hydronephrosis noted in either kidney  No perinephric fluid collections  No echogenic shadowing calculi seen  Right kidney:  10 1 cm  Left kidney:  10 9 cm  URETERS:  Nonvisualized  BLADDER:   Normally distended  Urinary bladder wall appears somewhat trabeculated    No discrete focal thickening or mass lesions  Bilateral ureteral jets detected  Impression: Echogenic renal cortices bilaterally suggestive of medical renal disease  No hydronephrosis  Benign-appearing cortical renal cysts of varying sizes as described  Trabeculated urinary bladder wall      Workstation performed: [id number]

## 2021-07-15 ENCOUNTER — APPOINTMENT (OUTPATIENT)
Dept: LAB | Facility: HOSPITAL | Age: 86
End: 2021-07-15
Payer: MEDICARE

## 2021-07-15 ENCOUNTER — OFFICE VISIT (OUTPATIENT)
Dept: HEMATOLOGY ONCOLOGY | Facility: CLINIC | Age: 86
End: 2021-07-15
Payer: MEDICARE

## 2021-07-15 ENCOUNTER — TELEPHONE (OUTPATIENT)
Dept: HEMATOLOGY ONCOLOGY | Facility: CLINIC | Age: 86
End: 2021-07-15

## 2021-07-15 VITALS
HEART RATE: 49 BPM | RESPIRATION RATE: 16 BRPM | DIASTOLIC BLOOD PRESSURE: 60 MMHG | WEIGHT: 129 LBS | HEIGHT: 60 IN | OXYGEN SATURATION: 96 % | SYSTOLIC BLOOD PRESSURE: 114 MMHG | TEMPERATURE: 97.8 F | BODY MASS INDEX: 25.32 KG/M2

## 2021-07-15 DIAGNOSIS — D50.9 IRON DEFICIENCY ANEMIA, UNSPECIFIED IRON DEFICIENCY ANEMIA TYPE: ICD-10-CM

## 2021-07-15 DIAGNOSIS — D50.0 IRON DEFICIENCY ANEMIA DUE TO CHRONIC BLOOD LOSS: ICD-10-CM

## 2021-07-15 DIAGNOSIS — R19.5 FECAL OCCULT BLOOD TEST POSITIVE: ICD-10-CM

## 2021-07-15 DIAGNOSIS — N18.30 STAGE 3 CHRONIC KIDNEY DISEASE, UNSPECIFIED WHETHER STAGE 3A OR 3B CKD (HCC): ICD-10-CM

## 2021-07-15 DIAGNOSIS — D50.0 IRON DEFICIENCY ANEMIA DUE TO CHRONIC BLOOD LOSS: Primary | ICD-10-CM

## 2021-07-15 PROBLEM — D64.9 ANEMIA: Status: ACTIVE | Noted: 2021-07-15

## 2021-07-15 LAB
FERRITIN SERPL-MCNC: 50 NG/ML (ref 8–388)
IRON SATN MFR SERPL: 8 %
IRON SERPL-MCNC: 38 UG/DL (ref 50–170)
TIBC SERPL-MCNC: 488 UG/DL (ref 250–450)

## 2021-07-15 PROCEDURE — 36415 COLL VENOUS BLD VENIPUNCTURE: CPT

## 2021-07-15 PROCEDURE — 82728 ASSAY OF FERRITIN: CPT

## 2021-07-15 PROCEDURE — 83550 IRON BINDING TEST: CPT

## 2021-07-15 PROCEDURE — 83540 ASSAY OF IRON: CPT

## 2021-07-15 PROCEDURE — 99214 OFFICE O/P EST MOD 30 MIN: CPT | Performed by: INTERNAL MEDICINE

## 2021-07-15 RX ORDER — SODIUM CHLORIDE 9 MG/ML
20 INJECTION, SOLUTION INTRAVENOUS ONCE
Status: CANCELLED | OUTPATIENT
Start: 2021-07-19

## 2021-07-20 DIAGNOSIS — R19.5 FECAL OCCULT BLOOD TEST POSITIVE: ICD-10-CM

## 2021-07-20 DIAGNOSIS — D50.9 IRON DEFICIENCY ANEMIA, UNSPECIFIED IRON DEFICIENCY ANEMIA TYPE: Primary | ICD-10-CM

## 2021-07-20 RX ORDER — SODIUM CHLORIDE 9 MG/ML
20 INJECTION, SOLUTION INTRAVENOUS ONCE
Status: CANCELLED | OUTPATIENT
Start: 2021-07-22

## 2021-07-22 ENCOUNTER — HOSPITAL ENCOUNTER (OUTPATIENT)
Dept: INFUSION CENTER | Facility: CLINIC | Age: 86
Discharge: HOME | End: 2021-07-22
Payer: MEDICARE

## 2021-07-22 VITALS
TEMPERATURE: 96.7 F | SYSTOLIC BLOOD PRESSURE: 124 MMHG | DIASTOLIC BLOOD PRESSURE: 70 MMHG | RESPIRATION RATE: 18 BRPM | HEART RATE: 78 BPM

## 2021-07-22 DIAGNOSIS — R19.5 FECAL OCCULT BLOOD TEST POSITIVE: ICD-10-CM

## 2021-07-22 DIAGNOSIS — D50.9 IRON DEFICIENCY ANEMIA, UNSPECIFIED IRON DEFICIENCY ANEMIA TYPE: Primary | ICD-10-CM

## 2021-07-22 PROCEDURE — 96365 THER/PROPH/DIAG IV INF INIT: CPT

## 2021-07-22 RX ORDER — SODIUM CHLORIDE 9 MG/ML
20 INJECTION, SOLUTION INTRAVENOUS ONCE
Status: COMPLETED | OUTPATIENT
Start: 2021-07-22 | End: 2021-07-22

## 2021-07-22 RX ORDER — SODIUM CHLORIDE 9 MG/ML
20 INJECTION, SOLUTION INTRAVENOUS ONCE
Status: CANCELLED | OUTPATIENT
Start: 2021-07-29

## 2021-07-22 RX ADMIN — FERUMOXYTOL 510 MG: 510 INJECTION INTRAVENOUS at 12:57

## 2021-07-22 RX ADMIN — SODIUM CHLORIDE 20 ML/HR: 0.9 INJECTION, SOLUTION INTRAVENOUS at 12:55

## 2021-07-22 NOTE — PROGRESS NOTES
Pt presents for feraheme infusion offering no complaints  Infusion tolerated without incident  PIV removed  Vitals stable  Pt discharged in stable condition

## 2021-07-29 ENCOUNTER — HOSPITAL ENCOUNTER (OUTPATIENT)
Dept: INFUSION CENTER | Facility: CLINIC | Age: 86
Discharge: HOME | End: 2021-07-29
Payer: MEDICARE

## 2021-07-29 VITALS — RESPIRATION RATE: 18 BRPM | TEMPERATURE: 96.7 F | SYSTOLIC BLOOD PRESSURE: 128 MMHG | DIASTOLIC BLOOD PRESSURE: 70 MMHG

## 2021-07-29 DIAGNOSIS — R19.5 FECAL OCCULT BLOOD TEST POSITIVE: Primary | ICD-10-CM

## 2021-07-29 DIAGNOSIS — D50.9 IRON DEFICIENCY ANEMIA, UNSPECIFIED IRON DEFICIENCY ANEMIA TYPE: ICD-10-CM

## 2021-07-29 PROCEDURE — 96365 THER/PROPH/DIAG IV INF INIT: CPT

## 2021-07-29 RX ORDER — SODIUM CHLORIDE 9 MG/ML
20 INJECTION, SOLUTION INTRAVENOUS ONCE
Status: CANCELLED | OUTPATIENT
Start: 2021-08-05

## 2021-07-29 RX ORDER — SODIUM CHLORIDE 9 MG/ML
20 INJECTION, SOLUTION INTRAVENOUS ONCE
Status: COMPLETED | OUTPATIENT
Start: 2021-07-29 | End: 2021-07-29

## 2021-07-29 RX ADMIN — FERUMOXYTOL 510 MG: 510 INJECTION INTRAVENOUS at 14:57

## 2021-07-29 RX ADMIN — SODIUM CHLORIDE 20 ML/HR: 9 INJECTION, SOLUTION INTRAVENOUS at 14:57

## 2021-07-29 NOTE — PLAN OF CARE
Problem: Potential for Falls  Goal: Patient will remain free of falls  Description: INTERVENTIONS:  - Educate patient/family on patient safety including physical limitations  - IOutcome: Progressing     Problem: Knowledge Deficit  Goal: Patient/family/caregiver demonstrates understanding of disease process, treatment plan, medications, and discharge instructions  Description: Complete learning assessment and assess knowledge base    Interventions:  - Provide teaching at level of understanding  - Provide teaching via preferred learning methods  Outcome: Progressing

## 2021-07-29 NOTE — PROGRESS NOTES
Pt presents for  IV iron  Offers no complaints  Pt tolerated tx with out incident  AVS provided  Tx completed  Aware of f/u appt

## 2021-08-06 ENCOUNTER — OFFICE VISIT (OUTPATIENT)
Dept: GASTROENTEROLOGY | Facility: CLINIC | Age: 86
End: 2021-08-06
Payer: MEDICARE

## 2021-08-06 VITALS
SYSTOLIC BLOOD PRESSURE: 128 MMHG | BODY MASS INDEX: 24.74 KG/M2 | WEIGHT: 126 LBS | HEIGHT: 60 IN | HEART RATE: 65 BPM | DIASTOLIC BLOOD PRESSURE: 70 MMHG

## 2021-08-06 DIAGNOSIS — R19.5 OCCULT BLOOD IN STOOLS: ICD-10-CM

## 2021-08-06 DIAGNOSIS — D50.0 IRON DEFICIENCY ANEMIA DUE TO CHRONIC BLOOD LOSS: Primary | ICD-10-CM

## 2021-08-06 PROCEDURE — 99203 OFFICE O/P NEW LOW 30 MIN: CPT | Performed by: PHYSICIAN ASSISTANT

## 2021-08-06 PROCEDURE — 1123F ACP DISCUSS/DSCN MKR DOCD: CPT | Performed by: PHYSICIAN ASSISTANT

## 2021-08-06 NOTE — PROGRESS NOTES
[facility] - Outpatient Consultation  Gali Renee 80 y o  female MRN: [id number]  Encounter: [id number]          ASSESSMENT AND PLAN:      1  Iron deficiency anemia due to chronic blood loss  2  Occult blood in stools  She has a chronic, stable anemia  She is receiving iron infusions  Stool is positive for blood - no overt bleeding or other GI symptoms  She does not want to pursue endoscopic evaluation at this time  At 80 we discussed how this is ok  Will f/u in October after she seems hematology again for further discussion    ______________________________________________________________________    HPI:  80year old   Female presents for evaluation of anemia  Review of labs shows a documented anemia least over the past 2 years  Her hemoglobin and hematocrit seemed to have been stable over that time period  Recently she started to see hematology is receiving iron infusions  Her stools were tested and noted to be positive for blood  She denies any obvious bleeding  She denies bright red blood per rectum, melena or hematemesis  She denies any gastrointestinal symptoms such as abdominal pain, nausea, vomiting, dysphagia, odynophagia, heartburn, diarrhea or constipation  She has had a colonoscopy in the past but admits that his been a long time  She denies any family history of colorectal cancer  REVIEW OF SYSTEMS:    CONSTITUTIONAL: Denies any fever, chills, rigors, and weight loss  HEENT: No earache or tinnitus  Denies hearing loss or visual disturbances  CARDIOVASCULAR: No chest pain or palpitations  RESPIRATORY: Denies any cough, hemoptysis, shortness of breath or dyspnea on exertion  GASTROINTESTINAL: As noted in the History of Present Illness  GENITOURINARY: No problems with urination  Denies any hematuria or dysuria  NEUROLOGIC: No dizziness or vertigo, denies headaches  MUSCULOSKELETAL: Denies any muscle or joint pain     SKIN: Denies skin rashes or itching  ENDOCRINE: Denies excessive thirst  Denies intolerance to heat or cold  PSYCHOSOCIAL: Denies depression or anxiety  Denies any recent memory loss  Historical Information   Past Medical History:   Diagnosis Date    Anemia     Asteatotic eczema     Chronic kidney disease     Hypercholesterolemia     Lichen sclerosus et atrophicus     Mitral valve insufficiency     Seborrheic keratoses     Tricuspid regurgitation     Vertigo      Past Surgical History:   Procedure Laterality Date    APPENDECTOMY  11/14/1939    CATARACT EXTRACTION, BILATERAL  04/2019    HYSTERECTOMY  11/14/1962    ORIF HIP FRACTURE Left 2020    MA COLONOSCOPY FLX DX W/COLLJ SPEC WHEN PFRMD N/A 5/19/2016    Procedure: EGD AND COLONOSCOPY;  Surgeon: Kareen Griffin MD;  Location: [facility]; Service: Gastroenterology     Social History   Social History     Substance and Sexual Activity   Alcohol Use No     Social History     Substance and Sexual Activity   Drug Use No     Social History     Tobacco Use   Smoking Status Never Smoker   Smokeless Tobacco Never Used     Family History   Problem Relation Age of Onset    Heart disease Mother         Abnormality    Heart disease Father         Abnormality    No Known Problems Sister     No Known Problems Brother        Meds/Allergies       Current Outpatient Medications:     apixaban (Eliquis) 2 5 mg    diltiazem (Dilt-XR) 120 MG 24 hr capsule    ferrous sulfate 325 (65 Fe) mg tablet    furosemide (LASIX) 40 mg tablet    Klor-Con M10 10 MEQ tablet    Lidocaine 4 % PTCH    metoprolol succinate (TOPROL-XL) 100 mg 24 hr tablet    SIMBRINZA 1-0 2 % SUSP    docusate sodium (COLACE) 100 mg capsule    Allergies   Allergen Reactions    Lactose Intolerance (Gi) - Food Allergy     Penicillins            Objective     Blood pressure 128/70, pulse 65, height 5' (1 524 m), weight 57 2 kg (126 lb)  Body mass index is 24 61 kg/m²          PHYSICAL EXAM:      General Appearance:   Alert, cooperative, no distress   HEENT:   Normocephalic, atraumatic, anicteric      Neck:  Supple, symmetrical, trachea midline   Lungs:   Clear to auscultation bilaterally; no rales, rhonchi or wheezing; respirations unlabored    Heart[de-identified]   Regular rate and rhythm; no murmur, rub, or gallop  Abdomen:   Soft, non-tender, non-distended; normal bowel sounds; no masses, no organomegaly    Genitalia:   Deferred    Rectal:   Deferred    Extremities:  No cyanosis, clubbing or edema    Pulses:  2+ and symmetric    Skin:  No jaundice, rashes, or lesions    Lymph nodes:  No palpable cervical lymphadenopathy        Lab Results:   No visits with results within 1 Day(s) from this visit  Latest known visit with results is:   Appointment on 07/15/2021   Component Date Value    Iron Saturation 07/15/2021 8     TIBC 07/15/2021 488*    Iron 07/15/2021 38*    Ferritin 07/15/2021 50          Radiology Results:   No results found

## 2021-08-11 ENCOUNTER — TELEPHONE (OUTPATIENT)
Dept: NEPHROLOGY | Facility: CLINIC | Age: 86
End: 2021-08-11

## 2021-08-12 ENCOUNTER — APPOINTMENT (OUTPATIENT)
Dept: LAB | Facility: HOSPITAL | Age: 86
End: 2021-08-12
Attending: INTERNAL MEDICINE
Payer: MEDICARE

## 2021-08-12 DIAGNOSIS — E83.9 CHRONIC KIDNEY DISEASE-MINERAL AND BONE DISORDER: ICD-10-CM

## 2021-08-12 DIAGNOSIS — N18.30 STAGE 3 CHRONIC KIDNEY DISEASE, UNSPECIFIED WHETHER STAGE 3A OR 3B CKD (HCC): ICD-10-CM

## 2021-08-12 DIAGNOSIS — M89.9 CHRONIC KIDNEY DISEASE-MINERAL AND BONE DISORDER: ICD-10-CM

## 2021-08-12 DIAGNOSIS — N18.9 CHRONIC KIDNEY DISEASE-MINERAL AND BONE DISORDER: ICD-10-CM

## 2021-08-12 LAB
25(OH)D3 SERPL-MCNC: 41.1 NG/ML (ref 30–100)
ANION GAP SERPL CALCULATED.3IONS-SCNC: 10 MMOL/L (ref 4–13)
BASOPHILS # BLD AUTO: 0.06 THOUSANDS/ΜL (ref 0–0.1)
BASOPHILS NFR BLD AUTO: 1 % (ref 0–1)
BUN SERPL-MCNC: 50 MG/DL (ref 5–25)
CALCIUM SERPL-MCNC: 8.9 MG/DL (ref 8.3–10.1)
CHLORIDE SERPL-SCNC: 104 MMOL/L (ref 100–108)
CO2 SERPL-SCNC: 28 MMOL/L (ref 21–32)
CREAT SERPL-MCNC: 1.48 MG/DL (ref 0.6–1.3)
CREAT UR-MCNC: 51.8 MG/DL
EOSINOPHIL # BLD AUTO: 0.19 THOUSAND/ΜL (ref 0–0.61)
EOSINOPHIL NFR BLD AUTO: 4 % (ref 0–6)
ERYTHROCYTE [DISTWIDTH] IN BLOOD BY AUTOMATED COUNT: 17.2 % (ref 11.6–15.1)
GFR SERPL CREATININE-BSD FRML MDRD: 29 ML/MIN/1.73SQ M
GLUCOSE P FAST SERPL-MCNC: 77 MG/DL (ref 65–99)
HCT VFR BLD AUTO: 41 % (ref 34.8–46.1)
HGB BLD-MCNC: 12.2 G/DL (ref 11.5–15.4)
IMM GRANULOCYTES # BLD AUTO: 0.03 THOUSAND/UL (ref 0–0.2)
IMM GRANULOCYTES NFR BLD AUTO: 1 % (ref 0–2)
LYMPHOCYTES # BLD AUTO: 0.64 THOUSANDS/ΜL (ref 0.6–4.47)
LYMPHOCYTES NFR BLD AUTO: 12 % (ref 14–44)
MCH RBC QN AUTO: 28.4 PG (ref 26.8–34.3)
MCHC RBC AUTO-ENTMCNC: 29.8 G/DL (ref 31.4–37.4)
MCV RBC AUTO: 95 FL (ref 82–98)
MONOCYTES # BLD AUTO: 0.5 THOUSAND/ΜL (ref 0.17–1.22)
MONOCYTES NFR BLD AUTO: 9 % (ref 4–12)
NEUTROPHILS # BLD AUTO: 3.9 THOUSANDS/ΜL (ref 1.85–7.62)
NEUTS SEG NFR BLD AUTO: 73 % (ref 43–75)
NRBC BLD AUTO-RTO: 0 /100 WBCS
PHOSPHATE SERPL-MCNC: 4.7 MG/DL (ref 2.3–4.1)
PLATELET # BLD AUTO: 159 THOUSANDS/UL (ref 149–390)
PMV BLD AUTO: 12.5 FL (ref 8.9–12.7)
POTASSIUM SERPL-SCNC: 4.5 MMOL/L (ref 3.5–5.3)
PROT UR-MCNC: 11 MG/DL
PROT/CREAT UR: 0.21 MG/G{CREAT} (ref 0–0.1)
PTH-INTACT SERPL-MCNC: 71 PG/ML (ref 18.4–80.1)
RBC # BLD AUTO: 4.3 MILLION/UL (ref 3.81–5.12)
SODIUM SERPL-SCNC: 142 MMOL/L (ref 136–145)
WBC # BLD AUTO: 5.32 THOUSAND/UL (ref 4.31–10.16)

## 2021-08-12 PROCEDURE — 36415 COLL VENOUS BLD VENIPUNCTURE: CPT

## 2021-08-12 PROCEDURE — 84156 ASSAY OF PROTEIN URINE: CPT

## 2021-08-12 PROCEDURE — 82570 ASSAY OF URINE CREATININE: CPT

## 2021-08-12 PROCEDURE — 80048 BASIC METABOLIC PNL TOTAL CA: CPT

## 2021-08-12 PROCEDURE — 85025 COMPLETE CBC W/AUTO DIFF WBC: CPT

## 2021-08-12 PROCEDURE — 82306 VITAMIN D 25 HYDROXY: CPT

## 2021-08-12 PROCEDURE — 84100 ASSAY OF PHOSPHORUS: CPT

## 2021-08-12 PROCEDURE — 83970 ASSAY OF PARATHORMONE: CPT

## 2021-08-18 ENCOUNTER — TELEPHONE (OUTPATIENT)
Dept: NEPHROLOGY | Facility: CLINIC | Age: 86
End: 2021-08-18

## 2021-08-19 ENCOUNTER — OFFICE VISIT (OUTPATIENT)
Dept: NEPHROLOGY | Facility: CLINIC | Age: 86
End: 2021-08-19
Payer: MEDICARE

## 2021-08-19 VITALS
BODY MASS INDEX: 25.25 KG/M2 | HEART RATE: 68 BPM | SYSTOLIC BLOOD PRESSURE: 140 MMHG | WEIGHT: 128.6 LBS | HEIGHT: 60 IN | RESPIRATION RATE: 16 BRPM | TEMPERATURE: 96.8 F | DIASTOLIC BLOOD PRESSURE: 80 MMHG

## 2021-08-19 DIAGNOSIS — I48.0 PAROXYSMAL ATRIAL FIBRILLATION (HCC): ICD-10-CM

## 2021-08-19 DIAGNOSIS — I10 ESSENTIAL HYPERTENSION: ICD-10-CM

## 2021-08-19 DIAGNOSIS — E83.9 CHRONIC KIDNEY DISEASE-MINERAL AND BONE DISORDER: ICD-10-CM

## 2021-08-19 DIAGNOSIS — N18.9 CHRONIC KIDNEY DISEASE-MINERAL AND BONE DISORDER: ICD-10-CM

## 2021-08-19 DIAGNOSIS — N18.32 STAGE 3B CHRONIC KIDNEY DISEASE (HCC): Primary | ICD-10-CM

## 2021-08-19 DIAGNOSIS — D50.0 IRON DEFICIENCY ANEMIA DUE TO CHRONIC BLOOD LOSS: ICD-10-CM

## 2021-08-19 DIAGNOSIS — M89.9 CHRONIC KIDNEY DISEASE-MINERAL AND BONE DISORDER: ICD-10-CM

## 2021-08-19 PROCEDURE — 99214 OFFICE O/P EST MOD 30 MIN: CPT | Performed by: INTERNAL MEDICINE

## 2021-08-19 NOTE — ASSESSMENT & PLAN NOTE
Lab Results   Component Value Date    EGFR 29 08/12/2021    EGFR 20 04/22/2021    EGFR 25 07/23/2020    CREATININE 1 48 (H) 08/12/2021    CREATININE 2 08 (H) 04/22/2021    CREATININE 1 68 (H) 07/23/2020    PTH and phosphorus along with vitamin-D are within acceptable range and will continue to monitor

## 2021-08-19 NOTE — PROGRESS NOTES
NEPHROLOGY OFFICE FOLLOW UP  Jason Pickens 80 y o  female MRN: [id number]    Encounter: [id number] 8/19/2021    REASON FOR VISIT: Jason Pickens is a 80 y o  female who is here on 8/19/2021 for Chronic Kidney Disease and Follow-up    HPI:     Gertrude España came in today for follow-up  [de-identified] year woman who looks quite good for her age and still quite active    Since I saw her last she had some iron infusion and feeling better though still feeling tired    Denies any other acute complaint     No chest pain no palpitation or shortness of breath      REVIEW OF SYSTEMS:    Review of Systems   Constitutional: Negative for activity change and fatigue  HENT: Negative for congestion and ear discharge  Eyes: Negative for photophobia and pain  Respiratory: Negative for apnea and choking  Cardiovascular: Negative for chest pain and palpitations  Gastrointestinal: Negative for abdominal distention and blood in stool  Endocrine: Negative for heat intolerance and polyphagia  Genitourinary: Negative for flank pain and urgency  Musculoskeletal: Negative for neck pain and neck stiffness  Skin: Negative for color change and wound  Allergic/Immunologic: Negative for food allergies and immunocompromised state  Neurological: Negative for seizures and facial asymmetry  Hematological: Negative for adenopathy  Does not bruise/bleed easily  Psychiatric/Behavioral: Negative for self-injury and suicidal ideas           PAST MEDICAL HISTORY:  Past Medical History:   Diagnosis Date    Anemia     Asteatotic eczema     Chronic kidney disease     Hypercholesterolemia     Lichen sclerosus et atrophicus     Mitral valve insufficiency     Seborrheic keratoses     Tricuspid regurgitation     Vertigo        PAST SURGICAL HISTORY:  Past Surgical History:   Procedure Laterality Date    APPENDECTOMY  11/14/1939    CATARACT EXTRACTION, BILATERAL  04/2019    HYSTERECTOMY  11/14/1962    ORIF HIP FRACTURE Left 2020  [place] COLONOSCOPY FLX DX W/COLLJ SPEC WHEN PFRMD N/A 5/19/2016    Procedure: EGD AND COLONOSCOPY;  Surgeon: Isaiah Leblanc MD;  Location: [facility]; Service: Gastroenterology       SOCIAL HISTORY:  Social History     Substance and Sexual Activity   Alcohol Use No     Social History     Substance and Sexual Activity   Drug Use No     Social History     Tobacco Use   Smoking Status Never Smoker   Smokeless Tobacco Never Used       FAMILY HISTORY:  Family History   Problem Relation Age of Onset    Heart disease Mother         Abnormality    Heart disease Father         Abnormality    No Known Problems Sister     No Known Problems Brother        MEDICATIONS:    Current Outpatient Medications:     apixaban (Eliquis) 2 5 mg, Take 1 tablet (2 5 mg total) by mouth 2 (two) times a day, Disp: 180 tablet, Rfl: 3    diltiazem (Dilt-XR) 120 MG 24 hr capsule, Take 1 capsule (120 mg total) by mouth daily, Disp: 90 capsule, Rfl: 1    docusate sodium (COLACE) 100 mg capsule, Take 100 mg by mouth 2 (two) times a day , Disp: , Rfl:     ferrous sulfate 325 (65 Fe) mg tablet, TAKE 1 TABLET TWICE A DAY, Disp: 180 tablet, Rfl: 1    furosemide (LASIX) 40 mg tablet, Take 1 tablet (40 mg total) by mouth daily, Disp: 100 tablet, Rfl: 0    Klor-Con M10 10 MEQ tablet, TAKE 1 TABLET BY MOUTH EVERY DAY, Disp: 90 tablet, Rfl: 1    metoprolol succinate (TOPROL-XL) 100 mg 24 hr tablet, TAKE 2 TABLETS BY MOUTH EVERY DAY, Disp: 180 tablet, Rfl: 0    SIMBRINZA 1-0 2 % SUSP, INSTILL 1 DROP INTO BOTH EYES TWICE A DAY, Disp: , Rfl: 2    Lidocaine 4 % PTCH, Place 1 patch on the skin daily (Patient not taking: Reported on 8/19/2021), Disp: , Rfl:     PHYSICAL EXAM:  Vitals:    08/19/21 1044   BP: 140/80   BP Location: Right arm   Patient Position: Sitting   Pulse: 68   Resp: 16   Temp: (!) 96 8 °F (36 °C)   TempSrc: Temporal   Weight: 58 3 kg (128 lb 9 6 oz)   Height: 4' 11 5" (1 511 m)     Body mass index is 25 54 kg/m²      Physical Exam  Constitutional:       General: She is not in acute distress  Appearance: She is well-developed  HENT:      Head: Normocephalic  Eyes:      General: No scleral icterus  Conjunctiva/sclera: Conjunctivae normal    Neck:      Vascular: No JVD  Cardiovascular:      Rate and Rhythm: Normal rate  Heart sounds: Normal heart sounds  Pulmonary:      Effort: Pulmonary effort is normal       Breath sounds: No wheezing  Abdominal:      Palpations: Abdomen is soft  Tenderness: There is no abdominal tenderness  Musculoskeletal:         General: Normal range of motion  Cervical back: Neck supple  Skin:     General: Skin is warm  Findings: No rash  Neurological:      Mental Status: She is alert and oriented to person, place, and time     Psychiatric:         Behavior: Behavior normal          LAB RESULTS:  Results for orders placed or performed in visit on 60/10/92   Basic metabolic panel   Result Value Ref Range    Sodium 142 136 - 145 mmol/L    Potassium 4 5 3 5 - 5 3 mmol/L    Chloride 104 100 - 108 mmol/L    CO2 28 21 - 32 mmol/L    ANION GAP 10 4 - 13 mmol/L    BUN 50 (H) 5 - 25 mg/dL    Creatinine 1 48 (H) 0 60 - 1 30 mg/dL    Glucose, Fasting 77 65 - 99 mg/dL    Calcium 8 9 8 3 - 10 1 mg/dL    eGFR 29 ml/min/1 73sq m   Phosphorus   Result Value Ref Range    Phosphorus 4 7 (H) 2 3 - 4 1 mg/dL   CBC and differential   Result Value Ref Range    WBC 5 32 4 31 - 10 16 Thousand/uL    RBC 4 30 3 81 - 5 12 Million/uL    Hemoglobin 12 2 11 5 - 15 4 g/dL    Hematocrit 41 0 34 8 - 46 1 %    MCV 95 82 - 98 fL    MCH 28 4 26 8 - 34 3 pg    MCHC 29 8 (L) 31 4 - 37 4 g/dL    RDW 17 2 (H) 11 6 - 15 1 %    MPV 12 5 8 9 - 12 7 fL    Platelets 122 632 - 417 Thousands/uL    nRBC 0 /100 WBCs    Neutrophils Relative 73 43 - 75 %    Immat GRANS % 1 0 - 2 %    Lymphocytes Relative 12 (L) 14 - 44 %    Monocytes Relative 9 4 - 12 %    Eosinophils Relative 4 0 - 6 %    Basophils Relative 1 0 - 1 % Neutrophils Absolute 3 90 1 85 - 7 62 Thousands/µL    Immature Grans Absolute 0 03 0 00 - 0 20 Thousand/uL    Lymphocytes Absolute 0 64 0 60 - 4 47 Thousands/µL    Monocytes Absolute 0 50 0 17 - 1 22 Thousand/µL    Eosinophils Absolute 0 19 0 00 - 0 61 Thousand/µL    Basophils Absolute 0 06 0 00 - 0 10 Thousands/µL   Protein / creatinine ratio, urine   Result Value Ref Range    Creatinine, Ur 51 8 mg/dL    Protein Urine Random 11 mg/dL    Prot/Creat Ratio, Ur 0 21 (H) 0 00 - 0 10   PTH, intact   Result Value Ref Range    PTH 71 0 18 4 - 80 1 pg/mL   Vitamin D 25 hydroxy   Result Value Ref Range    Vit D, 25-Hydroxy 41 1 30 0 - 100 0 ng/mL       ASSESSMENT and PLAN:      Chronic kidney disease, stage III (moderate)  Lab Results   Component Value Date    EGFR 29 08/12/2021    EGFR 20 04/22/2021    EGFR 25 07/23/2020    CREATININE 1 48 (H) 08/12/2021    CREATININE 2 08 (H) 04/22/2021    CREATININE 1 68 (H) 07/23/2020    overall stable  Slight fluctuation is there based on volume status  Importance of hydration and avoiding nephrotoxic medicine discussed with her    Chronic kidney disease-mineral and bone disorder  Lab Results   Component Value Date    EGFR 29 08/12/2021    EGFR 20 04/22/2021    EGFR 25 07/23/2020    CREATININE 1 48 (H) 08/12/2021    CREATININE 2 08 (H) 04/22/2021    CREATININE 1 68 (H) 07/23/2020    PTH and phosphorus along with vitamin-D are within acceptable range and will continue to monitor    Essential hypertension   Reasonably well controlled with present medication  Will continue that    Paroxysmal atrial fibrillation ([address] )   Rate is very well control       discussed everything with her  Advised to continue what she is doing and I will see her back in 6 months  Will get blood and urine test before that visit        Portions of the record may have been created with voice recognition software   Occasional wrong word or "sound a like" substitutions may have occurred due to the inherent limitations of voice recognition software  Read the chart carefully and recognize, using context, where substitutions have occurred  If you have any questions, please contact the dictating provider

## 2021-08-19 NOTE — PATIENT INSTRUCTIONS
Chronic Kidney Disease   AMBULATORY CARE:   Chronic kidney disease (CKD)  is the gradual and permanent loss of kidney function  Normally, the kidneys remove fluid, chemicals, and waste from your blood  These wastes are turned into urine by your kidneys  CKD may worsen over time and lead to kidney failure  Common signs and symptoms include the following:   · Changes in how often you need to urinate    · Swelling in your arms, legs, or feet    · Shortness of breath    · Fatigue or weakness    · Bad or bitter taste in your mouth    · Nausea, vomiting, or loss of appetite    Call your local emergency number (911 in the [address] [facility]) if:   · You have a seizure  · You have shortness of breath  Seek care immediately if:   · You are confused and very drowsy  Call your doctor or nephrologist if:   · You suddenly gain or lose more weight than your healthcare provider has told you is okay  · You have itchy skin or a rash  · You urinate more or less than you normally do  · You have blood in your urine  · You have nausea and are vomiting  · You have fatigue or muscle weakness  · You have hiccups that will not stop  · You have questions or concerns about your condition or care  How CKD is diagnosed:  CKD has 5 stages  Your healthcare provider will use results from the following tests to find the stage of CKD you have:  · Blood and urine tests  show how well your kidneys are working  They may also show the cause of your CKD  · Ultrasound, CT scan, or MRI  pictures may be used to check your kidneys  You may be given contrast liquid to help your kidneys show up better in the pictures  Tell the healthcare provider if you have ever had an allergic reaction to contrast liquid  Do not enter the MRI room with anything metal  Metal can cause serious injury  Tell the healthcare provider if you have any metal in or on your body      · A biopsy  is a procedure to remove a small piece of tissue from your kidney  It is done to find the cause of your CKD  Treatment  can help control signs and symptoms, and prevent a worse stage of CKD  Your care team may include specialists, such as a dietitian or a heart specialist  This depends on the stage of your CKD and if you have other health conditions to manage  Healthcare providers will work with you to create a plan based on your decisions for treatment  Your treatment plan may include any of the following:  · Medicines  may be given to decrease your blood pressure and get rid of extra fluid  You may also receive medicine to manage health conditions that may occur with CKD, such as anemia, diabetes, and heart disease  · Dialysis  is a treatment to remove chemicals and waste from your blood when your kidneys can no longer do this  · Surgery  may be needed to create an arteriovenous fistula (AVF) in your arm or insert a catheter into your abdomen  This is done so you can receive dialysis  · A kidney transplant  may be done if your CKD becomes severe  What you can do to manage CKD: Management may include making some lifestyle changes  Tell your healthcare provider if you have any concerns about being able to make changes  He or she can help you find solutions, including working with specialists  Ask for help creating a plan to break large goals into smaller steps  Your plan may include any of the following:  · Manage other health conditions  Your healthcare provider will work with you to make a care plan that meets your needs  You will be checked regularly for heart disease or other conditions that can make CKD worse, such as diabetes  Your blood pressure will be closely monitored  You will also get a target blood pressure and help making a plan to reach your target  This may include taking your blood pressure at home  · Maintain a healthy weight  Your weight and body mass index (BMI) will be checked regularly   BMI helps find if your weight is healthy for your height  Your healthcare provider will use other tests to check your muscle and protein levels  Extra weight can strain your kidneys  A low weight or low muscle mass can make you feel more tired  You may have trouble doing your daily activities  Ask your provider what a healthy weight is for you  He or she can help you create a plan to lose or gain weight safely, if needed  The plan may include keeping a food diary  This is a list of foods and liquids you have each day  Your provider will use the diary to help you make changes, if needed  Changes are based on your health and any other conditions you have, such as diabetes  · Create an exercise plan  Regular exercise can help you manage CKD, high blood pressure, and diabetes  Exercise also helps control weight  Your provider can help you create exercise goals and a plan to reach those goals  For example, your goal may be to exercise for 30 minutes in a day  Your plan can include breaking exercise into 10 minute sessions, 3 times during the day  · Create a healthy eating plan  Your provider may tell you to eat food low in potassium, phosphorus, or protein  Your provider may also recommend vitamin or mineral supplements  Do not take any supplements without talking to your provider  A dietitian can help you plan meals if needed  Ask how much liquid to drink each day and which liquids are best for you  · Limit sodium (salt) as directed  You may need to limit sodium to less than 2,300 milligrams (mg) each day  Ask your dietitian or healthcare provider how much sodium you can have each day  The amount depends on your stage of kidney disease  Table salt, canned foods, soups, salted snacks, and processed meats, like deli meats and sausage, are high in sodium  Your provider or a dietitian can show you how to read food labels for sodium  · Limit alcohol as directed  Alcohol can cause fluid retention and can affect your kidneys   Ask how much alcohol is safe for you  A drink of alcohol is 12 ounces of beer, 5 ounces of wine, or 1½ ounces of liquor  · Do not smoke  Nicotine and other chemicals in cigarettes and cigars can cause kidney damage  Ask your provider for information if you currently smoke and need help to quit  E-cigarettes or smokeless tobacco still contain nicotine  Talk to your provider before you use these products  · Ask about over-the-counter medicines  Medicines such as NSAIDs and laxatives may harm your kidneys  Some cough and cold medicines can raise your blood pressure  Always ask if a medicine is safe before you take it  · Ask about vaccines you may need  CKD can increase your risk for infections such as pneumonia, influenza, and hepatitis  Vaccines lower your risk for infection  Your healthcare provider will tell you which vaccines you need and when to get them  Follow up with your doctor or nephrologist as directed: You will need to return for tests to monitor your kidney and nerve function, and your parathyroid hormone level  Your medicines may be changed, based on certain test results  Write down your questions so you remember to ask them during your visits  © Copyright [employer] 2021 Information is for End User's use only and may not be sold, redistributed or otherwise used for commercial purposes  All illustrations and images included in CareNotes® are the copyrighted property of [employer]  or Carlos Mondragon  The above information is an  only  It is not intended as medical advice for individual conditions or treatments  Talk to your doctor, nurse or pharmacist before following any medical regimen to see if it is safe and effective for you

## 2021-08-19 NOTE — ASSESSMENT & PLAN NOTE
Lab Results   Component Value Date    EGFR 29 08/12/2021    EGFR 20 04/22/2021    EGFR 25 07/23/2020    CREATININE 1 48 (H) 08/12/2021    CREATININE 2 08 (H) 04/22/2021    CREATININE 1 68 (H) 07/23/2020    overall stable  Slight fluctuation is there based on volume status    Importance of hydration and avoiding nephrotoxic medicine discussed with her

## 2021-09-14 ENCOUNTER — APPOINTMENT (OUTPATIENT)
Dept: LAB | Facility: HOSPITAL | Age: 86
End: 2021-09-14
Payer: MEDICARE

## 2021-09-14 DIAGNOSIS — N18.9 CHRONIC KIDNEY DISEASE-MINERAL AND BONE DISORDER: ICD-10-CM

## 2021-09-14 DIAGNOSIS — N18.32 STAGE 3B CHRONIC KIDNEY DISEASE (HCC): ICD-10-CM

## 2021-09-14 DIAGNOSIS — D50.0 IRON DEFICIENCY ANEMIA DUE TO CHRONIC BLOOD LOSS: ICD-10-CM

## 2021-09-14 DIAGNOSIS — M89.9 CHRONIC KIDNEY DISEASE-MINERAL AND BONE DISORDER: ICD-10-CM

## 2021-09-14 DIAGNOSIS — E83.9 CHRONIC KIDNEY DISEASE-MINERAL AND BONE DISORDER: ICD-10-CM

## 2021-09-14 LAB
25(OH)D3 SERPL-MCNC: 39.8 NG/ML (ref 30–100)
ANION GAP SERPL CALCULATED.3IONS-SCNC: 7 MMOL/L (ref 4–13)
BACTERIA UR QL AUTO: NORMAL /HPF
BASOPHILS # BLD AUTO: 0.04 THOUSANDS/ΜL (ref 0–0.1)
BASOPHILS NFR BLD AUTO: 1 % (ref 0–1)
BILIRUB UR QL STRIP: NEGATIVE
BUN SERPL-MCNC: 21 MG/DL (ref 5–25)
CALCIUM SERPL-MCNC: 9.1 MG/DL (ref 8.3–10.1)
CHLORIDE SERPL-SCNC: 107 MMOL/L (ref 100–108)
CLARITY UR: ABNORMAL
CO2 SERPL-SCNC: 31 MMOL/L (ref 21–32)
COLOR UR: YELLOW
CREAT SERPL-MCNC: 1.15 MG/DL (ref 0.6–1.3)
CREAT UR-MCNC: 114 MG/DL
EOSINOPHIL # BLD AUTO: 0.21 THOUSAND/ΜL (ref 0–0.61)
EOSINOPHIL NFR BLD AUTO: 4 % (ref 0–6)
ERYTHROCYTE [DISTWIDTH] IN BLOOD BY AUTOMATED COUNT: 15.6 % (ref 11.6–15.1)
GFR SERPL CREATININE-BSD FRML MDRD: 40 ML/MIN/1.73SQ M
GLUCOSE P FAST SERPL-MCNC: 79 MG/DL (ref 65–99)
GLUCOSE UR STRIP-MCNC: NEGATIVE MG/DL
HCT VFR BLD AUTO: 37.4 % (ref 34.8–46.1)
HGB BLD-MCNC: 11.5 G/DL (ref 11.5–15.4)
HGB UR QL STRIP.AUTO: NEGATIVE
IMM GRANULOCYTES # BLD AUTO: 0.06 THOUSAND/UL (ref 0–0.2)
IMM GRANULOCYTES NFR BLD AUTO: 1 % (ref 0–2)
KETONES UR STRIP-MCNC: NEGATIVE MG/DL
LEUKOCYTE ESTERASE UR QL STRIP: ABNORMAL
LYMPHOCYTES # BLD AUTO: 0.48 THOUSANDS/ΜL (ref 0.6–4.47)
LYMPHOCYTES NFR BLD AUTO: 8 % (ref 14–44)
MCH RBC QN AUTO: 29.5 PG (ref 26.8–34.3)
MCHC RBC AUTO-ENTMCNC: 30.7 G/DL (ref 31.4–37.4)
MCV RBC AUTO: 96 FL (ref 82–98)
MONOCYTES # BLD AUTO: 0.47 THOUSAND/ΜL (ref 0.17–1.22)
MONOCYTES NFR BLD AUTO: 8 % (ref 4–12)
NEUTROPHILS # BLD AUTO: 4.43 THOUSANDS/ΜL (ref 1.85–7.62)
NEUTS SEG NFR BLD AUTO: 78 % (ref 43–75)
NITRITE UR QL STRIP: NEGATIVE
NON-SQ EPI CELLS URNS QL MICRO: NORMAL /HPF
NRBC BLD AUTO-RTO: 0 /100 WBCS
PH UR STRIP.AUTO: 7 [PH]
PHOSPHATE SERPL-MCNC: 3.7 MG/DL (ref 2.3–4.1)
PLATELET # BLD AUTO: 136 THOUSANDS/UL (ref 149–390)
PMV BLD AUTO: 12.5 FL (ref 8.9–12.7)
POTASSIUM SERPL-SCNC: 5.7 MMOL/L (ref 3.5–5.3)
PROT UR STRIP-MCNC: NEGATIVE MG/DL
PROT UR-MCNC: 28 MG/DL
PROT/CREAT UR: 0.25 MG/G{CREAT} (ref 0–0.1)
PTH-INTACT SERPL-MCNC: 77.4 PG/ML (ref 18.4–80.1)
RBC # BLD AUTO: 3.9 MILLION/UL (ref 3.81–5.12)
RBC #/AREA URNS AUTO: NORMAL /HPF
SODIUM SERPL-SCNC: 145 MMOL/L (ref 136–145)
SP GR UR STRIP.AUTO: 1.01 (ref 1–1.03)
UROBILINOGEN UR QL STRIP.AUTO: 0.2 E.U./DL
WBC # BLD AUTO: 5.69 THOUSAND/UL (ref 4.31–10.16)
WBC #/AREA URNS AUTO: NORMAL /HPF

## 2021-09-14 PROCEDURE — 84156 ASSAY OF PROTEIN URINE: CPT

## 2021-09-14 PROCEDURE — 85025 COMPLETE CBC W/AUTO DIFF WBC: CPT

## 2021-09-14 PROCEDURE — 82306 VITAMIN D 25 HYDROXY: CPT

## 2021-09-14 PROCEDURE — 36415 COLL VENOUS BLD VENIPUNCTURE: CPT

## 2021-09-14 PROCEDURE — 80048 BASIC METABOLIC PNL TOTAL CA: CPT

## 2021-09-14 PROCEDURE — 83970 ASSAY OF PARATHORMONE: CPT

## 2021-09-14 PROCEDURE — 84100 ASSAY OF PHOSPHORUS: CPT

## 2021-09-14 PROCEDURE — 81001 URINALYSIS AUTO W/SCOPE: CPT

## 2021-09-14 PROCEDURE — 82570 ASSAY OF URINE CREATININE: CPT

## 2021-09-19 NOTE — PROGRESS NOTES
HEMATOLOGY CLINIC NOTE    Primary Care Provider: Padmini Lobo MD  Referring Provider:    MRN: [id number]  : 1924    Assessment / Plan:     1  Iron deficiency anemia, unspecified iron deficiency anemia type  2  Stage 3 chronic kidney disease, unspecified whether stage 3a or 3b CKD ([facility] )  Patient with history of anemia which is multifactorial due to iron deficiency anemia as well as chronic kidney disease  Hemoglobin has improved compared to our consultation  Most recent hemoglobin 11 5 grams/deciliter, MCV 96  No recent iron panel drawn  She is status post Feraheme x2  For now, we will have patient go for an iron panel today  She will take once daily oral iron as long as she can tolerate it  Otherwise, we will have patient have labs once every 3 months  I recommended her follow-up every 6 months  However, she feels more comfortable following up at 3 months     - CBC and differential; Standing  - Iron Panel (Includes Ferritin, Iron Sat%, Iron, and TIBC); Standing  - ferrous sulfate 324 (65 Fe) mg; Take 1 tablet (324 mg total) by mouth daily before breakfast  Dispense: 90 tablet; Refill: 1    3  Fecal occult blood test positive  She did have a positive fecal occult blood test a few months back  Due to her age, comorbidity she did not want to have a colonoscopy  However, she still wanted to see GI  There were no other recommendations made  4  Thrombocytopenia ([place] [address] )  I noticed a recent drop in her platelets  She has a history of borderline thrombocytopenia  Most recent platelets 020  She denies any bleeding  We will continue to monitor this with CBCs  She is taking once daily multivitamin  Patient voiced understanding and agreement to the above  The patient knows to call the office with any questions or concerns regarding the above      I have spent 30 minutes with Patient  today in which greater than 50% of this time was spent in counseling/coordination of care regarding Diagnostic results, Risks and benefits of tx options, Intructions for management, Patient and family education, Importance of tx compliance, Risk factor reductions and Impressions  Reason for visit:       Chief Complaint   Patient presents with    Follow-up       History of Hematology Illness:     Breonna William is a 80 y o  female who came in for follow up  1  Chronic Normocytic Anemia   - secondary to CKD & Iron Deficiency from possible GI bleed  - on/off anemia since at least 3/2017  Baseline Hgb usually 9-11g/dL over last few years       - 4/2021 (Hgb 9 5g/dL) --> 5/2021 (Hgb 11 3g/dL) --> 7/2021 (Hgb 10 3g/dL)  - c/s labs: + fecal occult test, ferritin 58, Iron 39, TIBC 499, Iron sat 8%  Vitamin B12, Retic, SPEP, EPO WNL    - 7/2021: s/p Feraheme 510mg x2  - 9/2021: Hgb 11 5g/dL, MCV 96  No iron panel drawn       2  Chronic Anticoagulation - Afib   - Eliquis 2 5mg BID    Interval History:   "5/14/2021: This is a 80-year-old female with a past medical history of AFib, hypertension, mitral valve insufficiency, CHF, chronic kidney disease, hyperholesterolemia presenting for consultation regarding anemia       She denies any bleeding into urine, stools   No PICA, increased shortness of breath with exertion or fatigue  Diet is well balanced with at least 2-3 meals a day including chicken, fish  Does not consume red meats  She denies history of malabsorptive conditions, GI bleed  Bibi Chaudhry is currently taking twice daily oral iron       Patient does not smoke, drink  No history of cancer, no family history of cancer        7/15/2021: Patient came in for follow up  She denies any bleeding into urine or stools  Feels well, offers no complaints "    9/20/2021:  Patient came in for follow-up  Offers no complaints today  She denies any bleeding that she has seen  She did have a positive fecal occult blood test a few months ago  She was seen by GI    Patient did not want to have a colonoscopy but did still want a see GI regarding this  For now, we are just monitoring her hemoglobin to ensure it does not drop due to any possible GI bleeding  Problem list:       Patient Active Problem List   Diagnosis    Paroxysmal atrial fibrillation ([address] )    Long term current use of anticoagulant    Chronic kidney disease, stage III (moderate) (HCC)    Essential hypertension    Mitral valve insufficiency    Hypercholesterolemia    Anemia due to chronic kidney disease    Diastolic dysfunction    Epistaxis    Chronic kidney disease-mineral and bone disorder    Fracture of right orbital wall (HCC)    Chronic diastolic congestive heart failure (HCC)    Iron deficiency anemia    Fecal occult blood test positive    Anemia       REVIEW OF SYMPTOMS:   Review of Systems   Constitutional: Negative for activity change, appetite change, chills, diaphoresis, fatigue, fever and unexpected weight change  Ambulating with cane   HENT: Negative for mouth sores and nosebleeds  Eyes: Negative for visual disturbance  Respiratory: Negative for apnea, cough, choking, chest tightness, shortness of breath, wheezing and stridor  Cardiovascular: Negative for chest pain, palpitations and leg swelling  Gastrointestinal: Negative for abdominal pain, anal bleeding, blood in stool, constipation, diarrhea, nausea and vomiting  Endocrine: Negative for cold intolerance  Genitourinary: Negative for hematuria, menstrual problem and vaginal bleeding  Musculoskeletal: Negative for arthralgias  Skin: Negative for color change, pallor, rash and wound  Neurological: Negative for dizziness, syncope, light-headedness and headaches  Hematological: Negative for adenopathy  Does not bruise/bleed easily  Psychiatric/Behavioral: Negative for sleep disturbance         PHYSICAL EXAMINATION:     Vital Signs:   /72 (BP Location: Left arm, Patient Position: Sitting, Cuff Size: Standard)   Pulse 65   Temp 97 9 °F (36 6 °C) (Tympanic)   Resp 18   Ht 4' 11 5" (1 511 m)   Wt 59 4 kg (131 lb)   LMP  (LMP Unknown)   SpO2 94%   BMI 26 02 kg/m²   Body surface area is 1 55 meters squared  Ht Readings from Last 8 Encounters:   09/20/21 4' 11 5" (1 511 m)   08/19/21 4' 11 5" (1 511 m)   08/06/21 5' (1 524 m)   07/15/21 5' (1 524 m)   05/17/21 5' (1 524 m)   05/14/21 5' 1" (1 549 m)   04/22/21 5' 1" (1 549 m)   10/12/20 5' 1" (1 549 m)       Wt Readings from Last 8 Encounters:   09/20/21 59 4 kg (131 lb)   08/19/21 58 3 kg (128 lb 9 6 oz)   08/06/21 57 2 kg (126 lb)   07/15/21 58 5 kg (129 lb)   06/10/21 58 1 kg (128 lb)   05/17/21 58 1 kg (128 lb)   05/14/21 57 6 kg (127 lb)   04/22/21 59 1 kg (130 lb 6 4 oz)          Physical Exam  Constitutional:       General: She is not in acute distress  Appearance: Normal appearance  She is not ill-appearing, toxic-appearing or diaphoretic  HENT:      Head: Normocephalic and atraumatic  Eyes:      General: No scleral icterus  Extraocular Movements: Extraocular movements intact  Conjunctiva/sclera: Conjunctivae normal       Pupils: Pupils are equal, round, and reactive to light  Cardiovascular:      Rate and Rhythm: Normal rate and regular rhythm  Heart sounds: Normal heart sounds  No murmur heard  Pulmonary:      Effort: Pulmonary effort is normal  No respiratory distress  Breath sounds: Normal breath sounds  No stridor  No wheezing, rhonchi or rales  Abdominal:      Palpations: Abdomen is soft  Tenderness: There is no abdominal tenderness  Musculoskeletal:         General: No tenderness  Normal range of motion  Cervical back: Normal range of motion and neck supple  Right lower leg: No edema  Left lower leg: No edema  Lymphadenopathy:      Cervical: No cervical adenopathy  Skin:     General: Skin is warm and dry  Coloration: Skin is not jaundiced or pale  Findings: No bruising, erythema, lesion or rash     Neurological: General: No focal deficit present  Mental Status: She is alert and oriented to person, place, and time  Mental status is at baseline  Cranial Nerves: No cranial nerve deficit  Motor: No weakness  Psychiatric:         Mood and Affect: Mood normal          Behavior: Behavior normal          Thought Content: Thought content normal          Judgment: Judgment normal        Reviewed historical information  PAST MEDICAL HISTORY:    Past Medical History:   Diagnosis Date    Anemia     Asteatotic eczema     Chronic kidney disease     Hypercholesterolemia     Lichen sclerosus et atrophicus     Mitral valve insufficiency     Seborrheic keratoses     Tricuspid regurgitation     Vertigo        PAST SURGICAL HISTORY:    Past Surgical History:   Procedure Laterality Date    APPENDECTOMY  11/14/1939    CATARACT EXTRACTION, BILATERAL  04/2019    HYSTERECTOMY  11/14/1962    ORIF HIP FRACTURE Left 2020    NM COLONOSCOPY FLX DX W/COLLJ SPEC WHEN PFRMD N/A 5/19/2016    Procedure: EGD AND COLONOSCOPY;  Surgeon: Sharad Leblanc MD;  Location: [facility];   Service: Gastroenterology         CURRENT MEDICATIONS:     Current Outpatient Medications:     apixaban (Eliquis) 2 5 mg, Take 1 tablet (2 5 mg total) by mouth 2 (two) times a day, Disp: 180 tablet, Rfl: 3    diltiazem (Dilt-XR) 120 MG 24 hr capsule, Take 1 capsule (120 mg total) by mouth daily, Disp: 90 capsule, Rfl: 1    ferrous sulfate 325 (65 Fe) mg tablet, TAKE 1 TABLET TWICE A DAY, Disp: 180 tablet, Rfl: 1    furosemide (LASIX) 40 mg tablet, Take 1 tablet (40 mg total) by mouth daily, Disp: 100 tablet, Rfl: 0    Klor-Con M10 10 MEQ tablet, TAKE 1 TABLET BY MOUTH EVERY DAY, Disp: 90 tablet, Rfl: 1    metoprolol succinate (TOPROL-XL) 100 mg 24 hr tablet, TAKE 2 TABLETS BY MOUTH EVERY DAY, Disp: 180 tablet, Rfl: 0    docusate sodium (COLACE) 100 mg capsule, Take 100 mg by mouth 2 (two) times a day  (Patient not taking: Reported on 9/20/2021), Disp: , Rfl:     Lidocaine 4 % PTCH, Place 1 patch on the skin daily (Patient not taking: Reported on 8/19/2021), Disp: , Rfl:     SIMBRINZA 1-0 2 % SUSP, INSTILL 1 DROP INTO BOTH EYES TWICE A DAY (Patient not taking: Reported on 9/20/2021), Disp: , Rfl: 2    SOCIAL HISTORY:    Social History     Tobacco Use    Smoking status: Never Smoker    Smokeless tobacco: Never Used   Vaping Use    Vaping Use: Never used   Substance Use Topics    Alcohol use: No    Drug use: No       FAMILY HISTORY:    Family History   Problem Relation Age of Onset    Heart disease Mother         Abnormality    Heart disease Father         Abnormality    No Known Problems Sister     No Known Problems Brother        ALLERGIES:    Allergies   Allergen Reactions    Lactose Intolerance (Gi) - Food Allergy     Penicillins          LAB:    Lab Results   Component Value Date    WBC 5 69 09/14/2021    HGB 11 5 09/14/2021    HCT 37 4 09/14/2021    MCV 96 09/14/2021     (L) 09/14/2021       Lab Results   Component Value Date     10/26/2015    SODIUM 145 09/14/2021    K 5 7 (H) 09/14/2021     09/14/2021    CO2 31 09/14/2021    ANIONGAP 5 10/26/2015    AGAP 7 09/14/2021    BUN 21 09/14/2021    CREATININE 1 15 09/14/2021    GLUC 97 05/29/2018    GLUF 79 09/14/2021    CALCIUM 9 1 09/14/2021    AST 25 04/22/2021    ALT 24 04/22/2021    ALKPHOS 81 04/22/2021    PROT 7 9 10/15/2015    TP 7 1 05/14/2021    BILITOT 0 70 10/15/2015    TBILI 0 71 04/22/2021    EGFR 40 09/14/2021       IMAGING:  US kidney and bladder  Narrative: RENAL ULTRASOUND    INDICATION:   N18 3: Chronic kidney disease, stage 3 (moderate)  COMPARISON: None    TECHNIQUE:   Ultrasound of the retroperitoneum was performed with a curvilinear transducer utilizing volumetric sweeps and still imaging techniques  FINDINGS:    KIDNEYS:  Renal cortices are echogenic bilaterally consistent with medical renal disease    Numerous prominent simple appearing cysts of varying sizes are noted bilaterally including a 4 6 cm cortical cyst in the midportion of the right kidney and an exophytic simple 8 6 cm cyst at the lower pole the left kidney  No sonographically detectable solid renal mass or hydronephrosis noted in either kidney  No perinephric fluid collections  No echogenic shadowing calculi seen  Right kidney:  10 1 cm  Left kidney:  10 9 cm  URETERS:  Nonvisualized  BLADDER:   Normally distended  Urinary bladder wall appears somewhat trabeculated  No discrete focal thickening or mass lesions  Bilateral ureteral jets detected  Impression: Echogenic renal cortices bilaterally suggestive of medical renal disease  No hydronephrosis  Benign-appearing cortical renal cysts of varying sizes as described  Trabeculated urinary bladder wall      Workstation performed: [id number]

## 2021-09-20 ENCOUNTER — OFFICE VISIT (OUTPATIENT)
Dept: HEMATOLOGY ONCOLOGY | Facility: CLINIC | Age: 86
End: 2021-09-20
Payer: MEDICARE

## 2021-09-20 ENCOUNTER — APPOINTMENT (OUTPATIENT)
Dept: LAB | Facility: HOSPITAL | Age: 86
End: 2021-09-20
Payer: MEDICARE

## 2021-09-20 VITALS
TEMPERATURE: 97.9 F | OXYGEN SATURATION: 94 % | HEART RATE: 65 BPM | RESPIRATION RATE: 18 BRPM | WEIGHT: 131 LBS | BODY MASS INDEX: 25.72 KG/M2 | SYSTOLIC BLOOD PRESSURE: 148 MMHG | HEIGHT: 60 IN | DIASTOLIC BLOOD PRESSURE: 72 MMHG

## 2021-09-20 DIAGNOSIS — D50.9 IRON DEFICIENCY ANEMIA, UNSPECIFIED IRON DEFICIENCY ANEMIA TYPE: Primary | ICD-10-CM

## 2021-09-20 DIAGNOSIS — R19.5 FECAL OCCULT BLOOD TEST POSITIVE: ICD-10-CM

## 2021-09-20 DIAGNOSIS — D69.6 THROMBOCYTOPENIA (HCC): ICD-10-CM

## 2021-09-20 DIAGNOSIS — N18.30 STAGE 3 CHRONIC KIDNEY DISEASE, UNSPECIFIED WHETHER STAGE 3A OR 3B CKD (HCC): ICD-10-CM

## 2021-09-20 DIAGNOSIS — D50.9 IRON DEFICIENCY ANEMIA, UNSPECIFIED IRON DEFICIENCY ANEMIA TYPE: ICD-10-CM

## 2021-09-20 LAB
BASOPHILS # BLD AUTO: 0.05 THOUSANDS/ΜL (ref 0–0.1)
BASOPHILS NFR BLD AUTO: 1 % (ref 0–1)
EOSINOPHIL # BLD AUTO: 0.16 THOUSAND/ΜL (ref 0–0.61)
EOSINOPHIL NFR BLD AUTO: 3 % (ref 0–6)
ERYTHROCYTE [DISTWIDTH] IN BLOOD BY AUTOMATED COUNT: 15.2 % (ref 11.6–15.1)
FERRITIN SERPL-MCNC: 183 NG/ML (ref 8–388)
HCT VFR BLD AUTO: 37.9 % (ref 34.8–46.1)
HGB BLD-MCNC: 11.7 G/DL (ref 11.5–15.4)
IMM GRANULOCYTES # BLD AUTO: 0.04 THOUSAND/UL (ref 0–0.2)
IMM GRANULOCYTES NFR BLD AUTO: 1 % (ref 0–2)
IRON SATN MFR SERPL: 13 % (ref 15–50)
IRON SERPL-MCNC: 52 UG/DL (ref 50–170)
LYMPHOCYTES # BLD AUTO: 0.44 THOUSANDS/ΜL (ref 0.6–4.47)
LYMPHOCYTES NFR BLD AUTO: 8 % (ref 14–44)
MCH RBC QN AUTO: 29 PG (ref 26.8–34.3)
MCHC RBC AUTO-ENTMCNC: 30.9 G/DL (ref 31.4–37.4)
MCV RBC AUTO: 94 FL (ref 82–98)
MONOCYTES # BLD AUTO: 0.48 THOUSAND/ΜL (ref 0.17–1.22)
MONOCYTES NFR BLD AUTO: 9 % (ref 4–12)
NEUTROPHILS # BLD AUTO: 4.06 THOUSANDS/ΜL (ref 1.85–7.62)
NEUTS SEG NFR BLD AUTO: 78 % (ref 43–75)
NRBC BLD AUTO-RTO: 0 /100 WBCS
PLATELET # BLD AUTO: 146 THOUSANDS/UL (ref 149–390)
PMV BLD AUTO: 12.4 FL (ref 8.9–12.7)
RBC # BLD AUTO: 4.04 MILLION/UL (ref 3.81–5.12)
TIBC SERPL-MCNC: 386 UG/DL (ref 250–450)
WBC # BLD AUTO: 5.23 THOUSAND/UL (ref 4.31–10.16)

## 2021-09-20 PROCEDURE — 83550 IRON BINDING TEST: CPT

## 2021-09-20 PROCEDURE — 82728 ASSAY OF FERRITIN: CPT

## 2021-09-20 PROCEDURE — 36415 COLL VENOUS BLD VENIPUNCTURE: CPT

## 2021-09-20 PROCEDURE — 83540 ASSAY OF IRON: CPT

## 2021-09-20 PROCEDURE — 99214 OFFICE O/P EST MOD 30 MIN: CPT | Performed by: INTERNAL MEDICINE

## 2021-09-20 PROCEDURE — 85025 COMPLETE CBC W/AUTO DIFF WBC: CPT

## 2021-09-20 RX ORDER — FERROUS SULFATE TAB EC 324 MG (65 MG FE EQUIVALENT) 324 (65 FE) MG
324 TABLET DELAYED RESPONSE ORAL
Qty: 90 TABLET | Refills: 1 | Status: SHIPPED | OUTPATIENT
Start: 2021-09-20 | End: 2021-11-26 | Stop reason: SDUPTHER

## 2021-10-04 DIAGNOSIS — R60.9 EDEMA, UNSPECIFIED TYPE: ICD-10-CM

## 2021-10-04 RX ORDER — FUROSEMIDE 40 MG/1
TABLET ORAL
Qty: 90 TABLET | Refills: 1 | Status: SHIPPED | OUTPATIENT
Start: 2021-10-04 | End: 2021-11-23 | Stop reason: HOSPADM

## 2021-10-06 ENCOUNTER — OFFICE VISIT (OUTPATIENT)
Dept: GASTROENTEROLOGY | Facility: CLINIC | Age: 86
End: 2021-10-06
Payer: MEDICARE

## 2021-10-06 VITALS
BODY MASS INDEX: 25.52 KG/M2 | WEIGHT: 130 LBS | HEART RATE: 82 BPM | DIASTOLIC BLOOD PRESSURE: 70 MMHG | HEIGHT: 60 IN | SYSTOLIC BLOOD PRESSURE: 138 MMHG

## 2021-10-06 DIAGNOSIS — D50.0 IRON DEFICIENCY ANEMIA DUE TO CHRONIC BLOOD LOSS: Primary | ICD-10-CM

## 2021-10-06 PROCEDURE — 99213 OFFICE O/P EST LOW 20 MIN: CPT | Performed by: PHYSICIAN ASSISTANT

## 2021-10-11 DIAGNOSIS — I10 ESSENTIAL HYPERTENSION: ICD-10-CM

## 2021-10-11 RX ORDER — DILTIAZEM HYDROCHLORIDE 120 MG/1
CAPSULE, EXTENDED RELEASE ORAL
Qty: 90 CAPSULE | Refills: 1 | Status: SHIPPED | OUTPATIENT
Start: 2021-10-11 | End: 2022-04-29

## 2021-10-26 ENCOUNTER — OFFICE VISIT (OUTPATIENT)
Dept: INTERNAL MEDICINE CLINIC | Facility: CLINIC | Age: 86
End: 2021-10-26
Payer: MEDICARE

## 2021-10-26 ENCOUNTER — APPOINTMENT (OUTPATIENT)
Dept: LAB | Facility: CLINIC | Age: 86
End: 2021-10-26
Payer: MEDICARE

## 2021-10-26 VITALS
HEIGHT: 59 IN | BODY MASS INDEX: 26.08 KG/M2 | SYSTOLIC BLOOD PRESSURE: 134 MMHG | TEMPERATURE: 97.3 F | WEIGHT: 129.4 LBS | RESPIRATION RATE: 17 BRPM | OXYGEN SATURATION: 94 % | DIASTOLIC BLOOD PRESSURE: 78 MMHG | HEART RATE: 70 BPM

## 2021-10-26 DIAGNOSIS — R05.9 COUGH: Primary | ICD-10-CM

## 2021-10-26 DIAGNOSIS — J30.1 SEASONAL ALLERGIC RHINITIS DUE TO POLLEN: ICD-10-CM

## 2021-10-26 DIAGNOSIS — D50.9 IRON DEFICIENCY ANEMIA, UNSPECIFIED IRON DEFICIENCY ANEMIA TYPE: ICD-10-CM

## 2021-10-26 LAB
BASOPHILS # BLD AUTO: 0.06 THOUSANDS/ΜL (ref 0–0.1)
BASOPHILS NFR BLD AUTO: 1 % (ref 0–1)
EOSINOPHIL # BLD AUTO: 0.23 THOUSAND/ΜL (ref 0–0.61)
EOSINOPHIL NFR BLD AUTO: 3 % (ref 0–6)
ERYTHROCYTE [DISTWIDTH] IN BLOOD BY AUTOMATED COUNT: 14 % (ref 11.6–15.1)
FERRITIN SERPL-MCNC: 144 NG/ML (ref 8–388)
HCT VFR BLD AUTO: 40.4 % (ref 34.8–46.1)
HGB BLD-MCNC: 12.5 G/DL (ref 11.5–15.4)
IMM GRANULOCYTES # BLD AUTO: 0.04 THOUSAND/UL (ref 0–0.2)
IMM GRANULOCYTES NFR BLD AUTO: 1 % (ref 0–2)
IRON SATN MFR SERPL: 8 % (ref 15–50)
IRON SERPL-MCNC: 35 UG/DL (ref 50–170)
LYMPHOCYTES # BLD AUTO: 0.66 THOUSANDS/ΜL (ref 0.6–4.47)
LYMPHOCYTES NFR BLD AUTO: 9 % (ref 14–44)
MCH RBC QN AUTO: 29.1 PG (ref 26.8–34.3)
MCHC RBC AUTO-ENTMCNC: 30.9 G/DL (ref 31.4–37.4)
MCV RBC AUTO: 94 FL (ref 82–98)
MONOCYTES # BLD AUTO: 0.79 THOUSAND/ΜL (ref 0.17–1.22)
MONOCYTES NFR BLD AUTO: 10 % (ref 4–12)
NEUTROPHILS # BLD AUTO: 5.95 THOUSANDS/ΜL (ref 1.85–7.62)
NEUTS SEG NFR BLD AUTO: 76 % (ref 43–75)
NRBC BLD AUTO-RTO: 0 /100 WBCS
PLATELET # BLD AUTO: 179 THOUSANDS/UL (ref 149–390)
PMV BLD AUTO: 13.2 FL (ref 8.9–12.7)
RBC # BLD AUTO: 4.29 MILLION/UL (ref 3.81–5.12)
TIBC SERPL-MCNC: 419 UG/DL (ref 250–450)
WBC # BLD AUTO: 7.73 THOUSAND/UL (ref 4.31–10.16)

## 2021-10-26 PROCEDURE — 36415 COLL VENOUS BLD VENIPUNCTURE: CPT | Performed by: PHYSICIAN ASSISTANT

## 2021-10-26 PROCEDURE — G0439 PPPS, SUBSEQ VISIT: HCPCS | Performed by: NURSE PRACTITIONER

## 2021-10-26 PROCEDURE — 99214 OFFICE O/P EST MOD 30 MIN: CPT | Performed by: NURSE PRACTITIONER

## 2021-10-26 PROCEDURE — 83540 ASSAY OF IRON: CPT | Performed by: PHYSICIAN ASSISTANT

## 2021-10-26 PROCEDURE — 83550 IRON BINDING TEST: CPT | Performed by: PHYSICIAN ASSISTANT

## 2021-10-26 PROCEDURE — 82728 ASSAY OF FERRITIN: CPT | Performed by: PHYSICIAN ASSISTANT

## 2021-10-26 PROCEDURE — 85025 COMPLETE CBC W/AUTO DIFF WBC: CPT | Performed by: PHYSICIAN ASSISTANT

## 2021-10-26 RX ORDER — BENZONATATE 100 MG/1
100 CAPSULE ORAL 3 TIMES DAILY PRN
Qty: 20 CAPSULE | Refills: 0 | Status: ON HOLD | OUTPATIENT
Start: 2021-10-26 | End: 2021-11-06 | Stop reason: SDUPTHER

## 2021-10-27 ENCOUNTER — TELEPHONE (OUTPATIENT)
Dept: GASTROENTEROLOGY | Facility: CLINIC | Age: 86
End: 2021-10-27

## 2021-10-28 ENCOUNTER — TELEPHONE (OUTPATIENT)
Dept: INTERNAL MEDICINE CLINIC | Facility: CLINIC | Age: 86
End: 2021-10-28

## 2021-10-29 ENCOUNTER — OFFICE VISIT (OUTPATIENT)
Dept: INTERNAL MEDICINE CLINIC | Facility: CLINIC | Age: 86
End: 2021-10-29
Payer: MEDICARE

## 2021-10-29 VITALS
HEART RATE: 113 BPM | HEIGHT: 59 IN | SYSTOLIC BLOOD PRESSURE: 168 MMHG | BODY MASS INDEX: 25.76 KG/M2 | TEMPERATURE: 98.4 F | WEIGHT: 127.8 LBS | OXYGEN SATURATION: 91 % | DIASTOLIC BLOOD PRESSURE: 90 MMHG

## 2021-10-29 DIAGNOSIS — I48.0 PAROXYSMAL ATRIAL FIBRILLATION (HCC): ICD-10-CM

## 2021-10-29 DIAGNOSIS — I10 ESSENTIAL HYPERTENSION: Primary | ICD-10-CM

## 2021-10-29 DIAGNOSIS — R05.9 COUGH: ICD-10-CM

## 2021-10-29 DIAGNOSIS — N18.32 STAGE 3B CHRONIC KIDNEY DISEASE (HCC): ICD-10-CM

## 2021-10-29 DIAGNOSIS — I34.0 NONRHEUMATIC MITRAL VALVE REGURGITATION: ICD-10-CM

## 2021-10-29 DIAGNOSIS — H10.13 ALLERGIC CONJUNCTIVITIS OF BOTH EYES: ICD-10-CM

## 2021-10-29 DIAGNOSIS — I50.32 CHRONIC DIASTOLIC CONGESTIVE HEART FAILURE (HCC): ICD-10-CM

## 2021-10-29 PROCEDURE — 99214 OFFICE O/P EST MOD 30 MIN: CPT | Performed by: INTERNAL MEDICINE

## 2021-10-29 RX ORDER — PREDNISONE 10 MG/1
TABLET ORAL
Qty: 30 TABLET | Refills: 0 | Status: SHIPPED | OUTPATIENT
Start: 2021-10-29 | End: 2021-11-06 | Stop reason: HOSPADM

## 2021-11-01 DIAGNOSIS — I10 ESSENTIAL HYPERTENSION: ICD-10-CM

## 2021-11-02 RX ORDER — METOPROLOL SUCCINATE 100 MG/1
TABLET, EXTENDED RELEASE ORAL
Qty: 180 TABLET | Refills: 0 | Status: SHIPPED | OUTPATIENT
Start: 2021-11-02 | End: 2021-11-23 | Stop reason: HOSPADM

## 2021-11-03 ENCOUNTER — HOSPITAL ENCOUNTER (OUTPATIENT)
Dept: RADIOLOGY | Facility: HOSPITAL | Age: 86
Discharge: HOME | End: 2021-11-03
Payer: MEDICARE

## 2021-11-03 DIAGNOSIS — R05.9 COUGH: ICD-10-CM

## 2021-11-03 PROCEDURE — 70220 X-RAY EXAM OF SINUSES: CPT

## 2021-11-03 PROCEDURE — 71046 X-RAY EXAM CHEST 2 VIEWS: CPT

## 2021-11-05 ENCOUNTER — APPOINTMENT (EMERGENCY)
Dept: CT IMAGING | Facility: HOSPITAL | Age: 86
End: 2021-11-05
Payer: MEDICARE

## 2021-11-05 ENCOUNTER — APPOINTMENT (EMERGENCY)
Dept: RADIOLOGY | Facility: HOSPITAL | Age: 86
End: 2021-11-05
Payer: MEDICARE

## 2021-11-05 ENCOUNTER — HOSPITAL ENCOUNTER (OUTPATIENT)
Facility: HOSPITAL | Age: 86
Setting detail: OBSERVATION
Discharge: HOME | End: 2021-11-06
Attending: EMERGENCY MEDICINE | Admitting: STUDENT IN AN ORGANIZED HEALTH CARE EDUCATION/TRAINING PROGRAM
Payer: MEDICARE

## 2021-11-05 DIAGNOSIS — J30.1 SEASONAL ALLERGIC RHINITIS DUE TO POLLEN: ICD-10-CM

## 2021-11-05 DIAGNOSIS — R05.9 COUGH: ICD-10-CM

## 2021-11-05 DIAGNOSIS — K92.2 ACUTE LOWER GI BLEEDING: Primary | ICD-10-CM

## 2021-11-05 LAB
ABO GROUP BLD: NORMAL
ALBUMIN SERPL BCP-MCNC: 4.1 G/DL (ref 3.5–5)
ALP SERPL-CCNC: 92 U/L (ref 46–116)
ALT SERPL W P-5'-P-CCNC: 75 U/L (ref 12–78)
ANION GAP SERPL CALCULATED.3IONS-SCNC: 6 MMOL/L (ref 4–13)
AST SERPL W P-5'-P-CCNC: 60 U/L (ref 5–45)
ATRIAL RATE: 241 BPM
BASOPHILS # BLD MANUAL: 0 THOUSAND/UL (ref 0–0.1)
BASOPHILS NFR MAR MANUAL: 0 % (ref 0–1)
BILIRUB SERPL-MCNC: 0.57 MG/DL (ref 0.2–1)
BUN SERPL-MCNC: 43 MG/DL (ref 5–25)
CALCIUM SERPL-MCNC: 8.4 MG/DL (ref 8.3–10.1)
CHLORIDE SERPL-SCNC: 101 MMOL/L (ref 100–108)
CO2 SERPL-SCNC: 32 MMOL/L (ref 21–32)
CREAT SERPL-MCNC: 1.58 MG/DL (ref 0.6–1.3)
EOSINOPHIL # BLD MANUAL: 0 THOUSAND/UL (ref 0–0.4)
EOSINOPHIL NFR BLD MANUAL: 0 % (ref 0–6)
ERYTHROCYTE [DISTWIDTH] IN BLOOD BY AUTOMATED COUNT: 14.1 % (ref 11.6–15.1)
FLUAV RNA RESP QL NAA+PROBE: NEGATIVE
FLUBV RNA RESP QL NAA+PROBE: NEGATIVE
GFR SERPL CREATININE-BSD FRML MDRD: 27 ML/MIN/1.73SQ M
GLUCOSE SERPL-MCNC: 96 MG/DL (ref 65–140)
HCT VFR BLD AUTO: 38.5 % (ref 34.8–46.1)
HCT VFR BLD AUTO: 40.6 % (ref 34.8–46.1)
HGB BLD-MCNC: 11.8 G/DL (ref 11.5–15.4)
HGB BLD-MCNC: 12.2 G/DL (ref 11.5–15.4)
HGB BLD-MCNC: 12.2 G/DL (ref 11.5–15.4)
HGB BLD-MCNC: 12.9 G/DL (ref 11.5–15.4)
INR PPP: 1.13 (ref 0.84–1.19)
LYMPHOCYTES # BLD AUTO: 0.21 THOUSAND/UL (ref 0.6–4.47)
LYMPHOCYTES # BLD AUTO: 2 % (ref 14–44)
MCH RBC QN AUTO: 28.4 PG (ref 26.8–34.3)
MCHC RBC AUTO-ENTMCNC: 30 G/DL (ref 31.4–37.4)
MCV RBC AUTO: 94 FL (ref 82–98)
MONOCYTES # BLD AUTO: 0.53 THOUSAND/UL (ref 0–1.22)
MONOCYTES NFR BLD: 5 % (ref 4–12)
NEUTROPHILS # BLD MANUAL: 9.91 THOUSAND/UL (ref 1.85–7.62)
NEUTS BAND NFR BLD MANUAL: 2 % (ref 0–8)
NEUTS SEG NFR BLD AUTO: 91 % (ref 43–75)
NT-PROBNP SERPL-MCNC: 8424 PG/ML
PLATELET # BLD AUTO: 228 THOUSANDS/UL (ref 149–390)
PLATELET BLD QL SMEAR: ADEQUATE
PMV BLD AUTO: 12.3 FL (ref 8.9–12.7)
POTASSIUM SERPL-SCNC: 4.9 MMOL/L (ref 3.5–5.3)
PROT SERPL-MCNC: 7.6 G/DL (ref 6.4–8.2)
PROTHROMBIN TIME: 14 SECONDS (ref 11.6–14.5)
QRS AXIS: 106 DEGREES
QRSD INTERVAL: 72 MS
QT INTERVAL: 356 MS
QTC INTERVAL: 466 MS
RBC # BLD AUTO: 4.3 MILLION/UL (ref 3.81–5.12)
RH BLD: NEGATIVE
RSV RNA RESP QL NAA+PROBE: NEGATIVE
SARS-COV-2 RNA RESP QL NAA+PROBE: NEGATIVE
SODIUM SERPL-SCNC: 139 MMOL/L (ref 136–145)
T WAVE AXIS: 39 DEGREES
TROPONIN I SERPL-MCNC: 0.02 NG/ML
VENTRICULAR RATE: 103 BPM
WBC # BLD AUTO: 10.66 THOUSAND/UL (ref 4.31–10.16)

## 2021-11-05 PROCEDURE — 0241U HB NFCT DS VIR RESP RNA 4 TRGT: CPT | Performed by: PHYSICIAN ASSISTANT

## 2021-11-05 PROCEDURE — G1004 CDSM NDSC: HCPCS

## 2021-11-05 PROCEDURE — 93010 ELECTROCARDIOGRAM REPORT: CPT | Performed by: INTERNAL MEDICINE

## 2021-11-05 PROCEDURE — 85014 HEMATOCRIT: CPT | Performed by: PHYSICIAN ASSISTANT

## 2021-11-05 PROCEDURE — 71046 X-RAY EXAM CHEST 2 VIEWS: CPT

## 2021-11-05 PROCEDURE — 85610 PROTHROMBIN TIME: CPT | Performed by: PHYSICIAN ASSISTANT

## 2021-11-05 PROCEDURE — 85018 HEMOGLOBIN: CPT | Performed by: FAMILY MEDICINE

## 2021-11-05 PROCEDURE — 85018 HEMOGLOBIN: CPT | Performed by: PHYSICIAN ASSISTANT

## 2021-11-05 PROCEDURE — 99285 EMERGENCY DEPT VISIT HI MDM: CPT

## 2021-11-05 PROCEDURE — 99285 EMERGENCY DEPT VISIT HI MDM: CPT | Performed by: PHYSICIAN ASSISTANT

## 2021-11-05 PROCEDURE — 85007 BL SMEAR W/DIFF WBC COUNT: CPT | Performed by: PHYSICIAN ASSISTANT

## 2021-11-05 PROCEDURE — 83880 ASSAY OF NATRIURETIC PEPTIDE: CPT | Performed by: PHYSICIAN ASSISTANT

## 2021-11-05 PROCEDURE — 36415 COLL VENOUS BLD VENIPUNCTURE: CPT | Performed by: PHYSICIAN ASSISTANT

## 2021-11-05 PROCEDURE — 74176 CT ABD & PELVIS W/O CONTRAST: CPT

## 2021-11-05 PROCEDURE — 99220 PR INITIAL OBSERVATION CARE/DAY 70 MINUTES: CPT | Performed by: FAMILY MEDICINE

## 2021-11-05 PROCEDURE — 84484 ASSAY OF TROPONIN QUANT: CPT | Performed by: PHYSICIAN ASSISTANT

## 2021-11-05 PROCEDURE — 99213 OFFICE O/P EST LOW 20 MIN: CPT | Performed by: INTERNAL MEDICINE

## 2021-11-05 PROCEDURE — 80053 COMPREHEN METABOLIC PANEL: CPT | Performed by: PHYSICIAN ASSISTANT

## 2021-11-05 PROCEDURE — 85027 COMPLETE CBC AUTOMATED: CPT | Performed by: PHYSICIAN ASSISTANT

## 2021-11-05 PROCEDURE — 93005 ELECTROCARDIOGRAM TRACING: CPT

## 2021-11-05 PROCEDURE — C9113 INJ PANTOPRAZOLE SODIUM, VIA: HCPCS | Performed by: FAMILY MEDICINE

## 2021-11-05 RX ORDER — PANTOPRAZOLE SODIUM 40 MG/1
40 INJECTION, POWDER, FOR SOLUTION INTRAVENOUS EVERY 12 HOURS
Status: DISCONTINUED | OUTPATIENT
Start: 2021-11-05 | End: 2021-11-06 | Stop reason: HOSPADM

## 2021-11-05 RX ORDER — BENZONATATE 100 MG/1
100 CAPSULE ORAL 3 TIMES DAILY PRN
Status: DISCONTINUED | OUTPATIENT
Start: 2021-11-05 | End: 2021-11-06 | Stop reason: HOSPADM

## 2021-11-05 RX ORDER — METOPROLOL SUCCINATE 100 MG/1
200 TABLET, EXTENDED RELEASE ORAL DAILY
Status: DISCONTINUED | OUTPATIENT
Start: 2021-11-05 | End: 2021-11-06 | Stop reason: HOSPADM

## 2021-11-05 RX ORDER — DILTIAZEM HYDROCHLORIDE 120 MG/1
120 CAPSULE, COATED, EXTENDED RELEASE ORAL DAILY
Status: DISCONTINUED | OUTPATIENT
Start: 2021-11-05 | End: 2021-11-06 | Stop reason: HOSPADM

## 2021-11-05 RX ORDER — GUAIFENESIN 600 MG
600 TABLET, EXTENDED RELEASE 12 HR ORAL EVERY 12 HOURS SCHEDULED
Status: DISCONTINUED | OUTPATIENT
Start: 2021-11-05 | End: 2021-11-06 | Stop reason: HOSPADM

## 2021-11-05 RX ORDER — FERROUS SULFATE 325(65) MG
325 TABLET ORAL
Status: DISCONTINUED | OUTPATIENT
Start: 2021-11-05 | End: 2021-11-06 | Stop reason: HOSPADM

## 2021-11-05 RX ORDER — FUROSEMIDE 40 MG/1
40 TABLET ORAL DAILY
Status: DISCONTINUED | OUTPATIENT
Start: 2021-11-05 | End: 2021-11-05

## 2021-11-05 RX ORDER — POTASSIUM CHLORIDE 750 MG/1
10 TABLET, EXTENDED RELEASE ORAL DAILY
Status: DISCONTINUED | OUTPATIENT
Start: 2021-11-05 | End: 2021-11-05

## 2021-11-06 VITALS
RESPIRATION RATE: 17 BRPM | WEIGHT: 126.54 LBS | BODY MASS INDEX: 25.56 KG/M2 | HEART RATE: 70 BPM | OXYGEN SATURATION: 91 % | TEMPERATURE: 97.5 F | SYSTOLIC BLOOD PRESSURE: 161 MMHG | DIASTOLIC BLOOD PRESSURE: 86 MMHG

## 2021-11-06 LAB — HGB BLD-MCNC: 13.1 G/DL (ref 11.5–15.4)

## 2021-11-06 PROCEDURE — 85018 HEMOGLOBIN: CPT | Performed by: FAMILY MEDICINE

## 2021-11-06 PROCEDURE — 99217 PR OBSERVATION CARE DISCHARGE MANAGEMENT: CPT | Performed by: FAMILY MEDICINE

## 2021-11-06 PROCEDURE — C9113 INJ PANTOPRAZOLE SODIUM, VIA: HCPCS | Performed by: FAMILY MEDICINE

## 2021-11-06 RX ORDER — PREDNISONE 20 MG/1
20 TABLET ORAL DAILY
Qty: 5 TABLET | Refills: 0 | Status: SHIPPED | OUTPATIENT
Start: 2021-11-06 | End: 2021-11-14 | Stop reason: HOSPADM

## 2021-11-06 RX ORDER — AZITHROMYCIN 500 MG/1
500 TABLET, FILM COATED ORAL DAILY
Qty: 3 TABLET | Refills: 0 | Status: SHIPPED | OUTPATIENT
Start: 2021-11-06 | End: 2021-11-09

## 2021-11-06 RX ORDER — ALBUTEROL SULFATE 90 UG/1
2 AEROSOL, METERED RESPIRATORY (INHALATION) EVERY 6 HOURS PRN
Qty: 6.7 G | Refills: 0 | Status: SHIPPED | OUTPATIENT
Start: 2021-11-06

## 2021-11-06 RX ORDER — BENZONATATE 100 MG/1
100 CAPSULE ORAL 3 TIMES DAILY PRN
Qty: 20 CAPSULE | Refills: 0 | Status: ON HOLD | OUTPATIENT
Start: 2021-11-06 | End: 2021-11-14 | Stop reason: SDUPTHER

## 2021-11-08 ENCOUNTER — TRANSITIONAL CARE MANAGEMENT (OUTPATIENT)
Dept: INTERNAL MEDICINE CLINIC | Facility: CLINIC | Age: 86
End: 2021-11-08

## 2021-11-08 ENCOUNTER — TELEPHONE (OUTPATIENT)
Dept: INTERNAL MEDICINE CLINIC | Facility: CLINIC | Age: 86
End: 2021-11-08

## 2021-11-11 ENCOUNTER — APPOINTMENT (EMERGENCY)
Dept: RADIOLOGY | Facility: HOSPITAL | Age: 86
DRG: 291 | End: 2021-11-11
Payer: MEDICARE

## 2021-11-11 ENCOUNTER — HOSPITAL ENCOUNTER (INPATIENT)
Facility: HOSPITAL | Age: 86
LOS: 3 days | Discharge: HOME HEALTH | DRG: 291 | End: 2021-11-14
Attending: EMERGENCY MEDICINE | Admitting: INTERNAL MEDICINE
Payer: MEDICARE

## 2021-11-11 ENCOUNTER — OFFICE VISIT (OUTPATIENT)
Dept: INTERNAL MEDICINE CLINIC | Facility: CLINIC | Age: 86
End: 2021-11-11
Payer: MEDICARE

## 2021-11-11 VITALS
HEART RATE: 80 BPM | OXYGEN SATURATION: 97 % | TEMPERATURE: 97.8 F | RESPIRATION RATE: 18 BRPM | DIASTOLIC BLOOD PRESSURE: 82 MMHG | SYSTOLIC BLOOD PRESSURE: 106 MMHG | BODY MASS INDEX: 24.19 KG/M2 | HEIGHT: 59 IN | WEIGHT: 120 LBS

## 2021-11-11 DIAGNOSIS — I50.9 CHF (CONGESTIVE HEART FAILURE) (HCC): ICD-10-CM

## 2021-11-11 DIAGNOSIS — R05.9 COUGH: ICD-10-CM

## 2021-11-11 DIAGNOSIS — N17.9 ACUTE KIDNEY INJURY SUPERIMPOSED ON CKD (HCC): ICD-10-CM

## 2021-11-11 DIAGNOSIS — J30.1 SEASONAL ALLERGIC RHINITIS DUE TO POLLEN: ICD-10-CM

## 2021-11-11 DIAGNOSIS — R06.00 DYSPNEA: Primary | ICD-10-CM

## 2021-11-11 DIAGNOSIS — N18.9 ACUTE KIDNEY INJURY SUPERIMPOSED ON CKD (HCC): ICD-10-CM

## 2021-11-11 DIAGNOSIS — I50.33 ACUTE ON CHRONIC DIASTOLIC CONGESTIVE HEART FAILURE (HCC): ICD-10-CM

## 2021-11-11 DIAGNOSIS — J18.0 BRONCHOPNEUMONIA: Primary | ICD-10-CM

## 2021-11-11 DIAGNOSIS — I48.0 PAROXYSMAL ATRIAL FIBRILLATION (HCC): ICD-10-CM

## 2021-11-11 DIAGNOSIS — N18.32 STAGE 3B CHRONIC KIDNEY DISEASE (HCC): ICD-10-CM

## 2021-11-11 PROBLEM — N18.4 CHRONIC RENAL DISEASE, STAGE IV (HCC): Status: ACTIVE | Noted: 2021-11-11

## 2021-11-11 LAB
2HR DELTA HS TROPONIN: -2 NG/L
4HR DELTA HS TROPONIN: -5 NG/L
ALBUMIN SERPL BCP-MCNC: 3.8 G/DL (ref 3.5–5)
ALP SERPL-CCNC: 83 U/L (ref 46–116)
ALT SERPL W P-5'-P-CCNC: 59 U/L (ref 12–78)
ANION GAP SERPL CALCULATED.3IONS-SCNC: 9 MMOL/L (ref 4–13)
APTT PPP: 31 SECONDS (ref 23–37)
AST SERPL W P-5'-P-CCNC: 46 U/L (ref 5–45)
BASOPHILS # BLD MANUAL: 0 THOUSAND/UL (ref 0–0.1)
BASOPHILS NFR MAR MANUAL: 0 % (ref 0–1)
BILIRUB DIRECT SERPL-MCNC: 0.2 MG/DL (ref 0–0.2)
BILIRUB SERPL-MCNC: 0.59 MG/DL (ref 0.2–1)
BUN SERPL-MCNC: 45 MG/DL (ref 5–25)
CALCIUM SERPL-MCNC: 8.5 MG/DL (ref 8.3–10.1)
CARDIAC TROPONIN I PNL SERPL HS: 15 NG/L
CARDIAC TROPONIN I PNL SERPL HS: 18 NG/L
CARDIAC TROPONIN I PNL SERPL HS: 20 NG/L
CHLORIDE SERPL-SCNC: 102 MMOL/L (ref 100–108)
CO2 SERPL-SCNC: 32 MMOL/L (ref 21–32)
CREAT SERPL-MCNC: 1.27 MG/DL (ref 0.6–1.3)
EOSINOPHIL # BLD MANUAL: 0.13 THOUSAND/UL (ref 0–0.4)
EOSINOPHIL NFR BLD MANUAL: 1 % (ref 0–6)
ERYTHROCYTE [DISTWIDTH] IN BLOOD BY AUTOMATED COUNT: 14.5 % (ref 11.6–15.1)
FLUAV RNA RESP QL NAA+PROBE: NEGATIVE
FLUBV RNA RESP QL NAA+PROBE: NEGATIVE
GFR SERPL CREATININE-BSD FRML MDRD: 35 ML/MIN/1.73SQ M
GLUCOSE SERPL-MCNC: 126 MG/DL (ref 65–140)
HCT VFR BLD AUTO: 40.5 % (ref 34.8–46.1)
HGB BLD-MCNC: 12.2 G/DL (ref 11.5–15.4)
INR PPP: 1.14 (ref 0.84–1.19)
LYMPHOCYTES # BLD AUTO: 0.53 THOUSAND/UL (ref 0.6–4.47)
LYMPHOCYTES # BLD AUTO: 4 % (ref 14–44)
MCH RBC QN AUTO: 28.5 PG (ref 26.8–34.3)
MCHC RBC AUTO-ENTMCNC: 30.1 G/DL (ref 31.4–37.4)
MCV RBC AUTO: 95 FL (ref 82–98)
METAMYELOCYTES NFR BLD MANUAL: 3 % (ref 0–1)
MONOCYTES # BLD AUTO: 0.4 THOUSAND/UL (ref 0–1.22)
MONOCYTES NFR BLD: 3 % (ref 4–12)
NEUTROPHILS # BLD MANUAL: 11.71 THOUSAND/UL (ref 1.85–7.62)
NEUTS SEG NFR BLD AUTO: 88 % (ref 43–75)
NT-PROBNP SERPL-MCNC: 7205 PG/ML
PLATELET # BLD AUTO: 207 THOUSANDS/UL (ref 149–390)
PLATELET BLD QL SMEAR: ADEQUATE
PMV BLD AUTO: 11.4 FL (ref 8.9–12.7)
POTASSIUM SERPL-SCNC: 5.2 MMOL/L (ref 3.5–5.3)
PROMYELOCYTES NFR BLD MANUAL: 1 % (ref 0–0)
PROT SERPL-MCNC: 7.1 G/DL (ref 6.4–8.2)
PROTHROMBIN TIME: 14.2 SECONDS (ref 11.6–14.5)
RBC # BLD AUTO: 4.28 MILLION/UL (ref 3.81–5.12)
RBC MORPH BLD: NORMAL
RSV RNA RESP QL NAA+PROBE: NEGATIVE
SARS-COV-2 RNA RESP QL NAA+PROBE: NEGATIVE
SODIUM SERPL-SCNC: 143 MMOL/L (ref 136–145)
WBC # BLD AUTO: 13.31 THOUSAND/UL (ref 4.31–10.16)

## 2021-11-11 PROCEDURE — 99285 EMERGENCY DEPT VISIT HI MDM: CPT | Performed by: EMERGENCY MEDICINE

## 2021-11-11 PROCEDURE — 85730 THROMBOPLASTIN TIME PARTIAL: CPT | Performed by: EMERGENCY MEDICINE

## 2021-11-11 PROCEDURE — 93005 ELECTROCARDIOGRAM TRACING: CPT

## 2021-11-11 PROCEDURE — 0241U HB NFCT DS VIR RESP RNA 4 TRGT: CPT | Performed by: EMERGENCY MEDICINE

## 2021-11-11 PROCEDURE — 85610 PROTHROMBIN TIME: CPT | Performed by: EMERGENCY MEDICINE

## 2021-11-11 PROCEDURE — 85007 BL SMEAR W/DIFF WBC COUNT: CPT | Performed by: EMERGENCY MEDICINE

## 2021-11-11 PROCEDURE — 80048 BASIC METABOLIC PNL TOTAL CA: CPT | Performed by: EMERGENCY MEDICINE

## 2021-11-11 PROCEDURE — 36415 COLL VENOUS BLD VENIPUNCTURE: CPT | Performed by: EMERGENCY MEDICINE

## 2021-11-11 PROCEDURE — 99222 1ST HOSP IP/OBS MODERATE 55: CPT | Performed by: PHYSICIAN ASSISTANT

## 2021-11-11 PROCEDURE — 99496 TRANSJ CARE MGMT HIGH F2F 7D: CPT | Performed by: INTERNAL MEDICINE

## 2021-11-11 PROCEDURE — 80076 HEPATIC FUNCTION PANEL: CPT | Performed by: EMERGENCY MEDICINE

## 2021-11-11 PROCEDURE — 71046 X-RAY EXAM CHEST 2 VIEWS: CPT

## 2021-11-11 PROCEDURE — 83880 ASSAY OF NATRIURETIC PEPTIDE: CPT | Performed by: EMERGENCY MEDICINE

## 2021-11-11 PROCEDURE — 84484 ASSAY OF TROPONIN QUANT: CPT | Performed by: EMERGENCY MEDICINE

## 2021-11-11 PROCEDURE — 99285 EMERGENCY DEPT VISIT HI MDM: CPT

## 2021-11-11 PROCEDURE — 96374 THER/PROPH/DIAG INJ IV PUSH: CPT

## 2021-11-11 PROCEDURE — 85027 COMPLETE CBC AUTOMATED: CPT | Performed by: EMERGENCY MEDICINE

## 2021-11-11 RX ORDER — ACETAMINOPHEN 325 MG/1
650 TABLET ORAL EVERY 6 HOURS PRN
Status: DISCONTINUED | OUTPATIENT
Start: 2021-11-11 | End: 2021-11-14 | Stop reason: HOSPADM

## 2021-11-11 RX ORDER — BENZONATATE 100 MG/1
100 CAPSULE ORAL 3 TIMES DAILY PRN
Status: DISCONTINUED | OUTPATIENT
Start: 2021-11-11 | End: 2021-11-14 | Stop reason: HOSPADM

## 2021-11-11 RX ORDER — METOPROLOL SUCCINATE 100 MG/1
200 TABLET, EXTENDED RELEASE ORAL DAILY
Status: DISCONTINUED | OUTPATIENT
Start: 2021-11-12 | End: 2021-11-14 | Stop reason: HOSPADM

## 2021-11-11 RX ORDER — FUROSEMIDE 10 MG/ML
40 INJECTION INTRAMUSCULAR; INTRAVENOUS ONCE
Status: COMPLETED | OUTPATIENT
Start: 2021-11-11 | End: 2021-11-11

## 2021-11-11 RX ORDER — FUROSEMIDE 10 MG/ML
40 INJECTION INTRAMUSCULAR; INTRAVENOUS DAILY
Status: DISCONTINUED | OUTPATIENT
Start: 2021-11-12 | End: 2021-11-12

## 2021-11-11 RX ORDER — IPRATROPIUM BROMIDE AND ALBUTEROL SULFATE 2.5; .5 MG/3ML; MG/3ML
3 SOLUTION RESPIRATORY (INHALATION) ONCE
Status: COMPLETED | OUTPATIENT
Start: 2021-11-11 | End: 2021-11-11

## 2021-11-11 RX ORDER — ALBUTEROL SULFATE 90 UG/1
2 AEROSOL, METERED RESPIRATORY (INHALATION) EVERY 6 HOURS PRN
Status: DISCONTINUED | OUTPATIENT
Start: 2021-11-11 | End: 2021-11-14 | Stop reason: HOSPADM

## 2021-11-11 RX ORDER — MAGNESIUM HYDROXIDE/ALUMINUM HYDROXICE/SIMETHICONE 120; 1200; 1200 MG/30ML; MG/30ML; MG/30ML
30 SUSPENSION ORAL EVERY 6 HOURS PRN
Status: DISCONTINUED | OUTPATIENT
Start: 2021-11-11 | End: 2021-11-14 | Stop reason: HOSPADM

## 2021-11-11 RX ORDER — FERROUS SULFATE 325(65) MG
325 TABLET ORAL
Status: DISCONTINUED | OUTPATIENT
Start: 2021-11-12 | End: 2021-11-14 | Stop reason: HOSPADM

## 2021-11-11 RX ORDER — DILTIAZEM HYDROCHLORIDE 120 MG/1
120 CAPSULE, COATED, EXTENDED RELEASE ORAL DAILY
Status: DISCONTINUED | OUTPATIENT
Start: 2021-11-12 | End: 2021-11-14 | Stop reason: HOSPADM

## 2021-11-11 RX ADMIN — IPRATROPIUM BROMIDE AND ALBUTEROL SULFATE 3 ML: 2.5; .5 SOLUTION RESPIRATORY (INHALATION) at 16:43

## 2021-11-11 RX ADMIN — FUROSEMIDE 40 MG: 10 INJECTION, SOLUTION INTRAVENOUS at 18:03

## 2021-11-12 ENCOUNTER — APPOINTMENT (INPATIENT)
Dept: NON INVASIVE DIAGNOSTICS | Facility: HOSPITAL | Age: 86
DRG: 291 | End: 2021-11-12
Payer: MEDICARE

## 2021-11-12 LAB
AORTIC ROOT: 2.7 CM
AORTIC VALVE MEAN VELOCITY: 14.8 M/S
APICAL FOUR CHAMBER EJECTION FRACTION: 63 %
ASCENDING AORTA: 2.7 CM
AV AREA BY CONTINUOUS VTI: 0.9 CM2
AV AREA PEAK VELOCITY: 0.9 CM2
AV LVOT MEAN GRADIENT: 1 MMHG
AV LVOT PEAK GRADIENT: 2 MMHG
AV MEAN GRADIENT: 11 MMHG
AV PEAK GRADIENT: 19 MMHG
AV VALVE AREA: 0.9 CM2
DOP CALC AO VTI: 57.8 CM
DOP CALC LVOT AREA: 2.83 CM2
DOP CALC LVOT DIAMETER: 1.9 CM
DOP CALC LVOT PEAK VEL VTI: 18.4 CM
DOP CALC LVOT PEAK VEL: 0.66 M/S
DOP CALC LVOT STROKE INDEX: 35.1 ML/M2
DOP CALC LVOT STROKE VOLUME: 52.14 CM3
E WAVE DECELERATION TIME: 187 MS
FRACTIONAL SHORTENING: 25 % (ref 28–44)
INTERVENTRICULAR SEPTUM IN DIASTOLE (PARASTERNAL SHORT AXIS VIEW): 1.4 CM
LA/AORTA RATIO 2D: 1.81
LEFT INTERNAL DIMENSION IN SYSTOLE: 2.7 CM (ref 2.1–4)
LEFT VENTRICULAR INTERNAL DIMENSION IN DIASTOLE: 3.6 CM (ref 3.65–5.43)
LEFT VENTRICULAR POSTERIOR WALL IN END DIASTOLE: 1.1 CM
LEFT VENTRICULAR STROKE VOLUME: 26 ML
MV E'TISSUE VEL-SEP: 8 CM/S
MV PEAK A VEL: 0.73 M/S
MV PEAK E VEL: 160 CM/S
MV STENOSIS PRESSURE HALF TIME: 0 MS
SL CV PED ECHO LEFT VENTRICLE DIASTOLIC VOLUME (MOD BIPLANE) 2D: 53 ML
SL CV PED ECHO LEFT VENTRICLE SYSTOLIC VOLUME (MOD BIPLANE) 2D: 26 ML
TRICUSPID VALVE PEAK REGURGITATION VELOCITY: 3.4 M/S
TRICUSPID VALVE S': 0.7 CM/S
TV PEAK GRADIENT: 46 MMHG
Z-SCORE OF LEFT VENTRICULAR DIMENSION IN END SYSTOLE: -2.13

## 2021-11-12 PROCEDURE — 99222 1ST HOSP IP/OBS MODERATE 55: CPT | Performed by: INTERNAL MEDICINE

## 2021-11-12 PROCEDURE — 93306 TTE W/DOPPLER COMPLETE: CPT | Performed by: INTERNAL MEDICINE

## 2021-11-12 PROCEDURE — 93306 TTE W/DOPPLER COMPLETE: CPT

## 2021-11-12 PROCEDURE — 99233 SBSQ HOSP IP/OBS HIGH 50: CPT | Performed by: INTERNAL MEDICINE

## 2021-11-12 RX ORDER — FUROSEMIDE 10 MG/ML
40 INJECTION INTRAMUSCULAR; INTRAVENOUS
Status: DISCONTINUED | OUTPATIENT
Start: 2021-11-12 | End: 2021-11-13

## 2021-11-12 RX ORDER — FUROSEMIDE 10 MG/ML
40 INJECTION INTRAMUSCULAR; INTRAVENOUS DAILY
Status: DISCONTINUED | OUTPATIENT
Start: 2021-11-12 | End: 2021-11-12

## 2021-11-12 RX ORDER — FUROSEMIDE 10 MG/ML
20 INJECTION INTRAMUSCULAR; INTRAVENOUS
Status: DISCONTINUED | OUTPATIENT
Start: 2021-11-12 | End: 2021-11-12

## 2021-11-12 RX ADMIN — BENZONATATE 100 MG: 100 CAPSULE ORAL at 16:59

## 2021-11-12 RX ADMIN — FUROSEMIDE 20 MG: 10 INJECTION, SOLUTION INTRAVENOUS at 10:12

## 2021-11-12 RX ADMIN — METOPROLOL SUCCINATE 200 MG: 100 TABLET, EXTENDED RELEASE ORAL at 09:49

## 2021-11-12 RX ADMIN — FERROUS SULFATE TAB 325 MG (65 MG ELEMENTAL FE) 325 MG: 325 (65 FE) TAB at 09:48

## 2021-11-12 RX ADMIN — APIXABAN 2.5 MG: 2.5 TABLET, FILM COATED ORAL at 16:59

## 2021-11-12 RX ADMIN — FUROSEMIDE 40 MG: 10 INJECTION, SOLUTION INTRAVENOUS at 17:00

## 2021-11-12 RX ADMIN — DILTIAZEM HYDROCHLORIDE 120 MG: 120 CAPSULE, COATED, EXTENDED RELEASE ORAL at 09:48

## 2021-11-12 RX ADMIN — APIXABAN 2.5 MG: 2.5 TABLET, FILM COATED ORAL at 09:49

## 2021-11-13 PROBLEM — N18.9 ACUTE KIDNEY INJURY SUPERIMPOSED ON CKD (HCC): Status: ACTIVE | Noted: 2021-11-13

## 2021-11-13 PROBLEM — N17.9 ACUTE KIDNEY INJURY SUPERIMPOSED ON CKD (HCC): Status: ACTIVE | Noted: 2021-11-13

## 2021-11-13 LAB
ANION GAP SERPL CALCULATED.3IONS-SCNC: 9 MMOL/L (ref 4–13)
BUN SERPL-MCNC: 39 MG/DL (ref 5–25)
CALCIUM SERPL-MCNC: 8.5 MG/DL (ref 8.3–10.1)
CHLORIDE SERPL-SCNC: 101 MMOL/L (ref 100–108)
CO2 SERPL-SCNC: 37 MMOL/L (ref 21–32)
CREAT SERPL-MCNC: 1.45 MG/DL (ref 0.6–1.3)
GFR SERPL CREATININE-BSD FRML MDRD: 30 ML/MIN/1.73SQ M
GLUCOSE SERPL-MCNC: 78 MG/DL (ref 65–140)
POTASSIUM SERPL-SCNC: 3.7 MMOL/L (ref 3.5–5.3)
SODIUM SERPL-SCNC: 147 MMOL/L (ref 136–145)

## 2021-11-13 PROCEDURE — 99233 SBSQ HOSP IP/OBS HIGH 50: CPT | Performed by: PHYSICIAN ASSISTANT

## 2021-11-13 PROCEDURE — 80048 BASIC METABOLIC PNL TOTAL CA: CPT | Performed by: INTERNAL MEDICINE

## 2021-11-13 PROCEDURE — 99233 SBSQ HOSP IP/OBS HIGH 50: CPT | Performed by: INTERNAL MEDICINE

## 2021-11-13 RX ORDER — FUROSEMIDE 40 MG/1
40 TABLET ORAL DAILY
Status: DISCONTINUED | OUTPATIENT
Start: 2021-11-14 | End: 2021-11-14 | Stop reason: HOSPADM

## 2021-11-13 RX ORDER — FUROSEMIDE 10 MG/ML
40 INJECTION INTRAMUSCULAR; INTRAVENOUS
Status: DISCONTINUED | OUTPATIENT
Start: 2021-11-13 | End: 2021-11-13

## 2021-11-13 RX ORDER — FUROSEMIDE 10 MG/ML
40 INJECTION INTRAMUSCULAR; INTRAVENOUS ONCE
Status: COMPLETED | OUTPATIENT
Start: 2021-11-13 | End: 2021-11-13

## 2021-11-13 RX ADMIN — DILTIAZEM HYDROCHLORIDE 120 MG: 120 CAPSULE, COATED, EXTENDED RELEASE ORAL at 08:26

## 2021-11-13 RX ADMIN — APIXABAN 2.5 MG: 2.5 TABLET, FILM COATED ORAL at 08:26

## 2021-11-13 RX ADMIN — FERROUS SULFATE TAB 325 MG (65 MG ELEMENTAL FE) 325 MG: 325 (65 FE) TAB at 08:26

## 2021-11-13 RX ADMIN — METOPROLOL SUCCINATE 200 MG: 100 TABLET, EXTENDED RELEASE ORAL at 08:26

## 2021-11-13 RX ADMIN — FUROSEMIDE 40 MG: 10 INJECTION, SOLUTION INTRAMUSCULAR; INTRAVENOUS at 16:58

## 2021-11-13 RX ADMIN — APIXABAN 2.5 MG: 2.5 TABLET, FILM COATED ORAL at 17:00

## 2021-11-14 VITALS
WEIGHT: 121.03 LBS | DIASTOLIC BLOOD PRESSURE: 70 MMHG | TEMPERATURE: 98 F | RESPIRATION RATE: 17 BRPM | OXYGEN SATURATION: 94 % | BODY MASS INDEX: 24.4 KG/M2 | HEIGHT: 59 IN | HEART RATE: 80 BPM | SYSTOLIC BLOOD PRESSURE: 139 MMHG

## 2021-11-14 DIAGNOSIS — N17.9 AKI (ACUTE KIDNEY INJURY) (HCC): Primary | ICD-10-CM

## 2021-11-14 PROBLEM — I50.9 ACUTE EXACERBATION OF CHF (CONGESTIVE HEART FAILURE) (HCC): Status: RESOLVED | Noted: 2021-11-11 | Resolved: 2021-11-14

## 2021-11-14 LAB
ANION GAP SERPL CALCULATED.3IONS-SCNC: 8 MMOL/L (ref 4–13)
BUN SERPL-MCNC: 40 MG/DL (ref 5–25)
CALCIUM SERPL-MCNC: 8 MG/DL (ref 8.3–10.1)
CHLORIDE SERPL-SCNC: 101 MMOL/L (ref 100–108)
CO2 SERPL-SCNC: 34 MMOL/L (ref 21–32)
CREAT SERPL-MCNC: 1.46 MG/DL (ref 0.6–1.3)
GFR SERPL CREATININE-BSD FRML MDRD: 30 ML/MIN/1.73SQ M
GLUCOSE SERPL-MCNC: 84 MG/DL (ref 65–140)
MAGNESIUM SERPL-MCNC: 2.2 MG/DL (ref 1.6–2.6)
PHOSPHATE SERPL-MCNC: 4.1 MG/DL (ref 2.3–4.1)
POTASSIUM SERPL-SCNC: 3.6 MMOL/L (ref 3.5–5.3)
SODIUM SERPL-SCNC: 143 MMOL/L (ref 136–145)

## 2021-11-14 PROCEDURE — 99232 SBSQ HOSP IP/OBS MODERATE 35: CPT | Performed by: INTERNAL MEDICINE

## 2021-11-14 PROCEDURE — 80048 BASIC METABOLIC PNL TOTAL CA: CPT | Performed by: PHYSICIAN ASSISTANT

## 2021-11-14 PROCEDURE — 99239 HOSP IP/OBS DSCHRG MGMT >30: CPT | Performed by: INTERNAL MEDICINE

## 2021-11-14 PROCEDURE — 83735 ASSAY OF MAGNESIUM: CPT | Performed by: PHYSICIAN ASSISTANT

## 2021-11-14 PROCEDURE — 84100 ASSAY OF PHOSPHORUS: CPT | Performed by: PHYSICIAN ASSISTANT

## 2021-11-14 RX ORDER — BENZONATATE 100 MG/1
100 CAPSULE ORAL 3 TIMES DAILY PRN
Qty: 20 CAPSULE | Refills: 0 | Status: SHIPPED | OUTPATIENT
Start: 2021-11-14 | End: 2022-03-15 | Stop reason: ALTCHOICE

## 2021-11-14 RX ORDER — FLUTICASONE PROPIONATE 50 MCG
1 SPRAY, SUSPENSION (ML) NASAL DAILY
Qty: 11.1 ML | Refills: 0 | Status: SHIPPED | OUTPATIENT
Start: 2021-11-14

## 2021-11-14 RX ADMIN — FUROSEMIDE 40 MG: 40 TABLET ORAL at 10:06

## 2021-11-14 RX ADMIN — DILTIAZEM HYDROCHLORIDE 120 MG: 120 CAPSULE, COATED, EXTENDED RELEASE ORAL at 10:06

## 2021-11-14 RX ADMIN — METOPROLOL SUCCINATE 200 MG: 100 TABLET, EXTENDED RELEASE ORAL at 10:06

## 2021-11-14 RX ADMIN — FERROUS SULFATE TAB 325 MG (65 MG ELEMENTAL FE) 325 MG: 325 (65 FE) TAB at 10:06

## 2021-11-14 RX ADMIN — APIXABAN 2.5 MG: 2.5 TABLET, FILM COATED ORAL at 10:06

## 2021-11-15 ENCOUNTER — PATIENT OUTREACH (OUTPATIENT)
Dept: CASE MANAGEMENT | Facility: HOSPITAL | Age: 86
End: 2021-11-15

## 2021-11-15 ENCOUNTER — TELEPHONE (OUTPATIENT)
Dept: INTERNAL MEDICINE CLINIC | Facility: CLINIC | Age: 86
End: 2021-11-15

## 2021-11-15 DIAGNOSIS — I50.32 CHRONIC DIASTOLIC CONGESTIVE HEART FAILURE (HCC): Primary | ICD-10-CM

## 2021-11-15 LAB
ATRIAL RATE: 250 BPM
QRS AXIS: 107 DEGREES
QRSD INTERVAL: 70 MS
QT INTERVAL: 386 MS
QTC INTERVAL: 407 MS
T WAVE AXIS: 73 DEGREES
VENTRICULAR RATE: 67 BPM

## 2021-11-15 PROCEDURE — 93010 ELECTROCARDIOGRAM REPORT: CPT | Performed by: INTERNAL MEDICINE

## 2021-11-15 NOTE — TELEPHONE ENCOUNTER
Vadim Holland (Self) [phone] (M)     Pt was dc'ed from [facility] 11/14/2021 and wants her hosp f/u w/Dr Jennifer Blue    he has no availability for this appt    Patient does have an [facility] appt with Dr Jennifer Blue 11/26/21 @ 2:30, would  do her TCM at that appt ? ? Please call pt back & let hr know

## 2021-11-16 ENCOUNTER — TELEPHONE (OUTPATIENT)
Dept: INTERNAL MEDICINE CLINIC | Facility: CLINIC | Age: 86
End: 2021-11-16

## 2021-11-16 NOTE — TELEPHONE ENCOUNTER
FYI - Admitted pt to home care for nursing PT & OT  Want to put her on nicas -testing to see how much fluid is in the body    Also tele monitering  Faye Elsa ck'ing heart rate, ox level, weight, BP  Still has crackles in bases of lungs, wheeze, frequent non productive cough    once every 10 min's    fatigued  Faye Elsa a lot of pn in right foot    + 1 edema, warmer then the other, not hot    complaining 5 - 9 pn level  Faye Elsa She will try tylenol to see if that helps       Not on any abx    vitals weight 123

## 2021-11-17 ENCOUNTER — PATIENT OUTREACH (OUTPATIENT)
Dept: CASE MANAGEMENT | Facility: HOSPITAL | Age: 86
End: 2021-11-17

## 2021-11-17 ENCOUNTER — TELEPHONE (OUTPATIENT)
Dept: INTERNAL MEDICINE CLINIC | Facility: CLINIC | Age: 86
End: 2021-11-17

## 2021-11-18 ENCOUNTER — APPOINTMENT (EMERGENCY)
Dept: RADIOLOGY | Facility: HOSPITAL | Age: 86
DRG: 314 | End: 2021-11-18
Payer: MEDICARE

## 2021-11-18 ENCOUNTER — HOSPITAL ENCOUNTER (INPATIENT)
Facility: HOSPITAL | Age: 86
LOS: 4 days | Discharge: HOME HEALTH | DRG: 314 | End: 2021-11-23
Attending: EMERGENCY MEDICINE | Admitting: FAMILY MEDICINE
Payer: MEDICARE

## 2021-11-18 DIAGNOSIS — R06.00 DYSPNEA: Primary | ICD-10-CM

## 2021-11-18 DIAGNOSIS — I50.9 CHF (CONGESTIVE HEART FAILURE) (HCC): ICD-10-CM

## 2021-11-18 DIAGNOSIS — R09.02 HYPOXIA: ICD-10-CM

## 2021-11-18 DIAGNOSIS — R60.9 EDEMA, UNSPECIFIED TYPE: ICD-10-CM

## 2021-11-18 DIAGNOSIS — I27.20 SEVERE PULMONARY HYPERTENSION (HCC): ICD-10-CM

## 2021-11-18 PROBLEM — R06.03 ACUTE RESPIRATORY DISTRESS: Status: ACTIVE | Noted: 2021-11-18

## 2021-11-18 LAB
2HR DELTA HS TROPONIN: -1 NG/L
4HR DELTA HS TROPONIN: -3 NG/L
ALBUMIN SERPL BCP-MCNC: 3.2 G/DL (ref 3.5–5)
ALP SERPL-CCNC: 72 U/L (ref 46–116)
ALT SERPL W P-5'-P-CCNC: 23 U/L (ref 12–78)
ANION GAP SERPL CALCULATED.3IONS-SCNC: 8 MMOL/L (ref 4–13)
AST SERPL W P-5'-P-CCNC: 19 U/L (ref 5–45)
ATRIAL RATE: 80 BPM
BASOPHILS # BLD AUTO: 0.07 THOUSANDS/ΜL (ref 0–0.1)
BASOPHILS NFR BLD AUTO: 1 % (ref 0–1)
BILIRUB SERPL-MCNC: 0.55 MG/DL (ref 0.2–1)
BUN SERPL-MCNC: 45 MG/DL (ref 5–25)
CALCIUM ALBUM COR SERPL-MCNC: 9.2 MG/DL (ref 8.3–10.1)
CALCIUM SERPL-MCNC: 8.6 MG/DL (ref 8.3–10.1)
CARDIAC TROPONIN I PNL SERPL HS: 14 NG/L
CARDIAC TROPONIN I PNL SERPL HS: 16 NG/L
CARDIAC TROPONIN I PNL SERPL HS: 17 NG/L
CHLORIDE SERPL-SCNC: 101 MMOL/L (ref 100–108)
CO2 SERPL-SCNC: 32 MMOL/L (ref 21–32)
CREAT SERPL-MCNC: 1.73 MG/DL (ref 0.6–1.3)
EOSINOPHIL # BLD AUTO: 0.31 THOUSAND/ΜL (ref 0–0.61)
EOSINOPHIL NFR BLD AUTO: 4 % (ref 0–6)
ERYTHROCYTE [DISTWIDTH] IN BLOOD BY AUTOMATED COUNT: 14.6 % (ref 11.6–15.1)
GFR SERPL CREATININE-BSD FRML MDRD: 24 ML/MIN/1.73SQ M
GLUCOSE SERPL-MCNC: 95 MG/DL (ref 65–140)
HCT VFR BLD AUTO: 40.2 % (ref 34.8–46.1)
HGB BLD-MCNC: 12.1 G/DL (ref 11.5–15.4)
IMM GRANULOCYTES # BLD AUTO: 0.12 THOUSAND/UL (ref 0–0.2)
IMM GRANULOCYTES NFR BLD AUTO: 1 % (ref 0–2)
LYMPHOCYTES # BLD AUTO: 0.74 THOUSANDS/ΜL (ref 0.6–4.47)
LYMPHOCYTES NFR BLD AUTO: 9 % (ref 14–44)
MCH RBC QN AUTO: 28.3 PG (ref 26.8–34.3)
MCHC RBC AUTO-ENTMCNC: 30.1 G/DL (ref 31.4–37.4)
MCV RBC AUTO: 94 FL (ref 82–98)
MONOCYTES # BLD AUTO: 0.97 THOUSAND/ΜL (ref 0.17–1.22)
MONOCYTES NFR BLD AUTO: 11 % (ref 4–12)
NEUTROPHILS # BLD AUTO: 6.41 THOUSANDS/ΜL (ref 1.85–7.62)
NEUTS SEG NFR BLD AUTO: 74 % (ref 43–75)
NRBC BLD AUTO-RTO: 0 /100 WBCS
NT-PROBNP SERPL-MCNC: 3061 PG/ML
PLATELET # BLD AUTO: 157 THOUSANDS/UL (ref 149–390)
PMV BLD AUTO: 12.8 FL (ref 8.9–12.7)
POTASSIUM SERPL-SCNC: 4.2 MMOL/L (ref 3.5–5.3)
PROT SERPL-MCNC: 6.8 G/DL (ref 6.4–8.2)
QRS AXIS: 119 DEGREES
QRSD INTERVAL: 72 MS
QT INTERVAL: 396 MS
QTC INTERVAL: 454 MS
RBC # BLD AUTO: 4.27 MILLION/UL (ref 3.81–5.12)
SODIUM SERPL-SCNC: 141 MMOL/L (ref 136–145)
T WAVE AXIS: 17 DEGREES
VENTRICULAR RATE: 79 BPM
WBC # BLD AUTO: 8.62 THOUSAND/UL (ref 4.31–10.16)

## 2021-11-18 PROCEDURE — 85025 COMPLETE CBC W/AUTO DIFF WBC: CPT | Performed by: PHYSICIAN ASSISTANT

## 2021-11-18 PROCEDURE — 84484 ASSAY OF TROPONIN QUANT: CPT | Performed by: PHYSICIAN ASSISTANT

## 2021-11-18 PROCEDURE — 83880 ASSAY OF NATRIURETIC PEPTIDE: CPT | Performed by: PHYSICIAN ASSISTANT

## 2021-11-18 PROCEDURE — 94664 DEMO&/EVAL PT USE INHALER: CPT

## 2021-11-18 PROCEDURE — 94640 AIRWAY INHALATION TREATMENT: CPT

## 2021-11-18 PROCEDURE — 93010 ELECTROCARDIOGRAM REPORT: CPT | Performed by: INTERNAL MEDICINE

## 2021-11-18 PROCEDURE — 99220 PR INITIAL OBSERVATION CARE/DAY 70 MINUTES: CPT | Performed by: STUDENT IN AN ORGANIZED HEALTH CARE EDUCATION/TRAINING PROGRAM

## 2021-11-18 PROCEDURE — 99285 EMERGENCY DEPT VISIT HI MDM: CPT | Performed by: PHYSICIAN ASSISTANT

## 2021-11-18 PROCEDURE — 93005 ELECTROCARDIOGRAM TRACING: CPT

## 2021-11-18 PROCEDURE — 36415 COLL VENOUS BLD VENIPUNCTURE: CPT | Performed by: PHYSICIAN ASSISTANT

## 2021-11-18 PROCEDURE — 99285 EMERGENCY DEPT VISIT HI MDM: CPT

## 2021-11-18 PROCEDURE — 80053 COMPREHEN METABOLIC PANEL: CPT | Performed by: PHYSICIAN ASSISTANT

## 2021-11-18 PROCEDURE — 71046 X-RAY EXAM CHEST 2 VIEWS: CPT

## 2021-11-18 RX ORDER — FUROSEMIDE 40 MG/1
40 TABLET ORAL ONCE
Status: COMPLETED | OUTPATIENT
Start: 2021-11-18 | End: 2021-11-18

## 2021-11-18 RX ORDER — DILTIAZEM HYDROCHLORIDE 120 MG/1
120 CAPSULE, COATED, EXTENDED RELEASE ORAL DAILY
Status: DISCONTINUED | OUTPATIENT
Start: 2021-11-18 | End: 2021-11-23 | Stop reason: HOSPADM

## 2021-11-18 RX ORDER — POTASSIUM CHLORIDE 750 MG/1
10 TABLET, EXTENDED RELEASE ORAL DAILY
Status: DISCONTINUED | OUTPATIENT
Start: 2021-11-19 | End: 2021-11-18

## 2021-11-18 RX ORDER — IPRATROPIUM BROMIDE AND ALBUTEROL SULFATE .5; 3 MG/3ML; MG/3ML
1 SOLUTION RESPIRATORY (INHALATION) ONCE
Status: COMPLETED | OUTPATIENT
Start: 2021-11-18 | End: 2021-11-18

## 2021-11-18 RX ORDER — POTASSIUM CHLORIDE 750 MG/1
10 TABLET, EXTENDED RELEASE ORAL ONCE
Status: COMPLETED | OUTPATIENT
Start: 2021-11-18 | End: 2021-11-18

## 2021-11-18 RX ORDER — ALBUTEROL SULFATE 2.5 MG/3ML
2.5 SOLUTION RESPIRATORY (INHALATION) EVERY 4 HOURS PRN
Status: DISCONTINUED | OUTPATIENT
Start: 2021-11-18 | End: 2021-11-23 | Stop reason: HOSPADM

## 2021-11-18 RX ORDER — IPRATROPIUM BROMIDE AND ALBUTEROL SULFATE 2.5; .5 MG/3ML; MG/3ML
3 SOLUTION RESPIRATORY (INHALATION)
Status: DISCONTINUED | OUTPATIENT
Start: 2021-11-18 | End: 2021-11-18

## 2021-11-18 RX ORDER — FUROSEMIDE 10 MG/ML
40 INJECTION INTRAMUSCULAR; INTRAVENOUS
Status: DISCONTINUED | OUTPATIENT
Start: 2021-11-18 | End: 2021-11-21

## 2021-11-18 RX ORDER — FUROSEMIDE 10 MG/ML
40 INJECTION INTRAMUSCULAR; INTRAVENOUS
Status: DISCONTINUED | OUTPATIENT
Start: 2021-11-19 | End: 2021-11-18

## 2021-11-18 RX ORDER — BENZONATATE 100 MG/1
100 CAPSULE ORAL 3 TIMES DAILY PRN
Status: DISCONTINUED | OUTPATIENT
Start: 2021-11-18 | End: 2021-11-23 | Stop reason: HOSPADM

## 2021-11-18 RX ORDER — DILTIAZEM HYDROCHLORIDE 60 MG/1
60 CAPSULE, EXTENDED RELEASE ORAL EVERY 12 HOURS SCHEDULED
Status: DISCONTINUED | OUTPATIENT
Start: 2021-11-19 | End: 2021-11-18

## 2021-11-18 RX ORDER — METOPROLOL SUCCINATE 50 MG/1
200 TABLET, EXTENDED RELEASE ORAL DAILY
Status: DISCONTINUED | OUTPATIENT
Start: 2021-11-19 | End: 2021-11-18

## 2021-11-18 RX ORDER — METOPROLOL SUCCINATE 100 MG/1
100 TABLET, EXTENDED RELEASE ORAL DAILY
Status: DISCONTINUED | OUTPATIENT
Start: 2021-11-18 | End: 2021-11-23 | Stop reason: HOSPADM

## 2021-11-18 RX ADMIN — NIFEDIPINE 120 MG: 10 CAPSULE ORAL at 13:14

## 2021-11-18 RX ADMIN — METOPROLOL SUCCINATE 100 MG: 100 TABLET, EXTENDED RELEASE ORAL at 12:43

## 2021-11-18 RX ADMIN — IPRATROPIUM BROMIDE AND ALBUTEROL SULFATE 3 ML: 2.5; .5 SOLUTION RESPIRATORY (INHALATION) at 13:14

## 2021-11-18 RX ADMIN — CEFTRIAXONE SODIUM 1000 MG: 10 INJECTION, POWDER, FOR SOLUTION INTRAVENOUS at 14:38

## 2021-11-18 RX ADMIN — POTASSIUM CHLORIDE 10 MEQ: 750 TABLET, EXTENDED RELEASE ORAL at 12:43

## 2021-11-18 RX ADMIN — FUROSEMIDE 40 MG: 40 TABLET ORAL at 12:43

## 2021-11-18 RX ADMIN — FUROSEMIDE 40 MG: 10 INJECTION, SOLUTION INTRAMUSCULAR; INTRAVENOUS at 18:03

## 2021-11-18 RX ADMIN — APIXABAN 2.5 MG: 2.5 TABLET, FILM COATED ORAL at 12:43

## 2021-11-19 LAB
ANION GAP SERPL CALCULATED.3IONS-SCNC: 9 MMOL/L (ref 4–13)
BUN SERPL-MCNC: 39 MG/DL (ref 5–25)
CALCIUM SERPL-MCNC: 8.9 MG/DL (ref 8.3–10.1)
CHLORIDE SERPL-SCNC: 103 MMOL/L (ref 100–108)
CO2 SERPL-SCNC: 33 MMOL/L (ref 21–32)
CREAT SERPL-MCNC: 1.62 MG/DL (ref 0.6–1.3)
ERYTHROCYTE [DISTWIDTH] IN BLOOD BY AUTOMATED COUNT: 14.3 % (ref 11.6–15.1)
GFR SERPL CREATININE-BSD FRML MDRD: 26 ML/MIN/1.73SQ M
GLUCOSE SERPL-MCNC: 90 MG/DL (ref 65–140)
HCT VFR BLD AUTO: 43.8 % (ref 34.8–46.1)
HGB BLD-MCNC: 13.1 G/DL (ref 11.5–15.4)
MCH RBC QN AUTO: 28.2 PG (ref 26.8–34.3)
MCHC RBC AUTO-ENTMCNC: 29.9 G/DL (ref 31.4–37.4)
MCV RBC AUTO: 94 FL (ref 82–98)
PLATELET # BLD AUTO: 152 THOUSANDS/UL (ref 149–390)
PMV BLD AUTO: 12.1 FL (ref 8.9–12.7)
POTASSIUM SERPL-SCNC: 3.9 MMOL/L (ref 3.5–5.3)
RBC # BLD AUTO: 4.65 MILLION/UL (ref 3.81–5.12)
SODIUM SERPL-SCNC: 145 MMOL/L (ref 136–145)
WBC # BLD AUTO: 6.7 THOUSAND/UL (ref 4.31–10.16)

## 2021-11-19 PROCEDURE — 85027 COMPLETE CBC AUTOMATED: CPT | Performed by: STUDENT IN AN ORGANIZED HEALTH CARE EDUCATION/TRAINING PROGRAM

## 2021-11-19 PROCEDURE — 99223 1ST HOSP IP/OBS HIGH 75: CPT | Performed by: INTERNAL MEDICINE

## 2021-11-19 PROCEDURE — 80048 BASIC METABOLIC PNL TOTAL CA: CPT | Performed by: STUDENT IN AN ORGANIZED HEALTH CARE EDUCATION/TRAINING PROGRAM

## 2021-11-19 PROCEDURE — 99232 SBSQ HOSP IP/OBS MODERATE 35: CPT | Performed by: PHYSICIAN ASSISTANT

## 2021-11-19 PROCEDURE — 36415 COLL VENOUS BLD VENIPUNCTURE: CPT | Performed by: STUDENT IN AN ORGANIZED HEALTH CARE EDUCATION/TRAINING PROGRAM

## 2021-11-19 RX ADMIN — FUROSEMIDE 40 MG: 10 INJECTION, SOLUTION INTRAMUSCULAR; INTRAVENOUS at 10:13

## 2021-11-19 RX ADMIN — NIFEDIPINE 120 MG: 10 CAPSULE ORAL at 10:17

## 2021-11-19 RX ADMIN — BENZONATATE 100 MG: 100 CAPSULE ORAL at 10:15

## 2021-11-19 RX ADMIN — APIXABAN 2.5 MG: 2.5 TABLET, FILM COATED ORAL at 10:12

## 2021-11-19 RX ADMIN — APIXABAN 2.5 MG: 2.5 TABLET, FILM COATED ORAL at 17:03

## 2021-11-19 RX ADMIN — METOPROLOL SUCCINATE 100 MG: 100 TABLET, EXTENDED RELEASE ORAL at 10:12

## 2021-11-19 RX ADMIN — FUROSEMIDE 40 MG: 10 INJECTION, SOLUTION INTRAMUSCULAR; INTRAVENOUS at 17:03

## 2021-11-20 LAB
ANION GAP SERPL CALCULATED.3IONS-SCNC: 7 MMOL/L (ref 4–13)
BUN SERPL-MCNC: 40 MG/DL (ref 5–25)
CALCIUM SERPL-MCNC: 9 MG/DL (ref 8.3–10.1)
CHLORIDE SERPL-SCNC: 104 MMOL/L (ref 100–108)
CO2 SERPL-SCNC: 35 MMOL/L (ref 21–32)
CREAT SERPL-MCNC: 1.77 MG/DL (ref 0.6–1.3)
ERYTHROCYTE [DISTWIDTH] IN BLOOD BY AUTOMATED COUNT: 14.2 % (ref 11.6–15.1)
GFR SERPL CREATININE-BSD FRML MDRD: 24 ML/MIN/1.73SQ M
GLUCOSE SERPL-MCNC: 90 MG/DL (ref 65–140)
HCT VFR BLD AUTO: 42.5 % (ref 34.8–46.1)
HGB BLD-MCNC: 12.8 G/DL (ref 11.5–15.4)
MCH RBC QN AUTO: 28.2 PG (ref 26.8–34.3)
MCHC RBC AUTO-ENTMCNC: 30.1 G/DL (ref 31.4–37.4)
MCV RBC AUTO: 94 FL (ref 82–98)
PLATELET # BLD AUTO: 153 THOUSANDS/UL (ref 149–390)
PMV BLD AUTO: 11.9 FL (ref 8.9–12.7)
POTASSIUM SERPL-SCNC: 4 MMOL/L (ref 3.5–5.3)
RBC # BLD AUTO: 4.54 MILLION/UL (ref 3.81–5.12)
SODIUM SERPL-SCNC: 146 MMOL/L (ref 136–145)
WBC # BLD AUTO: 6.26 THOUSAND/UL (ref 4.31–10.16)

## 2021-11-20 PROCEDURE — 80048 BASIC METABOLIC PNL TOTAL CA: CPT | Performed by: PHYSICIAN ASSISTANT

## 2021-11-20 PROCEDURE — 85027 COMPLETE CBC AUTOMATED: CPT | Performed by: PHYSICIAN ASSISTANT

## 2021-11-20 PROCEDURE — 99232 SBSQ HOSP IP/OBS MODERATE 35: CPT | Performed by: PHYSICIAN ASSISTANT

## 2021-11-20 RX ORDER — FLUTICASONE PROPIONATE 50 MCG
1 SPRAY, SUSPENSION (ML) NASAL DAILY
Status: DISCONTINUED | OUTPATIENT
Start: 2021-11-20 | End: 2021-11-23 | Stop reason: HOSPADM

## 2021-11-20 RX ORDER — ACETAMINOPHEN 325 MG/1
650 TABLET ORAL EVERY 6 HOURS PRN
Status: DISCONTINUED | OUTPATIENT
Start: 2021-11-20 | End: 2021-11-23 | Stop reason: HOSPADM

## 2021-11-20 RX ADMIN — APIXABAN 2.5 MG: 2.5 TABLET, FILM COATED ORAL at 09:04

## 2021-11-20 RX ADMIN — FUROSEMIDE 40 MG: 10 INJECTION, SOLUTION INTRAMUSCULAR; INTRAVENOUS at 17:36

## 2021-11-20 RX ADMIN — FUROSEMIDE 40 MG: 10 INJECTION, SOLUTION INTRAMUSCULAR; INTRAVENOUS at 09:04

## 2021-11-20 RX ADMIN — METOPROLOL SUCCINATE 100 MG: 100 TABLET, EXTENDED RELEASE ORAL at 09:04

## 2021-11-20 RX ADMIN — FLUTICASONE PROPIONATE 1 SPRAY: 50 SPRAY, METERED NASAL at 21:58

## 2021-11-20 RX ADMIN — APIXABAN 2.5 MG: 2.5 TABLET, FILM COATED ORAL at 17:36

## 2021-11-20 RX ADMIN — NIFEDIPINE 120 MG: 10 CAPSULE ORAL at 09:04

## 2021-11-21 LAB
ANION GAP SERPL CALCULATED.3IONS-SCNC: 9 MMOL/L (ref 4–13)
BUN SERPL-MCNC: 41 MG/DL (ref 5–25)
CALCIUM SERPL-MCNC: 9.2 MG/DL (ref 8.3–10.1)
CHLORIDE SERPL-SCNC: 101 MMOL/L (ref 100–108)
CO2 SERPL-SCNC: 34 MMOL/L (ref 21–32)
CREAT SERPL-MCNC: 1.82 MG/DL (ref 0.6–1.3)
ERYTHROCYTE [DISTWIDTH] IN BLOOD BY AUTOMATED COUNT: 14 % (ref 11.6–15.1)
GFR SERPL CREATININE-BSD FRML MDRD: 23 ML/MIN/1.73SQ M
GLUCOSE SERPL-MCNC: 92 MG/DL (ref 65–140)
HCT VFR BLD AUTO: 45.6 % (ref 34.8–46.1)
HGB BLD-MCNC: 13.9 G/DL (ref 11.5–15.4)
MCH RBC QN AUTO: 28.9 PG (ref 26.8–34.3)
MCHC RBC AUTO-ENTMCNC: 30.5 G/DL (ref 31.4–37.4)
MCV RBC AUTO: 95 FL (ref 82–98)
PLATELET # BLD AUTO: 172 THOUSANDS/UL (ref 149–390)
PMV BLD AUTO: 12.1 FL (ref 8.9–12.7)
POTASSIUM SERPL-SCNC: 3.8 MMOL/L (ref 3.5–5.3)
RBC # BLD AUTO: 4.81 MILLION/UL (ref 3.81–5.12)
SODIUM SERPL-SCNC: 144 MMOL/L (ref 136–145)
WBC # BLD AUTO: 6.93 THOUSAND/UL (ref 4.31–10.16)

## 2021-11-21 PROCEDURE — 97166 OT EVAL MOD COMPLEX 45 MIN: CPT

## 2021-11-21 PROCEDURE — 99232 SBSQ HOSP IP/OBS MODERATE 35: CPT | Performed by: PHYSICIAN ASSISTANT

## 2021-11-21 PROCEDURE — 80048 BASIC METABOLIC PNL TOTAL CA: CPT | Performed by: PHYSICIAN ASSISTANT

## 2021-11-21 PROCEDURE — 97163 PT EVAL HIGH COMPLEX 45 MIN: CPT

## 2021-11-21 PROCEDURE — 99222 1ST HOSP IP/OBS MODERATE 55: CPT | Performed by: INTERNAL MEDICINE

## 2021-11-21 PROCEDURE — 85027 COMPLETE CBC AUTOMATED: CPT | Performed by: PHYSICIAN ASSISTANT

## 2021-11-21 RX ORDER — FUROSEMIDE 10 MG/ML
40 INJECTION INTRAMUSCULAR; INTRAVENOUS DAILY
Status: DISCONTINUED | OUTPATIENT
Start: 2021-11-21 | End: 2021-11-21

## 2021-11-21 RX ADMIN — METOPROLOL SUCCINATE 100 MG: 100 TABLET, EXTENDED RELEASE ORAL at 08:25

## 2021-11-21 RX ADMIN — APIXABAN 2.5 MG: 2.5 TABLET, FILM COATED ORAL at 17:30

## 2021-11-21 RX ADMIN — FLUTICASONE PROPIONATE 1 SPRAY: 50 SPRAY, METERED NASAL at 08:24

## 2021-11-21 RX ADMIN — NIFEDIPINE 120 MG: 10 CAPSULE ORAL at 08:25

## 2021-11-21 RX ADMIN — FUROSEMIDE 40 MG: 10 INJECTION, SOLUTION INTRAMUSCULAR; INTRAVENOUS at 13:24

## 2021-11-21 RX ADMIN — APIXABAN 2.5 MG: 2.5 TABLET, FILM COATED ORAL at 08:25

## 2021-11-22 LAB
ANION GAP SERPL CALCULATED.3IONS-SCNC: 9 MMOL/L (ref 4–13)
BUN SERPL-MCNC: 45 MG/DL (ref 5–25)
CALCIUM SERPL-MCNC: 8.5 MG/DL (ref 8.3–10.1)
CHLORIDE SERPL-SCNC: 102 MMOL/L (ref 100–108)
CO2 SERPL-SCNC: 32 MMOL/L (ref 21–32)
CREAT SERPL-MCNC: 1.86 MG/DL (ref 0.6–1.3)
GFR SERPL CREATININE-BSD FRML MDRD: 22 ML/MIN/1.73SQ M
GLUCOSE SERPL-MCNC: 85 MG/DL (ref 65–140)
POTASSIUM SERPL-SCNC: 3.6 MMOL/L (ref 3.5–5.3)
SODIUM SERPL-SCNC: 143 MMOL/L (ref 136–145)

## 2021-11-22 PROCEDURE — 80048 BASIC METABOLIC PNL TOTAL CA: CPT | Performed by: PHYSICIAN ASSISTANT

## 2021-11-22 PROCEDURE — 99232 SBSQ HOSP IP/OBS MODERATE 35: CPT | Performed by: INTERNAL MEDICINE

## 2021-11-22 PROCEDURE — 99232 SBSQ HOSP IP/OBS MODERATE 35: CPT | Performed by: NURSE PRACTITIONER

## 2021-11-22 RX ORDER — POTASSIUM CHLORIDE 750 MG/1
10 TABLET, EXTENDED RELEASE ORAL ONCE
Status: COMPLETED | OUTPATIENT
Start: 2021-11-22 | End: 2021-11-22

## 2021-11-22 RX ORDER — FERROUS SULFATE 325(65) MG
325 TABLET ORAL
Status: DISCONTINUED | OUTPATIENT
Start: 2021-11-23 | End: 2021-11-23 | Stop reason: HOSPADM

## 2021-11-22 RX ADMIN — METOPROLOL SUCCINATE 100 MG: 100 TABLET, EXTENDED RELEASE ORAL at 09:33

## 2021-11-22 RX ADMIN — POTASSIUM CHLORIDE 10 MEQ: 750 TABLET, EXTENDED RELEASE ORAL at 13:11

## 2021-11-22 RX ADMIN — FUROSEMIDE 60 MG: 40 TABLET ORAL at 09:31

## 2021-11-22 RX ADMIN — APIXABAN 2.5 MG: 2.5 TABLET, FILM COATED ORAL at 17:28

## 2021-11-22 RX ADMIN — APIXABAN 2.5 MG: 2.5 TABLET, FILM COATED ORAL at 09:33

## 2021-11-22 RX ADMIN — FLUTICASONE PROPIONATE 1 SPRAY: 50 SPRAY, METERED NASAL at 09:33

## 2021-11-22 RX ADMIN — NIFEDIPINE 120 MG: 10 CAPSULE ORAL at 09:32

## 2021-11-23 ENCOUNTER — TRANSITIONAL CARE MANAGEMENT (OUTPATIENT)
Dept: INTERNAL MEDICINE CLINIC | Facility: CLINIC | Age: 86
End: 2021-11-23

## 2021-11-23 VITALS
SYSTOLIC BLOOD PRESSURE: 135 MMHG | DIASTOLIC BLOOD PRESSURE: 73 MMHG | WEIGHT: 131.39 LBS | RESPIRATION RATE: 16 BRPM | TEMPERATURE: 97.6 F | HEART RATE: 59 BPM | OXYGEN SATURATION: 92 % | HEIGHT: 63 IN | BODY MASS INDEX: 23.28 KG/M2

## 2021-11-23 LAB
ANION GAP SERPL CALCULATED.3IONS-SCNC: 6 MMOL/L (ref 4–13)
BUN SERPL-MCNC: 38 MG/DL (ref 5–25)
CALCIUM SERPL-MCNC: 8.8 MG/DL (ref 8.3–10.1)
CHLORIDE SERPL-SCNC: 102 MMOL/L (ref 100–108)
CO2 SERPL-SCNC: 33 MMOL/L (ref 21–32)
CREAT SERPL-MCNC: 1.64 MG/DL (ref 0.6–1.3)
ERYTHROCYTE [DISTWIDTH] IN BLOOD BY AUTOMATED COUNT: 13.8 % (ref 11.6–15.1)
GFR SERPL CREATININE-BSD FRML MDRD: 26 ML/MIN/1.73SQ M
GLUCOSE SERPL-MCNC: 87 MG/DL (ref 65–140)
HCT VFR BLD AUTO: 42.5 % (ref 34.8–46.1)
HGB BLD-MCNC: 13.2 G/DL (ref 11.5–15.4)
MCH RBC QN AUTO: 28.3 PG (ref 26.8–34.3)
MCHC RBC AUTO-ENTMCNC: 31.1 G/DL (ref 31.4–37.4)
MCV RBC AUTO: 91 FL (ref 82–98)
PLATELET # BLD AUTO: 171 THOUSANDS/UL (ref 149–390)
PMV BLD AUTO: 12.2 FL (ref 8.9–12.7)
POTASSIUM SERPL-SCNC: 4.2 MMOL/L (ref 3.5–5.3)
RBC # BLD AUTO: 4.66 MILLION/UL (ref 3.81–5.12)
SODIUM SERPL-SCNC: 141 MMOL/L (ref 136–145)
WBC # BLD AUTO: 5.47 THOUSAND/UL (ref 4.31–10.16)

## 2021-11-23 PROCEDURE — 85027 COMPLETE CBC AUTOMATED: CPT | Performed by: NURSE PRACTITIONER

## 2021-11-23 PROCEDURE — 80048 BASIC METABOLIC PNL TOTAL CA: CPT | Performed by: NURSE PRACTITIONER

## 2021-11-23 PROCEDURE — 99239 HOSP IP/OBS DSCHRG MGMT >30: CPT | Performed by: NURSE PRACTITIONER

## 2021-11-23 PROCEDURE — 99232 SBSQ HOSP IP/OBS MODERATE 35: CPT | Performed by: INTERNAL MEDICINE

## 2021-11-23 RX ORDER — FUROSEMIDE 20 MG/1
60 TABLET ORAL DAILY
Qty: 90 TABLET | Refills: 0 | Status: SHIPPED | OUTPATIENT
Start: 2021-11-24 | End: 2021-11-26 | Stop reason: SDUPTHER

## 2021-11-23 RX ORDER — METOPROLOL SUCCINATE 100 MG/1
100 TABLET, EXTENDED RELEASE ORAL DAILY
Qty: 30 TABLET | Refills: 0 | Status: SHIPPED | OUTPATIENT
Start: 2021-11-24 | End: 2021-11-26 | Stop reason: SDUPTHER

## 2021-11-23 RX ADMIN — APIXABAN 2.5 MG: 2.5 TABLET, FILM COATED ORAL at 09:36

## 2021-11-23 RX ADMIN — FERROUS SULFATE TAB 325 MG (65 MG ELEMENTAL FE) 325 MG: 325 (65 FE) TAB at 09:36

## 2021-11-23 RX ADMIN — FUROSEMIDE 60 MG: 40 TABLET ORAL at 09:36

## 2021-11-23 RX ADMIN — NIFEDIPINE 120 MG: 10 CAPSULE ORAL at 09:36

## 2021-11-23 RX ADMIN — METOPROLOL SUCCINATE 100 MG: 100 TABLET, EXTENDED RELEASE ORAL at 09:36

## 2021-11-23 RX ADMIN — FLUTICASONE PROPIONATE 1 SPRAY: 50 SPRAY, METERED NASAL at 09:38

## 2021-11-26 ENCOUNTER — OFFICE VISIT (OUTPATIENT)
Dept: INTERNAL MEDICINE CLINIC | Facility: CLINIC | Age: 86
End: 2021-11-26
Payer: MEDICARE

## 2021-11-26 VITALS
TEMPERATURE: 98.6 F | HEIGHT: 63 IN | SYSTOLIC BLOOD PRESSURE: 116 MMHG | WEIGHT: 120.3 LBS | BODY MASS INDEX: 21.32 KG/M2 | HEART RATE: 80 BPM | OXYGEN SATURATION: 97 % | DIASTOLIC BLOOD PRESSURE: 76 MMHG

## 2021-11-26 DIAGNOSIS — R06.00 DYSPNEA: ICD-10-CM

## 2021-11-26 DIAGNOSIS — I50.32 CHRONIC DIASTOLIC CONGESTIVE HEART FAILURE (HCC): Primary | ICD-10-CM

## 2021-11-26 DIAGNOSIS — I48.0 PAROXYSMAL ATRIAL FIBRILLATION (HCC): ICD-10-CM

## 2021-11-26 DIAGNOSIS — D50.9 IRON DEFICIENCY ANEMIA, UNSPECIFIED IRON DEFICIENCY ANEMIA TYPE: ICD-10-CM

## 2021-11-26 DIAGNOSIS — I50.9 CHF (CONGESTIVE HEART FAILURE) (HCC): ICD-10-CM

## 2021-11-26 PROCEDURE — 99214 OFFICE O/P EST MOD 30 MIN: CPT | Performed by: INTERNAL MEDICINE

## 2021-11-26 RX ORDER — METOPROLOL SUCCINATE 100 MG/1
100 TABLET, EXTENDED RELEASE ORAL DAILY
Qty: 90 TABLET | Refills: 3 | Status: SHIPPED | OUTPATIENT
Start: 2021-11-26 | End: 2021-12-01 | Stop reason: SDUPTHER

## 2021-11-26 RX ORDER — FUROSEMIDE 20 MG/1
60 TABLET ORAL DAILY
Qty: 90 TABLET | Refills: 11 | Status: SHIPPED | OUTPATIENT
Start: 2021-11-26 | End: 2022-07-08

## 2021-11-26 RX ORDER — FERROUS SULFATE TAB EC 324 MG (65 MG FE EQUIVALENT) 324 (65 FE) MG
324 TABLET DELAYED RESPONSE ORAL
Qty: 90 TABLET | Refills: 1 | Status: SHIPPED | OUTPATIENT
Start: 2021-11-26 | End: 2022-04-26

## 2021-11-29 ENCOUNTER — PATIENT OUTREACH (OUTPATIENT)
Dept: CASE MANAGEMENT | Facility: HOSPITAL | Age: 86
End: 2021-11-29

## 2021-12-01 ENCOUNTER — OFFICE VISIT (OUTPATIENT)
Dept: CARDIOLOGY CLINIC | Facility: CLINIC | Age: 86
End: 2021-12-01
Payer: MEDICARE

## 2021-12-01 VITALS
RESPIRATION RATE: 18 BRPM | OXYGEN SATURATION: 95 % | HEART RATE: 76 BPM | HEIGHT: 63 IN | BODY MASS INDEX: 21.62 KG/M2 | DIASTOLIC BLOOD PRESSURE: 76 MMHG | WEIGHT: 122 LBS | SYSTOLIC BLOOD PRESSURE: 140 MMHG

## 2021-12-01 DIAGNOSIS — I27.20 PULMONARY HYPERTENSION (HCC): ICD-10-CM

## 2021-12-01 DIAGNOSIS — I10 PRIMARY HYPERTENSION: ICD-10-CM

## 2021-12-01 DIAGNOSIS — I36.1 NONRHEUMATIC TRICUSPID VALVE REGURGITATION: ICD-10-CM

## 2021-12-01 DIAGNOSIS — I50.9 CHF (CONGESTIVE HEART FAILURE) (HCC): Primary | ICD-10-CM

## 2021-12-01 DIAGNOSIS — I35.0 NONRHEUMATIC AORTIC VALVE STENOSIS: ICD-10-CM

## 2021-12-01 PROCEDURE — 99214 OFFICE O/P EST MOD 30 MIN: CPT | Performed by: NURSE PRACTITIONER

## 2021-12-01 RX ORDER — METOPROLOL SUCCINATE 100 MG/1
200 TABLET, EXTENDED RELEASE ORAL DAILY
Qty: 90 TABLET | Refills: 3 | Status: SHIPPED | OUTPATIENT
Start: 2021-12-01 | End: 2022-04-11

## 2021-12-02 ENCOUNTER — PATIENT OUTREACH (OUTPATIENT)
Dept: CASE MANAGEMENT | Facility: HOSPITAL | Age: 86
End: 2021-12-02

## 2021-12-02 ENCOUNTER — TELEPHONE (OUTPATIENT)
Dept: CARDIOLOGY CLINIC | Facility: CLINIC | Age: 86
End: 2021-12-02

## 2021-12-08 ENCOUNTER — OFFICE VISIT (OUTPATIENT)
Dept: CARDIOLOGY CLINIC | Facility: CLINIC | Age: 86
End: 2021-12-08
Payer: MEDICARE

## 2021-12-08 ENCOUNTER — TELEPHONE (OUTPATIENT)
Dept: HEMATOLOGY ONCOLOGY | Facility: CLINIC | Age: 86
End: 2021-12-08

## 2021-12-08 VITALS
HEART RATE: 77 BPM | WEIGHT: 122 LBS | SYSTOLIC BLOOD PRESSURE: 154 MMHG | BODY MASS INDEX: 21.62 KG/M2 | RESPIRATION RATE: 16 BRPM | DIASTOLIC BLOOD PRESSURE: 78 MMHG | HEIGHT: 63 IN | OXYGEN SATURATION: 96 %

## 2021-12-08 DIAGNOSIS — I35.0 NONRHEUMATIC AORTIC VALVE STENOSIS: Primary | ICD-10-CM

## 2021-12-08 PROCEDURE — 99215 OFFICE O/P EST HI 40 MIN: CPT | Performed by: INTERNAL MEDICINE

## 2021-12-20 ENCOUNTER — PATIENT OUTREACH (OUTPATIENT)
Dept: CASE MANAGEMENT | Facility: HOSPITAL | Age: 86
End: 2021-12-20

## 2021-12-20 DIAGNOSIS — R05.9 COUGH: ICD-10-CM

## 2021-12-20 DIAGNOSIS — J30.1 SEASONAL ALLERGIC RHINITIS DUE TO POLLEN: ICD-10-CM

## 2021-12-20 RX ORDER — BENZONATATE 100 MG/1
100 CAPSULE ORAL 3 TIMES DAILY PRN
Qty: 20 CAPSULE | Refills: 0 | OUTPATIENT
Start: 2021-12-20

## 2021-12-21 ENCOUNTER — TELEPHONE (OUTPATIENT)
Dept: HEMATOLOGY ONCOLOGY | Facility: CLINIC | Age: 86
End: 2021-12-21

## 2021-12-21 ENCOUNTER — PATIENT OUTREACH (OUTPATIENT)
Dept: CASE MANAGEMENT | Facility: HOSPITAL | Age: 86
End: 2021-12-21

## 2021-12-27 DIAGNOSIS — I50.9 CHF (CONGESTIVE HEART FAILURE) (HCC): ICD-10-CM

## 2021-12-27 DIAGNOSIS — J30.1 SEASONAL ALLERGIC RHINITIS DUE TO POLLEN: ICD-10-CM

## 2021-12-27 DIAGNOSIS — R05.9 COUGH: ICD-10-CM

## 2021-12-27 DIAGNOSIS — R06.00 DYSPNEA: ICD-10-CM

## 2021-12-28 RX ORDER — BENZONATATE 100 MG/1
100 CAPSULE ORAL 3 TIMES DAILY PRN
Qty: 20 CAPSULE | Refills: 0 | OUTPATIENT
Start: 2021-12-28

## 2021-12-28 RX ORDER — FUROSEMIDE 20 MG/1
60 TABLET ORAL DAILY
Qty: 90 TABLET | Refills: 11 | OUTPATIENT
Start: 2021-12-28 | End: 2022-01-27

## 2022-01-01 ENCOUNTER — TELEPHONE (OUTPATIENT)
Dept: CARDIOLOGY CLINIC | Facility: CLINIC | Age: 87
End: 2022-01-01

## 2022-01-01 DIAGNOSIS — R26.2 AMBULATORY DYSFUNCTION: Primary | ICD-10-CM

## 2022-01-01 DIAGNOSIS — Z00.00 HEALTHCARE MAINTENANCE: Primary | ICD-10-CM

## 2022-01-01 RX ORDER — DOXYCYCLINE HYCLATE 100 MG/1
CAPSULE ORAL
Qty: 1 CAPSULE | Refills: 0 | Status: SHIPPED | OUTPATIENT
Start: 2022-01-01 | End: 2022-01-01

## 2022-01-07 ENCOUNTER — PATIENT OUTREACH (OUTPATIENT)
Dept: CASE MANAGEMENT | Facility: HOSPITAL | Age: 87
End: 2022-01-07

## 2022-01-07 NOTE — PROGRESS NOTES
Heart Failure Outpatient Care Coordinator Note: Chart notes reviewed and call made to patient  She reports is doing well  States weights are stable- 123# today and she denies edema and states her breathing is at hr baseline- [place] with activity  [facility] RN visits continue weekly  She rescheduled her CT surgery and heme/onc appointments for nest week and week after due to weather  Denies concerns or needs  Will continue to follow throughout bundle episode

## 2022-01-14 ENCOUNTER — OFFICE VISIT (OUTPATIENT)
Dept: CARDIAC SURGERY | Facility: CLINIC | Age: 87
End: 2022-01-14
Payer: MEDICARE

## 2022-01-14 VITALS
BODY MASS INDEX: 21.86 KG/M2 | WEIGHT: 123.4 LBS | DIASTOLIC BLOOD PRESSURE: 81 MMHG | TEMPERATURE: 97 F | OXYGEN SATURATION: 96 % | SYSTOLIC BLOOD PRESSURE: 165 MMHG | HEART RATE: 71 BPM | HEIGHT: 63 IN | RESPIRATION RATE: 16 BRPM

## 2022-01-14 DIAGNOSIS — I35.0 NONRHEUMATIC AORTIC VALVE STENOSIS: Primary | ICD-10-CM

## 2022-01-14 PROCEDURE — 99204 OFFICE O/P NEW MOD 45 MIN: CPT | Performed by: NURSE PRACTITIONER

## 2022-01-14 NOTE — PROGRESS NOTES
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) and anemia  Patient was hospitalized twice in November 2021 with CHF  Echo performed at that time demonstrates EF 60%, moderate AS, RAIN 0 95 cm2 & MG 11 mm Hg  Patient is accompanied by her son, Reid Vyas today  Patient states she lives independently in a small cottage at a senior community  She has an individual do her grocery shopping and cleaning  She participates in activities, such as card games, birthday parties, etc where she lives  She denies chest pain, SOB, [place], lightheadedness, palpitations, weight gain, edema  Syncope, fatigue/change in activity tolerance, PND or orthopnea  She offers no complaints and states she feels well  Patient has family that is supportive and very involved  She is a non-smoker, non-drinker  She is up to date with dental care  Covid immunizations: 1/19/21 & 1/29/21; Booster: 10/27/21      Past Medical History:  Past Medical History:   Diagnosis Date    Anemia     Asteatotic eczema     Chronic kidney disease     Hypercholesterolemia     Lichen sclerosus et atrophicus     Mitral valve insufficiency     Seborrheic keratoses     Tricuspid regurgitation     Vertigo          Past Surgical History:   Past Surgical History:   Procedure Laterality Date    APPENDECTOMY  11/14/1939    CATARACT EXTRACTION, BILATERAL  04/2019    HYSTERECTOMY  11/14/1962    ORIF HIP FRACTURE Left 2020    CT COLONOSCOPY FLX DX W/COLLJ SPEC WHEN PFRMD N/A 5/19/2016    Procedure: EGD AND COLONOSCOPY;  Surgeon: Israel Kinsey MD; Location: [facility]; Service: Gastroenterology         Family History:  Family History   Problem Relation Age of Onset    Heart disease Mother         Abnormality    Heart disease Father         Abnormality    No Known Problems Sister     No Known Problems Brother          Social History:    Social History     Substance and Sexual Activity   Alcohol Use Not Currently     Social History     Substance and Sexual Activity   Drug Use No     Social History     Tobacco Use   Smoking Status Never Smoker   Smokeless Tobacco Never Used         Home Medications:   Prior to Admission medications    Medication Sig Start Date End Date Taking? Authorizing Provider   apixaban (Eliquis) 2 5 mg Take 1 tablet (2 5 mg total) by mouth 2 (two) times a day 11/26/21  Yes Avani Garcia MD   benzonatate (TESSALON PERLES) 100 mg capsule Take 1 capsule (100 mg total) by mouth 3 (three) times a day as needed for cough 11/14/21  Yes Gagan Doe MD   Dilt- MG 24 hr capsule TAKE 1 CAPSULE BY MOUTH EVERY DAY 10/11/21  Yes Od[place] Dominique PA-C   ferrous sulfate 324 (65 Fe) mg Take 1 tablet (324 mg total) by mouth daily before breakfast 11/26/21  Yes Avani Garcia MD   furosemide (LASIX) 20 mg tablet Take 3 tablets (60 mg total) by mouth daily 11/26/21 1/14/22 Yes Avani Garcia MD   Klor-Con M10 10 MEQ tablet TAKE 1 TABLET BY MOUTH EVERY DAY 1/5/21  Yes Avani Garcia MD   metoprolol succinate (TOPROL-XL) 100 mg 24 hr tablet Take 2 tablets (200 mg total) by mouth daily 12/1/21 1/14/22 Yes GUNJAN King   albuterol (Proventil HFA) 90 mcg/act inhaler Inhale 2 puffs every 6 (six) hours as needed for wheezing (cough)  Patient not taking: Reported on 1/14/2022 11/6/21   Barak Blake MD   fluticasone (FLONASE) 50 mcg/act nasal spray 1 spray into each nostril daily  Patient not taking: Reported on 1/14/2022 11/14/21   Gagan Doe MD       Allergies:   Allergies   Allergen Reactions    Lactose Intolerance (Gi) - Food Allergy     Penicillins Other (See Comments)     unknown       Review of Systems:  Review of Systems - History obtained from chart review and the patient  General ROS: negative  Psychological ROS: negative  Ophthalmic ROS: negative  ENT ROS: negative  Allergy and Immunology ROS: negative  Hematological and Lymphatic ROS: negative  Endocrine ROS: negative  Breast ROS: negative  Respiratory ROS: no cough, shortness of breath, or wheezing  Cardiovascular ROS: no chest pain or dyspnea on exertion  Gastrointestinal ROS: no abdominal pain, change in bowel habits, or black or bloody stools  Genito-Urinary ROS: no dysuria, trouble voiding, or hematuria  Musculoskeletal ROS: negative  Neurological ROS: no TIA or stroke symptoms  Dermatological ROS: negative    Vital Signs:     Vitals:    01/14/22 1048 01/14/22 1051   BP: (!) 175/80 165/81   BP Location: Left arm Right arm   Patient Position: Sitting    Cuff Size: Standard    Pulse: 71    Resp: 16    Temp: (!) 97 °F (36 1 °C)    TempSrc: Tympanic    SpO2: 96%    Weight: 56 kg (123 lb 6 4 oz)    Height: 5' 3" (1 6 m)        Physical Exam:    General: Alert, oriented, well developed, appears younger than stated age, no acute distress  HEENT/NECK:  PERRLA  No jugular venous distention  Cardiac:Regular rate and rhythm, No murmurs rubs or gallops   Carotid arteries: 2+ pulses, no bruits  Pulmonary:  Breath sounds clear bilaterally  Abdomen:  Non-tender, Non-distended  Positive bowel sounds  Upper extremities: 2+ radial pulses; brisk capillary refill  Lower extremities: Extremities warm/dry  PT/DP pulses 2+ bilaterally  No edema B/L  Neuro: Alert and oriented X 3  Sensation is grossly intact  No focal deficits  Musculoskeletal: MAEE, stable gait  Skin: Warm/Dry, without rashes or lesions        Lab Results:   Lab Results   Component Value Date    WBC 5 47 11/23/2021    HGB 13 2 11/23/2021    HCT 42 5 11/23/2021    MCV 91 11/23/2021     11/23/2021     Lab Results Component Value Date    SODIUM 141 11/23/2021    K 4 2 11/23/2021     11/23/2021    CO2 33 (H) 11/23/2021    BUN 38 (H) 11/23/2021    CREATININE 1 64 (H) 11/23/2021    GLUC 87 11/23/2021    CALCIUM 8 8 11/23/2021     Lab Results   Component Value Date    HGBA1C 5 7 05/29/2018     Lab Results   Component Value Date    TROPONINI 0 02 11/05/2021       Imaging Studies:     Echocardiogram: 11/12/21      Left Ventricle: Left ventricular cavity size is normal  Wall thickness is mildly increased  There is mild asymmetric hypertrophy of the septal wall  Systolic function is normal   Estimated LVEF 60% Wall motion is normal  Diastolic function is moderately abnormal, consistent with grade II (pseudonormal) relaxation    Right Ventricle: Right ventricular cavity size is mildly dilated  Systolic function is mildly reduced    Left Atrium: The atrium is moderate to severely dilated    Right Atrium: The atrium is moderate to severely dilated    Aortic Valve: The aortic valve is probably trileaflet  The leaflets are moderately thickened  The leaflets are moderately calcified  There is mildly reduced mobility  There is mild regurgitation  There is moderate stenosis  Peak velocity 2 27, mean pressure gradient 11 mm Hg, aortic valve area 0 95 cm2    Mitral Valve: There is moderate annular calcification  There is mild regurgitation    Tricuspid Valve: There is moderate to severe regurgitation    Aorta: The aortic root is mildly dilated    IVC/SVC: IVC is dilated No images to comment on respiratory changes    Pulmonary Artery: The pulmonary artery systolic pressure is severely increased    Estimated peak pulmonary artery systolic pressure 80 mm mercury    I have personally reviewed pertinent films in PACS     PCP, Cardiology and Hospital notes reviewed    TAVR evaluation Assessment:     [facility]: I      Assessment:  Patient Active Problem List    Diagnosis Date Noted    Nonrheumatic aortic valve stenosis 01/14/2022  Dyspnea 11/18/2021    Hypoxia 11/18/2021    Acute respiratory distress 11/18/2021    [facility] 11/18/2021    Acute kidney injury superimposed on CKD ([facility] ) 11/13/2021    Chronic renal disease, stage IV ([facility] ) 11/11/2021    Bronchopneumonia 11/11/2021    Lower GI bleed 11/05/2021    Allergic conjunctivitis of both eyes 10/29/2021    Seasonal allergic rhinitis due to pollen 10/26/2021    Cough 10/26/2021    Iron deficiency anemia 07/15/2021    Fecal occult blood test positive 07/15/2021    Anemia 07/15/2021    Chronic diastolic congestive heart failure ([facility] ) 04/22/2021    Fracture of right orbital wall ([facility] ) 07/23/2020    Chronic kidney disease-mineral and bone disorder 11/26/2019    Epistaxis 40/59/5619    Diastolic dysfunction 98/89/3313    Anemia due to chronic kidney disease 10/18/2018    Mitral valve insufficiency 05/29/2018    Hypercholesterolemia 05/29/2018    Paroxysmal atrial fibrillation ([facility] ) 03/20/2017    Long term current use of anticoagulant 03/20/2017    Chronic kidney disease, stage III (moderate) ([facility] ) 03/20/2017    Essential hypertension 03/20/2017         Plan:    Buster Weber has moderate AS on echo but is completely asymptomatic  She has no impairment of her functional capabilities  In light of this and her advanced age, we recommend continued medical management  Buster Weber and her son Tico Giang were comfortable with our recommendations, and their questions were answered to their satisfaction  Thank you for allowing us to participate in the care of this patient  The patient recently had a screening colonoscopy in 5/19/16  Therefore GI referral is not indicated at this time       SIGNATURE: GUNJAN Tena  DATE: January 14, 2022  TIME: 11:55 AM

## 2022-01-19 NOTE — PROGRESS NOTES
HEMATOLOGY CLINIC NOTE    Primary Care Provider: Travis Torres MD  Referring Provider: Mario Pollock  MRN: [id number]  : 1924    Assessment / Plan:   1  Iron deficiency anemia, unspecified iron deficiency anemia type   2  Stage 3 chronic kidney disease, unspecified whether stage 3a or 3b CKD ([facility] )   Patient's most recent lab work from 2021 shows hemoglobin 13 2, MCV 91  No recent iron panel drawn  She is currently taking once daily oral iron without any complaints  Patient states she feels well today without any bleeding that she has noticed in urine or stools  We will have patient obtain an iron panel today to ensure iron values are normal   She will have a CBC, iron panel once every 3 months with follow-up in 6 months  I educated her on the signs and symptoms of anemia which she understands to call our office should these develop  - CBC and differential; Standing  - Iron Panel (Includes Ferritin, Iron Sat%, Iron, and TIBC); Standing    3  Fecal occult blood test positive  Patient decided to not have colonoscopy understandably due to age  Patient voiced understanding and agreement to the above  The patient knows to call the office with any questions or concerns regarding the above  I have spent  minutes with Patient  today in which greater than 50% of this time was spent in counseling/coordination of care regarding Diagnostic results, Risks and benefits of tx options, Intructions for management, Patient and family education, Importance of tx compliance, Risk factor reductions and Impressions  Reason for visit:       Chief Complaint   Patient presents with    Follow-up     Iron Deficiency Anemia        History of Hematology Illness:     Brian Prajapati is a 80 y o  female who came in for follow up  1  Chronic Normocytic Anemia   - secondary to CKD & Iron Deficiency from possible GI bleed  - on/off anemia since at least 3/2017  Baseline Hgb usually 9-11g/dL over last few years     - 4/2021 (Hgb 9 5g/dL) --> 5/2021 (Hgb 11 3g/dL) --> 7/2021 (Hgb 10 3g/dL)  - c/s labs: + fecal occult test, ferritin 58, Iron 39, TIBC 499, Iron sat 8%                   Vitamin B12, Retic, SPEP, EPO WNL  - GI c/s due to positive FOBT  Patient decided to not have colonoscopy understandably      - 7/2021: s/p Feraheme 510mg x2  - 9/2021: Hgb 11 5g/dL, MCV 96  No iron panel drawn    - 11/2021: Hgb 13 2g/dL, MCV 91  No iron panel drawn       2  Chronic Anticoagulation - Afib   - Eliquis 2 5mg BID     Interval History:   "5/14/2021: This is a 22-year-old female with a past medical history of AFib, hypertension, mitral valve insufficiency, CHF, chronic kidney disease, hyperholesterolemia presenting for consultation regarding anemia       She denies any bleeding into urine, stools   No PICA, increased shortness of breath with exertion or fatigue  Diet is well balanced with at least 2-3 meals a day including chicken, fish  Does not consume red meats  She denies history of malabsorptive conditions, GI bleed  Nannette Boucher is currently taking twice daily oral iron       Patient does not smoke, drink  No history of cancer, no family history of cancer        7/15/2021: Patient came in for follow up  She denies any bleeding into urine or stools  Feels well, offers no complaints "     9/20/2021:  Patient came in for follow-up  Offers no complaints today  She denies any bleeding that she has seen  She did have a positive fecal occult blood test a few months ago  She was seen by GI  Patient did not want to have a colonoscopy but did still want a see GI regarding this  For now, we are just monitoring her hemoglobin to ensure it does not drop due to any possible GI bleeding  1/20/2022:  Patient came in for follow-up  Offers no complaints today  Denies any bleeding  States that she has no worsening chest pain, shortness of breath, lightheadedness, dizziness, PICA    She is taking once daily oral iron without any constipation or other GI problems  She recently saw cardiology who decided and aortic valve replacement would not be beneficial in her case  Problem list:       Patient Active Problem List   Diagnosis    [facility]oxysmal atrial fibrillation ([facility] )    Long term current use of anticoagulant    Chronic kidney disease, stage III (moderate) ([facility]    Essential hypertension    Mitral valve insufficiency    Hypercholesterolemia    Anemia due to chronic kidney disease    Diastolic dysfunction    Epistaxis    Chronic kidney disease-mineral and bone disorder    Fracture of right orbital wall (HCC)    Chronic diastolic congestive heart failure (HCC)    Iron deficiency anemia    Fecal occult blood test positive    Anemia    Seasonal allergic rhinitis due to pollen    Cough    Allergic conjunctivitis of both eyes    Lower GI bleed    Chronic renal disease, stage IV (HCC)    Bronchopneumonia    [facility] superimposed [facility] CKD [facility]    Dyspnea    Hypoxia    Acute respiratory distress    Severe pulmonary hypertension ([facility])    Nonrheumatic aortic valve stenosis       REVIEW OF SYMPTOMS:   Review of Systems   Constitutional: Negative for activity change, appetite change, chills, diaphoresis, fatigue, fever and unexpected weight change  HENT: Negative for mouth sores and nosebleeds  Eyes: Negative for visual disturbance  Respiratory: Negative for apnea, cough, choking, chest tightness, shortness of breath, wheezing and stridor  Cardiovascular: Negative for chest pain, palpitations and leg swelling  Gastrointestinal: Negative for abdominal pain, anal bleeding, blood in stool, constipation, diarrhea, nausea and vomiting  Endocrine: Negative for cold intolerance  Genitourinary: Negative for hematuria, menstrual problem and vaginal bleeding  Musculoskeletal: Negative for arthralgias  Skin: Negative for color change, pallor and rash     Neurological: Negative for dizziness, syncope, light-headedness and headaches  Hematological: Negative for adenopathy  Does not bruise/bleed easily  Psychiatric/Behavioral: Negative for sleep disturbance  PHYSICAL EXAMINATION:     Vital Signs:   /82 (BP Location: Left arm, Cuff Size: Standard)   Pulse 62   Temp (!) 97 1 °F (36 2 °C)   Resp 18   Ht 4' 11 5" (1 511 m)   Wt 56 7 kg (125 lb)   LMP  (LMP Unknown)   SpO2 99%   BMI 24 82 kg/m²   Body surface area is 1 52 meters squared  Ht Readings from Last 8 Encounters:   01/20/22 4' 11 5" (1 511 m)   01/14/22 5' 3" (1 6 m)   12/08/21 5' 3" (1 6 m)   12/01/21 5' 3" (1 6 m)   11/26/21 5' 3" (1 6 m)   11/20/21 5' 3" (1 6 m)   11/13/21 4' 11" (1 499 m)   11/11/21 4' 11" (1 499 m)       Wt Readings from Last 8 Encounters:   01/20/22 56 7 kg (125 lb)   01/14/22 56 kg (123 lb 6 4 oz)   12/08/21 55 3 kg (122 lb)   12/01/21 55 3 kg (122 lb)   11/26/21 54 6 kg (120 lb 4 8 oz)   11/23/21 59 6 kg (131 lb 6 3 oz)   11/14/21 54 9 kg (121 lb 0 5 oz)   11/11/21 54 4 kg (120 lb)          Physical Exam  Constitutional:       General: She is not in acute distress  Appearance: Normal appearance  She is not ill-appearing, toxic-appearing or diaphoretic  Comments: Ambulating with a cane    HENT:      Head: Normocephalic and atraumatic  Eyes:      General: No scleral icterus  Extraocular Movements: Extraocular movements intact  Conjunctiva/sclera: Conjunctivae normal       Pupils: Pupils are equal, round, and reactive to light  Cardiovascular:      Rate and Rhythm: Normal rate and regular rhythm  Heart sounds: Normal heart sounds  Pulmonary:      Effort: Pulmonary effort is normal  No respiratory distress  Breath sounds: Normal breath sounds  Abdominal:      Palpations: Abdomen is soft  Tenderness: There is no abdominal tenderness  Musculoskeletal:         General: No swelling, tenderness, deformity or signs of injury  Normal range of motion        Cervical back: Normal range of motion and neck supple  Right lower leg: No edema  Left lower leg: No edema  Skin:     General: Skin is warm and dry  Coloration: Skin is not jaundiced or pale  Findings: No bruising, erythema, lesion or rash  Neurological:      General: No focal deficit present  Mental Status: She is alert and oriented to person, place, and time  Mental status is at baseline  Cranial Nerves: No cranial nerve deficit  Motor: No weakness  Psychiatric:         Mood and Affect: Mood normal          Behavior: Behavior normal          Thought Content: Thought content normal          Judgment: Judgment normal        Reviewed historical information  PAST MEDICAL HISTORY:    Past Medical History:   Diagnosis Date    Anemia     Asteatotic eczema     Chronic kidney disease     Hypercholesterolemia     Lichen sclerosus et atrophicus     Mitral valve insufficiency     Seborrheic keratoses     Tricuspid regurgitation     Vertigo        PAST SURGICAL HISTORY:    Past Surgical History:   Procedure Laterality Date    APPENDECTOMY  11/14/1939    CATARACT EXTRACTION, BILATERAL  04/2019    HYSTERECTOMY  11/14/1962    ORIF HIP FRACTURE Left 2020    [place] COLONOSCOPY FLX DX W/COLLJ SPEC WHEN PFRMD N/A 5/19/2016    Procedure: EGD AND COLONOSCOPY;  Surgeon: Angel Harper MD;  Location: [facility];   Service: Gastroenterology         CURRENT MEDICATIONS:     Current Outpatient Medications:     apixaban (Eliquis) 2 5 mg, Take 1 tablet (2 5 mg total) by mouth 2 (two) times a day, Disp: 180 tablet, Rfl: 3    benzonatate (TESSALON PERLES) 100 mg capsule, Take 1 capsule (100 mg total) by mouth 3 (three) times a day as needed for cough, Disp: 20 capsule, Rfl: 0    Dilt- MG 24 hr capsule, TAKE 1 CAPSULE BY MOUTH EVERY DAY, Disp: 90 capsule, Rfl: 1    ferrous sulfate 324 (65 Fe) mg, Take 1 tablet (324 mg total) by mouth daily before breakfast, Disp: 90 tablet, Rfl: 1    Klor-Con M10 10 MEQ tablet, TAKE 1 TABLET BY MOUTH EVERY DAY, Disp: 90 tablet, Rfl: 1    albuterol (Proventil HFA) 90 mcg/act inhaler, Inhale 2 puffs every 6 (six) hours as needed for wheezing (cough) (Patient not taking: Reported on 1/14/2022 ), Disp: 6 7 g, Rfl: 0    fluticasone (FLONASE) 50 mcg/act nasal spray, 1 spray into each nostril daily (Patient not taking: Reported on 1/14/2022 ), Disp: 11 1 mL, Rfl: 0    furosemide (LASIX) 20 mg tablet, Take 3 tablets (60 mg total) by mouth daily, Disp: 90 tablet, Rfl: 11    metoprolol succinate (TOPROL-XL) 100 mg 24 hr tablet, Take 2 tablets (200 mg total) by mouth daily, Disp: 90 tablet, Rfl: 3    SOCIAL HISTORY:    Social History     Tobacco Use    Smoking status: Never Smoker    Smokeless tobacco: Never Used   Vaping Use    Vaping Use: Never used   Substance Use Topics    Alcohol use: Not Currently    Drug use: No       FAMILY HISTORY:    Family History   Problem Relation Age of Onset    Heart disease Mother         Abnormality    Heart disease Father         Abnormality    No Known Problems Sister     No Known Problems Brother        ALLERGIES:    Allergies   Allergen Reactions    Lactose Intolerance (Gi) - Food Allergy     Penicillins Other (See Comments)     unknown         LAB:    Lab Results   Component Value Date    WBC 5 47 11/23/2021    HGB 13 2 11/23/2021    HCT 42 5 11/23/2021    MCV 91 11/23/2021     11/23/2021       Lab Results   Component Value Date     10/26/2015    SODIUM 141 11/23/2021    K 4 2 11/23/2021     11/23/2021    CO2 33 (H) 11/23/2021    ANIONGAP 5 10/26/2015    AGAP 6 11/23/2021    BUN 38 (H) 11/23/2021    CREATININE 1 64 (H) 11/23/2021    GLUC 87 11/23/2021    GLUF 79 09/14/2021    CALCIUM 8 8 11/23/2021    AST 19 11/18/2021    ALT 23 11/18/2021    ALKPHOS 72 11/18/2021    PROT 7 9 10/15/2015    TP 6 8 11/18/2021    BILITOT 0 70 10/15/2015    TBILI 0 55 11/18/2021    EGFR 26 11/23/2021       IMAGING:  XR chest 2 views  Narrative: CHEST INDICATION:   sob  COMPARISON:  11/11/2021; 3/29/2017    EXAM PERFORMED/VIEWS:  XR CHEST PA & LATERAL    FINDINGS:    Cardiomegaly is present  Aortic calcification is present  No infiltrate is identified  Pulmonary vascularity and interstitial markings have slightly increased and trace effusions are noted on the lateral view  No pneumothorax or pleural effusion  Osseous structures appear within normal limits for patient age  Impression: Evidence of trace edema and effusions      Workstation performed: [id number]

## 2022-01-20 ENCOUNTER — TELEPHONE (OUTPATIENT)
Dept: INTERNAL MEDICINE CLINIC | Facility: CLINIC | Age: 87
End: 2022-01-20

## 2022-01-20 ENCOUNTER — APPOINTMENT (OUTPATIENT)
Dept: LAB | Facility: HOSPITAL | Age: 87
End: 2022-01-20
Payer: MEDICARE

## 2022-01-20 ENCOUNTER — OFFICE VISIT (OUTPATIENT)
Dept: HEMATOLOGY ONCOLOGY | Facility: CLINIC | Age: 87
End: 2022-01-20
Payer: MEDICARE

## 2022-01-20 VITALS
TEMPERATURE: 97.1 F | BODY MASS INDEX: 24.54 KG/M2 | DIASTOLIC BLOOD PRESSURE: 82 MMHG | WEIGHT: 125 LBS | HEART RATE: 62 BPM | SYSTOLIC BLOOD PRESSURE: 132 MMHG | HEIGHT: 60 IN | OXYGEN SATURATION: 99 % | RESPIRATION RATE: 18 BRPM

## 2022-01-20 DIAGNOSIS — N18.30 STAGE 3 CHRONIC KIDNEY DISEASE, UNSPECIFIED WHETHER STAGE 3A OR 3B CKD (HCC): ICD-10-CM

## 2022-01-20 DIAGNOSIS — D50.9 IRON DEFICIENCY ANEMIA, UNSPECIFIED IRON DEFICIENCY ANEMIA TYPE: ICD-10-CM

## 2022-01-20 DIAGNOSIS — D50.9 IRON DEFICIENCY ANEMIA, UNSPECIFIED IRON DEFICIENCY ANEMIA TYPE: Primary | ICD-10-CM

## 2022-01-20 DIAGNOSIS — R19.5 FECAL OCCULT BLOOD TEST POSITIVE: ICD-10-CM

## 2022-01-20 LAB
BASOPHILS # BLD AUTO: 0.05 THOUSANDS/ΜL (ref 0–0.1)
BASOPHILS NFR BLD AUTO: 1 % (ref 0–1)
EOSINOPHIL # BLD AUTO: 0.19 THOUSAND/ΜL (ref 0–0.61)
EOSINOPHIL NFR BLD AUTO: 3 % (ref 0–6)
ERYTHROCYTE [DISTWIDTH] IN BLOOD BY AUTOMATED COUNT: 15.4 % (ref 11.6–15.1)
FERRITIN SERPL-MCNC: 86 NG/ML (ref 8–388)
HCT VFR BLD AUTO: 40.8 % (ref 34.8–46.1)
HGB BLD-MCNC: 12.5 G/DL (ref 11.5–15.4)
IMM GRANULOCYTES # BLD AUTO: 0.04 THOUSAND/UL (ref 0–0.2)
IMM GRANULOCYTES NFR BLD AUTO: 1 % (ref 0–2)
IRON SATN MFR SERPL: 16 % (ref 15–50)
IRON SERPL-MCNC: 72 UG/DL (ref 50–170)
LYMPHOCYTES # BLD AUTO: 0.77 THOUSANDS/ΜL (ref 0.6–4.47)
LYMPHOCYTES NFR BLD AUTO: 12 % (ref 14–44)
MCH RBC QN AUTO: 28.1 PG (ref 26.8–34.3)
MCHC RBC AUTO-ENTMCNC: 30.6 G/DL (ref 31.4–37.4)
MCV RBC AUTO: 92 FL (ref 82–98)
MONOCYTES # BLD AUTO: 0.46 THOUSAND/ΜL (ref 0.17–1.22)
MONOCYTES NFR BLD AUTO: 7 % (ref 4–12)
NEUTROPHILS # BLD AUTO: 5.09 THOUSANDS/ΜL (ref 1.85–7.62)
NEUTS SEG NFR BLD AUTO: 76 % (ref 43–75)
NRBC BLD AUTO-RTO: 0 /100 WBCS
PLATELET # BLD AUTO: 179 THOUSANDS/UL (ref 149–390)
PMV BLD AUTO: 13 FL (ref 8.9–12.7)
RBC # BLD AUTO: 4.45 MILLION/UL (ref 3.81–5.12)
TIBC SERPL-MCNC: 440 UG/DL (ref 250–450)
WBC # BLD AUTO: 6.6 THOUSAND/UL (ref 4.31–10.16)

## 2022-01-20 PROCEDURE — 83550 IRON BINDING TEST: CPT | Performed by: INTERNAL MEDICINE

## 2022-01-20 PROCEDURE — 85025 COMPLETE CBC W/AUTO DIFF WBC: CPT | Performed by: INTERNAL MEDICINE

## 2022-01-20 PROCEDURE — 83540 ASSAY OF IRON: CPT | Performed by: INTERNAL MEDICINE

## 2022-01-20 PROCEDURE — 82728 ASSAY OF FERRITIN: CPT | Performed by: INTERNAL MEDICINE

## 2022-01-20 PROCEDURE — 36415 COLL VENOUS BLD VENIPUNCTURE: CPT | Performed by: INTERNAL MEDICINE

## 2022-01-20 PROCEDURE — 99213 OFFICE O/P EST LOW 20 MIN: CPT | Performed by: INTERNAL MEDICINE

## 2022-01-26 DIAGNOSIS — I10 ESSENTIAL HYPERTENSION: ICD-10-CM

## 2022-01-26 RX ORDER — POTASSIUM CHLORIDE 750 MG/1
TABLET, EXTENDED RELEASE ORAL
Qty: 90 TABLET | Refills: 0 | Status: SHIPPED | OUTPATIENT
Start: 2022-01-26 | End: 2022-05-16

## 2022-02-03 ENCOUNTER — TELEPHONE (OUTPATIENT)
Dept: NEPHROLOGY | Facility: CLINIC | Age: 87
End: 2022-02-03

## 2022-02-03 NOTE — TELEPHONE ENCOUNTER
I called and left a message on machine for patient to return our call about scheduling him for his follow up appointment from the recall list with Dr Jasmyne King,

## 2022-02-08 DIAGNOSIS — N18.32 STAGE 3B CHRONIC KIDNEY DISEASE (HCC): Primary | ICD-10-CM

## 2022-02-11 ENCOUNTER — PATIENT OUTREACH (OUTPATIENT)
Dept: CASE MANAGEMENT | Facility: HOSPITAL | Age: 87
End: 2022-02-11

## 2022-02-11 NOTE — PROGRESS NOTES
Heart Failure Outpatient Care Coordinator Note: Call made to patient who reports she is feeling "pretty good"  She states her weights are stable 123-125 #  No SOB or edema  She is adherent to her medication and low sodium diet  She manages her own medication and her son brings her to her appointments  She remains active around her house with her walker and good family support  States [facility] nurse still making weekly home visits  Denies any needs  BPCI closure, ended today   Episode resolved and outpatient care manager removed self from the care team

## 2022-03-15 ENCOUNTER — OFFICE VISIT (OUTPATIENT)
Dept: CARDIOLOGY CLINIC | Facility: CLINIC | Age: 87
End: 2022-03-15
Payer: MEDICARE

## 2022-03-15 VITALS
RESPIRATION RATE: 16 BRPM | HEART RATE: 68 BPM | HEIGHT: 60 IN | SYSTOLIC BLOOD PRESSURE: 160 MMHG | BODY MASS INDEX: 25.32 KG/M2 | OXYGEN SATURATION: 96 % | WEIGHT: 129 LBS | DIASTOLIC BLOOD PRESSURE: 80 MMHG

## 2022-03-15 DIAGNOSIS — I48.91 ATRIAL FIBRILLATION, UNSPECIFIED TYPE (HCC): Primary | ICD-10-CM

## 2022-03-15 DIAGNOSIS — I35.0 NONRHEUMATIC AORTIC VALVE STENOSIS: ICD-10-CM

## 2022-03-15 PROCEDURE — 99214 OFFICE O/P EST MOD 30 MIN: CPT | Performed by: INTERNAL MEDICINE

## 2022-03-15 NOTE — PROGRESS NOTES
Cardiology Office Note    Tessa Hernandez 80 y o  female MRN: [id number]    03/15/22          Assessment:  1  Severe low flow; low gradient AS (D3)  2  Chronic HFpEF  3  Moderate to severe TR  4  Pulmonary hypertension   5  Chronic atrial fibrillation   6  Hypertension   7  CKD    Plan:  · She was evaluated by CT surgery and medical management was advised  · She is euvolemic on Lasix 60 mg daily   · She is rate controlled on Cardizem and Toprol-XL   · Continue Eliquis 2 5 mg b i d  Ambulatory blood pressure monitoring and maintaining a low sodium diet was advised  · He was advised to notify us with the onset of cardiac symptoms  Follow up: 4 months or sooner as needed    1  Atrial fibrillation, unspecified type ([address] )     2  Nonrheumatic aortic valve stenosis         HPI: Tessa Hernandez is a 80y o  year old female with history of severe aortic stenosis, tricuspid regurgitation, pulmonary hypertension and atrial fibrillation presents for routine follow-up  Past cardiac history:  · She was hospitalized twice in November with acute on chronic HFpEF  She was diuresed with IV lasix and eventually discharged on lasix 60 mg/day  TTE revealed EF: 60% with reduced RV function, biatrial dilation and severe low flow, low gradient AS with RAIN: 0 86  There was also moderate to severe TR with PHTN with PASP of 80 mmHg  She has been doing well from a cardiac standpoint since her last evaluation  She was evaluated by Cardiac surgery and medical management of her aortic stenosis was advised  She is able to perform her ADLs independently  She denies chest pain, palpitations, dyspnea on exertion, lightheadedness, presyncope or any other cardiac concerns at this time  She has been tolerating her medications without side effect              Allergies   Allergen Reactions    Lactose Intolerance (Gi) - Food Allergy     Penicillins Other (See Comments)     unknown         Current Outpatient Medications:    albuterol (Proventil HFA) 90 mcg/act inhaler, Inhale 2 puffs every 6 (six) hours as needed for wheezing (cough), Disp: 6 7 g, Rfl: 0    apixaban (Eliquis) 2 5 mg, Take 1 tablet (2 5 mg total) by mouth 2 (two) times a day, Disp: 180 tablet, Rfl: 3    Dilt- MG 24 hr capsule, TAKE 1 CAPSULE BY MOUTH EVERY DAY, Disp: 90 capsule, Rfl: 1    ferrous sulfate 324 (65 Fe) mg, Take 1 tablet (324 mg total) by mouth daily before breakfast, Disp: 90 tablet, Rfl: 1    fluticasone (FLONASE) 50 mcg/act nasal spray, 1 spray into each nostril daily, Disp: 11 1 mL, Rfl: 0    furosemide (LASIX) 20 mg tablet, Take 3 tablets (60 mg total) by mouth daily, Disp: 90 tablet, Rfl: 11    Klor-Con M10 10 MEQ tablet, TAKE 1 TABLET BY MOUTH EVERY DAY, Disp: 90 tablet, Rfl: 0    metoprolol succinate (TOPROL-XL) 100 mg 24 hr tablet, Take 2 tablets (200 mg total) by mouth daily, Disp: 90 tablet, Rfl: 3    Past Medical History:   Diagnosis Date    Anemia     Asteatotic eczema     Chronic kidney disease     Hypercholesterolemia     Lichen sclerosus et atrophicus     Mitral valve insufficiency     Seborrheic keratoses     Tricuspid regurgitation     Vertigo        Family History   Problem Relation Age of Onset    Heart disease Mother         Abnormality    Heart disease Father         Abnormality    No Known Problems Sister     No Known Problems Brother        Past Surgical History:   Procedure Laterality Date    APPENDECTOMY  11/14/1939    CATARACT EXTRACTION, BILATERAL  04/2019    HYSTERECTOMY  11/14/1962    ORIF HIP FRACTURE Left 2020    IA COLONOSCOPY FLX DX W/COLLJ SPEC WHEN PFRMD N/A 5/19/2016    Procedure: EGD AND COLONOSCOPY;  Surgeon: Rosaura Sykes MD;  Location: [facility]; Service: Gastroenterology       Social History     Socioeconomic History    Marital status:       Spouse name: Not on file    Number of children: Not on file    Years of education: Not on file    Highest education level: Not on file   Occupational History    Not on file   Tobacco Use    Smoking status: Never Smoker    Smokeless tobacco: Never Used   Vaping Use    Vaping Use: Never used   Substance and Sexual Activity    Alcohol use: Not Currently    Drug use: No    Sexual activity: Not Currently   Other Topics Concern    Not on file   Social History Narrative    Advance directive declined by patient     Social Determinants of Health     Financial Resource Strain: Not on file   Food Insecurity: No Food Insecurity    Worried About Running Out of Food in the Last Year: Never true    Rufina of Food in the Last Year: Never true   Transportation Needs: No Transportation Needs    Lack of Transportation (Medical): No    Lack of Transportation (Non-Medical): No   Physical Activity: Not on file   Stress: No Stress Concern Present    Feeling of Stress : Not at all   Social Connections: Not on file   Intimate Partner Violence: Not on file   Housing Stability: Low Risk     Unable to Pay for Housing in the Last Year: No    Number of Places Lived in the Last Year: 1    Unstable Housing in the Last Year: No       Review of Systems   Constitutional: Negative for diaphoresis, weight gain and weight loss  HENT: Negative for congestion  Cardiovascular: Negative for chest pain, dyspnea on exertion, irregular heartbeat, leg swelling, near-syncope, orthopnea, palpitations, paroxysmal nocturnal dyspnea and syncope  Respiratory: Negative for shortness of breath, sleep disturbances due to breathing and snoring  Hematologic/Lymphatic: Does not bruise/bleed easily  Skin: Negative for rash  Musculoskeletal: Negative for myalgias  Gastrointestinal: Negative for nausea and vomiting  Neurological: Negative for excessive daytime sleepiness and light-headedness  Psychiatric/Behavioral: The patient is not nervous/anxious          Vitals: /80 (BP Location: Left arm, Patient Position: Sitting)   Pulse 68   Resp 16   Ht 4' 11 5" (1 511 m)   Wt 58 5 kg (129 lb)   LMP  (LMP Unknown)   SpO2 96%   BMI 25 62 kg/m²       Physical Exam:     GEN: Alert and oriented x 3, in no acute distress  Well appearing and well nourished  HEENT: Sclera anicteric, conjunctivae pink, mucous membranes moist  Oropharynx clear  NECK: Supple, no carotid bruits, no significant JVD  Trachea midline, no thyromegaly  HEART: irregularly irregular rhythm, normal S1 and S2, 3/6 BUD, no clicks, gallops or rubs  PMI nondisplaced, no thrills  LUNGS: Clear to auscultation bilaterally; no wheezes, rales, or rhonchi  No increased work of breathing or signs of respiratory distress  ABDOMEN: Soft, nontender, nondistended   EXTREMITIES: Skin warm and well perfused, no clubbing, cyanosis, or edema  NEURO: No focal findings  Normal speech  Mood and affect normal    SKIN: Normal without suspicious lesions on exposed skin

## 2022-04-05 ENCOUNTER — TELEPHONE (OUTPATIENT)
Dept: NEPHROLOGY | Facility: CLINIC | Age: 87
End: 2022-04-05

## 2022-04-08 ENCOUNTER — APPOINTMENT (OUTPATIENT)
Dept: LAB | Facility: HOSPITAL | Age: 87
End: 2022-04-08
Attending: INTERNAL MEDICINE
Payer: MEDICARE

## 2022-04-08 ENCOUNTER — TELEPHONE (OUTPATIENT)
Dept: NEPHROLOGY | Facility: CLINIC | Age: 87
End: 2022-04-08

## 2022-04-08 DIAGNOSIS — N18.32 STAGE 3B CHRONIC KIDNEY DISEASE (HCC): ICD-10-CM

## 2022-04-08 LAB
25(OH)D3 SERPL-MCNC: 44.3 NG/ML (ref 30–100)
ANION GAP SERPL CALCULATED.3IONS-SCNC: 8 MMOL/L (ref 4–13)
BACTERIA UR QL AUTO: ABNORMAL /HPF
BASOPHILS # BLD AUTO: 0.07 THOUSANDS/ΜL (ref 0–0.1)
BASOPHILS NFR BLD AUTO: 1 % (ref 0–1)
BILIRUB UR QL STRIP: NEGATIVE
BUN SERPL-MCNC: 49 MG/DL (ref 5–25)
CALCIUM SERPL-MCNC: 9.1 MG/DL (ref 8.3–10.1)
CHLORIDE SERPL-SCNC: 102 MMOL/L (ref 100–108)
CLARITY UR: CLEAR
CO2 SERPL-SCNC: 30 MMOL/L (ref 21–32)
COLOR UR: YELLOW
CREAT SERPL-MCNC: 1.62 MG/DL (ref 0.6–1.3)
CREAT UR-MCNC: 33.8 MG/DL
EOSINOPHIL # BLD AUTO: 0.24 THOUSAND/ΜL (ref 0–0.61)
EOSINOPHIL NFR BLD AUTO: 4 % (ref 0–6)
ERYTHROCYTE [DISTWIDTH] IN BLOOD BY AUTOMATED COUNT: 15.2 % (ref 11.6–15.1)
GFR SERPL CREATININE-BSD FRML MDRD: 26 ML/MIN/1.73SQ M
GLUCOSE P FAST SERPL-MCNC: 87 MG/DL (ref 65–99)
GLUCOSE UR STRIP-MCNC: NEGATIVE MG/DL
HCT VFR BLD AUTO: 38.9 % (ref 34.8–46.1)
HGB BLD-MCNC: 11.6 G/DL (ref 11.5–15.4)
HGB UR QL STRIP.AUTO: NEGATIVE
IMM GRANULOCYTES # BLD AUTO: 0.05 THOUSAND/UL (ref 0–0.2)
IMM GRANULOCYTES NFR BLD AUTO: 1 % (ref 0–2)
KETONES UR STRIP-MCNC: NEGATIVE MG/DL
LEUKOCYTE ESTERASE UR QL STRIP: ABNORMAL
LYMPHOCYTES # BLD AUTO: 0.59 THOUSANDS/ΜL (ref 0.6–4.47)
LYMPHOCYTES NFR BLD AUTO: 9 % (ref 14–44)
MCH RBC QN AUTO: 27.6 PG (ref 26.8–34.3)
MCHC RBC AUTO-ENTMCNC: 29.8 G/DL (ref 31.4–37.4)
MCV RBC AUTO: 93 FL (ref 82–98)
MONOCYTES # BLD AUTO: 0.7 THOUSAND/ΜL (ref 0.17–1.22)
MONOCYTES NFR BLD AUTO: 10 % (ref 4–12)
NEUTROPHILS # BLD AUTO: 5.21 THOUSANDS/ΜL (ref 1.85–7.62)
NEUTS SEG NFR BLD AUTO: 75 % (ref 43–75)
NITRITE UR QL STRIP: NEGATIVE
NON-SQ EPI CELLS URNS QL MICRO: ABNORMAL /HPF
NRBC BLD AUTO-RTO: 0 /100 WBCS
PH UR STRIP.AUTO: 6.5 [PH]
PHOSPHATE SERPL-MCNC: 4.2 MG/DL (ref 2.3–4.1)
PLATELET # BLD AUTO: 188 THOUSANDS/UL (ref 149–390)
PMV BLD AUTO: 12.1 FL (ref 8.9–12.7)
POTASSIUM SERPL-SCNC: 4.3 MMOL/L (ref 3.5–5.3)
PROT UR STRIP-MCNC: NEGATIVE MG/DL
PROT UR-MCNC: 7 MG/DL
PROT/CREAT UR: 0.21 MG/G{CREAT} (ref 0–0.1)
PTH-INTACT SERPL-MCNC: 123.9 PG/ML (ref 18.4–80.1)
RBC # BLD AUTO: 4.2 MILLION/UL (ref 3.81–5.12)
RBC #/AREA URNS AUTO: ABNORMAL /HPF
SODIUM SERPL-SCNC: 140 MMOL/L (ref 136–145)
SP GR UR STRIP.AUTO: 1.01 (ref 1–1.03)
UROBILINOGEN UR QL STRIP.AUTO: 0.2 E.U./DL
WBC # BLD AUTO: 6.86 THOUSAND/UL (ref 4.31–10.16)
WBC #/AREA URNS AUTO: ABNORMAL /HPF

## 2022-04-08 PROCEDURE — 83970 ASSAY OF PARATHORMONE: CPT

## 2022-04-08 PROCEDURE — 82306 VITAMIN D 25 HYDROXY: CPT

## 2022-04-08 PROCEDURE — 80048 BASIC METABOLIC PNL TOTAL CA: CPT

## 2022-04-08 PROCEDURE — 81001 URINALYSIS AUTO W/SCOPE: CPT

## 2022-04-08 PROCEDURE — 84100 ASSAY OF PHOSPHORUS: CPT

## 2022-04-08 PROCEDURE — 84156 ASSAY OF PROTEIN URINE: CPT

## 2022-04-08 PROCEDURE — 36415 COLL VENOUS BLD VENIPUNCTURE: CPT

## 2022-04-08 PROCEDURE — 82570 ASSAY OF URINE CREATININE: CPT

## 2022-04-08 PROCEDURE — 85025 COMPLETE CBC W/AUTO DIFF WBC: CPT

## 2022-04-08 NOTE — TELEPHONE ENCOUNTER
[facility] lab called to infom Dr Hakeem Peralta that pt came today to do blood and urine test  Pt was unable to give urine sample bc used bathroom just before going for tests   Pt will drop off urine sample on Monday before her appt with Dr Hakeem Peralta

## 2022-04-11 ENCOUNTER — OFFICE VISIT (OUTPATIENT)
Dept: NEPHROLOGY | Facility: CLINIC | Age: 87
End: 2022-04-11
Payer: MEDICARE

## 2022-04-11 VITALS
SYSTOLIC BLOOD PRESSURE: 140 MMHG | WEIGHT: 126 LBS | DIASTOLIC BLOOD PRESSURE: 80 MMHG | HEIGHT: 60 IN | RESPIRATION RATE: 16 BRPM | HEART RATE: 62 BPM | BODY MASS INDEX: 24.74 KG/M2 | OXYGEN SATURATION: 95 % | TEMPERATURE: 97.7 F

## 2022-04-11 DIAGNOSIS — I50.9 CONGESTIVE HEART FAILURE, UNSPECIFIED HF CHRONICITY, UNSPECIFIED HEART FAILURE TYPE (HCC): ICD-10-CM

## 2022-04-11 DIAGNOSIS — N18.9 CHRONIC KIDNEY DISEASE-MINERAL AND BONE DISORDER: ICD-10-CM

## 2022-04-11 DIAGNOSIS — N18.4 CHRONIC RENAL DISEASE, STAGE IV (HCC): ICD-10-CM

## 2022-04-11 DIAGNOSIS — I50.32 CHRONIC DIASTOLIC CONGESTIVE HEART FAILURE (HCC): ICD-10-CM

## 2022-04-11 DIAGNOSIS — I10 ESSENTIAL HYPERTENSION: ICD-10-CM

## 2022-04-11 DIAGNOSIS — E83.9 CHRONIC KIDNEY DISEASE-MINERAL AND BONE DISORDER: ICD-10-CM

## 2022-04-11 DIAGNOSIS — N18.32 STAGE 3B CHRONIC KIDNEY DISEASE (HCC): Primary | ICD-10-CM

## 2022-04-11 DIAGNOSIS — M89.9 CHRONIC KIDNEY DISEASE-MINERAL AND BONE DISORDER: ICD-10-CM

## 2022-04-11 PROCEDURE — 99214 OFFICE O/P EST MOD 30 MIN: CPT | Performed by: INTERNAL MEDICINE

## 2022-04-11 NOTE — ASSESSMENT & PLAN NOTE
Lab Results   Component Value Date    EGFR 26 04/08/2022    EGFR 26 11/23/2021    EGFR 22 11/22/2021    CREATININE 1 62 (H) 04/08/2022    CREATININE 1 64 (H) 11/23/2021    CREATININE 1 86 (H) 11/22/2021   PTH and phosphorus along with vitamin-D are within acceptable range and will continue to monitor

## 2022-04-11 NOTE — ASSESSMENT & PLAN NOTE
Lab Results   Component Value Date    EGFR 26 04/08/2022    EGFR 26 11/23/2021    EGFR 22 11/22/2021    CREATININE 1 62 (H) 04/08/2022    CREATININE 1 64 (H) 11/23/2021    CREATININE 1 86 (H) 11/22/2021   Renal function is stable overall    Advised to avoid any nephrotoxic medicine will continue to monitor

## 2022-04-11 NOTE — PROGRESS NOTES
----- Message from Roshni Teran LPN sent at 12/3/2020  1:50 PM CST -----    ----- Message -----  From: Brody Hung  Sent: 12/3/2020   1:38 PM CST  To: Fercho Armstrong    .Type:  Patient Returning Call    Who Called:Husam Church  Who Left Message for Patient:Eugenia  Does the patient know what this is regarding?:no  Would the patient rather a call back or a response via [facility]? Call back  Best Call Back [phone]  Additional Information:          NEPHROLOGY OFFICE FOLLOW UP  Shane Beltrán 80 y o  female MRN: [id number]    Encounter: [id number] 4/11/2022    REASON FOR VISIT: Shane Beltrán is a 80 y o  female who is here on 4/11/2022 for Chronic Kidney Disease and Follow-up    HPI:    Celiac came in today for follow-up of stage IV CKD  [de-identified] year woman who looks good for her age  Denies any acute complaint     Since I saw her last was hospital with volume overload status requiring diuresis     Feeling much better now     Has some joint pain but no other acute complaint     Denies any chest pain palpitation or shortness of breath      REVIEW OF SYSTEMS:    Review of Systems   Constitutional: Negative for activity change and fatigue  HENT: Negative for congestion and ear discharge  Eyes: Negative for photophobia and pain  Respiratory: Negative for apnea and choking  Cardiovascular: Negative for chest pain and palpitations  Gastrointestinal: Negative for abdominal distention and blood in stool  Endocrine: Negative for heat intolerance and polyphagia  Genitourinary: Negative for flank pain and urgency  Musculoskeletal: Negative for neck pain and neck stiffness  Skin: Negative for color change and wound  Allergic/Immunologic: Negative for food allergies and immunocompromised state  Neurological: Negative for seizures and facial asymmetry  Hematological: Negative for adenopathy  Does not bruise/bleed easily  Psychiatric/Behavioral: Negative for self-injury and suicidal ideas           PAST MEDICAL HISTORY:  Past Medical History:   Diagnosis Date    Anemia     Asteatotic eczema     Chronic kidney disease     Hypercholesterolemia     Lichen sclerosus et atrophicus     Mitral valve insufficiency     Seborrheic keratoses     Tricuspid regurgitation     Vertigo        PAST SURGICAL HISTORY:  Past Surgical History:   Procedure Laterality Date    APPENDECTOMY  11/14/1939    CATARACT EXTRACTION, BILATERAL  04/2019    HYSTERECTOMY  11/14/1962    ORIF HIP FRACTURE Left 2020    AK COLONOSCOPY FLX DX W/COLLJ SPEC WHEN PFRMD N/A 5/19/2016    Procedure: EGD AND COLONOSCOPY;  Surgeon: Srinath Newman MD;  Location: [facility];   Service: Gastroenterology       SOCIAL HISTORY:  Social History     Substance and Sexual Activity   Alcohol Use Not Currently     Social History     Substance and Sexual Activity   Drug Use No     Social History     Tobacco Use   Smoking Status Never Smoker   Smokeless Tobacco Never Used       FAMILY HISTORY:  Family History   Problem Relation Age of Onset    Heart disease Mother         Abnormality    Heart disease Father         Abnormality    No Known Problems Sister     No Known Problems Brother        MEDICATIONS:    Current Outpatient Medications:     apixaban (Eliquis) 2 5 mg, Take 1 tablet (2 5 mg total) by mouth 2 (two) times a day, Disp: 180 tablet, Rfl: 3    Dilt- MG 24 hr capsule, TAKE 1 CAPSULE BY MOUTH EVERY DAY, Disp: 90 capsule, Rfl: 1    ferrous sulfate 324 (65 Fe) mg, Take 1 tablet (324 mg total) by mouth daily before breakfast, Disp: 90 tablet, Rfl: 1    furosemide (LASIX) 20 mg tablet, Take 3 tablets (60 mg total) by mouth daily, Disp: 90 tablet, Rfl: 11    Klor-Con M10 10 MEQ tablet, TAKE 1 TABLET BY MOUTH EVERY DAY, Disp: 90 tablet, Rfl: 0    metoprolol succinate (TOPROL-XL) 100 mg 24 hr tablet, Take 2 tablets (200 mg total) by mouth daily, Disp: 90 tablet, Rfl: 3    albuterol (Proventil HFA) 90 mcg/act inhaler, Inhale 2 puffs every 6 (six) hours as needed for wheezing (cough) (Patient not taking: Reported on 4/11/2022 ), Disp: 6 7 g, Rfl: 0    fluticasone (FLONASE) 50 mcg/act nasal spray, 1 spray into each nostril daily (Patient not taking: Reported on 4/11/2022 ), Disp: 11 1 mL, Rfl: 0    PHYSICAL EXAM:  Vitals:    04/11/22 1429   BP: 140/80   BP Location: Right arm   Patient Position: Sitting   Pulse: 62   Resp: 16   Temp: 97 7 °F (36 5 °C)   TempSrc: Temporal SpO2: 95%   Weight: 57 2 kg (126 lb)   Height: 4' 11 5" (1 511 m)     Body mass index is 25 02 kg/m²  Physical Exam  Constitutional:       General: She is not in acute distress  Appearance: She is well-developed  HENT:      Head: Normocephalic  Eyes:      General: No scleral icterus  Conjunctiva/sclera: Conjunctivae normal    Neck:      Vascular: No JVD  Cardiovascular:      Rate and Rhythm: Normal rate  Heart sounds: Normal heart sounds  Pulmonary:      Effort: Pulmonary effort is normal       Breath sounds: No wheezing  Abdominal:      Palpations: Abdomen is soft  Tenderness: There is no abdominal tenderness  Musculoskeletal:         General: Normal range of motion  Cervical back: Neck supple  Skin:     General: Skin is warm  Findings: No rash  Neurological:      Mental Status: She is alert and oriented to person, place, and time     Psychiatric:         Behavior: Behavior normal          LAB RESULTS:  Results for orders placed or performed in visit on 28/39/85   Basic metabolic panel   Result Value Ref Range    Sodium 140 136 - 145 mmol/L    Potassium 4 3 3 5 - 5 3 mmol/L    Chloride 102 100 - 108 mmol/L    CO2 30 21 - 32 mmol/L    ANION GAP 8 4 - 13 mmol/L    BUN 49 (H) 5 - 25 mg/dL    Creatinine 1 62 (H) 0 60 - 1 30 mg/dL    Glucose, Fasting 87 65 - 99 mg/dL    Calcium 9 1 8 3 - 10 1 mg/dL    eGFR 26 ml/min/1 73sq m   CBC and differential   Result Value Ref Range    WBC 6 86 4 31 - 10 16 Thousand/uL    RBC 4 20 3 81 - 5 12 Million/uL    Hemoglobin 11 6 11 5 - 15 4 g/dL    Hematocrit 38 9 34 8 - 46 1 %    MCV 93 82 - 98 fL    MCH 27 6 26 8 - 34 3 pg    MCHC 29 8 (L) 31 4 - 37 4 g/dL    RDW 15 2 (H) 11 6 - 15 1 %    MPV 12 1 8 9 - 12 7 fL    Platelets 246 016 - 196 Thousands/uL    nRBC 0 /100 WBCs    Neutrophils Relative 75 43 - 75 %    Immat GRANS % 1 0 - 2 %    Lymphocytes Relative 9 (L) 14 - 44 %    Monocytes Relative 10 4 - 12 %    Eosinophils Relative 4 0 - 6 % Basophils Relative 1 0 - 1 %    Neutrophils Absolute 5 21 1 85 - 7 62 Thousands/µL    Immature Grans Absolute 0 05 0 00 - 0 20 Thousand/uL    Lymphocytes Absolute 0 59 (L) 0 60 - 4 47 Thousands/µL    Monocytes Absolute 0 70 0 17 - 1 22 Thousand/µL    Eosinophils Absolute 0 24 0 00 - 0 61 Thousand/µL    Basophils Absolute 0 07 0 00 - 0 10 Thousands/µL   Phosphorus   Result Value Ref Range    Phosphorus 4 2 (H) 2 3 - 4 1 mg/dL   PTH, intact   Result Value Ref Range     9 (H) 18 4 - 80 1 pg/mL   Vitamin D 25 hydroxy   Result Value Ref Range    Vit D, 25-Hydroxy 44 3 30 0 - 100 0 ng/mL       ASSESSMENT and PLAN:      Chronic renal disease, stage IV ([facility])  Lab Results   Component Value Date    EGFR 26 04/08/2022    EGFR 26 11/23/2021    EGFR 22 11/22/2021    CREATININE 1 62 (H) 04/08/2022    CREATININE 1 64 (H) 11/23/2021    CREATININE 1 86 (H) 11/22/2021   Renal function is stable overall  Advised to avoid any nephrotoxic medicine will continue to monitor    Chronic kidney disease-mineral and bone disorder  Lab Results   Component Value Date    EGFR 26 04/08/2022    EGFR 26 11/23/2021    EGFR 22 11/22/2021    CREATININE 1 62 (H) 04/08/2022    CREATININE 1 64 (H) 11/23/2021    CREATININE 1 86 (H) 11/22/2021   PTH and phosphorus along with vitamin-D are within acceptable range and will continue to monitor    Essential hypertension  Blood pressure is very well control with present medication which will continue    Chronic diastolic congestive heart failure (HCC)  Wt Readings from Last 3 Encounters:   04/11/22 57 2 kg (126 lb)   03/15/22 58 5 kg (129 lb)   01/20/22 56 7 kg (125 lb)     On diuretic and seems to be quite comfortable at this point  Kidney function remained stable  Volume management discussed with her            Everything discussed with the patient at length  At her age will like to keep her comfortable    Will see her back in 6 months will get blood and urine test before that visit      Portions of the record may have been created with voice recognition software  Occasional wrong word or "sound a like" substitutions may have occurred due to the inherent limitations of voice recognition software  Read the chart carefully and recognize, using context, where substitutions have occurred  If you have any questions, please contact the dictating provider

## 2022-04-11 NOTE — ASSESSMENT & PLAN NOTE
Wt Readings from Last 3 Encounters:   04/11/22 57 2 kg (126 lb)   03/15/22 58 5 kg (129 lb)   01/20/22 56 7 kg (125 lb)     On diuretic and seems to be quite comfortable at this point  Kidney function remained stable    Volume management discussed with her

## 2022-04-11 NOTE — PATIENT INSTRUCTIONS
Chronic Kidney Disease   AMBULATORY CARE:   Chronic kidney disease (CKD)  is the gradual and permanent loss of kidney function  Normally, the kidneys remove fluid, chemicals, and waste from your blood  These wastes are turned into urine by your kidneys  CKD may worsen over time and lead to kidney failure  Common signs and symptoms include the following:   · Changes in how often you need to urinate    · Swelling in your arms, legs, or feet    · Shortness of breath    · Fatigue or weakness    · Bad or bitter taste in your mouth    · Nausea, vomiting, or loss of appetite    Call your local emergency number (911 in the [address] [facility]) if:   · You have a seizure  · You have shortness of breath  Seek care immediately if:   · You are confused and very drowsy  Call your doctor or nephrologist if:   · You suddenly gain or lose more weight than your healthcare provider has told you is okay  · You have itchy skin or a rash  · You urinate more or less than you normally do  · You have blood in your urine  · You have nausea and are vomiting  · You have fatigue or muscle weakness  · You have hiccups that will not stop  · You have questions or concerns about your condition or care  How CKD is diagnosed:  CKD has 5 stages  Your healthcare provider will use results from the following tests to find the stage of CKD you have:  · Blood and urine tests  show how well your kidneys are working  They may also show the cause of your CKD  · Ultrasound, CT scan, or MRI  pictures may be used to check your kidneys  You may be given contrast liquid to help your kidneys show up better in the pictures  Tell the healthcare provider if you have ever had an allergic reaction to contrast liquid  Do not enter the MRI room with anything metal  Metal can cause serious injury  Tell the healthcare provider if you have any metal in or on your body      · A biopsy  is a procedure to remove a small piece of tissue from your kidney  It is done to find the cause of your CKD  Treatment  can help control signs and symptoms, and prevent a worse stage of CKD  Your care team may include specialists, such as a dietitian or a heart specialist  This depends on the stage of your CKD and if you have other health conditions to manage  Healthcare providers will work with you to create a plan based on your decisions for treatment  Your treatment plan may include any of the following:  · Medicines  may be given to decrease your blood pressure and get rid of extra fluid  You may also receive medicine to manage health conditions that may occur with CKD, such as anemia, diabetes, and heart disease  · Dialysis  is a treatment to remove chemicals and waste from your blood when your kidneys can no longer do this  · Surgery  may be needed to create an arteriovenous fistula (AVF) in your arm or insert a catheter into your abdomen  This is done so you can receive dialysis  · A kidney transplant  may be done if your CKD becomes severe  What you can do to manage CKD: Management may include making some lifestyle changes  Tell your healthcare provider if you have any concerns about being able to make changes  He or she can help you find solutions, including working with specialists  Ask for help creating a plan to break large goals into smaller steps  Your plan may include any of the following:  · Manage other health conditions  Your healthcare provider will work with you to make a care plan that meets your needs  You will be checked regularly for heart disease or other conditions that can make CKD worse, such as diabetes  Your blood pressure will be closely monitored  You will also get a target blood pressure and help making a plan to reach your target  This may include taking your blood pressure at home  · Maintain a healthy weight  Your weight and body mass index (BMI) will be checked regularly   BMI helps find if your weight is healthy for your height  Your healthcare provider will use other tests to check your muscle and protein levels  Extra weight can strain your kidneys  A low weight or low muscle mass can make you feel more tired  You may have trouble doing your daily activities  Ask your provider what a healthy weight is for you  He or she can help you create a plan to lose or gain weight safely, if needed  The plan may include keeping a food diary  This is a list of foods and liquids you have each day  Your provider will use the diary to help you make changes, if needed  Changes are based on your health and any other conditions you have, such as diabetes  · Create an exercise plan  Regular exercise can help you manage CKD, high blood pressure, and diabetes  Exercise also helps control weight  Your provider can help you create exercise goals and a plan to reach those goals  For example, your goal may be to exercise for 30 minutes in a day  Your plan can include breaking exercise into 10 minute sessions, 3 times during the day  · Create a healthy eating plan  Your provider may tell you to eat food low in potassium, phosphorus, or protein  Your provider may also recommend vitamin or mineral supplements  Do not take any supplements without talking to your provider  A dietitian can help you plan meals if needed  Ask how much liquid to drink each day and which liquids are best for you  · Limit sodium (salt) as directed  You may need to limit sodium to less than 2,300 milligrams (mg) each day  Ask your dietitian or healthcare provider how much sodium you can have each day  The amount depends on your stage of kidney disease  Table salt, canned foods, soups, salted snacks, and processed meats, like deli meats and sausage, are high in sodium  Your provider or a dietitian can show you how to read food labels for sodium  · Limit alcohol as directed  Alcohol can cause fluid retention and can affect your kidneys   Ask how much alcohol is safe for you  A drink of alcohol is 12 ounces of beer, 5 ounces of wine, or 1½ ounces of liquor  · Do not smoke  Nicotine and other chemicals in cigarettes and cigars can cause kidney damage  Ask your provider for information if you currently smoke and need help to quit  E-cigarettes or smokeless tobacco still contain nicotine  Talk to your provider before you use these products  · Ask about over-the-counter medicines  Medicines such as NSAIDs and laxatives may harm your kidneys  Some cough and cold medicines can raise your blood pressure  Always ask if a medicine is safe before you take it  · Ask about vaccines you may need  CKD can increase your risk for infections such as pneumonia, influenza, and hepatitis  Vaccines lower your risk for infection  Your healthcare provider will tell you which vaccines you need and when to get them  Follow up with your doctor or nephrologist as directed: You will need to return for tests to monitor your kidney and nerve function, and your parathyroid hormone level  Your medicines may be changed, based on certain test results  Write down your questions so you remember to ask them during your visits  © Copyright [employer] 2022 Information is for End User's use only and may not be sold, redistributed or otherwise used for commercial purposes  All illustrations and images included in CareNotes® are the copyrighted property of [employer]  or Carlos Mondragon  The above information is an  only  It is not intended as medical advice for individual conditions or treatments  Talk to your doctor, nurse or pharmacist before following any medical regimen to see if it is safe and effective for you

## 2022-04-26 DIAGNOSIS — I10 ESSENTIAL HYPERTENSION: ICD-10-CM

## 2022-04-26 DIAGNOSIS — D50.9 IRON DEFICIENCY ANEMIA, UNSPECIFIED IRON DEFICIENCY ANEMIA TYPE: ICD-10-CM

## 2022-04-26 RX ORDER — FERROUS SULFATE TAB EC 324 MG (65 MG FE EQUIVALENT) 324 (65 FE) MG
324 TABLET DELAYED RESPONSE ORAL
Qty: 90 TABLET | Refills: 1 | Status: SHIPPED | OUTPATIENT
Start: 2022-04-26

## 2022-04-29 RX ORDER — DILTIAZEM HYDROCHLORIDE 120 MG/1
CAPSULE, EXTENDED RELEASE ORAL
Qty: 90 CAPSULE | Refills: 1 | Status: SHIPPED | OUTPATIENT
Start: 2022-04-29

## 2022-05-16 DIAGNOSIS — I10 ESSENTIAL HYPERTENSION: ICD-10-CM

## 2022-05-16 RX ORDER — POTASSIUM CHLORIDE 750 MG/1
TABLET, EXTENDED RELEASE ORAL
Qty: 90 TABLET | Refills: 0 | Status: SHIPPED | OUTPATIENT
Start: 2022-05-16

## 2022-06-30 ENCOUNTER — TELEPHONE (OUTPATIENT)
Dept: LAB | Facility: HOSPITAL | Age: 87
End: 2022-06-30

## 2022-06-30 NOTE — TELEPHONE ENCOUNTER
6/30 - scheduled for 7/7 - non fasting bloodwork Pt had a quiet shift. She spent majority of her time either pacing the halls or in her room. Pt did not attend any groups. Pt ranked her depression at a 10 (on a scale from 1-10, 10 being the worst) and her anxiety at a 10 as well. Pt denied any SIB/SI and VH but she said yes to AH and the voices are not telling her good things . Pt did not eat much or drink any water. Writer prompted through out the day but pt passed every time.        03/20/19 1446   Behavioral Health   Hallucinations auditory   Thinking poor concentration   Orientation person: oriented;place: oriented;date: oriented;time: oriented   Memory baseline memory   Insight poor   Judgement impaired   Eye Contact at examiner   Affect blunted, flat   Mood mood is calm;anxious;depressed   Physical Appearance/Attire appears stated age;attire appropriate to age and situation   Hygiene other (see comment)  (adequate )   Suicidality other (see comments)  (pt denies )   1. Wish to be Dead No   2. Non-Specific Active Suicidal Thoughts  No   Self Injury other (see comment)  (pt denies )   Elopement (none observed)   Activity isolative   Speech clear;coherent   Medication Sensitivity no stated side effects;no observed side effects   Psychomotor / Gait balanced;steady   Activities of Daily Living   Hygiene/Grooming independent   Oral Hygiene independent   Dress independent   Laundry unable to complete   Room Organization independent

## 2022-07-07 ENCOUNTER — HOSPITAL ENCOUNTER (INPATIENT)
Facility: HOSPITAL | Age: 87
LOS: 2 days | Discharge: HOME HEALTH | DRG: 872 | End: 2022-07-10
Attending: EMERGENCY MEDICINE | Admitting: INTERNAL MEDICINE
Payer: MEDICARE

## 2022-07-07 ENCOUNTER — APPOINTMENT (EMERGENCY)
Dept: CT IMAGING | Facility: HOSPITAL | Age: 87
DRG: 872 | End: 2022-07-07
Payer: MEDICARE

## 2022-07-07 ENCOUNTER — LAB (OUTPATIENT)
Dept: LAB | Facility: HOSPITAL | Age: 87
End: 2022-07-07
Payer: MEDICARE

## 2022-07-07 DIAGNOSIS — E87.1 HYPONATREMIA: ICD-10-CM

## 2022-07-07 DIAGNOSIS — R05.9 COUGH: ICD-10-CM

## 2022-07-07 DIAGNOSIS — R06.00 DYSPNEA: ICD-10-CM

## 2022-07-07 DIAGNOSIS — N30.01 ACUTE CYSTITIS WITH HEMATURIA: ICD-10-CM

## 2022-07-07 DIAGNOSIS — I50.9 CHF (CONGESTIVE HEART FAILURE) (HCC): ICD-10-CM

## 2022-07-07 DIAGNOSIS — A41.9 SEPSIS, DUE TO UNSPECIFIED ORGANISM, UNSPECIFIED WHETHER ACUTE ORGAN DYSFUNCTION PRESENT (HCC): Primary | ICD-10-CM

## 2022-07-07 DIAGNOSIS — I50.32 CHRONIC DIASTOLIC CONGESTIVE HEART FAILURE (HCC): ICD-10-CM

## 2022-07-07 DIAGNOSIS — N17.9 AKI (ACUTE KIDNEY INJURY) (HCC): ICD-10-CM

## 2022-07-07 LAB
ALBUMIN SERPL BCP-MCNC: 3.7 G/DL (ref 3.5–5)
ALP SERPL-CCNC: 72 U/L (ref 46–116)
ALT SERPL W P-5'-P-CCNC: 18 U/L (ref 12–78)
ANION GAP SERPL CALCULATED.3IONS-SCNC: 14 MMOL/L (ref 4–13)
APTT PPP: 37 SECONDS (ref 23–37)
AST SERPL W P-5'-P-CCNC: 31 U/L (ref 5–45)
BACTERIA UR QL AUTO: ABNORMAL /HPF
BASOPHILS # BLD AUTO: 0.04 THOUSANDS/ΜL (ref 0–0.1)
BASOPHILS NFR BLD AUTO: 0 % (ref 0–1)
BILIRUB SERPL-MCNC: 1.02 MG/DL (ref 0.2–1)
BILIRUB UR QL STRIP: NEGATIVE
BUN SERPL-MCNC: 36 MG/DL (ref 5–25)
CALCIUM SERPL-MCNC: 8.5 MG/DL (ref 8.3–10.1)
CHLORIDE SERPL-SCNC: 86 MMOL/L (ref 100–108)
CLARITY UR: CLEAR
CO2 SERPL-SCNC: 25 MMOL/L (ref 21–32)
COLOR UR: YELLOW
CREAT SERPL-MCNC: 1.43 MG/DL (ref 0.6–1.3)
EOSINOPHIL # BLD AUTO: 0.04 THOUSAND/ΜL (ref 0–0.61)
EOSINOPHIL NFR BLD AUTO: 0 % (ref 0–6)
ERYTHROCYTE [DISTWIDTH] IN BLOOD BY AUTOMATED COUNT: 15.7 % (ref 11.6–15.1)
GFR SERPL CREATININE-BSD FRML MDRD: 30 ML/MIN/1.73SQ M
GLUCOSE SERPL-MCNC: 108 MG/DL (ref 65–140)
GLUCOSE UR STRIP-MCNC: NEGATIVE MG/DL
HCT VFR BLD AUTO: 35.6 % (ref 34.8–46.1)
HGB BLD-MCNC: 10.9 G/DL (ref 11.5–15.4)
HGB UR QL STRIP.AUTO: ABNORMAL
IMM GRANULOCYTES # BLD AUTO: 0.15 THOUSAND/UL (ref 0–0.2)
IMM GRANULOCYTES NFR BLD AUTO: 1 % (ref 0–2)
INR PPP: 1.15 (ref 0.84–1.19)
KETONES UR STRIP-MCNC: NEGATIVE MG/DL
LEUKOCYTE ESTERASE UR QL STRIP: ABNORMAL
LIPASE SERPL-CCNC: 124 U/L (ref 73–393)
LYMPHOCYTES # BLD AUTO: 0.58 THOUSANDS/ΜL (ref 0.6–4.47)
LYMPHOCYTES NFR BLD AUTO: 4 % (ref 14–44)
MCH RBC QN AUTO: 26.4 PG (ref 26.8–34.3)
MCHC RBC AUTO-ENTMCNC: 30.6 G/DL (ref 31.4–37.4)
MCV RBC AUTO: 86 FL (ref 82–98)
MONOCYTES # BLD AUTO: 1.07 THOUSAND/ΜL (ref 0.17–1.22)
MONOCYTES NFR BLD AUTO: 8 % (ref 4–12)
NEUTROPHILS # BLD AUTO: 12.36 THOUSANDS/ΜL (ref 1.85–7.62)
NEUTS SEG NFR BLD AUTO: 87 % (ref 43–75)
NITRITE UR QL STRIP: POSITIVE
NON-SQ EPI CELLS URNS QL MICRO: ABNORMAL /HPF
NRBC BLD AUTO-RTO: 0 /100 WBCS
PH UR STRIP.AUTO: 7 [PH]
PLATELET # BLD AUTO: 206 THOUSANDS/UL (ref 149–390)
PMV BLD AUTO: 11.2 FL (ref 8.9–12.7)
POTASSIUM SERPL-SCNC: 4.3 MMOL/L (ref 3.5–5.3)
PROT SERPL-MCNC: 6.8 G/DL (ref 6.4–8.2)
PROT UR STRIP-MCNC: >=300 MG/DL
PROTHROMBIN TIME: 14.2 SECONDS (ref 11.6–14.5)
RBC # BLD AUTO: 4.13 MILLION/UL (ref 3.81–5.12)
RBC #/AREA URNS AUTO: ABNORMAL /HPF
SODIUM SERPL-SCNC: 125 MMOL/L (ref 136–145)
SP GR UR STRIP.AUTO: 1.01 (ref 1–1.03)
UROBILINOGEN UR QL STRIP.AUTO: 1 E.U./DL
WBC # BLD AUTO: 14.24 THOUSAND/UL (ref 4.31–10.16)
WBC #/AREA URNS AUTO: ABNORMAL /HPF

## 2022-07-07 PROCEDURE — 85610 PROTHROMBIN TIME: CPT | Performed by: EMERGENCY MEDICINE

## 2022-07-07 PROCEDURE — 99285 EMERGENCY DEPT VISIT HI MDM: CPT | Performed by: EMERGENCY MEDICINE

## 2022-07-07 PROCEDURE — 87086 URINE CULTURE/COLONY COUNT: CPT

## 2022-07-07 PROCEDURE — 85730 THROMBOPLASTIN TIME PARTIAL: CPT | Performed by: EMERGENCY MEDICINE

## 2022-07-07 PROCEDURE — 80053 COMPREHEN METABOLIC PANEL: CPT | Performed by: EMERGENCY MEDICINE

## 2022-07-07 PROCEDURE — 80048 BASIC METABOLIC PNL TOTAL CA: CPT

## 2022-07-07 PROCEDURE — 99285 EMERGENCY DEPT VISIT HI MDM: CPT

## 2022-07-07 PROCEDURE — 81001 URINALYSIS AUTO W/SCOPE: CPT

## 2022-07-07 PROCEDURE — 83690 ASSAY OF LIPASE: CPT | Performed by: EMERGENCY MEDICINE

## 2022-07-07 PROCEDURE — 85025 COMPLETE CBC W/AUTO DIFF WBC: CPT | Performed by: EMERGENCY MEDICINE

## 2022-07-07 PROCEDURE — 83880 ASSAY OF NATRIURETIC PEPTIDE: CPT

## 2022-07-07 PROCEDURE — 74176 CT ABD & PELVIS W/O CONTRAST: CPT

## 2022-07-08 PROBLEM — N39.0 UTI (URINARY TRACT INFECTION): Status: ACTIVE | Noted: 2022-07-08

## 2022-07-08 PROBLEM — E87.1 HYPONATREMIA: Status: ACTIVE | Noted: 2022-07-08

## 2022-07-08 PROBLEM — N28.1 BILATERAL RENAL CYSTS: Status: ACTIVE | Noted: 2022-07-08

## 2022-07-08 LAB
ANION GAP SERPL CALCULATED.3IONS-SCNC: 7 MMOL/L (ref 4–13)
ANION GAP SERPL CALCULATED.3IONS-SCNC: 8 MMOL/L (ref 4–13)
ANION GAP SERPL CALCULATED.3IONS-SCNC: 9 MMOL/L (ref 4–13)
BUN SERPL-MCNC: 27 MG/DL (ref 5–25)
BUN SERPL-MCNC: 30 MG/DL (ref 5–25)
BUN SERPL-MCNC: 41 MG/DL (ref 5–25)
CALCIUM SERPL-MCNC: 8.6 MG/DL (ref 8.3–10.1)
CALCIUM SERPL-MCNC: 8.8 MG/DL (ref 8.3–10.1)
CALCIUM SERPL-MCNC: 9.2 MG/DL (ref 8.3–10.1)
CHLORIDE SERPL-SCNC: 104 MMOL/L (ref 100–108)
CHLORIDE SERPL-SCNC: 95 MMOL/L (ref 100–108)
CHLORIDE SERPL-SCNC: 98 MMOL/L (ref 100–108)
CO2 SERPL-SCNC: 27 MMOL/L (ref 21–32)
CO2 SERPL-SCNC: 28 MMOL/L (ref 21–32)
CO2 SERPL-SCNC: 29 MMOL/L (ref 21–32)
CREAT SERPL-MCNC: 1.21 MG/DL (ref 0.6–1.3)
CREAT SERPL-MCNC: 1.28 MG/DL (ref 0.6–1.3)
CREAT SERPL-MCNC: 1.56 MG/DL (ref 0.6–1.3)
GFR SERPL CREATININE-BSD FRML MDRD: 27 ML/MIN/1.73SQ M
GFR SERPL CREATININE-BSD FRML MDRD: 34 ML/MIN/1.73SQ M
GFR SERPL CREATININE-BSD FRML MDRD: 37 ML/MIN/1.73SQ M
GLUCOSE P FAST SERPL-MCNC: 127 MG/DL (ref 65–99)
GLUCOSE P FAST SERPL-MCNC: 98 MG/DL (ref 65–99)
GLUCOSE SERPL-MCNC: 84 MG/DL (ref 65–140)
GLUCOSE SERPL-MCNC: 98 MG/DL (ref 65–140)
NT-PROBNP SERPL-MCNC: 3028 PG/ML
POTASSIUM SERPL-SCNC: 4.4 MMOL/L (ref 3.5–5.3)
POTASSIUM SERPL-SCNC: 4.5 MMOL/L (ref 3.5–5.3)
POTASSIUM SERPL-SCNC: 4.5 MMOL/L (ref 3.5–5.3)
PROCALCITONIN SERPL-MCNC: 0.07 NG/ML
SODIUM SERPL-SCNC: 131 MMOL/L (ref 136–145)
SODIUM SERPL-SCNC: 134 MMOL/L (ref 136–145)
SODIUM SERPL-SCNC: 140 MMOL/L (ref 136–145)

## 2022-07-08 PROCEDURE — 87040 BLOOD CULTURE FOR BACTERIA: CPT | Performed by: EMERGENCY MEDICINE

## 2022-07-08 PROCEDURE — 80048 BASIC METABOLIC PNL TOTAL CA: CPT | Performed by: INTERNAL MEDICINE

## 2022-07-08 PROCEDURE — 84145 PROCALCITONIN (PCT): CPT | Performed by: EMERGENCY MEDICINE

## 2022-07-08 PROCEDURE — 36415 COLL VENOUS BLD VENIPUNCTURE: CPT | Performed by: EMERGENCY MEDICINE

## 2022-07-08 PROCEDURE — 99220 PR INITIAL OBSERVATION CARE/DAY 70 MINUTES: CPT | Performed by: INTERNAL MEDICINE

## 2022-07-08 PROCEDURE — 80048 BASIC METABOLIC PNL TOTAL CA: CPT | Performed by: PHYSICIAN ASSISTANT

## 2022-07-08 RX ORDER — SODIUM CHLORIDE, SODIUM GLUCONATE, SODIUM ACETATE, POTASSIUM CHLORIDE, MAGNESIUM CHLORIDE, SODIUM PHOSPHATE, DIBASIC, AND POTASSIUM PHOSPHATE .53; .5; .37; .037; .03; .012; .00082 G/100ML; G/100ML; G/100ML; G/100ML; G/100ML; G/100ML; G/100ML
50 INJECTION, SOLUTION INTRAVENOUS CONTINUOUS
Status: DISCONTINUED | OUTPATIENT
Start: 2022-07-08 | End: 2022-07-10 | Stop reason: HOSPADM

## 2022-07-08 RX ORDER — SODIUM CHLORIDE 9 MG/ML
50 INJECTION, SOLUTION INTRAVENOUS CONTINUOUS
Status: DISCONTINUED | OUTPATIENT
Start: 2022-07-08 | End: 2022-07-08

## 2022-07-08 RX ORDER — DILTIAZEM HYDROCHLORIDE 120 MG/1
120 CAPSULE, COATED, EXTENDED RELEASE ORAL DAILY
Status: DISCONTINUED | OUTPATIENT
Start: 2022-07-08 | End: 2022-07-10 | Stop reason: HOSPADM

## 2022-07-08 RX ORDER — METOPROLOL SUCCINATE 100 MG/1
200 TABLET, EXTENDED RELEASE ORAL DAILY
Status: DISCONTINUED | OUTPATIENT
Start: 2022-07-08 | End: 2022-07-10 | Stop reason: HOSPADM

## 2022-07-08 RX ORDER — ACETAMINOPHEN 325 MG/1
650 TABLET ORAL EVERY 6 HOURS PRN
Status: DISCONTINUED | OUTPATIENT
Start: 2022-07-08 | End: 2022-07-10 | Stop reason: HOSPADM

## 2022-07-08 RX ORDER — MAGNESIUM HYDROXIDE/ALUMINUM HYDROXICE/SIMETHICONE 120; 1200; 1200 MG/30ML; MG/30ML; MG/30ML
30 SUSPENSION ORAL EVERY 6 HOURS PRN
Status: DISCONTINUED | OUTPATIENT
Start: 2022-07-08 | End: 2022-07-10 | Stop reason: HOSPADM

## 2022-07-08 RX ORDER — FERROUS SULFATE 325(65) MG
325 TABLET ORAL
Status: DISCONTINUED | OUTPATIENT
Start: 2022-07-08 | End: 2022-07-10 | Stop reason: HOSPADM

## 2022-07-08 RX ADMIN — SODIUM CHLORIDE 50 ML/HR: 0.9 INJECTION, SOLUTION INTRAVENOUS at 03:52

## 2022-07-08 RX ADMIN — APIXABAN 2.5 MG: 2.5 TABLET, FILM COATED ORAL at 08:22

## 2022-07-08 RX ADMIN — METOPROLOL SUCCINATE 200 MG: 100 TABLET, EXTENDED RELEASE ORAL at 08:21

## 2022-07-08 RX ADMIN — CEFTRIAXONE SODIUM 1000 MG: 10 INJECTION, POWDER, FOR SOLUTION INTRAVENOUS at 00:44

## 2022-07-08 RX ADMIN — FERROUS SULFATE TAB 325 MG (65 MG ELEMENTAL FE) 325 MG: 325 (65 FE) TAB at 08:20

## 2022-07-08 RX ADMIN — APIXABAN 2.5 MG: 2.5 TABLET, FILM COATED ORAL at 17:53

## 2022-07-08 RX ADMIN — DILTIAZEM HYDROCHLORIDE 120 MG: 120 CAPSULE, COATED, EXTENDED RELEASE ORAL at 08:23

## 2022-07-08 RX ADMIN — SODIUM CHLORIDE, SODIUM GLUCONATE, SODIUM ACETATE, POTASSIUM CHLORIDE, MAGNESIUM CHLORIDE, SODIUM PHOSPHATE, DIBASIC, AND POTASSIUM PHOSPHATE 50 ML/HR: .53; .5; .37; .037; .03; .012; .00082 INJECTION, SOLUTION INTRAVENOUS at 16:51

## 2022-07-08 RX ADMIN — SODIUM CHLORIDE 500 ML: 0.9 INJECTION, SOLUTION INTRAVENOUS at 00:45

## 2022-07-08 RX ADMIN — SODIUM CHLORIDE, SODIUM GLUCONATE, SODIUM ACETATE, POTASSIUM CHLORIDE, MAGNESIUM CHLORIDE, SODIUM PHOSPHATE, DIBASIC, AND POTASSIUM PHOSPHATE 75 ML/HR: .53; .5; .37; .037; .03; .012; .00082 INJECTION, SOLUTION INTRAVENOUS at 08:20

## 2022-07-08 NOTE — ED PROVIDER NOTES
Pt Name: Octavia Keen  MRN: [id number]  [place] 2/17/1924  Age/Sex: 80 y o  female  Date of evaluation: 7/7/2022  PCP: Elizabeth Rider MD    [address] [place] [address]    Chief Complaint   Patient presents with    Oliguria or Anuria     Pt reporting she has not urinated today  Pt states she has been drinking water all day  Denies abdominal pain  HPI and MDM    80 y o  female presenting with difficulty urinating and hematuria  Hx of afib on eliquis, CKD  Last time she urinated was last night, since then has had difficulty despite drinking "lots of water"  States had blood in her urine last night  No abdominal pain, n/v, dysuria  No trauma  No flank or chest pain  Lives in assisted living  Patient able to urinate in the ED  Urine is bloody, sent to lab  Hypertensive, otherwise vitals reassuring  Will obtain labs, CT abd/pelvis  Patient's workup concerning for sepsis secondary to UTI  Started on IV antibiotics  She is also fairly hyponatremia, given IV fluids  Discussed with internal medicine for hospitalization  Medications   ceftriaxone (ROCEPHIN) 1 g/50 mL in dextrose IVPB (0 mg Intravenous Stopped 7/8/22 0114)   sodium chloride 0 9 % bolus 500 mL (0 mL Intravenous Stopped 7/8/22 0350)         Past Medical and Surgical History    Past Medical History:   Diagnosis Date    Anemia     Asteatotic eczema     Chronic kidney disease     Hypercholesterolemia     Lichen sclerosus et atrophicus     Mitral valve insufficiency     Seborrheic keratoses     Tricuspid regurgitation     Vertigo        Past Surgical History:   Procedure Laterality Date    APPENDECTOMY  11/14/1939    CATARACT EXTRACTION, BILATERAL  04/2019    HYSTERECTOMY  11/14/1962    ORIF HIP FRACTURE Left 2020    KS COLONOSCOPY FLX DX W/COLLJ SPEC WHEN PFRMD N/A 5/19/2016    Procedure: EGD AND COLONOSCOPY;  Surgeon: Ashley Harry MD;  [facility];   Service: Gastroenterology       Family History   Problem Relation Age of Onset    Heart disease Mother         Abnormality    Heart disease Father         Abnormality    No Known Problems Sister     No Known Problems Brother        Social History     Tobacco Use    Smoking status: Never Smoker    Smokeless tobacco: Never Used   Vaping Use    Vaping Use: Never used   Substance Use Topics    Alcohol use: Not Currently    Drug use: No           Allergies    Allergies   Allergen Reactions    Lactose Intolerance (Gi) - Food Allergy     Penicillins Other (See Comments)     unknown       Home Medications    Prior to Admission medications    Medication Sig Start Date End Date Taking? Authorizing Provider   albuterol (Proventil HFA) 90 mcg/act inhaler Inhale 2 puffs every 6 (six) hours as needed for wheezing (cough)  Patient not taking: Reported on 4/11/2022 11/6/21   Angela De León MD   apixaban (Eliquis) 2 5 mg Take 1 tablet (2 5 mg total) by mouth 2 (two) times a day 11/26/21   Yaa Bernabe MD   Dilt- MG 24 hr capsule TAKE 1 CAPSULE BY MOUTH EVERY DAY 4/29/22   Yaa Bernabe MD   ferrous sulfate 324 (65 Fe) mg TAKE 1 TABLET (324 MG TOTAL) BY MOUTH DAILY BEFORE BREAKFAST 4/26/22   Yaa Bernabe MD   fluticasone [facility]) 50 mcg/act nasal spray 1 spray into each nostril daily  Patient not taking: Reported on 4/11/2022 11/14/21   Tiffany Lozano MD   furosemide (LASIX) 20 mg tablet Take 3 tablets (60 mg total) by mouth daily 11/26/21 4/11/22  Yaa Bernabe MD   Klor-Con M10 10 MEQ tablet TAKE 1 TABLET BY MOUTH EVERY DAY 5/16/22   Yaa Bernabe MD   metoprolol succinate (TOPROL-XL) 100 mg 24 hr tablet Take 2 tablets (200 mg total) by mouth daily 12/1/21 4/11/22  GUNJAN Alberts           Review of Systems    Review of Systems   Constitutional: Negative for chills and fever  HENT: Negative for rhinorrhea and sore throat  Eyes: Negative for pain and visual disturbance  Respiratory: Negative for cough and shortness of breath      Cardiovascular: Negative for chest pain and leg swelling  Gastrointestinal: Negative for abdominal pain, nausea and vomiting  Genitourinary: Positive for difficulty urinating and hematuria  Negative for dysuria  Musculoskeletal: Negative for back pain and myalgias  Skin: Negative for rash and wound  Neurological: Negative for syncope and headaches  Physical Exam      ED Triage Vitals [07/07/22 2200]   Temperature Pulse Respirations Blood Pressure SpO2   97 6 °F (36 4 °C) 93 17 (!) 172/88 95 %      Temp Source Heart Rate Source Patient Position - Orthostatic VS BP Location FiO2 (%)   Oral Monitor Lying Right arm --      Pain Score       No Pain               Physical Exam  Constitutional:       General: She is not in acute distress  HENT:      Head: Normocephalic and atraumatic  Nose: Nose normal       Mouth/Throat:      Mouth: Mucous membranes are moist    Eyes:      Extraocular Movements: Extraocular movements intact  Pupils: Pupils are equal, round, and reactive to light  Cardiovascular:      Rate and Rhythm: Normal rate and regular rhythm  Pulmonary:      Effort: No respiratory distress  Breath sounds: Normal breath sounds  No wheezing  Abdominal:      General: There is no distension  Palpations: Abdomen is soft  Tenderness: There is no abdominal tenderness  Musculoskeletal:         General: No swelling or deformity  Normal range of motion  Cervical back: Normal range of motion and neck supple  Skin:     General: Skin is warm  Findings: No erythema  Neurological:      Mental Status: She is alert and oriented to person, place, and time  Mental status is at baseline  Diagnostic Results      Labs:    Results Reviewed     Procedure Component Value Units Date/Time    Blood culture #1 [[id number]] Collected: 07/08/22 0027    Lab Status: Final result Specimen: Blood from Arm, Left Updated: 07/13/22 0905     Blood Culture No Growth After 5 Days      Blood culture #2 [[id number]] Collected: 07/08/22 0027    Lab Status: Final result Specimen: Blood from Arm, Right Updated: 07/13/22 0905     Blood Culture No Growth After 5 Days      Urine culture [[id number]]  (Abnormal) Collected: 07/07/22 2228    Lab Status: Final result Specimen: Urine, Clean Catch Updated: 07/09/22 1101     Urine Culture <10,000 cfu/ml Gram Negative Ashok Enteric Like    CBC and differential [[id number]]  (Abnormal) Collected: 07/09/22 0542    Lab Status: Final result Specimen: Blood from Arm, Right Updated: 07/09/22 0548     WBC 9 70 Thousand/uL      RBC 3 81 Million/uL      Hemoglobin 10 1 g/dL      Hematocrit 33 3 %      MCV 87 fL      MCH 26 5 pg      MCHC 30 3 g/dL      RDW 16 0 %      MPV 11 4 fL      Platelets 313 Thousands/uL      nRBC 0 /100 WBCs      Neutrophils Relative 86 %      Immat GRANS % 1 %      Lymphocytes Relative 5 %      Monocytes Relative 7 %      Eosinophils Relative 1 %      Basophils Relative 0 %      Neutrophils Absolute 8 27 Thousands/µL      Immature Grans Absolute 0 06 Thousand/uL      Lymphocytes Absolute 0 50 Thousands/µL      Monocytes Absolute 0 72 Thousand/µL      Eosinophils Absolute 0 12 Thousand/µL      Basophils Absolute 0 03 Thousands/µL     BMP Q8 hours X 3 (Hyponatremia monitoring) [[id number]]  (Abnormal) Collected: 07/08/22 2331    Lab Status: Final result Specimen: Blood from Arm, Right Updated: 07/08/22 2350     Sodium 134 mmol/L      Potassium 4 5 mmol/L      Chloride 98 mmol/L      CO2 28 mmol/L      ANION GAP 8 mmol/L      BUN 27 mg/dL      Creatinine 1 28 mg/dL      Glucose 84 mg/dL      Calcium 8 6 mg/dL      eGFR 34 ml/min/1 73sq m     Narrative:      [place] guidelines for Chronic Kidney Disease (CKD):     Stage 1 with normal or high GFR (GFR > 90 mL/min/1 73 square meters)    Stage 2 Mild CKD (GFR = 60-89 mL/min/1 73 square meters)    Stage 3A Moderate CKD (GFR = 45-59 mL/min/1 73 square meters)    Stage 3B Moderate CKD (GFR = 30-44 mL/min/1 73 square meters)    Stage 4 Severe CKD (GFR = 15-29 mL/min/1 73 square meters)    Stage 5 End Stage CKD (GFR <15 mL/min/1 73 square meters)  Note: GFR calculation is accurate only with a steady state creatinine    Basic metabolic panel [[id number]]  (Abnormal) Collected: 07/08/22 0939    Lab Status: Final result Specimen: Blood from Arm, Left Updated: 07/08/22 1023     Sodium 131 mmol/L      Potassium 4 4 mmol/L      Chloride 95 mmol/L      CO2 29 mmol/L      ANION GAP 7 mmol/L      BUN 30 mg/dL      Creatinine 1 21 mg/dL      Glucose 98 mg/dL      Glucose, Fasting 98 mg/dL      Calcium 8 8 mg/dL      eGFR 37 ml/min/1 73sq m     Narrative:      National Kidney Disease Foundation guidelines for Chronic Kidney Disease (CKD):     Stage 1 with normal or high GFR (GFR > 90 mL/min/1 73 square meters)    Stage 2 Mild CKD (GFR = 60-89 mL/min/1 73 square meters)    Stage 3A Moderate CKD (GFR = 45-59 mL/min/1 73 square meters)    Stage 3B Moderate CKD (GFR = 30-44 mL/min/1 73 square meters)    Stage 4 Severe CKD (GFR = 15-29 mL/min/1 73 square meters)    Stage 5 End Stage CKD (GFR <15 mL/min/1 73 square meters)  Note: GFR calculation is accurate only with a steady state creatinine    Procalcitonin [[id number]]  (Normal) Collected: 07/08/22 0233    Lab Status: Final result Specimen: Blood Updated: 07/08/22 0304     Procalcitonin 0 07 ng/ml     Comprehensive metabolic panel [[id number]]  (Abnormal) Collected: 07/07/22 2243    Lab Status: Final result Specimen: Blood from Arm, Left Updated: 07/07/22 2330     Sodium 125 mmol/L      Potassium 4 3 mmol/L      Chloride 86 mmol/L      CO2 25 mmol/L      ANION GAP 14 mmol/L      BUN 36 mg/dL      Creatinine 1 43 mg/dL      Glucose 108 mg/dL      Calcium 8 5 mg/dL      AST 31 U/L      ALT 18 U/L      Alkaline Phosphatase 72 U/L      Total Protein 6 8 g/dL      Albumin 3 7 g/dL      Total Bilirubin 1 02 mg/dL      eGFR 30 ml/min/1 73sq m     Narrative: National Kidney Disease Foundation guidelines for Chronic Kidney Disease (CKD):     Stage 1 with normal or high GFR (GFR > 90 mL/min/1 73 square meters)    Stage 2 Mild CKD (GFR = 60-89 mL/min/1 73 square meters)    Stage 3A Moderate CKD (GFR = 45-59 mL/min/1 73 square meters)    Stage 3B Moderate CKD (GFR = 30-44 mL/min/1 73 square meters)    Stage 4 Severe CKD (GFR = 15-29 mL/min/1 73 square meters)    Stage 5 End Stage CKD (GFR <15 mL/min/1 73 square meters)  Note: GFR calculation is accurate only with a steady state creatinine    Lipase [[id number]]  (Normal) Collected: 07/07/22 2243    Lab Status: Final result Specimen: Blood from Arm, Left Updated: 07/07/22 2330     Lipase 124 u/L     Protime-INR [[id number]]  (Normal) Collected: 07/07/22 2243    Lab Status: Final result Specimen: Blood from Arm, Left Updated: 07/07/22 2304     Protime 14 2 seconds      INR 1 15    APTT [[id number]]  (Normal) Collected: 07/07/22 2243    Lab Status: Final result Specimen: Blood from Arm, Left Updated: 07/07/22 2304     PTT 37 seconds     CBC and differential [[id number]]  (Abnormal) Collected: 07/07/22 2243    Lab Status: Final result Specimen: Blood from Arm, Left Updated: 07/07/22 2250     WBC 14 24 Thousand/uL      RBC 4 13 Million/uL      Hemoglobin 10 9 g/dL      Hematocrit 35 6 %      MCV 86 fL      MCH 26 4 pg      MCHC 30 6 g/dL      RDW 15 7 %      MPV 11 2 fL      Platelets 892 Thousands/uL      nRBC 0 /100 WBCs      Neutrophils Relative 87 %      Immat GRANS % 1 %      Lymphocytes Relative 4 %      Monocytes Relative 8 %      Eosinophils Relative 0 %      Basophils Relative 0 %      Neutrophils Absolute 12 36 Thousands/µL      Immature Grans Absolute 0 15 Thousand/uL      Lymphocytes Absolute 0 58 Thousands/µL      Monocytes Absolute 1 07 Thousand/µL      Eosinophils Absolute 0 04 Thousand/µL      Basophils Absolute 0 04 Thousands/µL     Urine Microscopic [[id number]]  (Abnormal) Collected: 07/07/22 2228    Lab Status: Final result Specimen: Urine, Clean Catch Updated: 07/07/22 2245     RBC, UA 20-30 /hpf      WBC, UA 20-30 /hpf      Epithelial Cells Occasional /hpf      Bacteria, UA Occasional /hpf     UA w Reflex to Microscopic w Reflex to Culture [[id number]]  (Abnormal) Collected: 07/07/22 2228    Lab Status: Final result Specimen: Urine, Clean Catch Updated: 07/07/22 2233     Color, UA Yellow     Clarity, UA Clear     Specific Gravity, UA 1 015     pH, UA 7 0     Leukocytes, UA Moderate     Nitrite, UA Positive     Protein, UA >=300 mg/dl      Glucose, UA Negative mg/dl      Ketones, UA Negative mg/dl      Urobilinogen, UA 1 0 E U /dl      Bilirubin, UA Negative     Occult Blood, UA Large          All labs reviewed and utilized in the medical decision making process    Radiology:    CT abdomen pelvis wo contrast   Final Result      Mild to moderate circumferential wall thickening with inflammatory changes surrounding the bladder, suspicious for cystitis in the appropriate clinical setting  Correlation with patient's symptoms, laboratory values, and urinalysis recommended  Multiple bilateral renal cysts, larger on the left  Several hyperdense lesions in the right kidney including a 1 9 cm lesion in the midportion and a 1 1 cm lesion in the posterior right kidney, could represent complex cysts or solid lesions  Follow-up    nonemergent postcontrast cross-sectional imaging recommended for further evaluation when clinically feasible  Cardiomegaly, scattered colonic diverticulosis without evidence of acute diverticulitis, and other findings as above              Workstation performed: [id number]             All radiology studies independently viewed by me and interpreted by the radiologist     Procedure    Procedures        FINAL IMPRESSION    Final diagnoses:   Sepsis, due to unspecified organism, unspecified whether acute organ dysfunction present ([facility] [address] )   Acute cystitis with hematuria   Hyponatremia DISPOSITION    Time reflects when diagnosis was documented in both MDM as applicable and the Disposition within this note     Time User Action Codes Description Comment    7/8/2022 12:24 AM Rolf Colander Add [A41 9] Sepsis, due to unspecified organism, unspecified whether acute organ dysfunction present ([facility] [address] )     7/8/2022 12:24 AM Rolf Colander Add [N30 01] Acute cystitis with hematuria     7/8/2022 12:24 AM Rolf Colander Add [E87 1] Hyponatremia     7/10/2022  2:22 PM Sebastián Caller Add [Z30 38] Chronic diastolic congestive heart failure [facility])       ED Disposition     ED Disposition   Admit    Condition   Stable    Date/Time   Fri Jul 8, 2022 12:24 AM    Comment   Case was discussed with Debi Vegas and the patient's admission status was agreed to be Admission Status: inpatient status to the service of Dr Silvio Mckeon MD Documentation    Flowsheet Row Most Recent Value   Transported by [employer] and Unit #) LYFT waiver signed and placed in scan bin for chart      RN Documentation    Flowsheet Row Most Recent Value   Transported by [employer] and Unit #) [id number] waiver signed and placed in scan bin for chart      Follow-up Information     Follow up With Specialties Details Why Shara Carrillo MD Internal Medicine Follow up  [address] [address]  [phone]      [address] [place] [address] Follow up [facility] Requested:Nursing,  [facility],[address]  [phone]              PATIENT REFERRED TO:    Arlin Booker MD  [address] [address]  [phone]    Follow up      [address] [address]  [phone]    Follow up  [facility] Requested:Nursing, PT      DISCHARGE MEDICATIONS:    Discharge Medication List as of 7/10/2022  3:17 PM      CONTINUE these medications which have NOT CHANGED    Details   apixaban (Eliquis) 2 5 mg Take 1 tablet (2 5 mg total) by mouth 2 (two) times a day, Starting Fri 11/26/2021, Normal      Dilt- MG 24 hr capsule TAKE 1 CAPSULE BY MOUTH EVERY DAY, Normal      ferrous sulfate 324 (65 Fe) mg TAKE 1 TABLET (324 MG TOTAL) BY MOUTH DAILY BEFORE BREAKFAST, Starting Tue 4/26/2022, Normal      furosemide (LASIX) 20 mg tablet Take 3 tablets (60 mg total) by mouth daily, Starting Fri 11/26/2021, Until Fri 7/8/2022, Normal      Klor-Con M10 10 MEQ tablet TAKE 1 TABLET BY MOUTH EVERY DAY, Normal      albuterol (Proventil HFA) 90 mcg/act inhaler Inhale 2 puffs every 6 (six) hours as needed for wheezing (cough), Starting Sat 11/6/2021, Normal      fluticasone (FLONASE) 50 mcg/act nasal spray 1 spray into each nostril daily, Starting Sun 11/14/2021, Normal      metoprolol succinate (TOPROL-XL) 100 mg 24 hr tablet Take 2 tablets (200 mg total) by mouth daily, Starting Wed 12/1/2021, Until Mon 4/11/2022, Normal             Outpatient Discharge Orders   EXTERNAL: Ambulatory Referral to Home Health   Standing Status: Future Standing Exp  Date: 07/10/23      Activity as tolerated     Call provider for:  persistent nausea or vomiting     Call provider for:  severe uncontrolled pain     Call provider for:  redness, tenderness, or signs of infection (pain, swelling, redness, odor or green/yellow discharge around incision site)     Call provider for:  difficulty breathing, headache or visual disturbances     Call provider for:  persistent dizziness or light-headedness            [address] [employer], DO        This note was partially completed using voice recognition technology, and was scanned for gross errors; however some errors may still exist  Please contact the author with any questions or requests for clarification        [address] [employer], DO  07/29/22 [id number]

## 2022-07-08 NOTE — ASSESSMENT & PLAN NOTE
· CT abdomen pelvis revealing multiple cysts in bilateral kidneys along with lesions in right kidney, unsure if related to complex cyst versus solid lesions  · Discussed finding with patient and recommendation of follow-up outpatient with PCP and nephrology or urology for postcontrast cross section imaging

## 2022-07-08 NOTE — PLAN OF CARE
Problem: INFECTION - ADULT  Goal: Absence or prevention of progression during hospitalization  Description: INTERVENTIONS:  - Assess and monitor for signs and symptoms of infection  - Monitor lab/diagnostic results  - Monitor all insertion sites, i e  indwelling lines, tubes, and drains  - Monitor endotracheal if appropriate and nasal secretions for changes in amount and color  - [place] appropriate cooling/warming therapies per order  - Administer medications as ordered  - Instruct and encourage patient and family to use good hand hygiene technique  - Identify and instruct in appropriate isolation precautions for identified infection/condition  Outcome: Progressing  Goal: Absence of fever/infection during neutropenic period  Description: INTERVENTIONS:  - Monitor WBC    Outcome: Progressing     Problem: MOBILITY - ADULT  Goal: Maintain or return to baseline ADL function  Description: INTERVENTIONS:  - Educate patient/family on patient safety including physical limitations  - Instruct patient to call for assistance with activity   - Consult OT/PT to assist with strengthening/mobility   - Keep Call bell within reach  - Keep bed low and locked with side rails adjusted as appropriate  - Keep care items and personal belongings within reach  - Initiate and maintain comfort rounds  - Make Fall Risk Sign visible to staff  - Offer Toileting every 2 Hours, in advance of need  - Initiate/Maintain alarm  - Obtain necessary fall risk management equipment:   - Apply yellow socks and bracelet for high fall risk patients  - Consider moving patient to room near nurses station  Outcome: Progressing  Goal: Maintains/Returns to pre admission functional level  Description: INTERVENTIONS:  -  Assess patient's ability to carry out ADLs; assess patient's baseline for ADL function and identify physical deficits which impact ability to perform ADLs (bathing, care of mouth/teeth, toileting, grooming, dressing, etc )  - Assess/evaluate cause of self-care deficits   - Assess range of motion  - Assess patient's mobility; develop plan if impaired  - Assess patient's need for assistive devices and provide as appropriate  - Encourage maximum independence but intervene and supervise when necessary  - Involve family in performance of ADLs  - Assess for home care needs following discharge   - Consider OT consult to assist with ADL evaluation and planning for discharge  - Provide patient education as appropriate  Outcome: Progressing     Problem: Potential for Falls  Goal: Patient will remain free of falls  Description: INTERVENTIONS:  - Educate patient/family on patient safety including physical limitations  - Instruct patient to call for assistance with activity   - Consult OT/PT to assist with strengthening/mobility   - Keep Call bell within reach  - Keep bed low and locked with side rails adjusted as appropriate  - Keep care items and personal belongings within reach  - Initiate and maintain comfort rounds  - Make Fall Risk Sign visible to staff  - Offer Toileting every 2 Hours, in advance of need  - Initiate/Maintain alarm  - Obtain necessary fall risk management equipment:   - Apply yellow socks and bracelet for high fall risk patients  - Consider moving patient to room near nurses station  Outcome: Progressing

## 2022-07-08 NOTE — H&P
[facility]  H&P- Marcos Akhtar 2/17/1924, 80 y o  female MRN: [id number]  Unit/Bed#: ED 25 Encounter: [id number]  Primary Care Provider: Keon Mancilla MD   Date and time admitted to hospital: 7/7/2022  9:57 PM    * Hyponatremia  Assessment & Plan  · Sodium decreased at 125, most likely secondary to dehydration from UTI  · Will provide with gentle IV fluid hydration at 50 mL/hr, patient currently euvolemic, continue to monitor due to history of CHF  · Recheck BMP in a m    · If any worsening of sodium level despite IV fluid hydration consider nephrology consult    UTI (urinary tract infection)  Assessment & Plan  · Patient admits to hematuria x1 day, urinalysis positive for nitrites, moderate leukocytes, occasional bacteria  · Patient also with leukocytosis, currently not meeting sepsis criteria though  · CT abdomen pelvis revealing bladder wall thickening with inflammatory changes related to cystitis  · Urine culture pending  · Continue IV ceftriaxone 1 g daily    Bilateral renal cysts  Assessment & Plan  · CT abdomen pelvis revealing multiple cysts in bilateral kidneys along with lesions in right kidney, unsure if related to complex cyst versus solid lesions  · Discussed finding with patient and recommendation of follow-up outpatient with PCP and nephrology or urology for postcontrast cross section imaging    Chronic kidney disease, stage III (moderate) [facility])  Assessment & Plan  Lab Results   Component Value Date    EGFR 30 07/07/2022    EGFR 26 04/08/2022    EGFR 26 11/23/2021    CREATININE 1 43 (H) 07/07/2022    CREATININE 1 62 (H) 04/08/2022    CREATININE 1 64 (H) 11/23/2021   · Creatinine currently 1 43 which is slightly better from baseline which is 1 6  · Monitor BMP  · Avoid nephrotoxic agents    Paroxysmal atrial fibrillation (HCC)  Assessment & Plan  · Rate currently controlled, continue to monitor  · Continue home Eliquis 2 5 mg b i d , diltiazem daily, Toprol-XL daily      VTE Pharmacologic Prophylaxis: VTE Score: 5 High Risk (Score >/= 5) - Pharmacological DVT Prophylaxis Ordered: apixaban (Eliquis)  Sequential Compression Devices Ordered  Code Status: Level 1 - Full Code per pt  Discussion with family: pt  Anticipated Length of Stay: Patient will be admitted on an observation basis with an anticipated length of stay of less than 2 midnights secondary to see above  Total Time for Visit, including Counseling / Coordination of Care: 60 minutes Greater than 50% of this total time spent on direct patient counseling and coordination of care  Chief Complaint:    Chief Complaint   Patient presents with    Oliguria or Anuria     Pt reporting she has not urinated today  Pt states she has been drinking water all day  Denies abdominal pain  History of Present Illness:  Sonido Whittington is a 80 y o  female with a PMH of CHF, CKD stage 3, AFib on Eliquis who presents with complaint of sudden onset hematuria, urinary hesitancy x1 day  She denies dysuria but she reports she feels like she was having hard time urinating over the last day and then when she did she had bright red blood in her urine which scared her to come to the ED for evaluation  Denies fevers or chills       Review of Systems:  Review of Systems   Constitutional: Negative for activity change, chills and fever  Respiratory: Negative for shortness of breath  Cardiovascular: Negative for chest pain  Gastrointestinal: Negative for abdominal pain  Genitourinary: Positive for difficulty urinating and hematuria  Negative for dysuria and frequency         Past Medical and Surgical History:   Past Medical History:   Diagnosis Date    Anemia     Asteatotic eczema     Chronic kidney disease     Hypercholesterolemia     Lichen sclerosus et atrophicus     Mitral valve insufficiency     Seborrheic keratoses     Tricuspid regurgitation     Vertigo        Past Surgical History:   Procedure Laterality Date    APPENDECTOMY  11/14/1939    CATARACT EXTRACTION, BILATERAL  04/2019    HYSTERECTOMY  11/14/1962    ORIF HIP FRACTURE Left 2020    HI COLONOSCOPY FLX DX W/COLLJ SPEC WHEN PFRMD N/A 5/19/2016    Procedure: EGD AND COLONOSCOPY;  Surgeon: Vero Goldberg MD;  [facility]; Service: Gastroenterology       Meds/Allergies:  Prior to Admission medications    Medication Sig Start Date End Date Taking? Authorizing Provider   albuterol (Proventil HFA) 90 mcg/act inhaler Inhale 2 puffs every 6 (six) hours as needed for wheezing (cough)  Patient not taking: Reported on 4/11/2022 11/6/21   Alyssa Cota MD   apixaban (Eliquis) 2 5 mg Take 1 tablet (2 5 mg total) by mouth 2 (two) times a day 11/26/21   Roberto Spence MD   Dilt- MG 24 hr capsule TAKE 1 CAPSULE BY MOUTH EVERY DAY 4/29/22   Roberto Spence MD   ferrous sulfate 324 (65 Fe) mg TAKE 1 TABLET (324 MG TOTAL) BY MOUTH DAILY BEFORE BREAKFAST 4/26/22   Roberto Spence MD   fluticasone [facility]) 50 mcg/act nasal spray 1 spray into each nostril daily  Patient not taking: Reported on 4/11/2022 11/14/21   Jatinder Santoyo MD   furosemide (LASIX) 20 mg tablet Take 3 tablets (60 mg total) by mouth daily 11/26/21 4/11/22  Roberto Spence MD   Klor-Con M10 10 MEQ tablet TAKE 1 TABLET BY MOUTH EVERY DAY 5/16/22   Roberto Spence MD   metoprolol succinate (TOPROL-XL) 100 mg 24 hr tablet Take 2 tablets (200 mg total) by mouth daily 12/1/21 4/11/22  GUNJAN Tian     Have reviewed home medications per review of chart  Allergies: Allergies   Allergen Reactions    Lactose Intolerance (Gi) - Food Allergy     Penicillins Other (See Comments)     unknown       Social History:  Marital Status:       Patient Pre-hospital Living Situation: Home  Patient Pre-hospital Level of Mobility: unable to be assessed at time of evaluation  Patient Pre-hospital Diet Restrictions:  Cardiac  Substance Use History:   Social History     Substance and Sexual Activity   Alcohol Use Not Currently     Social History     Tobacco Use   Smoking Status Never Smoker   Smokeless Tobacco Never Used     Social History     Substance and Sexual Activity   Drug Use No       Family History:  Family History   Problem Relation Age of Onset    Heart disease Mother         Abnormality    Heart disease Father         Abnormality    No Known Problems Sister     No Known Problems Brother        Physical Exam:     Vitals:   Blood Pressure: 143/69 (07/08/22 0300)  Pulse: 75 (07/08/22 0300)  Temperature: 97 6 °F (36 4 °C) (07/07/22 2200)  Temp Source: Oral (07/07/22 2200)  Respirations: 18 (07/08/22 0300)  Weight - Scale: 57 2 kg (126 lb) (07/07/22 2200)  SpO2: 93 % (07/08/22 0300)    Physical Exam  Vitals and nursing note reviewed  Constitutional:       General: She is not in acute distress  Appearance: Normal appearance  She is not toxic-appearing or diaphoretic  HENT:      Head: Normocephalic  Cardiovascular:      Rate and Rhythm: Normal rate and regular rhythm  Heart sounds: Normal heart sounds  Pulmonary:      Effort: Pulmonary effort is normal  No respiratory distress  Breath sounds: Normal breath sounds  Abdominal:      General: Abdomen is flat  Bowel sounds are normal  There is no distension  Palpations: Abdomen is soft  Tenderness: There is no abdominal tenderness  Musculoskeletal:         General: No tenderness  Right lower leg: No edema  Left lower leg: No edema  Skin:     General: Skin is warm and dry  Neurological:      General: No focal deficit present  Mental Status: She is alert and oriented to person, place, and time  Psychiatric:         Mood and Affect: Mood normal          Behavior: Behavior normal          Thought Content:  Thought content normal          Judgment: Judgment normal           Additional Data:     Lab Results:  Results from last 7 days   Lab Units 07/07/22  2243   WBC Thousand/uL 14 24*   HEMOGLOBIN g/dL 10 9*   HEMATOCRIT % 35 6   PLATELETS Thousands/uL 206   NEUTROS PCT % 87*   LYMPHS PCT % 4*   MONOS PCT % 8   EOS PCT % 0     Results from last 7 days   Lab Units 07/07/22  2243   SODIUM mmol/L 125*   POTASSIUM mmol/L 4 3   CHLORIDE mmol/L 86*   CO2 mmol/L 25   BUN mg/dL 36*   CREATININE mg/dL 1 43*   ANION GAP mmol/L 14*   CALCIUM mg/dL 8 5   ALBUMIN g/dL 3 7   TOTAL BILIRUBIN mg/dL 1 02*   ALK PHOS U/L 72   ALT U/L 18   AST U/L 31   GLUCOSE RANDOM mg/dL 108     Results from last 7 days   Lab Units 07/07/22  2243   INR  1 15             Results from last 7 days   Lab Units 07/08/22  0233   PROCALCITONIN ng/ml 0 07       Imaging: Reviewed radiology reports from this admission including: abdominal/pelvic CT  CT abdomen pelvis wo contrast   Final Result by Jason William DO (07/07 9216)      Mild to moderate circumferential wall thickening with inflammatory changes surrounding the bladder, suspicious for cystitis in the appropriate clinical setting  Correlation with patient's symptoms, laboratory values, and urinalysis recommended  Multiple bilateral renal cysts, larger on the left  Several hyperdense lesions in the right kidney including a 1 9 cm lesion in the midportion and a 1 1 cm lesion in the posterior right kidney, could represent complex cysts or solid lesions  Follow-up    nonemergent postcontrast cross-sectional imaging recommended for further evaluation when clinically feasible  Cardiomegaly, scattered colonic diverticulosis without evidence of acute diverticulitis, and other findings as above  Workstation performed: [id number]             EKG and Other Studies Reviewed on Admission:   · EKG: No EKG obtained  ** Please Note: This note has been constructed using a voice recognition system   **

## 2022-07-08 NOTE — ASSESSMENT & PLAN NOTE
Lab Results   Component Value Date    EGFR 30 07/07/2022    EGFR 26 04/08/2022    EGFR 26 11/23/2021    CREATININE 1 43 (H) 07/07/2022    CREATININE 1 62 (H) 04/08/2022    CREATININE 1 64 (H) 11/23/2021   · Creatinine currently 1 43 which is slightly better from baseline which is 1 6  · Monitor BMP  · Avoid nephrotoxic agents

## 2022-07-08 NOTE — INCIDENTAL FINDINGS
The following findings require follow up:  Radiographic finding   Finding: "Multiple bilateral renal cysts, larger on the left  Several hyperdense lesions in the right kidney including a 1 9 cm lesion in the midportion and a 1 1 cm lesion in the posterior right kidney, could represent complex cysts or solid lesions  Follow-up   nonemergent postcontrast cross-sectional imaging recommended for further evaluation when clinically feasible "     Follow up required:  Outpatient with primary care physician and Nephrology/urology   Follow up should be done within 1-2 week(s)    Please notify the following clinician to assist with the follow up:    Outpatient with primary care physician and Nephrology and Urology

## 2022-07-08 NOTE — ASSESSMENT & PLAN NOTE
· Rate currently controlled, continue to monitor  · Continue home Eliquis 2 5 mg b i d , diltiazem daily, Toprol-XL daily

## 2022-07-08 NOTE — ASSESSMENT & PLAN NOTE
· Patient admits to hematuria x1 day, urinalysis positive for nitrites, moderate leukocytes, occasional bacteria  · Patient also with leukocytosis, currently not meeting sepsis criteria though  · CT abdomen pelvis revealing bladder wall thickening with inflammatory changes related to cystitis  · Urine culture pending  · Continue IV ceftriaxone 1 g daily

## 2022-07-08 NOTE — ASSESSMENT & PLAN NOTE
· Sodium decreased at 125, most likely secondary to dehydration from UTI  · Will provide with gentle IV fluid hydration at 50 mL/hr, patient currently euvolemic, continue to monitor due to history of CHF  · Recheck BMP in a m    · If any worsening of sodium level despite IV fluid hydration consider nephrology consult

## 2022-07-09 LAB
ANION GAP SERPL CALCULATED.3IONS-SCNC: 11 MMOL/L (ref 4–13)
BACTERIA UR CULT: ABNORMAL
BASOPHILS # BLD AUTO: 0.03 THOUSANDS/ΜL (ref 0–0.1)
BASOPHILS NFR BLD AUTO: 0 % (ref 0–1)
BUN SERPL-MCNC: 25 MG/DL (ref 5–25)
CALCIUM SERPL-MCNC: 8.5 MG/DL (ref 8.3–10.1)
CHLORIDE SERPL-SCNC: 98 MMOL/L (ref 100–108)
CO2 SERPL-SCNC: 26 MMOL/L (ref 21–32)
CREAT SERPL-MCNC: 1.32 MG/DL (ref 0.6–1.3)
EOSINOPHIL # BLD AUTO: 0.12 THOUSAND/ΜL (ref 0–0.61)
EOSINOPHIL NFR BLD AUTO: 1 % (ref 0–6)
ERYTHROCYTE [DISTWIDTH] IN BLOOD BY AUTOMATED COUNT: 16 % (ref 11.6–15.1)
GFR SERPL CREATININE-BSD FRML MDRD: 33 ML/MIN/1.73SQ M
GLUCOSE SERPL-MCNC: 133 MG/DL (ref 65–140)
HCT VFR BLD AUTO: 33.3 % (ref 34.8–46.1)
HGB BLD-MCNC: 10.1 G/DL (ref 11.5–15.4)
IMM GRANULOCYTES # BLD AUTO: 0.06 THOUSAND/UL (ref 0–0.2)
IMM GRANULOCYTES NFR BLD AUTO: 1 % (ref 0–2)
LYMPHOCYTES # BLD AUTO: 0.5 THOUSANDS/ΜL (ref 0.6–4.47)
LYMPHOCYTES NFR BLD AUTO: 5 % (ref 14–44)
MCH RBC QN AUTO: 26.5 PG (ref 26.8–34.3)
MCHC RBC AUTO-ENTMCNC: 30.3 G/DL (ref 31.4–37.4)
MCV RBC AUTO: 87 FL (ref 82–98)
MONOCYTES # BLD AUTO: 0.72 THOUSAND/ΜL (ref 0.17–1.22)
MONOCYTES NFR BLD AUTO: 7 % (ref 4–12)
NEUTROPHILS # BLD AUTO: 8.27 THOUSANDS/ΜL (ref 1.85–7.62)
NEUTS SEG NFR BLD AUTO: 86 % (ref 43–75)
NRBC BLD AUTO-RTO: 0 /100 WBCS
PLATELET # BLD AUTO: 167 THOUSANDS/UL (ref 149–390)
PMV BLD AUTO: 11.4 FL (ref 8.9–12.7)
POTASSIUM SERPL-SCNC: 4.1 MMOL/L (ref 3.5–5.3)
RBC # BLD AUTO: 3.81 MILLION/UL (ref 3.81–5.12)
SODIUM SERPL-SCNC: 135 MMOL/L (ref 136–145)
WBC # BLD AUTO: 9.7 THOUSAND/UL (ref 4.31–10.16)

## 2022-07-09 PROCEDURE — 99232 SBSQ HOSP IP/OBS MODERATE 35: CPT | Performed by: INTERNAL MEDICINE

## 2022-07-09 PROCEDURE — 85025 COMPLETE CBC W/AUTO DIFF WBC: CPT | Performed by: INTERNAL MEDICINE

## 2022-07-09 PROCEDURE — 80048 BASIC METABOLIC PNL TOTAL CA: CPT | Performed by: INTERNAL MEDICINE

## 2022-07-09 RX ADMIN — APIXABAN 2.5 MG: 2.5 TABLET, FILM COATED ORAL at 09:21

## 2022-07-09 RX ADMIN — CEFTRIAXONE SODIUM 1000 MG: 10 INJECTION, POWDER, FOR SOLUTION INTRAVENOUS at 01:03

## 2022-07-09 RX ADMIN — FERROUS SULFATE TAB 325 MG (65 MG ELEMENTAL FE) 325 MG: 325 (65 FE) TAB at 09:21

## 2022-07-09 RX ADMIN — SODIUM CHLORIDE, SODIUM GLUCONATE, SODIUM ACETATE, POTASSIUM CHLORIDE, MAGNESIUM CHLORIDE, SODIUM PHOSPHATE, DIBASIC, AND POTASSIUM PHOSPHATE 50 ML/HR: .53; .5; .37; .037; .03; .012; .00082 INJECTION, SOLUTION INTRAVENOUS at 12:11

## 2022-07-09 RX ADMIN — METOPROLOL SUCCINATE 200 MG: 100 TABLET, EXTENDED RELEASE ORAL at 09:21

## 2022-07-09 RX ADMIN — DILTIAZEM HYDROCHLORIDE 120 MG: 120 CAPSULE, COATED, EXTENDED RELEASE ORAL at 09:21

## 2022-07-09 RX ADMIN — CEFTRIAXONE SODIUM 1000 MG: 10 INJECTION, POWDER, FOR SOLUTION INTRAVENOUS at 23:05

## 2022-07-09 RX ADMIN — FUROSEMIDE 60 MG: 40 TABLET ORAL at 09:21

## 2022-07-09 RX ADMIN — APIXABAN 2.5 MG: 2.5 TABLET, FILM COATED ORAL at 17:40

## 2022-07-09 NOTE — PLAN OF CARE
Problem: Potential for Falls  Goal: Patient will remain free of falls  Description: INTERVENTIONS:  - Educate patient/family on patient safety including physical limitations  - Instruct patient to call for assistance with activity   - Consult OT/PT to assist with strengthening/mobility   - Keep Call bell within reach  - Keep bed low and locked with side rails adjusted as appropriate  - Keep care items and personal belongings within reach  - Initiate and maintain comfort rounds  - Make Fall Risk Sign visible to staff  - Offer Toileting every 2 Hours, in advance of need  - Initiate/Maintain alarm  - Obtain necessary fall risk management equipment  - Apply yellow socks and bracelet for high fall risk patients  - Consider moving patient to room near nurses station  Outcome: Progressing     Problem: MOBILITY - ADULT  Goal: Maintain or return to baseline ADL function  Description: INTERVENTIONS:  -  Assess patient's ability to carry out ADLs; assess patient's baseline for ADL function and identify physical deficits which impact ability to perform ADLs (bathing, care of mouth/teeth, toileting, grooming, dressing, etc )  - Assess/evaluate cause of self-care deficits   - Assess range of motion  - Assess patient's mobility; develop plan if impaired  - Assess patient's need for assistive devices and provide as appropriate  - Encourage maximum independence but intervene and supervise when necessary  - Involve family in performance of ADLs  - Assess for home care needs following discharge   - Consider OT consult to assist with ADL evaluation and planning for discharge  - Provide patient education as appropriate  Outcome: Progressing  Goal: Maintains/Returns to pre admission functional level  Description: INTERVENTIONS:  - Perform BMAT or MOVE assessment daily    - Set and communicate daily mobility goal to care team and patient/family/caregiver     - Collaborate with rehabilitation services on mobility goals if consulted  - Perform Range of Motion 3 times a day  - Reposition patient every 2 hours    - Dangle patient 3 times a day  - Stand patient 3 times a day  - Ambulate patient 3 times a day  - Out of bed to chair 3 times a day   - Out of bed for meals 3 times a day  - Out of bed for toileting  - Record patient progress and toleration of activity level   Outcome: Progressing     Problem: PAIN - ADULT  Goal: Verbalizes/displays adequate comfort level or baseline comfort level  Description: Interventions:  - Encourage patient to monitor pain and request assistance  - Assess pain using appropriate pain scale  - Administer analgesics based on type and severity of pain and evaluate response  - Implement non-pharmacological measures as appropriate and evaluate response  - Consider cultural and social influences on pain and pain management  - Notify physician/advanced practitioner if interventions unsuccessful or patient reports new pain  Outcome: Progressing     Problem: INFECTION - ADULT  Goal: Absence or prevention of progression during hospitalization  Description: INTERVENTIONS:  - Assess and monitor for signs and symptoms of infection  - Monitor lab/diagnostic results  - Monitor all insertion sites, i e  indwelling lines, tubes, and drains  - Monitor endotracheal if appropriate and nasal secretions for changes in amount and color  - [place] appropriate cooling/warming therapies per order  - Administer medications as ordered  - Instruct and encourage patient and family to use good hand hygiene technique  - Identify and instruct in appropriate isolation precautions for identified infection/condition  Outcome: Progressing  Goal: Absence of fever/infection during neutropenic period  Description: INTERVENTIONS:  - Monitor WBC    Outcome: Progressing     Problem: SAFETY ADULT  Goal: Patient will remain free of falls  Description: INTERVENTIONS:  - Educate patient/family on patient safety including physical limitations  - Instruct patient to call for assistance with activity   - Consult OT/PT to assist with strengthening/mobility   - Keep Call bell within reach  - Keep bed low and locked with side rails adjusted as appropriate  - Keep care items and personal belongings within reach  - Initiate and maintain comfort rounds  - Make Fall Risk Sign visible to staff  - Offer Toileting every 2 Hours, in advance of need  - Initiate/Maintain alarm  - Obtain necessary fall risk management equipment  - Apply yellow socks and bracelet for high fall risk patients  - Consider moving patient to room near nurses station  Outcome: Progressing  Goal: Maintain or return to baseline ADL function  Description: INTERVENTIONS:  -  Assess patient's ability to carry out ADLs; assess patient's baseline for ADL function and identify physical deficits which impact ability to perform ADLs (bathing, care of mouth/teeth, toileting, grooming, dressing, etc )  - Assess/evaluate cause of self-care deficits   - Assess range of motion  - Assess patient's mobility; develop plan if impaired  - Assess patient's need for assistive devices and provide as appropriate  - Encourage maximum independence but intervene and supervise when necessary  - Involve family in performance of ADLs  - Assess for home care needs following discharge   - Consider OT consult to assist with ADL evaluation and planning for discharge  - Provide patient education as appropriate  Outcome: Progressing  Goal: Maintains/Returns to pre admission functional level  Description: INTERVENTIONS:  - Perform BMAT or MOVE assessment daily    - Set and communicate daily mobility goal to care team and patient/family/caregiver  - Collaborate with rehabilitation services on mobility goals if consulted  - Perform Range of Motion 3 times a day  - Reposition patient every 2 hours    - Dangle patient 3 times a day  - Stand patient 3 times a day  - Ambulate patient 3 times a day  - Out of bed to chair 3 times a day   - Out of bed for meals 3 times a day  - Out of bed for toileting  - Record patient progress and toleration of activity level   Outcome: Progressing     Problem: DISCHARGE PLANNING  Goal: Discharge to home or other facility with appropriate resources  Description: INTERVENTIONS:  - Identify barriers to discharge w/patient and caregiver  - Arrange for needed discharge resources and transportation as appropriate  - Identify discharge learning needs (meds, wound care, etc )  - Arrange for interpretive services to assist at discharge as needed  - Refer to Case Management Department for coordinating discharge planning if the patient needs post-hospital services based on physician/advanced practitioner order or complex needs related to functional status, cognitive ability, or social support system  Outcome: Progressing     Problem: Knowledge Deficit  Goal: Patient/family/caregiver demonstrates understanding of disease process, treatment plan, medications, and discharge instructions  Description: Complete learning assessment and assess knowledge base    Interventions:  - Provide teaching at level of understanding  - Provide teaching via preferred learning methods  Outcome: Progressing

## 2022-07-09 NOTE — PLAN OF CARE
Problem: PAIN - ADULT  Goal: Verbalizes/displays adequate comfort level or baseline comfort level  Description: Interventions:  - Encourage patient to monitor pain and request assistance  - Assess pain using appropriate pain scale  - Administer analgesics based on type and severity of pain and evaluate response  - Implement non-pharmacological measures as appropriate and evaluate response  - Consider cultural and social influences on pain and pain management  - Notify physician/advanced practitioner if interventions unsuccessful or patient reports new pain  Outcome: Progressing     Problem: INFECTION - ADULT  Goal: Absence or prevention of progression during hospitalization  Description: INTERVENTIONS:  - Assess and monitor for signs and symptoms of infection  - Monitor lab/diagnostic results  - Monitor all insertion sites, i e  indwelling lines, tubes, and drains  - Monitor endotracheal if appropriate and nasal secretions for changes in amount and color  - [place] appropriate cooling/warming therapies per order  - Administer medications as ordered  - Instruct and encourage patient and family to use good hand hygiene technique  - Identify and instruct in appropriate isolation precautions for identified infection/condition  Outcome: Progressing  Goal: Absence of fever/infection during neutropenic period  Description: INTERVENTIONS:  - Monitor WBC    Outcome: Progressing     Problem: DISCHARGE PLANNING  Goal: Discharge to home or other facility with appropriate resources  Description: INTERVENTIONS:  - Identify barriers to discharge w/patient and caregiver  - Arrange for needed discharge resources and transportation as appropriate  - Identify discharge learning needs (meds, wound care, etc )  - Arrange for interpretive services to assist at discharge as needed  - Refer to Case Management Department for coordinating discharge planning if the patient needs post-hospital services based on physician/advanced practitioner order or complex needs related to functional status, cognitive ability, or social support system  Outcome: Progressing

## 2022-07-09 NOTE — ASSESSMENT & PLAN NOTE
· Patient admits to hematuria x1 day, urinalysis positive for nitrites, moderate leukocytes, occasional bacteria  · Patient also with leukocytosis, currently not meeting sepsis criteria though  · CT abdomen pelvis revealing bladder wall thickening with inflammatory changes related to cystitis  · Urine culture pending; follow-up  · Blood culture x2 no growth at 1 day  · Continue IV ceftriaxone 1 g daily

## 2022-07-09 NOTE — PLAN OF CARE
Problem: Potential for Falls  Goal: Patient will remain free of falls  Description: INTERVENTIONS:  - Educate patient/family on patient safety including physical limitations  - Instruct patient to call for assistance with activity   - Consult OT/PT to assist with strengthening/mobility   - Keep Call bell within reach  - Keep bed low and locked with side rails adjusted as appropriate  - Keep care items and personal belongings within reach  - Initiate and maintain comfort rounds  - Make Fall Risk Sign visible to staff  - Offer Toileting every 2 Hours, in advance of need  - Initiate/Maintain bed alarm  - Obtain necessary fall risk management equipment  - Apply yellow socks and bracelet for high fall risk patients  - Consider moving patient to room near nurses station  Outcome: Progressing     Problem: MOBILITY - ADULT  Goal: Maintain or return to baseline ADL function  Description: INTERVENTIONS:  -  Assess patient's ability to carry out ADLs; assess patient's baseline for ADL function and identify physical deficits which impact ability to perform ADLs (bathing, care of mouth/teeth, toileting, grooming, dressing, etc )  - Assess/evaluate cause of self-care deficits   - Assess range of motion  - Assess patient's mobility; develop plan if impaired  - Assess patient's need for assistive devices and provide as appropriate  - Encourage maximum independence but intervene and supervise when necessary  - Involve family in performance of ADLs  - Assess for home care needs following discharge   - Consider OT consult to assist with ADL evaluation and planning for discharge  - Provide patient education as appropriate  Outcome: Progressing  Goal: Maintains/Returns to pre admission functional level  Description: INTERVENTIONS:  - Perform BMAT or MOVE assessment daily    - Set and communicate daily mobility goal to care team and patient/family/caregiver     - Collaborate with rehabilitation services on mobility goals if consulted  - Perform Range of Motion 3 times a day  - Reposition patient every 2 hours    - Dangle patient 3 times a day  - Stand patient 3 times a day  - Ambulate patient 3 times a day  - Out of bed to chair 3 times a day   - Out of bed for meals 3 times a day  - Out of bed for toileting  - Record patient progress and toleration of activity level   Outcome: Progressing     Problem: PAIN - ADULT  Goal: Verbalizes/displays adequate comfort level or baseline comfort level  Description: Interventions:  - Encourage patient to monitor pain and request assistance  - Assess pain using appropriate pain scale  - Administer analgesics based on type and severity of pain and evaluate response  - Implement non-pharmacological measures as appropriate and evaluate response  - Consider cultural and social influences on pain and pain management  - Notify physician/advanced practitioner if interventions unsuccessful or patient reports new pain  Outcome: Progressing     Problem: INFECTION - ADULT  Goal: Absence or prevention of progression during hospitalization  Description: INTERVENTIONS:  - Assess and monitor for signs and symptoms of infection  - Monitor lab/diagnostic results  - Monitor all insertion sites, i e  indwelling lines, tubes, and drains  - Monitor endotracheal if appropriate and nasal secretions for changes in amount and color  - [place] appropriate cooling/warming therapies per order  - Administer medications as ordered  - Instruct and encourage patient and family to use good hand hygiene technique  - Identify and instruct in appropriate isolation precautions for identified infection/condition  Outcome: Progressing  Goal: Absence of fever/infection during neutropenic period  Description: INTERVENTIONS:  - Monitor WBC    Outcome: Progressing     Problem: SAFETY ADULT  Goal: Patient will remain free of falls  Description: INTERVENTIONS:  - Educate patient/family on patient safety including physical limitations  - Instruct patient to call for assistance with activity   - Consult OT/PT to assist with strengthening/mobility   - Keep Call bell within reach  - Keep bed low and locked with side rails adjusted as appropriate  - Keep care items and personal belongings within reach  - Initiate and maintain comfort rounds  - Make Fall Risk Sign visible to staff  - Offer Toileting every 2 Hours, in advance of need  - Initiate/Maintain bed alarm  - Obtain necessary fall risk management equipment  - Apply yellow socks and bracelet for high fall risk patients  - Consider moving patient to room near nurses station  Outcome: Progressing  Goal: Maintain or return to baseline ADL function  Description: INTERVENTIONS:  -  Assess patient's ability to carry out ADLs; assess patient's baseline for ADL function and identify physical deficits which impact ability to perform ADLs (bathing, care of mouth/teeth, toileting, grooming, dressing, etc )  - Assess/evaluate cause of self-care deficits   - Assess range of motion  - Assess patient's mobility; develop plan if impaired  - Assess patient's need for assistive devices and provide as appropriate  - Encourage maximum independence but intervene and supervise when necessary  - Involve family in performance of ADLs  - Assess for home care needs following discharge   - Consider OT consult to assist with ADL evaluation and planning for discharge  - Provide patient education as appropriate  Outcome: Progressing  Goal: Maintains/Returns to pre admission functional level  Description: INTERVENTIONS:  - Perform BMAT or MOVE assessment daily    - Set and communicate daily mobility goal to care team and patient/family/caregiver  - Collaborate with rehabilitation services on mobility goals if consulted  - Perform Range of Motion 3 times a day  - Reposition patient every 2 hours    - Dangle patient 3 times a day  - Stand patient 3 times a day  - Ambulate patient 3 times a day  - Out of bed to chair 3 times a day   - Out of bed for meals 3 times a day  - Out of bed for toileting  - Record patient progress and toleration of activity level   Outcome: Progressing     Problem: DISCHARGE PLANNING  Goal: Discharge to home or other facility with appropriate resources  Description: INTERVENTIONS:  - Identify barriers to discharge w/patient and caregiver  - Arrange for needed discharge resources and transportation as appropriate  - Identify discharge learning needs (meds, wound care, etc )  - Arrange for interpretive services to assist at discharge as needed  - Refer to Case Management Department for coordinating discharge planning if the patient needs post-hospital services based on physician/advanced practitioner order or complex needs related to functional status, cognitive ability, or social support system  Outcome: Progressing     Problem: Knowledge Deficit  Goal: Patient/family/caregiver demonstrates understanding of disease process, treatment plan, medications, and discharge instructions  Description: Complete learning assessment and assess knowledge base    Interventions:  - Provide teaching at level of understanding  - Provide teaching via preferred learning methods  Outcome: Progressing

## 2022-07-09 NOTE — PROGRESS NOTES
[facility]  Progress Note - Octavia Keen 2/17/1924, 80 y o  female MRN: [id number]  Unit/Bed#: -02 Encounter: [id number]  Primary Care Provider: Elizabeth Rider MD   Date and time admitted to hospital: 7/7/2022  9:57 PM    * UTI (urinary tract infection)  Assessment & Plan  · Patient admits to hematuria x1 day, urinalysis positive for nitrites, moderate leukocytes, occasional bacteria  · Patient also with leukocytosis, currently not meeting sepsis criteria though  · CT abdomen pelvis revealing bladder wall thickening with inflammatory changes related to cystitis  · Urine culture pending; follow-up  · Blood culture x2 no growth at 1 day  · Continue IV ceftriaxone 1 g daily    Hyponatremia  Assessment & Plan  · Sodium stable  · Follow-up repeat    Bilateral renal cysts  Assessment & Plan  · CT abdomen pelvis revealing multiple cysts in bilateral kidneys along with lesions in right kidney, unsure if related to complex cyst versus solid lesions  · Discussed finding with patient and recommendation of follow-up outpatient with PCP and nephrology or urology for postcontrast cross section imaging    Chronic kidney disease, stage III (moderate) [facility])  Assessment & Plan  Lab Results   Component Value Date    EGFR 34 07/08/2022    EGFR 37 07/08/2022    EGFR 30 07/07/2022    CREATININE 1 28 07/08/2022    CREATININE 1 21 07/08/2022    CREATININE 1 43 (H) 07/07/2022   · Creatinine currently 1 43 which is slightly better from baseline which is 1 6  · Monitor BMP  · Avoid nephrotoxic agents    Paroxysmal atrial fibrillation (HCC)  Assessment & Plan  · Rate currently controlled, continue to monitor  · Continue home Eliquis 2 5 mg b i d , diltiazem daily, Toprol-XL daily        VTE Pharmacologic Prophylaxis: VTE Score: 5 Moderate Risk (Score 3-4) - Pharmacological DVT Prophylaxis Ordered: apixaban (Eliquis)  Patient Centered Rounds: I performed bedside rounds with nursing staff today    Discussions with Specialists or Other Care Team Provider: case management    Education and Discussions with Family / Patient: Updated  (son) via phone  Time Spent for Care: 30 minutes  More than 50% of total time spent on counseling and coordination of care as described above  Current Length of Stay: 1 day(s)  Current Patient Status: Inpatient   Certification Statement: The patient will continue to require additional inpatient hospital stay due to Urinary tract infection  Discharge Plan: Anticipate discharge tomorrow to home  Code Status: Level 1 - Full Code    Subjective:   Patient resting comfortably on examination  Patient had no overnight events or complaints on exam this morning  Objective:     Vitals:   Temp (24hrs), Av 6 °F (36 4 °C), Min:97 6 °F (36 4 °C), Max:97 6 °F (36 4 °C)    Temp:  [97 6 °F (36 4 °C)] 97 6 °F (36 4 °C)  HR:  [65-75] 65  Resp:  [16-19] 19  BP: (123-139)/(56-67) 135/61  SpO2:  [89 %-95 %] 89 %  Body mass index is 25 02 kg/m²  Input and Output Summary (last 24 hours): Intake/Output Summary (Last 24 hours) at 2022 6601  Last data filed at 2022 0301  Gross per 24 hour   Intake 0 ml   Output --   Net 0 ml       Physical Exam:   Physical Exam  Vitals and nursing note reviewed  Constitutional:       General: She is not in acute distress  Appearance: She is well-developed  HENT:      Head: Normocephalic and atraumatic  Eyes:      General: No scleral icterus  Conjunctiva/sclera: Conjunctivae normal    Cardiovascular:      Rate and Rhythm: Normal rate and regular rhythm  Heart sounds: Normal heart sounds  No murmur heard  No friction rub  No gallop  Pulmonary:      Effort: Pulmonary effort is normal  No respiratory distress  Breath sounds: Normal breath sounds  No wheezing or rales  Abdominal:      General: Bowel sounds are normal  There is no distension  Palpations: Abdomen is soft  Tenderness:  There is no abdominal tenderness  Musculoskeletal:         General: Normal range of motion  Skin:     General: Skin is warm  Findings: No rash  Neurological:      Mental Status: She is alert and oriented to person, place, and time  Additional Data:     Labs:  Results from last 7 days   Lab Units 07/09/22  0542   WBC Thousand/uL 9 70   HEMOGLOBIN g/dL 10 1*   HEMATOCRIT % 33 3*   PLATELETS Thousands/uL 167   NEUTROS PCT % 86*   LYMPHS PCT % 5*   MONOS PCT % 7   EOS PCT % 1     Results from last 7 days   Lab Units 07/08/22  2331 07/08/22  0939 07/07/22  2243   SODIUM mmol/L 134*   < > 125*   POTASSIUM mmol/L 4 5   < > 4 3   CHLORIDE mmol/L 98*   < > 86*   CO2 mmol/L 28   < > 25   BUN mg/dL 27*   < > 36*   CREATININE mg/dL 1 28   < > 1 43*   ANION GAP mmol/L 8   < > 14*   CALCIUM mg/dL 8 6   < > 8 5   ALBUMIN g/dL  --   --  3 7   TOTAL BILIRUBIN mg/dL  --   --  1 02*   ALK PHOS U/L  --   --  72   ALT U/L  --   --  18   AST U/L  --   --  31   GLUCOSE RANDOM mg/dL 84   < > 108    < > = values in this interval not displayed  Results from last 7 days   Lab Units 07/07/22  2243   INR  1 15             Results from last 7 days   Lab Units 07/08/22  0233   PROCALCITONIN ng/ml 0 07       Lines/Drains:  Invasive Devices  Report    Peripheral Intravenous Line  Duration           Peripheral IV 11/18/21 Left;Ventral (anterior) Forearm 232 days    Peripheral IV 07/07/22 Left Antecubital 1 day                      Imaging: No pertinent imaging reviewed  Recent Cultures (last 7 days):   Results from last 7 days   Lab Units 07/08/22  0027   BLOOD CULTURE  No Growth at 24 hrs  No Growth at 24 hrs         Last 24 Hours Medication List:   Current Facility-Administered Medications   Medication Dose Route Frequency Provider Last Rate    acetaminophen  650 mg Oral Q6H PRN Julia Cardona PA-C      aluminum-magnesium hydroxide-simethicone  30 mL Oral Q6H PRN Julia Cardona PA-C      apixaban  2 5 mg Oral BID Julia Cardona PA-C  cefTRIAXone  1,000 mg Intravenous Q24H Ranjan Giang PA-C 1,000 mg (07/09/22 0103)    diltiazem  120 mg Oral Daily Ranjan Giang PA-C      ferrous sulfate  325 mg Oral Daily With Breakfast Ranjan Giang PA-C      furosemide  60 mg Oral Daily Ranjan Giang PA-C      metoprolol succinate  200 mg Oral Daily Ranjan Giang PA-C      multi-electrolyte  50 mL/hr Intravenous Continuous José Miguel Liang DO 50 mL/hr (07/08/22 1651)        Today, Patient Was Seen By: José Miguel Liang DO    **Please Note: This note may have been constructed using a voice recognition system  **

## 2022-07-09 NOTE — ASSESSMENT & PLAN NOTE
Lab Results   Component Value Date    EGFR 34 07/08/2022    EGFR 37 07/08/2022    EGFR 30 07/07/2022    CREATININE 1 28 07/08/2022    CREATININE 1 21 07/08/2022    CREATININE 1 43 (H) 07/07/2022   · Creatinine currently 1 43 which is slightly better from baseline which is 1 6  · Monitor BMP  · Avoid nephrotoxic agents

## 2022-07-10 VITALS
SYSTOLIC BLOOD PRESSURE: 156 MMHG | BODY MASS INDEX: 25.02 KG/M2 | RESPIRATION RATE: 16 BRPM | TEMPERATURE: 97.7 F | DIASTOLIC BLOOD PRESSURE: 74 MMHG | HEART RATE: 67 BPM | OXYGEN SATURATION: 90 % | WEIGHT: 126 LBS

## 2022-07-10 LAB
ANION GAP SERPL CALCULATED.3IONS-SCNC: 9 MMOL/L (ref 4–13)
BASOPHILS # BLD AUTO: 0.02 THOUSANDS/ΜL (ref 0–0.1)
BASOPHILS NFR BLD AUTO: 0 % (ref 0–1)
BUN SERPL-MCNC: 25 MG/DL (ref 5–25)
CALCIUM SERPL-MCNC: 8.4 MG/DL (ref 8.3–10.1)
CHLORIDE SERPL-SCNC: 100 MMOL/L (ref 100–108)
CO2 SERPL-SCNC: 29 MMOL/L (ref 21–32)
CREAT SERPL-MCNC: 1.32 MG/DL (ref 0.6–1.3)
EOSINOPHIL # BLD AUTO: 0.18 THOUSAND/ΜL (ref 0–0.61)
EOSINOPHIL NFR BLD AUTO: 3 % (ref 0–6)
ERYTHROCYTE [DISTWIDTH] IN BLOOD BY AUTOMATED COUNT: 16.2 % (ref 11.6–15.1)
GFR SERPL CREATININE-BSD FRML MDRD: 33 ML/MIN/1.73SQ M
GLUCOSE SERPL-MCNC: 84 MG/DL (ref 65–140)
HCT VFR BLD AUTO: 32.8 % (ref 34.8–46.1)
HGB BLD-MCNC: 9.9 G/DL (ref 11.5–15.4)
IMM GRANULOCYTES # BLD AUTO: 0.05 THOUSAND/UL (ref 0–0.2)
IMM GRANULOCYTES NFR BLD AUTO: 1 % (ref 0–2)
LYMPHOCYTES # BLD AUTO: 0.53 THOUSANDS/ΜL (ref 0.6–4.47)
LYMPHOCYTES NFR BLD AUTO: 8 % (ref 14–44)
MCH RBC QN AUTO: 26.5 PG (ref 26.8–34.3)
MCHC RBC AUTO-ENTMCNC: 30.2 G/DL (ref 31.4–37.4)
MCV RBC AUTO: 88 FL (ref 82–98)
MONOCYTES # BLD AUTO: 0.7 THOUSAND/ΜL (ref 0.17–1.22)
MONOCYTES NFR BLD AUTO: 11 % (ref 4–12)
NEUTROPHILS # BLD AUTO: 5.16 THOUSANDS/ΜL (ref 1.85–7.62)
NEUTS SEG NFR BLD AUTO: 77 % (ref 43–75)
NRBC BLD AUTO-RTO: 0 /100 WBCS
PLATELET # BLD AUTO: 158 THOUSANDS/UL (ref 149–390)
PMV BLD AUTO: 11.5 FL (ref 8.9–12.7)
POTASSIUM SERPL-SCNC: 3.6 MMOL/L (ref 3.5–5.3)
RBC # BLD AUTO: 3.73 MILLION/UL (ref 3.81–5.12)
SODIUM SERPL-SCNC: 138 MMOL/L (ref 136–145)
WBC # BLD AUTO: 6.64 THOUSAND/UL (ref 4.31–10.16)

## 2022-07-10 PROCEDURE — 99239 HOSP IP/OBS DSCHRG MGMT >30: CPT | Performed by: INTERNAL MEDICINE

## 2022-07-10 PROCEDURE — 80048 BASIC METABOLIC PNL TOTAL CA: CPT | Performed by: INTERNAL MEDICINE

## 2022-07-10 PROCEDURE — 85025 COMPLETE CBC W/AUTO DIFF WBC: CPT | Performed by: INTERNAL MEDICINE

## 2022-07-10 RX ADMIN — METOPROLOL SUCCINATE 200 MG: 100 TABLET, EXTENDED RELEASE ORAL at 09:40

## 2022-07-10 RX ADMIN — DILTIAZEM HYDROCHLORIDE 120 MG: 120 CAPSULE, COATED, EXTENDED RELEASE ORAL at 09:40

## 2022-07-10 RX ADMIN — APIXABAN 2.5 MG: 2.5 TABLET, FILM COATED ORAL at 09:40

## 2022-07-10 RX ADMIN — FUROSEMIDE 60 MG: 40 TABLET ORAL at 09:40

## 2022-07-10 RX ADMIN — FERROUS SULFATE TAB 325 MG (65 MG ELEMENTAL FE) 325 MG: 325 (65 FE) TAB at 09:40

## 2022-07-10 NOTE — PLAN OF CARE
Problem: Potential for Falls  Goal: Patient will remain free of falls  Description: INTERVENTIONS:  - Educate patient/family on patient safety including physical limitations  - Instruct patient to call for assistance with activity   - Consult OT/PT to assist with strengthening/mobility   - Keep Call bell within reach  - Keep bed low and locked with side rails adjusted as appropriate  - Keep care items and personal belongings within reach  - Initiate and maintain comfort rounds  - Make Fall Risk Sign visible to staff  - Offer Toileting every  Hours, in advance of need  - Initiate/Maintain alarm  - Obtain necessary fall risk management equipment:   - Apply yellow socks and bracelet for high fall risk patients  - Consider moving patient to room near nurses station  Outcome: Progressing     Problem: MOBILITY - ADULT  Goal: Maintain or return to baseline ADL function  Description: INTERVENTIONS:  -  Assess patient's ability to carry out ADLs; assess patient's baseline for ADL function and identify physical deficits which impact ability to perform ADLs (bathing, care of mouth/teeth, toileting, grooming, dressing, etc )  - Assess/evaluate cause of self-care deficits   - Assess range of motion  - Assess patient's mobility; develop plan if impaired  - Assess patient's need for assistive devices and provide as appropriate  - Encourage maximum independence but intervene and supervise when necessary  - Involve family in performance of ADLs  - Assess for home care needs following discharge   - Consider OT consult to assist with ADL evaluation and planning for discharge  - Provide patient education as appropriate  Outcome: Progressing  Goal: Maintains/Returns to pre admission functional level  Description: INTERVENTIONS:  - Perform BMAT or MOVE assessment daily    - Set and communicate daily mobility goal to care team and patient/family/caregiver     - Collaborate with rehabilitation services on mobility goals if consulted  - Perform Range of Motion  times a day  - Reposition patient every  hours    - Dangle patient  times a day  - Stand patient  times a day  - Ambulate patient  times a day  - Out of bed to chair  times a day   - Out of bed for meals  times a day  - Out of bed for toileting  - Record patient progress and toleration of activity level   Outcome: Progressing     Problem: PAIN - ADULT  Goal: Verbalizes/displays adequate comfort level or baseline comfort level  Description: Interventions:  - Encourage patient to monitor pain and request assistance  - Assess pain using appropriate pain scale  - Administer analgesics based on type and severity of pain and evaluate response  - Implement non-pharmacological measures as appropriate and evaluate response  - Consider cultural and social influences on pain and pain management  - Notify physician/advanced practitioner if interventions unsuccessful or patient reports new pain  Outcome: Progressing     Problem: INFECTION - ADULT  Goal: Absence or prevention of progression during hospitalization  Description: INTERVENTIONS:  - Assess and monitor for signs and symptoms of infection  - Monitor lab/diagnostic results  - Monitor all insertion sites, i e  indwelling lines, tubes, and drains  - Monitor endotracheal if appropriate and nasal secretions for changes in amount and color  - [place] appropriate cooling/warming therapies per order  - Administer medications as ordered  - Instruct and encourage patient and family to use good hand hygiene technique  - Identify and instruct in appropriate isolation precautions for identified infection/condition  Outcome: Progressing  Goal: Absence of fever/infection during neutropenic period  Description: INTERVENTIONS:  - Monitor WBC    Outcome: Progressing     Problem: SAFETY ADULT  Goal: Patient will remain free of falls  Description: INTERVENTIONS:  - Educate patient/family on patient safety including physical limitations  - Instruct patient to call for assistance with activity   - Consult OT/PT to assist with strengthening/mobility   - Keep Call bell within reach  - Keep bed low and locked with side rails adjusted as appropriate  - Keep care items and personal belongings within reach  - Initiate and maintain comfort rounds  - Make Fall Risk Sign visible to staff  - Offer Toileting every Hours, in advance of need  - Initiate/Maintain alarm  - Obtain necessary fall risk management equipment:   - Apply yellow socks and bracelet for high fall risk patients  - Consider moving patient to room near nurses station  Outcome: Progressing  Goal: Maintain or return to baseline ADL function  Description: INTERVENTIONS:  -  Assess patient's ability to carry out ADLs; assess patient's baseline for ADL function and identify physical deficits which impact ability to perform ADLs (bathing, care of mouth/teeth, toileting, grooming, dressing, etc )  - Assess/evaluate cause of self-care deficits   - Assess range of motion  - Assess patient's mobility; develop plan if impaired  - Assess patient's need for assistive devices and provide as appropriate  - Encourage maximum independence but intervene and supervise when necessary  - Involve family in performance of ADLs  - Assess for home care needs following discharge   - Consider OT consult to assist with ADL evaluation and planning for discharge  - Provide patient education as appropriate  Outcome: Progressing  Goal: Maintains/Returns to pre admission functional level  Description: INTERVENTIONS:  - Perform BMAT or MOVE assessment daily    - Set and communicate daily mobility goal to care team and patient/family/caregiver  - Collaborate with rehabilitation services on mobility goals if consulted  - Perform Range of Motion  times a day  - Reposition patient every  hours    - Dangle patient  times a day  - Stand patient  times a day  - Ambulate patient  times a day  - Out of bed to chair  times a day   - Out of bed for meals  times a day  - Out of bed for toileting  - Record patient progress and toleration of activity level   Outcome: Progressing     Problem: DISCHARGE PLANNING  Goal: Discharge to home or other facility with appropriate resources  Description: INTERVENTIONS:  - Identify barriers to discharge w/patient and caregiver  - Arrange for needed discharge resources and transportation as appropriate  - Identify discharge learning needs (meds, wound care, etc )  - Arrange for interpretive services to assist at discharge as needed  - Refer to Case Management Department for coordinating discharge planning if the patient needs post-hospital services based on physician/advanced practitioner order or complex needs related to functional status, cognitive ability, or social support system  Outcome: Progressing     Problem: Knowledge Deficit  Goal: Patient/family/caregiver demonstrates understanding of disease process, treatment plan, medications, and discharge instructions  Description: Complete learning assessment and assess knowledge base    Interventions:  - Provide teaching at level of understanding  - Provide teaching via preferred learning methods  Outcome: Progressing

## 2022-07-10 NOTE — PROGRESS NOTES
talked with pt and set up Homecare  Pt will need a ride home   offered to set up [facility] ride for pt  Pt getting dressed and will wait for call from Chanelle Tolentino

## 2022-07-10 NOTE — ASSESSMENT & PLAN NOTE
Lab Results   Component Value Date    EGFR 33 07/10/2022    EGFR 33 07/09/2022    EGFR 34 07/08/2022    CREATININE 1 32 (H) 07/10/2022    CREATININE 1 32 (H) 07/09/2022    CREATININE 1 28 07/08/2022   · Creatinine currently 1 43 which is slightly better from baseline which is 1 6  · Creatinine at baseline

## 2022-07-10 NOTE — DISCHARGE SUMMARY
[address] [facility]  Discharge- Tessa Nailer 2/17/1924, 80 y o  female MRN: [id number]  Unit/Bed#: -02 Encounter: [id number]  Primary Care Provider: Sofiya Oneill MD   Date and time admitted to hospital: 7/7/2022  9:57 PM    * UTI (urinary tract infection)  Assessment & Plan  · Patient admits to hematuria x1 day, urinalysis positive for nitrites, moderate leukocytes, occasional bacteria  · CT abdomen pelvis revealing bladder wall thickening with inflammatory changes related to cystitis  · Urine culture growing less than 10,000 gram-negative delores enteric like  · Blood culture x2 no growth at 2 days  · Patient completed IV ceftriaxone x3 days    Hyponatremia  Assessment & Plan  · Stable    Bilateral renal cysts  Assessment & Plan  · CT abdomen pelvis revealing multiple cysts in bilateral kidneys along with lesions in right kidney, unsure if related to complex cyst versus solid lesions  · Discussed finding with patient and recommendation of follow-up outpatient with PCP and nephrology or urology for postcontrast cross section imaging    Chronic kidney disease, stage III (moderate) [facility])  Assessment & Plan  Lab Results   Component Value Date    EGFR 33 07/10/2022    EGFR 33 07/09/2022    EGFR 34 07/08/2022    CREATININE 1 32 (H) 07/10/2022    CREATININE 1 32 (H) 07/09/2022    CREATININE 1 28 07/08/2022   · Creatinine currently 1 43 which is slightly better from baseline which is 1 6  · Creatinine at baseline    Paroxysmal atrial fibrillation (HCC)  Assessment & Plan  · Rate currently controlled, continue to monitor  · Continue home Eliquis 2 5 mg b i d , diltiazem daily, Toprol-XL daily      Medical Problems             Resolved Problems  Date Reviewed: 7/8/2022   None               Discharging Physician / Practitioner: Manohar Zamudio DO  PCP: Sofiya Oneill MD  Admission Date:   Admission Orders (From admission, onward)     Ordered        07/08/22 1509  Inpatient Admission  Once 07/08/22 0025  Place in Observation  Once                      Discharge Date: 07/10/22    Consultations During Hospital Stay:  · none    complications:  none    Reason for Admission:  Hematuria    Hospital Course:   Sylvie Szymanski is a 80 y o  female patient who originally presented to the hospital on 7/7/2022 due to hematuria  Patient noted to have urinary tract infection and was treated with IV antibiotics for 3 days  Patient did have improvement in white count and is no longer having any hematuria  Patient is stable for discharge at this time  Patient should follow-up with primary care provider  Please see above list of diagnoses and related plan for additional information  Condition at Discharge: stable    Discharge Day Visit / Exam:   Subjective:  Patient resting comfortably on examination  Patient had no overnight events or complaints on exam   Patient eager to go home today  Vitals: Blood Pressure: 156/74 (07/10/22 0940)  Pulse: 67 (07/10/22 0940)  Temperature: 97 7 °F (36 5 °C) (07/10/22 0940)  Temp Source: Oral (07/10/22 0940)  Respirations: 16 (07/10/22 0940)  Weight - Scale: 57 2 kg (126 lb) (07/07/22 2200)  SpO2: 90 % (07/10/22 0720)  Exam:   Physical Exam  Vitals and nursing note reviewed  Constitutional:       General: She is not in acute distress  Appearance: She is well-developed  HENT:      Head: Normocephalic and atraumatic  Eyes:      General: No scleral icterus  Conjunctiva/sclera: Conjunctivae normal    Cardiovascular:      Rate and Rhythm: Normal rate and regular rhythm  Heart sounds: Normal heart sounds  No murmur heard  No friction rub  No gallop  Pulmonary:      Effort: Pulmonary effort is normal  No respiratory distress  Breath sounds: Normal breath sounds  No wheezing or rales  Abdominal:      General: Bowel sounds are normal  There is no distension  Palpations: Abdomen is soft  Tenderness: There is no abdominal tenderness  Musculoskeletal:         General: Normal range of motion  Skin:     General: Skin is warm  Findings: No rash  Neurological:      Mental Status: She is alert and oriented to person, place, and time  Discussion with Family: Updated  (daughter) via phone  Discharge instructions/Information to patient and family:   See after visit summary for information provided to patient and family  Provisions for Follow-Up Care:  See after visit summary for information related to follow-up care and any pertinent home health orders  Disposition:   Home    Planned Readmission: none     Discharge Statement:  I spent 35 minutes discharging the patient  This time was spent on the day of discharge  I had direct contact with the patient on the day of discharge  Greater than 50% of the total time was spent examining patient, answering all patient questions, arranging and discussing plan of care with patient as well as directly providing post-discharge instructions  Additional time then spent on discharge activities  Discharge Medications:  See after visit summary for reconciled discharge medications provided to patient and/or family        **Please Note: This note may have been constructed using a voice recognition system**

## 2022-07-10 NOTE — ASSESSMENT & PLAN NOTE
· Patient admits to hematuria x1 day, urinalysis positive for nitrites, moderate leukocytes, occasional bacteria  · CT abdomen pelvis revealing bladder wall thickening with inflammatory changes related to cystitis  · Urine culture growing less than 10,000 gram-negative delores enteric like  · Blood culture x2 no growth at 2 days  · Patient completed IV ceftriaxone x3 days

## 2022-07-10 NOTE — CASE MANAGEMENT
Case Management Discharge Planning Note    Patient name Marcos Akhtar  Location [address] 432/-57 MRN [id number]  : 1924 Date 7/10/2022       Current Admission Date: 2022  Current Admission Diagnosis:UTI (urinary tract infection)   Patient Active Problem List    Diagnosis Date Noted    UTI (urinary tract infection) 2022    Hyponatremia 2022    Bilateral renal cysts 2022    Nonrheumatic aortic valve stenosis 2022    Dyspnea 2021    Hypoxia 2021    Acute respiratory distress 2021    Severe pulmonary hypertension ([address] ) 2021    Acute kidney injury superimposed on CKD ([facility] ) 2021    Chronic renal disease, stage IV ([facility] ) 2021    Bronchopneumonia 2021    Lower GI bleed 2021    Allergic conjunctivitis of both eyes 10/29/2021    Seasonal allergic rhinitis due to pollen 10/26/2021    Cough 10/26/2021    Iron deficiency anemia 07/15/2021    Fecal occult blood test positive 07/15/2021    Anemia 07/15/2021    Chronic diastolic congestive heart failure ([facility] ) 2021    Fracture of right orbital wall ([facility] ) 2020    Chronic kidney disease-mineral and bone disorder 2019    Epistaxis     Diastolic dysfunction     Anemia due to chronic kidney disease 10/18/2018    Mitral valve insufficiency 2018    Hypercholesterolemia 2018    Paroxysmal atrial fibrillation ([facility] ) 2017    Long term current use of anticoagulant 2017    Chronic kidney disease, stage III (moderate) ([facility] ) 2017    Essential hypertension 2017      LOS (days): 2  Geometric Mean LOS (GMLOS) (days):   Days to GMLOS:     OBJECTIVE:  Risk of Unplanned Readmission Score: 13 64     Current admission status: Inpatient   Preferred Pharmacy:   [id number], [address] [place] [address],[address]  Phone: [phone] Fax: [phone]    Primary Care Provider: Keon Mancilla MD    Primary Insurance: MEDICARE  Secondary Insurance: [facility]    DISCHARGE DETAILS:    Other Referral/Resources/Interventions Provided:  Interventions: HHC  Referral Comments: Home care referral pending with [address]  Pt aware of pending status and will follow up with agency if she does not recieve call  Treatment Team Recommendation: Home with [address]  Discharge Destination Plan[de-identified] Home with [place] at Discharge :  Other (Comment) ([facility])  Dispatcher Contacted: Yes  Number/Name of Dispatcher: [facility]TS  Transported by [employer] and Unit #): RON waiver signed and placed in scan bin for chart  ETA of Transport (Date): 07/10/22  ETA of Transport (Time): 1935

## 2022-07-10 NOTE — CASE MANAGEMENT
Case Management Assessment & Discharge Planning Note    Patient name Nathalie Osman  Location [address] 432/-20 MRN [id number]  : 1924 Date 7/10/2022       Current Admission Date: 2022  Current Admission Diagnosis:UTI (urinary tract infection)   Patient Active Problem List    Diagnosis Date Noted    UTI (urinary tract infection) 2022    Hyponatremia 2022    Bilateral renal cysts 2022    Nonrheumatic aortic valve stenosis 2022    Dyspnea 2021    Hypoxia 2021    Acute respiratory distress 2021    Severe pulmonary hypertension ([address] ) 2021    Acute kidney injury superimposed on CKD ([facility] ) 2021    Chronic renal disease, stage IV ([facility] ) 2021    Bronchopneumonia 2021    Lower GI bleed 2021    Allergic conjunctivitis of both eyes 10/29/2021    Seasonal allergic rhinitis due to pollen 10/26/2021    Cough 10/26/2021    Iron deficiency anemia 07/15/2021    Fecal occult blood test positive 07/15/2021    Anemia 07/15/2021    Chronic diastolic congestive heart failure ([facility] ) 2021    Fracture of right orbital wall ([facility] ) 2020    Chronic kidney disease-mineral and bone disorder 2019    Epistaxis     Diastolic dysfunction     Anemia due to chronic kidney disease 10/18/2018    Mitral valve insufficiency 2018    Hypercholesterolemia 2018    Paroxysmal atrial fibrillation ([facility] ) 2017    Long term current use of anticoagulant 2017    Chronic kidney disease, stage III (moderate) ([facility] ) 2017    Essential hypertension 2017      LOS (days): 2  Geometric Mean LOS (GMLOS) (days):   Days to GMLOS:     OBJECTIVE:    Risk of Unplanned Readmission Score: 13 61     Current admission status: Inpatient    [facility]erred Pharmacy:   [id number] [facility] [facility],[address]  Phone: [phone] Fax: [phone]    Primary Care Provider: Arian Tuttle MD    Primary Insurance: MEDICARE  Secondary Insurance: [facility]    ASSESSMENT:  [address] [facility] [place] [address] [place]   Primary Phone: [phone] (Home)               Patient Information  Admitted from[de-identified] Home  Mental Status: Alert  During Assessment patient was accompanied by: Not accompanied during assessment  Assessment information provided by[de-identified] Patient  Primary Caregiver: Self  Support Systems: Family members  [place] of Residence: [place] [address] do you live in?: [place]  Type of Current Residence: Other (Comment) (private residence)  Living Arrangements: Lives Alone    Activities of Daily Living Prior to Admission  Functional Status: Independent  Completes ADLs independently?: Yes  Ambulates independently?: Yes  Does patient use assisted devices?: Yes  Assisted Devices (DME) used: Straight Cane  Does patient currently own DME?: Yes  What DME does the patient currently own?: Straight Cane  Does patient have a history of [facility]?: Yes ([address] [place] [address])  Does patient currently have [facility]?: No    Patient Information Continued  Income Source: Pension/detention  Does patient have prescription coverage?: Yes      DISCHARGE DETAILS:    Discharge planning discussed with[de-identified] Patient  Freedom of Choice: Yes  Comments - Freedom of Choice: SW met briefly with pt to assess needs and discuss plans  Pt is planning on returning home today  Pt inquired about home care services  Requested services from agency she received services from in past   Pt could not recall name of agency but per chart it was [address] [place] [address]    CM contacted family/caregiver?: No- see comments (per pt family is away)    [address]         Is the patient interested in [facility] at discharge?: Yes  [address] requested[de-identified] Nursing, Physical [address] Name[de-identified] Other ([address]) [address] Provider[de-identified] PCP  [address] Needed[de-identified] Evaluate Functional Status and Safety, Heart Failure Management, Strengthening/Theraputic Exercises to Improve Function  Homebound Criteria Met[de-identified] Uses an Assist Device (i e  cane, walker, etc), Requires the Assistance of Another Person for Safe Ambulation or to Leave the Home  Supporting Clincal Findings[de-identified] Limited Endurance, Fatigues Easliy in [employer]    DME Referral Provided  Referral made for DME?: No    Other Referral/Resources/Interventions Provided:  Interventions: [facility]  Referral Comments: Home care referral made to [facility]     Treatment Team [facility]ation: [facility]  Discharge Destination Plan[de-identified] Home with [place] at Discharge : Other (Comment) ([facility])    IMM Given (Date):: 07/10/22  IMM Given to[de-identified] Patient (IMM reviewed with pt  Pt verbalized understanding and agrees with discharge determination  Pt signed IMM and copy given    Copy also placed in scan bin for chart )

## 2022-07-11 ENCOUNTER — HOSPITAL ENCOUNTER (EMERGENCY)
Facility: HOSPITAL | Age: 87
Discharge: HOME | End: 2022-07-11
Attending: EMERGENCY MEDICINE | Admitting: EMERGENCY MEDICINE
Payer: MEDICARE

## 2022-07-11 ENCOUNTER — APPOINTMENT (EMERGENCY)
Dept: CT IMAGING | Facility: HOSPITAL | Age: 87
End: 2022-07-11
Payer: MEDICARE

## 2022-07-11 ENCOUNTER — TELEPHONE (OUTPATIENT)
Dept: INTERNAL MEDICINE CLINIC | Facility: CLINIC | Age: 87
End: 2022-07-11

## 2022-07-11 ENCOUNTER — TRANSITIONAL CARE MANAGEMENT (OUTPATIENT)
Dept: INTERNAL MEDICINE CLINIC | Facility: CLINIC | Age: 87
End: 2022-07-11

## 2022-07-11 VITALS
OXYGEN SATURATION: 94 % | BODY MASS INDEX: 25.42 KG/M2 | HEIGHT: 59 IN | RESPIRATION RATE: 17 BRPM | SYSTOLIC BLOOD PRESSURE: 177 MMHG | TEMPERATURE: 97.7 F | DIASTOLIC BLOOD PRESSURE: 73 MMHG | WEIGHT: 126.1 LBS | HEART RATE: 74 BPM

## 2022-07-11 DIAGNOSIS — N39.0 UTI (URINARY TRACT INFECTION): Primary | ICD-10-CM

## 2022-07-11 LAB
ALBUMIN SERPL BCP-MCNC: 3.6 G/DL (ref 3.5–5)
ALP SERPL-CCNC: 79 U/L (ref 46–116)
ALT SERPL W P-5'-P-CCNC: 37 U/L (ref 12–78)
ANION GAP SERPL CALCULATED.3IONS-SCNC: 10 MMOL/L (ref 4–13)
AST SERPL W P-5'-P-CCNC: 65 U/L (ref 5–45)
BACTERIA UR QL AUTO: ABNORMAL /HPF
BASOPHILS # BLD AUTO: 0.03 THOUSANDS/ΜL (ref 0–0.1)
BASOPHILS NFR BLD AUTO: 1 % (ref 0–1)
BILIRUB SERPL-MCNC: 0.52 MG/DL (ref 0.2–1)
BILIRUB UR QL STRIP: NEGATIVE
BUN SERPL-MCNC: 27 MG/DL (ref 5–25)
CALCIUM SERPL-MCNC: 8.4 MG/DL (ref 8.3–10.1)
CHLORIDE SERPL-SCNC: 93 MMOL/L (ref 100–108)
CLARITY UR: CLEAR
CO2 SERPL-SCNC: 27 MMOL/L (ref 21–32)
COLOR UR: YELLOW
CREAT SERPL-MCNC: 1.24 MG/DL (ref 0.6–1.3)
EOSINOPHIL # BLD AUTO: 0.08 THOUSAND/ΜL (ref 0–0.61)
EOSINOPHIL NFR BLD AUTO: 1 % (ref 0–6)
ERYTHROCYTE [DISTWIDTH] IN BLOOD BY AUTOMATED COUNT: 16.2 % (ref 11.6–15.1)
GFR SERPL CREATININE-BSD FRML MDRD: 36 ML/MIN/1.73SQ M
GLUCOSE SERPL-MCNC: 96 MG/DL (ref 65–140)
GLUCOSE UR STRIP-MCNC: NEGATIVE MG/DL
HCT VFR BLD AUTO: 33.1 % (ref 34.8–46.1)
HGB BLD-MCNC: 10.1 G/DL (ref 11.5–15.4)
HGB UR QL STRIP.AUTO: NEGATIVE
IMM GRANULOCYTES # BLD AUTO: 0.08 THOUSAND/UL (ref 0–0.2)
IMM GRANULOCYTES NFR BLD AUTO: 1 % (ref 0–2)
KETONES UR STRIP-MCNC: NEGATIVE MG/DL
LEUKOCYTE ESTERASE UR QL STRIP: ABNORMAL
LYMPHOCYTES # BLD AUTO: 0.47 THOUSANDS/ΜL (ref 0.6–4.47)
LYMPHOCYTES NFR BLD AUTO: 7 % (ref 14–44)
MCH RBC QN AUTO: 26.4 PG (ref 26.8–34.3)
MCHC RBC AUTO-ENTMCNC: 30.5 G/DL (ref 31.4–37.4)
MCV RBC AUTO: 86 FL (ref 82–98)
MONOCYTES # BLD AUTO: 0.62 THOUSAND/ΜL (ref 0.17–1.22)
MONOCYTES NFR BLD AUTO: 10 % (ref 4–12)
NEUTROPHILS # BLD AUTO: 5.24 THOUSANDS/ΜL (ref 1.85–7.62)
NEUTS SEG NFR BLD AUTO: 80 % (ref 43–75)
NITRITE UR QL STRIP: NEGATIVE
NON-SQ EPI CELLS URNS QL MICRO: ABNORMAL /HPF
NRBC BLD AUTO-RTO: 0 /100 WBCS
PH UR STRIP.AUTO: 6.5 [PH]
PLATELET # BLD AUTO: 153 THOUSANDS/UL (ref 149–390)
PMV BLD AUTO: 11.6 FL (ref 8.9–12.7)
POTASSIUM SERPL-SCNC: 4.2 MMOL/L (ref 3.5–5.3)
PROT SERPL-MCNC: 6.9 G/DL (ref 6.4–8.2)
PROT UR STRIP-MCNC: NEGATIVE MG/DL
RBC # BLD AUTO: 3.83 MILLION/UL (ref 3.81–5.12)
RBC #/AREA URNS AUTO: ABNORMAL /HPF
SODIUM SERPL-SCNC: 130 MMOL/L (ref 136–145)
SP GR UR STRIP.AUTO: <=1.005 (ref 1–1.03)
UROBILINOGEN UR QL STRIP.AUTO: 0.2 E.U./DL
WBC # BLD AUTO: 6.52 THOUSAND/UL (ref 4.31–10.16)
WBC #/AREA URNS AUTO: ABNORMAL /HPF

## 2022-07-11 PROCEDURE — 36415 COLL VENOUS BLD VENIPUNCTURE: CPT | Performed by: PHYSICIAN ASSISTANT

## 2022-07-11 PROCEDURE — 80053 COMPREHEN METABOLIC PANEL: CPT | Performed by: PHYSICIAN ASSISTANT

## 2022-07-11 PROCEDURE — 74176 CT ABD & PELVIS W/O CONTRAST: CPT

## 2022-07-11 PROCEDURE — 85025 COMPLETE CBC W/AUTO DIFF WBC: CPT | Performed by: PHYSICIAN ASSISTANT

## 2022-07-11 PROCEDURE — 99284 EMERGENCY DEPT VISIT MOD MDM: CPT

## 2022-07-11 PROCEDURE — 81001 URINALYSIS AUTO W/SCOPE: CPT | Performed by: PHYSICIAN ASSISTANT

## 2022-07-11 PROCEDURE — 99284 EMERGENCY DEPT VISIT MOD MDM: CPT | Performed by: PHYSICIAN ASSISTANT

## 2022-07-11 PROCEDURE — 96365 THER/PROPH/DIAG IV INF INIT: CPT

## 2022-07-11 RX ORDER — CEPHALEXIN 250 MG/1
500 CAPSULE ORAL ONCE
Status: COMPLETED | OUTPATIENT
Start: 2022-07-11 | End: 2022-07-11

## 2022-07-11 RX ORDER — CEPHALEXIN 500 MG/1
500 CAPSULE ORAL EVERY 8 HOURS SCHEDULED
Qty: 15 CAPSULE | Refills: 0 | Status: SHIPPED | OUTPATIENT
Start: 2022-07-11 | End: 2022-07-16

## 2022-07-11 RX ADMIN — CEPHALEXIN 500 MG: 250 CAPSULE ORAL at 15:54

## 2022-07-11 RX ADMIN — SODIUM CHLORIDE, SODIUM LACTATE, POTASSIUM CHLORIDE, AND CALCIUM CHLORIDE 500 ML: .6; .31; .03; .02 INJECTION, SOLUTION INTRAVENOUS at 12:36

## 2022-07-11 NOTE — ED PROVIDER NOTES
History  Chief Complaint   Patient presents with    Difficulty Urinating     Recently discharged from [address] [place] [address] Dr moore, states she has not urinated since leaving yesterday  Sonido Whittington is a very pleasant 80year-old female presenting to the emergency department for evaluation with complaint of difficulty urinating  Of note the patient was recently hospitalized through 07/10/2022 for further management of oliguria and urinary tract infection and had received IV ceftriaxone x3 days for management  Patient reports that she did not experience any significant difficulty with urinating throughout her hospital course  She notes that her symptoms had developed more so following her discharge home, stating that she has been essentially dribbling urine from time of onset  She denies dysuria, urgency, frequency, hematuria  She denies fevers, chills, sweats, abdominal pain, back pain, or flank pain  She denies nausea or episodes of vomiting  She expresses concern for possible incomplete treatment of her urinary tract infection  She offers no other complaints or concerns at this time  Prior to Admission Medications   Prescriptions Last Dose Informant Patient Reported? Taking?    Dilt- MG 24 hr capsule   No No   Sig: TAKE 1 CAPSULE BY MOUTH EVERY DAY   Klor-Con M10 10 MEQ tablet   No No   Sig: TAKE 1 TABLET BY MOUTH EVERY DAY   albuterol (Proventil HFA) 90 mcg/act inhaler  Self No No   Sig: Inhale 2 puffs every 6 (six) hours as needed for wheezing (cough)   Patient not taking: No sig reported   apixaban (Eliquis) 2 5 mg  Self No No   Sig: Take 1 tablet (2 5 mg total) by mouth 2 (two) times a day   ferrous sulfate 324 (65 Fe) mg   No No   Sig: TAKE 1 TABLET (324 MG TOTAL) BY MOUTH DAILY BEFORE BREAKFAST   fluticasone (FLONASE) 50 mcg/act nasal spray  Self No No   Si spray into each nostril daily   Patient not taking: No sig reported   furosemide (LASIX) 20 mg tablet  Self No No   Sig: Take 3 tablets (60 mg total) by mouth daily   metoprolol succinate (TOPROL-XL) 100 mg 24 hr tablet  Self No No   Sig: Take 2 tablets (200 mg total) by mouth daily      Facility-Administered Medications: None       Past Medical History:   Diagnosis Date    Anemia     Asteatotic eczema     Chronic kidney disease     Hypercholesterolemia     Lichen sclerosus et atrophicus     Mitral valve insufficiency     Seborrheic keratoses     Tricuspid regurgitation     Vertigo        Past Surgical History:   Procedure Laterality Date    APPENDECTOMY  11/14/1939    CATARACT EXTRACTION, BILATERAL  04/2019    HYSTERECTOMY  11/14/1962    ORIF HIP FRACTURE Left 2020    NH COLONOSCOPY FLX DX W/COLLJ SPEC WHEN PFRMD N/A 5/19/2016    Procedure: EGD AND COLONOSCOPY;  Surgeon: Aga Heard MD;  Location: [facility]; Service: Gastroenterology       Family History   Problem Relation Age of Onset    Heart disease Mother         Abnormality    Heart disease Father         Abnormality    No Known Problems Sister     No Known Problems Brother      I have reviewed and agree with the history as documented  E-Cigarette/Vaping    E-Cigarette Use Never User      E-Cigarette/Vaping Substances    Nicotine No     THC No     CBD No     Flavoring No     Other No     Unknown No      Social History     Tobacco Use    Smoking status: Never Smoker    Smokeless tobacco: Never Used   Vaping Use    Vaping Use: Never used   Substance Use Topics    Alcohol use: Not Currently    Drug use: No       Review of Systems   Constitutional: Negative for chills, diaphoresis, fatigue and fever  Gastrointestinal: Negative for abdominal distention, abdominal pain, nausea and vomiting  Genitourinary: Positive for difficulty urinating  Negative for dysuria, hematuria and urgency  Skin: Negative for pallor and rash  All other systems reviewed and are negative  Physical Exam  Physical Exam  Vitals and nursing note reviewed     Constitutional: General: She is not in acute distress  Appearance: Normal appearance  She is well-developed  She is not ill-appearing, toxic-appearing or diaphoretic  HENT:      Head: Normocephalic and atraumatic  Right Ear: External ear normal       Left Ear: External ear normal    Eyes:      Conjunctiva/sclera: Conjunctivae normal    Cardiovascular:      Rate and Rhythm: Normal rate and regular rhythm  Pulses: Normal pulses  Pulmonary:      Effort: Pulmonary effort is normal  No respiratory distress  Breath sounds: Normal breath sounds  No wheezing, rhonchi or rales  Chest:      Chest wall: No tenderness  Abdominal:      General: There is no distension  Palpations: Abdomen is soft  Tenderness: There is no abdominal tenderness  There is no guarding or rebound  Musculoskeletal:         General: Normal range of motion  Cervical back: Normal range of motion and neck supple  Skin:     General: Skin is warm and dry  Capillary Refill: Capillary refill takes less than 2 seconds  Neurological:      Mental Status: She is alert  Motor: Motor function is intact  No weakness  Gait: Gait is intact     Psychiatric:         Mood and Affect: Mood normal          Vital Signs  ED Triage Vitals [07/11/22 1204]   Temperature Pulse Respirations Blood Pressure SpO2   97 7 °F (36 5 °C) 63 16 135/60 94 %      Temp Source Heart Rate Source Patient Position - Orthostatic VS BP Location FiO2 (%)   Oral Monitor Lying Right arm --      Pain Score       No Pain           Vitals:    07/11/22 1204 07/11/22 1207 07/11/22 1509   BP: 135/60 135/60 (!) 177/73   Pulse: 63 62 74   Patient Position - Orthostatic VS: Lying  Sitting         Visual Acuity      ED Medications  Medications   lactated ringers bolus 500 mL (0 mL Intravenous Stopped 7/11/22 1348)       Diagnostic Studies  Results Reviewed     Procedure Component Value Units Date/Time    Urine Microscopic [[id number]]  (Abnormal) Collected: 07/11/22 1249    Lab Status: Final result Specimen: Urine, Other Updated: 07/11/22 1402     RBC, UA None Seen /hpf      WBC, UA 4-10 /hpf      Epithelial Cells Moderate /hpf      Bacteria, UA Occasional /hpf     UA w Reflex to Microscopic w Reflex to Culture [[id number]]  (Abnormal) Collected: 07/11/22 1249    Lab Status: Final result Specimen: Urine, Other Updated: 07/11/22 1310     Color, UA Yellow     Clarity, UA Clear     Specific Gravity, UA <=1 005     pH, UA 6 5     Leukocytes, UA Small     Nitrite, UA Negative     Protein, UA Negative mg/dl      Glucose, UA Negative mg/dl      Ketones, UA Negative mg/dl      Urobilinogen, UA 0 2 E U /dl      Bilirubin, UA Negative     Occult Blood, UA Negative    Comprehensive metabolic panel [[id number]]  (Abnormal) Collected: 07/11/22 1237    Lab Status: Final result Specimen: Blood from Arm, Left Updated: 07/11/22 1307     Sodium 130 mmol/L      Potassium 4 2 mmol/L      Chloride 93 mmol/L      CO2 27 mmol/L      ANION GAP 10 mmol/L      BUN 27 mg/dL      Creatinine 1 24 mg/dL      Glucose 96 mg/dL      Calcium 8 4 mg/dL      AST 65 U/L      ALT 37 U/L      Alkaline Phosphatase 79 U/L      Total Protein 6 9 g/dL      Albumin 3 6 g/dL      Total Bilirubin 0 52 mg/dL      eGFR 36 ml/min/1 73sq m     Narrative:      [place] guidelines for Chronic Kidney Disease (CKD):     Stage 1 with normal or high GFR (GFR > 90 mL/min/1 73 square meters)    Stage 2 Mild CKD (GFR = 60-89 mL/min/1 73 square meters)    Stage 3A Moderate CKD (GFR = 45-59 mL/min/1 73 square meters)    Stage 3B Moderate CKD (GFR = 30-44 mL/min/1 73 square meters)    Stage 4 Severe CKD (GFR = 15-29 mL/min/1 73 square meters)    Stage 5 End Stage CKD (GFR <15 mL/min/1 73 square meters)  Note: GFR calculation is accurate only with a steady state creatinine    CBC and differential [[id number]]  (Abnormal) Collected: 07/11/22 1237    Lab Status: Final result Specimen: Blood from Arm, Left Updated: 07/11/22 1247     WBC 6 52 Thousand/uL      RBC 3 83 Million/uL      Hemoglobin 10 1 g/dL      Hematocrit 33 1 %      MCV 86 fL      MCH 26 4 pg      MCHC 30 5 g/dL      RDW 16 2 %      MPV 11 6 fL      Platelets 026 Thousands/uL      nRBC 0 /100 WBCs      Neutrophils Relative 80 %      Immat GRANS % 1 %      Lymphocytes Relative 7 %      Monocytes Relative 10 %      Eosinophils Relative 1 %      Basophils Relative 1 %      Neutrophils Absolute 5 24 Thousands/µL      Immature Grans Absolute 0 08 Thousand/uL      Lymphocytes Absolute 0 47 Thousands/µL      Monocytes Absolute 0 62 Thousand/µL      Eosinophils Absolute 0 08 Thousand/µL      Basophils Absolute 0 03 Thousands/µL                  CT abdomen pelvis wo contrast   Final Result by Bonita Walls MD (07/11 1520)      Resolved circumferential bladder wall thickening  No new findings within the abdomen or pelvis  Trace right pleural effusion  Workstation performed: [id number]                    Procedures  Procedures         ED Course  ED Course as of 07/11/22 1525   Mon Jul 11, 2022   1521 Patient has urinated multiple times in the ED                                             MDM  Number of Diagnoses or Management Options  UTI (urinary tract infection): new and requires workup  Diagnosis management comments: This is a 68-year-old female returning with symptoms of oliguria  She was hospitalized through 7/10/22 for management of urinary tract infection/cystitis at which time she had received 3 days of IV Rocephin  Patient states that following discharge home, she has had decreased urine output and incomplete bladder emptying  She has no abdominal pain  She reports no accompanying constitutional symptoms  She denies any changes in bowel habits stating she had a normal bowel movement this morning      Differential diagnosis includes but is not limited to:  Urinary retention, pyelo, cystitis, uti, haim, electrolyte derangements, anemia, renal impairment    Initial ED plan: labs, UA, imaging, dispo pending    Final ED Assessment: Vital signs stable on ED presentation, examination as above  Bladder scan around 200 ml on arrival  All labs and imaging independently reviewed with imaging interpreted by the Radiologist   Urinalysis demonstrates wbc 4-10 on microscopy  Lab work is unremarkable, without leukocytosis, renal impairment, or electrolyte derangements  CT abdomen/pelvis repeated which reports resolution of cystitis that was seen on prior CT  All testing results were reviewed with the patient at bedside  She had urinated twice in the emergency department today without issue  We discussed the possibility of incomplete treatment of a urinary tract infection, and will proceed with extending antibiotic course with Keflex, and 1st dose was provided in the emergency department today  Recommended hydration and completion of antibiotic course, with outpatient urology follow-up if symptoms persist   She was provided strict parameters for ED return  The patient had verbalized understanding and was comfortable and agreeable with disposition and care plan, and her daughter was provided an update by telephone call  The patient was discharged in stable condition           Amount and/or Complexity of Data Reviewed  Clinical lab tests: ordered and reviewed  Tests in the radiology section of CPT®: ordered and reviewed  Review and summarize past medical records: yes  Independent visualization of images, tracings, or specimens: yes    Risk of Complications, Morbidity, and/or Mortality  Presenting problems: moderate  Diagnostic procedures: moderate  Management options: low    Patient Progress  Patient progress: stable      Disposition  Final diagnoses:   UTI (urinary tract infection)     Time reflects when diagnosis was documented in both MDM as applicable and the Disposition within this note     Time User Action Codes Description Comment    7/11/2022  3:25 PM Bill Jenkins Add [N39 0] UTI (urinary tract infection)       ED Disposition     ED Disposition   Discharge    Condition   Stable    Date/Time   Mon Jul 11, 2022  3:25 PM    Comment   Óscar Andrade [facility] discharge to home/self care                 Follow-up Information     Follow up With Specialties Details Why Contact Info Additional 430 Jered Guzmán MD Internal Medicine   [address] [address] [address] [place] For Urology [facility] Urology Call   [address]  [address] [address] [phone] 57 [facility] For Urology [facility], [address] [facility], [facility] [address], [place], [place], [address]    [facility] Emergency Department Emergency Medicine  If symptoms worsen 34 [facility] [address] [facility] Emergency Department, [address], [place], [address]          Discharge Medication List as of 7/11/2022  3:39 PM      START taking these medications    Details   cephalexin (KEFLEX) 500 mg capsule Take 1 capsule (500 mg total) by mouth every 8 (eight) hours for 5 days, Starting Mon 7/11/2022, Until Sat 7/16/2022, Normal         CONTINUE these medications which have NOT CHANGED    Details   albuterol (Proventil HFA) 90 mcg/act inhaler Inhale 2 puffs every 6 (six) hours as needed for wheezing (cough), Starting Sat 11/6/2021, Normal      apixaban (Eliquis) 2 5 mg Take 1 tablet (2 5 mg total) by mouth 2 (two) times a day, Starting Fri 11/26/2021, Normal      Dilt- MG 24 hr capsule TAKE 1 CAPSULE BY MOUTH EVERY DAY, Normal      ferrous sulfate 324 (65 Fe) mg TAKE 1 TABLET (324 MG TOTAL) BY MOUTH DAILY BEFORE BREAKFAST, Starting Tue 4/26/2022, Normal      fluticasone (FLONASE) 50 mcg/act nasal spray 1 spray into each nostril daily, Starting Sun 11/14/2021, Normal furosemide (LASIX) 20 mg tablet Take 3 tablets (60 mg total) by mouth daily, Starting Fri 11/26/2021, Until Fri 7/8/2022, Normal      Klor-Con M10 10 MEQ tablet TAKE 1 TABLET BY MOUTH EVERY DAY, Normal      metoprolol succinate (TOPROL-XL) 100 mg 24 hr tablet Take 2 tablets (200 mg total) by mouth daily, Starting Wed 12/1/2021, Until Mon 4/11/2022, Normal             No discharge procedures on file      PDMP Review     None          ED Provider  Electronically Signed by           Ethan Dahl PA-C  07/13/22 8729

## 2022-07-11 NOTE — ED NOTES
Micah called for patient- will send patient to the waiting room and  will call patient when they arrive  Patient for discharge  No acute distress noted  All questions answered and DC papers given  IV removed, patent  Ambulatory out of department without difficulty              Gustavo Drummond RN  07/11/22 2150

## 2022-07-11 NOTE — DISCHARGE INSTRUCTIONS
Please take keflex course as directed  Drink plenty of fluids  Call Urology if symptoms persist     Return immediately to the ED with any new or worsening symptoms including inability to urinate, abdominal pain, fevers, chills, or other worrisome symptoms

## 2022-07-13 ENCOUNTER — TELEPHONE (OUTPATIENT)
Dept: INTERNAL MEDICINE CLINIC | Facility: CLINIC | Age: 87
End: 2022-07-13

## 2022-07-13 LAB
BACTERIA BLD CULT: NORMAL
BACTERIA BLD CULT: NORMAL

## 2022-07-13 NOTE — TELEPHONE ENCOUNTER
PT discharged from [place] [address] - has appt w/ Trent Santiago tomorrow 7/13/22 for Trans of care  Had to cancel that appt  Pt's son can bring her next Thurs 7/21/22  Can pt get a Trans of care appt for that day?     Please Advise: [phone]

## 2022-07-14 ENCOUNTER — TELEPHONE (OUTPATIENT)
Dept: GASTROENTEROLOGY | Facility: CLINIC | Age: 87
End: 2022-07-14

## 2022-07-14 NOTE — TELEPHONE ENCOUNTER
----- Message from Willie Grace PA-C sent at 7/14/2022  1:36 PM EDT -----  Please call patient and see if she is having any evidence of active GI bleeding  Her last hemoglobin level was 10 5 which is down slightly from 3 months ago  Thank you!

## 2022-07-15 ENCOUNTER — RA CDI HCC (OUTPATIENT)
Dept: OTHER | Facility: HOSPITAL | Age: 87
End: 2022-07-15

## 2022-07-15 NOTE — PROGRESS NOTES
[facility]  coding opportunities     I13 0     Chart Reviewed number of suggestions sent to Provider: 1     Patients Insurance     Medicare Insurance: [employer]

## 2022-07-19 ENCOUNTER — TELEPHONE (OUTPATIENT)
Dept: HEMATOLOGY ONCOLOGY | Facility: CLINIC | Age: 87
End: 2022-07-19

## 2022-07-19 NOTE — TELEPHONE ENCOUNTER
Appointment Cancellation Or Reschedule     Person calling In Patient    Provider CONRADO ESCALONA [facility]   Office Visit Date and Time 7/21 10AM   [facility]   Did patient want to reschedule their office appointment? If so, when was it scheduled to? no   Is this patient calling to reschedule an infusion appointment? no   no no   Is this patient a Chemo patient? no   Reason for Cancellation or Reschedule Schedule conflict     If the patient is a treatment patient, please route this to the office nurse  If the patient is not on treatment, please route to the office MA  If the patient is a surgical oncology patient, please route to surg/onc clinical pool

## 2022-07-20 ENCOUNTER — TELEPHONE (OUTPATIENT)
Dept: INTERNAL MEDICINE CLINIC | Facility: CLINIC | Age: 87
End: 2022-07-20

## 2022-07-20 NOTE — TELEPHONE ENCOUNTER
BP  TODAY  IS  170/84  / HEART  RATE  IS OFF    SEEING   LES   TOMORROW   BUT  WANTS  TO  KNOW  IF  SHE  SHOULD  GO  TO  THE  ER  TODAY OR  IS  OK   TO  WAIT  TILL  TOMORROW

## 2022-07-21 ENCOUNTER — OFFICE VISIT (OUTPATIENT)
Dept: INTERNAL MEDICINE CLINIC | Facility: CLINIC | Age: 87
End: 2022-07-21
Payer: MEDICARE

## 2022-07-21 VITALS
RESPIRATION RATE: 16 BRPM | TEMPERATURE: 97.1 F | HEIGHT: 59 IN | SYSTOLIC BLOOD PRESSURE: 140 MMHG | WEIGHT: 131.2 LBS | OXYGEN SATURATION: 96 % | BODY MASS INDEX: 26.45 KG/M2 | DIASTOLIC BLOOD PRESSURE: 78 MMHG | HEART RATE: 70 BPM

## 2022-07-21 DIAGNOSIS — I48.0 PAROXYSMAL ATRIAL FIBRILLATION (HCC): ICD-10-CM

## 2022-07-21 DIAGNOSIS — I50.32 CHRONIC DIASTOLIC CONGESTIVE HEART FAILURE (HCC): ICD-10-CM

## 2022-07-21 DIAGNOSIS — N30.00 ACUTE CYSTITIS WITHOUT HEMATURIA: Primary | ICD-10-CM

## 2022-07-21 DIAGNOSIS — I10 ESSENTIAL HYPERTENSION: ICD-10-CM

## 2022-07-21 PROCEDURE — 99495 TRANSJ CARE MGMT MOD F2F 14D: CPT

## 2022-07-21 NOTE — PROGRESS NOTES
INTERNAL MEDICINE TRANSITION OF CARE OFFICE VISIT    [facility]    NAME: Armond Arce  AGE: 80 y o  SEX: female  : 1924     DATE: 2022     Assessment and Plan:     1  Acute cystitis without hematuria  Recent hospitalization due to UTI  Patient states she feels better than she did prior to hospitalization  All antibiotics have been discontinued  2  Chronic diastolic congestive heart failure ([address] )  Patient is taking Lasix  She was instructed to weigh daily  She continues to limit salt    3  Paroxysmal atrial fibrillation (HCC)  Patient is taking Eliquis twice a day with no abnormal bleeding  4  Essential hypertension  Stable blood pressures  She is getting home care visits which are checking her blood pressure a few times a week  Taking metoprolol, Cardizem    Continue with physical therapy for strengthening, endurance  Stay hydrated     Transitional Care Management Review:     Armond Arce is a 80 y o  female here for TCM follow-up    During the TCM phone call patient stated:    TCM Call     Date and time call was made  2022  9:59 AM    Hospital care reviewed  Records reviewed    Patient was hospitialized at  [facility] 2021    Date of Admission  22    Date of discharge  07/10/22    Diagnosis  UTI    Disposition  Home    Current Symptoms  None      TCM Call     Post hospital issues  None    Scheduled for follow up?   Yes    Patients specialists  Other (comment)    Other specialists names  Hem/Onc-Tiffanie Dahl, [phone]    Did you obtain your prescribed medications  Yes    Do you need help managing your prescriptions or medications  No    Is transportation to your appointment needed  No    I have advised the patient to call PCP with any new or worsening symptoms  Bronwyn Morales LPN    Are you recieving any outpatient services  No    Are you recieving home care services  Yes    Types of home care services  Nurse visit; Home PT    Are you using any community resources  No    Current waiver services  No    Have you fallen in the last 12 months  No    Interperter language line needed  No    Counseling  Patient    Counseling topics  patient and family education; Importance of RX compliance    Comments  Pt currently in ER           HPI:     Patient was seen in the office today for a TCM after hospitalization due to urinary tract infection  Patient states that she feels significantly better but still feels short of breath and weak  She has a significant history for mitral valve insufficiency as well as heart failure and aortic valve stenosis  She has home care in several times a week nursing, PT, and OT  She gets help from her son  Patient does live alone  The following portions of the patient's history were reviewed and updated as appropriate: allergies, current medications, past family history, past medical history, past social history, past surgical history and problem list      Review of Systems:     Review of Systems   Constitutional: Negative for appetite change, chills, diaphoresis, fatigue, fever and unexpected weight change  HENT: Negative for postnasal drip and sneezing  Eyes: Negative for visual disturbance  Respiratory: Positive for shortness of breath  Negative for chest tightness  Cardiovascular: Negative for chest pain, palpitations and leg swelling  Gastrointestinal: Negative for abdominal pain and blood in stool  Endocrine: Negative for cold intolerance, heat intolerance, polydipsia, polyphagia and polyuria  Genitourinary: Negative for difficulty urinating, dysuria, frequency and urgency  Musculoskeletal: Negative for arthralgias and myalgias  Skin: Negative for rash and wound  Neurological: Positive for weakness  Negative for dizziness, light-headedness and headaches  Hematological: Negative for adenopathy     Psychiatric/Behavioral: Negative for confusion, dysphoric mood and sleep disturbance  The patient is not nervous/anxious  Problem List:     Patient Active Problem List   Diagnosis    Paroxysmal atrial fibrillation ([address] )    Long term current use of anticoagulant    Chronic kidney disease, stage III (moderate) (HCC)    Essential hypertension    Mitral valve insufficiency    Hypercholesterolemia    Anemia due to chronic kidney disease    Diastolic dysfunction    Epistaxis    Chronic kidney disease-mineral and bone disorder    Fracture of right orbital wall (HCC)    Chronic diastolic congestive heart failure (HCC)    Iron deficiency anemia    Fecal occult blood test positive    Anemia    Seasonal allergic rhinitis due to pollen    Cough    Allergic conjunctivitis of both eyes    Lower GI bleed    Chronic renal disease, stage IV (HCC)    Bronchopneumonia    Acute kidney injury superimposed on CKD (HCC)    Dyspnea    Hypoxia    Acute respiratory distress    Severe pulmonary hypertension (HCC)    Nonrheumatic aortic valve stenosis    UTI (urinary tract infection)    Hyponatremia    Bilateral renal cysts        Objective:     /78   Pulse 70   Temp (!) 97 1 °F (36 2 °C) (Tympanic)   Resp 16   Ht 4' 11" (1 499 m)   Wt 59 5 kg (131 lb 3 2 oz)   LMP  (LMP Unknown)   SpO2 96%   BMI 26 50 kg/m²     Physical Exam  Constitutional:       Appearance: She is well-developed  HENT:      Head: Normocephalic and atraumatic  Eyes:      Pupils: Pupils are equal, round, and reactive to light  Neck:      Thyroid: No thyromegaly  Cardiovascular:      Rate and Rhythm: Normal rate  Rhythm irregular  Heart sounds: Murmur heard  Pulmonary:      Effort: Pulmonary effort is normal       Breath sounds: Normal breath sounds  Abdominal:      General: Bowel sounds are normal       Palpations: Abdomen is soft  Musculoskeletal:         General: Normal range of motion        Cervical back: Normal range of motion and neck supple  Lymphadenopathy:      Cervical: No cervical adenopathy  Skin:     General: Skin is warm and dry  Neurological:      Mental Status: She is alert and oriented to person, place, and time  Laboratory Results: I have personally reviewed the pertinent laboratory results/reports     Radiology/Other Diagnostic Testing Results: I have personally reviewed pertinent reports  CT abdomen pelvis wo contrast    Result Date: 7/11/2022  CT ABDOMEN AND PELVIS WITHOUT IV CONTRAST INDICATION:   cystitis, recent uti  COMPARISON:  CT abdomen pelvis 7/7/2022 TECHNIQUE:  CT examination of the abdomen and pelvis was performed without intravenous contrast  This examination was performed without intravenous contrast in the context of the critical nationwide Omnipaque shortage  Axial, sagittal, and coronal 2D reformatted images were created from the source data and submitted for interpretation  Radiation dose length product (DLP) for this visit:  557 mGy-cm   This examination, like all CT scans performed in the [facility], was performed utilizing techniques to minimize radiation dose exposure, including the use of iterative reconstruction and automated exposure control  Enteric contrast was administered  FINDINGS: ABDOMEN LOWER CHEST:  Trace right pleural effusion  Cardiomegaly  LIVER/BILIARY TREE:  One or more subcentimeter sharply circumscribed low-density hepatic lesion(s) are noted, too small to accurately characterize, but statistically most likely to represent subcentimeter hepatic cysts  No suspicious solid hepatic lesion is identified  Hepatic contours are normal   No biliary dilatation  GALLBLADDER:  No calcified gallstones  No pericholecystic inflammatory change  SPLEEN:  Unremarkable  PANCREAS:  Unremarkable  ADRENAL GLANDS:  Unremarkable  KIDNEYS/URETERS:  No hydronephrosis  Unchanged bilateral renal cysts    Unchanged bilateral hyperdense lesions, likely hemorrhagic cysts, agree with the previously recommended follow-up  No perinephric collection  STOMACH AND BOWEL:  There is colonic diverticulosis without evidence of acute diverticulitis  APPENDIX:  No findings to suggest appendicitis  ABDOMINOPELVIC CAVITY:  No ascites  No pneumoperitoneum  No lymphadenopathy  VESSELS:  Unremarkable for patient's age  PELVIS REPRODUCTIVE ORGANS:  Status post hysterectomy  Low-lying pelvic structures indicate pelvic floor laxity  URINARY BLADDER:  Results circumferential bladder wall thickening  ABDOMINAL WALL/INGUINAL REGIONS:  Unremarkable  OSSEOUS STRUCTURES:  No acute fracture or destructive osseous lesion  Reidentified left femoral head avascular necrosis  Partially imaged left femoral fixation hardware  Resolved circumferential bladder wall thickening  No new findings within the abdomen or pelvis  Trace right pleural effusion  Workstation performed: [id number]        Current Medications:     Outpatient Medications Prior to Visit   Medication Sig Dispense Refill    apixaban (Eliquis) 2 5 mg Take 1 tablet (2 5 mg total) by mouth 2 (two) times a day 180 tablet 3    Dilt- MG 24 hr capsule TAKE 1 CAPSULE BY MOUTH EVERY DAY 90 capsule 1    ferrous sulfate 324 (65 Fe) mg TAKE 1 TABLET (324 MG TOTAL) BY MOUTH DAILY BEFORE BREAKFAST 90 tablet 1    Klor-Con M10 10 MEQ tablet TAKE 1 TABLET BY MOUTH EVERY DAY 90 tablet 0    albuterol (Proventil HFA) 90 mcg/act inhaler Inhale 2 puffs every 6 (six) hours as needed for wheezing (cough) (Patient not taking: No sig reported) 6 7 g 0    fluticasone (FLONASE) 50 mcg/act nasal spray 1 spray into each nostril daily (Patient not taking: No sig reported) 11 1 mL 0    furosemide (LASIX) 20 mg tablet Take 3 tablets (60 mg total) by mouth daily 90 tablet 11    metoprolol succinate (TOPROL-XL) 100 mg 24 hr tablet Take 2 tablets (200 mg total) by mouth daily 90 tablet 3     No facility-administered medications prior to visit         Anai Lee [address] [facility]

## 2022-07-26 ENCOUNTER — TELEPHONE (OUTPATIENT)
Dept: INTERNAL MEDICINE CLINIC | Facility: CLINIC | Age: 87
End: 2022-07-26

## 2022-07-26 NOTE — TELEPHONE ENCOUNTER
[address] [facility] home health visiting pt relayed:    Pt wanted to know if Dr Ellis Khan can send a B-12 supplement to Pharm or rec an OTC  Pt has been feeling not "too peppy" since UTI  Pt also wanted to follow up on VN or HomeHealth care he had mentioned to pt that he would refer for her      Also, Dr Ellis Khan suggested Physical Therapy for pt / Terrell Barboza will need new orders faxed to [phone]

## 2022-07-29 ENCOUNTER — TELEPHONE (OUTPATIENT)
Dept: INTERNAL MEDICINE CLINIC | Facility: CLINIC | Age: 87
End: 2022-07-29

## 2022-08-20 DIAGNOSIS — I50.9 CHF (CONGESTIVE HEART FAILURE) (HCC): ICD-10-CM

## 2022-08-22 RX ORDER — METOPROLOL SUCCINATE 100 MG/1
TABLET, EXTENDED RELEASE ORAL
Qty: 180 TABLET | Refills: 1 | Status: SHIPPED | OUTPATIENT
Start: 2022-08-22

## 2022-09-02 ENCOUNTER — HOSPITAL ENCOUNTER (EMERGENCY)
Facility: HOSPITAL | Age: 87
Discharge: HOME | End: 2022-09-03
Attending: EMERGENCY MEDICINE
Payer: MEDICARE

## 2022-09-02 VITALS
HEART RATE: 78 BPM | OXYGEN SATURATION: 94 % | TEMPERATURE: 97.6 F | RESPIRATION RATE: 18 BRPM | DIASTOLIC BLOOD PRESSURE: 64 MMHG | SYSTOLIC BLOOD PRESSURE: 140 MMHG

## 2022-09-02 DIAGNOSIS — R04.0 EPISTAXIS: Primary | ICD-10-CM

## 2022-09-02 PROCEDURE — 99283 EMERGENCY DEPT VISIT LOW MDM: CPT

## 2022-09-02 PROCEDURE — 30901 CONTROL OF NOSEBLEED: CPT | Performed by: SURGERY

## 2022-09-02 PROCEDURE — 99284 EMERGENCY DEPT VISIT MOD MDM: CPT | Performed by: SURGERY

## 2022-09-02 RX ORDER — TETRACAINE HYDROCHLORIDE 5 MG/ML
1 SOLUTION OPHTHALMIC ONCE
Status: COMPLETED | OUTPATIENT
Start: 2022-09-02 | End: 2022-09-02

## 2022-09-02 RX ORDER — TRANEXAMIC ACID 100 MG/ML
1000 INJECTION, SOLUTION INTRAVENOUS ONCE
Status: COMPLETED | OUTPATIENT
Start: 2022-09-02 | End: 2022-09-02

## 2022-09-02 RX ORDER — TRANEXAMIC ACID 100 MG/ML
1000 INJECTION, SOLUTION INTRAVENOUS ONCE
Status: DISCONTINUED | OUTPATIENT
Start: 2022-09-02 | End: 2022-09-03 | Stop reason: HOSPADM

## 2022-09-02 RX ORDER — OXYMETAZOLINE HYDROCHLORIDE 0.05 G/100ML
2 SPRAY NASAL ONCE
Status: COMPLETED | OUTPATIENT
Start: 2022-09-02 | End: 2022-09-02

## 2022-09-02 RX ADMIN — TRANEXAMIC ACID 1000 MG: 1 INJECTION, SOLUTION INTRAVENOUS at 20:58

## 2022-09-02 RX ADMIN — FLUORESCEIN SODIUM 1 STRIP: 0.6 STRIP OPHTHALMIC at 20:58

## 2022-09-02 RX ADMIN — TETRACAINE HYDROCHLORIDE 1 DROP: 5 SOLUTION OPHTHALMIC at 20:58

## 2022-09-02 RX ADMIN — OXYMETAZOLINE HYDROCHLORIDE 2 SPRAY: 0.05 SPRAY NASAL at 20:26

## 2022-09-03 ENCOUNTER — HOSPITAL ENCOUNTER (EMERGENCY)
Facility: HOSPITAL | Age: 87
Discharge: HOME | End: 2022-09-04
Attending: EMERGENCY MEDICINE
Payer: MEDICARE

## 2022-09-03 DIAGNOSIS — R04.0 EPISTAXIS: Primary | ICD-10-CM

## 2022-09-03 PROCEDURE — 30901 CONTROL OF NOSEBLEED: CPT | Performed by: EMERGENCY MEDICINE

## 2022-09-03 PROCEDURE — 99283 EMERGENCY DEPT VISIT LOW MDM: CPT

## 2022-09-03 PROCEDURE — 99282 EMERGENCY DEPT VISIT SF MDM: CPT | Performed by: EMERGENCY MEDICINE

## 2022-09-03 RX ORDER — OXYMETAZOLINE HYDROCHLORIDE 0.05 G/100ML
2 SPRAY NASAL ONCE
Status: COMPLETED | OUTPATIENT
Start: 2022-09-03 | End: 2022-09-03

## 2022-09-03 RX ORDER — OXYMETAZOLINE HYDROCHLORIDE 0.05 G/100ML
2 SPRAY NASAL ONCE
Status: COMPLETED | OUTPATIENT
Start: 2022-09-03 | End: 2022-09-04

## 2022-09-03 RX ADMIN — OXYMETAZOLINE HCL 2 SPRAY: 0.05 SPRAY NASAL at 22:20

## 2022-09-03 RX ADMIN — SILVER NITRATE APPLICATORS 1 APPLICATOR: 25; 75 STICK TOPICAL at 22:25

## 2022-09-03 NOTE — ED PROVIDER NOTES
History  Chief Complaint   Patient presents with    Nose Bleed     Pt arriving via EMS for epistaxis since today at [address] has a hx of nose bleeding and had a scheduled cauterization yesterday  Pt takes eliquis for afib  Pt also reporting new bleeding from her R tear duct, denies injury or trauma  Yvette Barahona is a 80 y o  female with a pertinent past medical history of epistaxis presenting today with epistaxis  The patient has frequent cauterizations  Had cauterization yesterday to the right nostril  Reports that she began bleeding today around 2:00 p m  And it has been constant since that time  She has apply pressure with no relief of symptoms  Patient also reports that she noted some blood to her right eye  Denies any traumatic injury  Denies any other symptoms at this time such as lightheadedness, dizziness, syncopal episodes, nausea/vomiting/diarrhea  No further complaints at this time  Prior to Admission Medications   Prescriptions Last Dose Informant Patient Reported? Taking?    Dilt- MG 24 hr capsule   No No   Sig: TAKE 1 CAPSULE BY MOUTH EVERY DAY   Klor-Con M10 10 MEQ tablet   No No   Sig: TAKE 1 TABLET BY MOUTH EVERY DAY   albuterol (Proventil HFA) 90 mcg/act inhaler   No No   Sig: Inhale 2 puffs every 6 (six) hours as needed for wheezing (cough)   Patient not taking: No sig reported   apixaban (Eliquis) 2 5 mg  Self No No   Sig: Take 1 tablet (2 5 mg total) by mouth 2 (two) times a day   ferrous sulfate 324 (65 Fe) mg   No No   Sig: TAKE 1 TABLET (324 MG TOTAL) BY MOUTH DAILY BEFORE BREAKFAST   fluticasone (FLONASE) 50 mcg/act nasal spray   No No   Si spray into each nostril daily   Patient not taking: No sig reported   furosemide (LASIX) 20 mg tablet  Self No No   Sig: Take 3 tablets (60 mg total) by mouth daily   metoprolol succinate (TOPROL-XL) 100 mg 24 hr tablet   No No   Sig: TAKE 2 TABLETS BY MOUTH EVERY DAY      Facility-Administered Medications: None Past Medical History:   Diagnosis Date    Anemia     Asteatotic eczema     Chronic kidney disease     Hypercholesterolemia     Lichen sclerosus et atrophicus     Mitral valve insufficiency     Seborrheic keratoses     Tricuspid regurgitation     Vertigo        Past Surgical History:   Procedure Laterality Date    APPENDECTOMY  11/14/1939    CATARACT EXTRACTION, BILATERAL  04/2019    HYSTERECTOMY  11/14/1962    ORIF HIP FRACTURE Left 2020    VT COLONOSCOPY FLX DX W/COLLJ SPEC WHEN PFRMD N/A 5/19/2016    Procedure: EGD AND COLONOSCOPY;  Surgeon: Israel Kinsey MD;  [facility]; Service: Gastroenterology       Family History   Problem Relation Age of Onset    Heart disease Mother         Abnormality    Heart disease Father         Abnormality    No Known Problems Sister     No Known Problems Brother      I have reviewed and agree with the history as documented  E-Cigarette/Vaping    E-Cigarette Use Never User      E-Cigarette/Vaping Substances    Nicotine No     THC No     CBD No     Flavoring No     Other No     Unknown No      Social History     Tobacco Use    Smoking status: Never Smoker    Smokeless tobacco: Never Used   Vaping Use    Vaping Use: Never used   Substance Use Topics    Alcohol use: Not Currently    Drug use: No       Review of Systems   Constitutional: Negative for chills and fever  HENT: Positive for nosebleeds  Negative for ear pain and sore throat  Eyes: Negative for pain and visual disturbance  Respiratory: Negative for cough and shortness of breath  Cardiovascular: Negative for chest pain and palpitations  Gastrointestinal: Negative for abdominal pain and vomiting  Genitourinary: Negative for dysuria and hematuria  Musculoskeletal: Negative for arthralgias and back pain  Skin: Negative for color change and rash  Neurological: Negative for seizures and syncope  All other systems reviewed and are negative        Physical Exam  Physical Exam  Vitals reviewed  Constitutional:       Appearance: She is normal weight  HENT:      Head: Normocephalic and atraumatic  Right Ear: External ear normal       Left Ear: External ear normal       Nose: Nose normal  No rhinorrhea  Comments: Dry crusted blood to the right nares     Mouth/Throat:      Mouth: Mucous membranes are moist       Pharynx: Oropharynx is clear  No oropharyngeal exudate  Eyes:      General:         Right eye: Discharge (bloody discharge) present  Extraocular Movements: Extraocular movements intact  Pupils: Pupils are equal, round, and reactive to light  Cardiovascular:      Rate and Rhythm: Normal rate and regular rhythm  Pulmonary:      Effort: Pulmonary effort is normal       Breath sounds: Normal breath sounds  No wheezing  Abdominal:      General: Abdomen is flat  Bowel sounds are normal       Tenderness: There is no abdominal tenderness  Musculoskeletal:         General: No swelling or tenderness  Normal range of motion  Cervical back: Normal range of motion and neck supple  No rigidity or tenderness  Skin:     General: Skin is warm  Capillary Refill: Capillary refill takes less than 2 seconds  Neurological:      General: No focal deficit present  Mental Status: She is alert and oriented to person, place, and time  Motor: No weakness           Vital Signs  ED Triage Vitals   Temperature Pulse Respirations Blood Pressure SpO2   09/02/22 2000 09/02/22 1957 09/02/22 1957 09/02/22 1957 09/02/22 1957   97 6 °F (36 4 °C) 78 18 140/64 94 %      Temp Source Heart Rate Source Patient Position - Orthostatic VS BP Location FiO2 (%)   09/02/22 2000 09/02/22 1957 09/02/22 1957 09/02/22 1957 --   Oral Monitor Sitting Right arm       Pain Score       --                  Vitals:    09/02/22 1957   BP: 140/64   Pulse: 78   Patient Position - Orthostatic VS: Sitting         Visual Acuity      ED Medications  Medications   tranexamic acid 100mg/mL (for epistaxis) 1,000 mg (0 mg Nasal Hold 9/2/22 2323)   oxymetazoline (AFRIN) 0 05 % nasal spray 2 spray (2 sprays Each Nare Given 9/2/22 2026)   tranexamic acid 100mg/mL (for epistaxis) 1,000 mg (1,000 mg Nasal Given by Other 9/2/22 2058)   fluorescein sodium sterile ophthalmic strip 1 strip (1 strip Right Eye Given by Other 9/2/22 2058)   tetracaine 0 5 % ophthalmic solution 1 drop (1 drop Right Eye Given by Other 9/2/22 2058)       Diagnostic Studies  Results Reviewed     None                 No orders to display              Procedures  Procedures         ED Course  ED Course as of 09/03/22 0208   [facility] Sep 02, 2022   2056 Applied TXA and pressure  Will reevaluate  Will do fluorescein examination secondary to eye involvement  2254 Bleeding resolved at this time  SBIRT 20yo+    Flowsheet Row Most Recent Value   SBIRT (23 yo +)    In order to provide better care to our patients, we are screening all of our patients for alcohol and drug use  Would it be okay to ask you these screening questions? Yes Filed at: 09/02/2022 2002   Initial Alcohol Screen: US AUDIT-C     1  How often do you have a drink containing alcohol? 0 Filed at: 09/02/2022 2002   2  How many drinks containing alcohol do you have on a typical day you are drinking? 0 Filed at: 09/02/2022 2002   3b  FEMALE Any Age, or MALE 65+: How often do you have 4 or more drinks on one occassion? 0 Filed at: 09/02/2022 2002   Audit-C Score 0 Filed at: 09/02/2022 2002   JANEL: How many times in the past year have you    Used an illegal drug or used a prescription medication for non-medical reasons?  Never Filed at: 09/02/2022 2002                    MDM  Number of Diagnoses or Management Options  Epistaxis: minor     Amount and/or Complexity of Data Reviewed  Clinical lab tests: ordered and reviewed  Tests in the radiology section of CPT®: ordered and reviewed  Tests in the medicine section of CPT®: reviewed and ordered  Review and summarize past medical records: yes  Discuss the patient with other providers: yes  Independent visualization of images, tracings, or specimens: yes    Risk of Complications, Morbidity, and/or Mortality  Presenting problems: moderate  Diagnostic procedures: moderate  Management options: moderate    Patient Progress  Patient progress: stable      Disposition  Final diagnoses:   Epistaxis     Time reflects when diagnosis was documented in both MDM as applicable and the Disposition within this note     Time User Action Codes Description Comment    9/2/2022 11:29 PM Filippo Peña Add [R04 0] Epistaxis       ED Disposition     ED Disposition   Discharge    Condition   Stable    Date/Time   Fri Sep 2, 2022 11:29 PM    Comment   Maria L Del Toro discharge to home/self care  Follow-up Information     Follow up With Specialties Details Why Contact Info Additional Information    [facility] Emergency Medicine Go to  If symptoms worsen [address] [facility] [address] [facility] Emergency Department, [address] [facility],[address] Vandana Lopez MD Internal Medicine   [facility] [address], [address] [place] [address]  Throat Otolaryngology Call today To schedule an appointment for follow-up for evaluation within the next 1-3 days regarding bloody nose  [id number] [facility] Ear, [address] [facility], [facility] , [address]    [employer], [address]          Discharge Medication List as of 9/2/2022 11:32 PM      CONTINUE these medications which have NOT CHANGED    Details   albuterol (Proventil HFA) 90 mcg/act inhaler Inhale 2 puffs every 6 (six) hours as needed for wheezing (cough), Starting Sat 11/6/2021, Normal      apixaban (Eliquis) 2 5 mg Take 1 tablet (2 5 mg total) by mouth 2 (two) times a day, Starting Fri 11/26/2021, Normal      Dilt- MG 24 hr capsule TAKE 1 CAPSULE BY MOUTH EVERY DAY, Normal      ferrous sulfate 324 (65 Fe) mg TAKE 1 TABLET (324 MG TOTAL) BY MOUTH DAILY BEFORE BREAKFAST, Starting Tue 4/26/2022, Normal      fluticasone (FLONASE) 50 mcg/act nasal spray 1 spray into each nostril daily, Starting Sun 11/14/2021, Normal      furosemide (LASIX) 20 mg tablet Take 3 tablets (60 mg total) by mouth daily, Starting Fri 11/26/2021, Until Fri 7/8/2022, Normal      Klor-Con M10 10 MEQ tablet TAKE 1 TABLET BY MOUTH EVERY DAY, Normal      metoprolol succinate (TOPROL-XL) 100 mg 24 hr tablet TAKE 2 TABLETS BY MOUTH EVERY DAY, Normal                 PDMP Review     None          ED Provider  Electronically Signed by           Larissa Merrill PA-C  09/03/22 0670

## 2022-09-03 NOTE — DISCHARGE INSTRUCTIONS
If bleeding begins again spray Afrin 2 times into the affected nostril  Apply pressure for 35-40 minutes  If this does not resolve the symptoms you may apply the cause provided and the nose pincher and wait/apply pressure for 35-40 minutes  If at that time the bleeding does not resolve, return to the emergency department  Return with any lightheadedness, dizziness, chest pain, shortness of breath, passing out, palpitations

## 2022-09-04 VITALS
TEMPERATURE: 97.7 F | RESPIRATION RATE: 20 BRPM | SYSTOLIC BLOOD PRESSURE: 142 MMHG | HEART RATE: 78 BPM | OXYGEN SATURATION: 97 % | DIASTOLIC BLOOD PRESSURE: 80 MMHG

## 2022-09-04 RX ADMIN — OXYMETAZOLINE HYDROCHLORIDE 2 SPRAY: 5 SPRAY NASAL at 00:20

## 2022-09-04 RX ADMIN — SILVER NITRATE APPLICATORS 1 APPLICATOR: 25; 75 STICK TOPICAL at 00:30

## 2022-09-04 RX ADMIN — SILVER NITRATE APPLICATORS 1 APPLICATOR: 25; 75 STICK TOPICAL at 00:35

## 2022-09-04 NOTE — ED PROVIDER NOTES
History  Chief Complaint   Patient presents with    Nose Bleed Re-evaluation     Pt presenting via EMS for a nose bleed  Pt seen here last night for same complaint following cauterization, pt dc with afrin spray and gauze which she attempted to use at home with minimal relief  Pts nose currently packed with no active bleeding  51-year-old female presents with recurrent left anterior nose bleed  She was seen yesterday, bleeding was controlled with Afrin, TXA, pressure  Bleeding recurs and patient re-presented to the emergency department  She is on Eliquis as a blood thinning medicine  Bleeding is from left near only, anterior nosebleed  No lightheadedness, no fevers or chills, no symptoms of blood loss anemia  Prior to Admission Medications   Prescriptions Last Dose Informant Patient Reported? Taking?    Dilt- MG 24 hr capsule   No No   Sig: TAKE 1 CAPSULE BY MOUTH EVERY DAY   Klor-Con M10 10 MEQ tablet   No No   Sig: TAKE 1 TABLET BY MOUTH EVERY DAY   albuterol (Proventil HFA) 90 mcg/act inhaler   No No   Sig: Inhale 2 puffs every 6 (six) hours as needed for wheezing (cough)   Patient not taking: No sig reported   apixaban (Eliquis) 2 5 mg  Self No No   Sig: Take 1 tablet (2 5 mg total) by mouth 2 (two) times a day   ferrous sulfate 324 (65 Fe) mg   No No   Sig: TAKE 1 TABLET (324 MG TOTAL) BY MOUTH DAILY BEFORE BREAKFAST   fluticasone (FLONASE) 50 mcg/act nasal spray   No No   Si spray into each nostril daily   Patient not taking: No sig reported   furosemide (LASIX) 20 mg tablet  Self No No   Sig: Take 3 tablets (60 mg total) by mouth daily   metoprolol succinate (TOPROL-XL) 100 mg 24 hr tablet   No No   Sig: TAKE 2 TABLETS BY MOUTH EVERY DAY      Facility-Administered Medications: None       Past Medical History:   Diagnosis Date    Anemia     Asteatotic eczema     Chronic kidney disease     Hypercholesterolemia     Lichen sclerosus et atrophicus     Mitral valve insufficiency  Seborrheic keratoses     Tricuspid regurgitation     Vertigo        Past Surgical History:   Procedure Laterality Date    APPENDECTOMY  11/14/1939    CATARACT EXTRACTION, BILATERAL  04/2019    HYSTERECTOMY  11/14/1962    ORIF HIP FRACTURE Left 2020    OK COLONOSCOPY FLX DX W/COLLJ SPEC WHEN PFRMD N/A 5/19/2016    Procedure: EGD AND COLONOSCOPY;  Surgeon: Moises Forte MD;  [facility]; Service: Gastroenterology       Family History   Problem Relation Age of Onset    Heart disease Mother         Abnormality    Heart disease Father         Abnormality    No Known Problems Sister     No Known Problems Brother      I have reviewed and agree with the history as documented  E-Cigarette/Vaping    E-Cigarette Use Never User      E-Cigarette/Vaping Substances    Nicotine No     THC No     CBD No     Flavoring No     Other No     Unknown No      Social History     Tobacco Use    Smoking status: Never Smoker    Smokeless tobacco: Never Used   Vaping Use    Vaping Use: Never used   Substance Use Topics    Alcohol use: Not Currently    Drug use: No       Review of Systems   Constitutional: Negative for chills and fever  HENT: Positive for nosebleeds  Hematological: Bruises/bleeds easily  Physical Exam  Physical Exam  Vitals reviewed  Constitutional:       General: She is not in acute distress  Appearance: She is well-developed  She is not ill-appearing, toxic-appearing or diaphoretic  HENT:      Head: Normocephalic and atraumatic  Nose:      Comments: Left anterior nose bleed  Significant amount of clot left anterior Nare, was removed was able to see bleeding vessel  Cautery applied and controlled bleeding  No bleeding from R nare  Eyes:      Conjunctiva/sclera: Conjunctivae normal    Pulmonary:      Effort: Pulmonary effort is normal  No respiratory distress  Musculoskeletal:         General: Normal range of motion        Cervical back: Normal range of motion  Skin:     General: Skin is warm and dry  Coloration: Skin is not pale  Neurological:      Mental Status: She is alert and oriented to person, place, and time  Cranial Nerves: No cranial nerve deficit  Psychiatric:         Behavior: Behavior normal          Vital Signs  ED Triage Vitals   Temperature Pulse Respirations Blood Pressure SpO2   09/03/22 2032 09/03/22 2032 09/03/22 2032 09/03/22 2032 09/03/22 2032   97 7 °F (36 5 °C) 76 18 140/69 98 %      Temp Source Heart Rate Source Patient Position - Orthostatic VS BP Location FiO2 (%)   09/03/22 2032 09/03/22 2032 09/03/22 2032 09/03/22 2032 --   Oral Monitor Sitting Right arm       Pain Score       09/04/22 0059       No Pain           Vitals:    09/03/22 2032 09/03/22 2215 09/04/22 0059   BP: 140/69 168/81 142/80   Pulse: 76 81 78   Patient Position - Orthostatic VS: Sitting Lying          Visual Acuity      ED Medications  Medications   silver nitrate-potassium nitrate (ARZOL SILVER NITRATE) 68-75 % applicator 1 applicator (1 applicator Topical Given by Other 9/3/22 2225)   oxymetazoline (AFRIN) 0 05 % nasal spray 2 spray (2 sprays Each Nare Given by Other 9/3/22 2220)   silver nitrate-potassium nitrate (ARZOL SILVER NITRATE) 85-34 % applicator 1 applicator (1 applicator Topical Given by Other 9/4/22 0030)   oxymetazoline (AFRIN) 0 05 % nasal spray 2 spray (2 sprays Each Nare Given 9/4/22 0020)   silver nitrate-potassium nitrate (ARZOL SILVER NITRATE) 06-77 % applicator 1 applicator (1 applicator Topical Given by Other 9/4/22 0035)       Diagnostic Studies  Results Reviewed     None                 No orders to display              Procedures  Epistaxis management    Date/Time: 9/4/2022 12:55 AM  Performed by: Gabriella Holland DO  Authorized by: Gabriella Holland DO   Universal Protocol:  Consent: Verbal consent obtained    Risks and benefits: risks, benefits and alternatives were discussed  Consent given by: patient      Patient location:  ED  Procedure details:     Treatment site:  R anterior    Hemostasis method:  Silver nitrate    Treatment complexity:  Limited    Treatment episode: recurring    Post-procedure details:     Assessment:  Bleeding stopped    Patient tolerance of procedure: Tolerated well, no immediate complications             ED Course  ED Course as of 09/04/22 0100   Sat Sep 03, 2022   2307 Silver nitrate cautery applied to R nare at patient request                               SBIRT 20yo+    Flowsheet Row Most Recent Value   SBIRT (23 yo +)    In order to provide better care to our patients, we are screening all of our patients for alcohol and drug use  Would it be okay to ask you these screening questions? Yes Filed at: 09/03/2022 2034   Initial Alcohol Screen: US AUDIT-C     1  How often do you have a drink containing alcohol? 0 Filed at: 09/03/2022 2034   2  How many drinks containing alcohol do you have on a typical day you are drinking? 0 Filed at: 09/03/2022 2034   3b  FEMALE Any Age, or MALE 65+: How often do you have 4 or more drinks on one occassion? 0 Filed at: 09/03/2022 2034   Audit-C Score 0 Filed at: 09/03/2022 2034   JANEL: How many times in the past year have you    Used an illegal drug or used a prescription medication for non-medical reasons? Never Filed at: 09/03/2022 2034                    [facility]  Number of Diagnoses or Management Options  Epistaxis  Diagnosis management comments: Epistaxis, recurrent, left anterior  controlled with cautery  Advised follow-up with ear nose throat doctor and advised return to emergency department as needed for continued bleeding  Discharged in stable condition        Disposition  Final diagnoses:   Epistaxis     Time reflects when diagnosis was documented in both MDM as applicable and the Disposition within this note     Time User Action Codes Description Comment    9/3/2022 11:22 PM Alfredo Jaquez Add [R04 0] Epistaxis       ED Disposition     ED Disposition   Discharge    Condition   Stable    Date/Time   Sat Sep 3, 2022 11:22 PM    Comment   Deshawn Kebede [facility] discharge to home/self care  Follow-up Information     Follow up With Specialties Details Why Contact Info Additional Information    [facility] Schedule an appointment as soon as possible for a visit  For follow up to ensure improvement, and for further testing and treatment as needed [address] Emergency Department Emergency Medicine  If symptoms worsen: rebleeding, infection, pain, etc [address] [facility] [id number] [phone] [facility], [address], [place], [address]          Patient's Medications   Discharge Prescriptions    No medications on file       No discharge procedures on file      PDMP Review     None          ED Provider  Electronically Signed by           Sheryl Jones DO  09/04/22 0100

## 2022-09-04 NOTE — ED NOTES
No bleeding noted at time of patient departure, pt provided thorough discharge instructions and education on nose bleeds     Roosevelt Tavarez RN  09/04/22 0134

## 2022-09-07 ENCOUNTER — TELEPHONE (OUTPATIENT)
Dept: INTERNAL MEDICINE CLINIC | Facility: CLINIC | Age: 87
End: 2022-09-07

## 2022-09-07 NOTE — TELEPHONE ENCOUNTER
Patient had 3 nose bleed in one day went to the ED on Sept 2   Also saw an Dr Drew Brooks ENT    wants to know if she should stop taking  Eliguis?

## 2022-09-13 ENCOUNTER — TELEPHONE (OUTPATIENT)
Dept: NEPHROLOGY | Facility: CLINIC | Age: 87
End: 2022-09-13

## 2022-09-14 DIAGNOSIS — I10 ESSENTIAL HYPERTENSION: ICD-10-CM

## 2022-09-14 RX ORDER — POTASSIUM CHLORIDE 750 MG/1
TABLET, EXTENDED RELEASE ORAL
Qty: 90 TABLET | Refills: 0 | Status: SHIPPED | OUTPATIENT
Start: 2022-09-14

## 2022-09-14 RX ORDER — DILTIAZEM HYDROCHLORIDE 120 MG/1
CAPSULE, EXTENDED RELEASE ORAL
Qty: 90 CAPSULE | Refills: 1 | Status: SHIPPED | OUTPATIENT
Start: 2022-09-14

## 2022-10-10 ENCOUNTER — TELEPHONE (OUTPATIENT)
Dept: LAB | Facility: HOSPITAL | Age: 87
End: 2022-10-10

## 2022-10-11 ENCOUNTER — TELEPHONE (OUTPATIENT)
Dept: NEPHROLOGY | Facility: CLINIC | Age: 87
End: 2022-10-11

## 2022-10-11 PROBLEM — N39.0 UTI (URINARY TRACT INFECTION): Status: RESOLVED | Noted: 2022-07-08 | Resolved: 2022-10-11

## 2022-10-12 PROBLEM — H10.13 ALLERGIC CONJUNCTIVITIS OF BOTH EYES: Status: RESOLVED | Noted: 2021-10-29 | Resolved: 2022-10-12

## 2022-10-12 PROBLEM — R05.9 COUGH: Status: RESOLVED | Noted: 2021-10-26 | Resolved: 2022-10-12

## 2022-10-12 PROBLEM — J18.0 BRONCHOPNEUMONIA: Status: RESOLVED | Noted: 2021-11-11 | Resolved: 2022-10-12

## 2022-10-13 ENCOUNTER — APPOINTMENT (OUTPATIENT)
Dept: LAB | Facility: HOSPITAL | Age: 87
End: 2022-10-13
Payer: MEDICARE

## 2022-10-13 DIAGNOSIS — N18.9 CHRONIC KIDNEY DISEASE-MINERAL AND BONE DISORDER: ICD-10-CM

## 2022-10-13 DIAGNOSIS — M89.9 CHRONIC KIDNEY DISEASE-MINERAL AND BONE DISORDER: ICD-10-CM

## 2022-10-13 DIAGNOSIS — E83.9 CHRONIC KIDNEY DISEASE-MINERAL AND BONE DISORDER: ICD-10-CM

## 2022-10-13 DIAGNOSIS — N18.4 CHRONIC RENAL DISEASE, STAGE IV (HCC): ICD-10-CM

## 2022-10-13 LAB
25(OH)D3 SERPL-MCNC: 50.9 NG/ML (ref 30–100)
ANION GAP SERPL CALCULATED.3IONS-SCNC: 7 MMOL/L (ref 4–13)
BACTERIA UR QL AUTO: ABNORMAL /HPF
BASOPHILS # BLD AUTO: 0.06 THOUSANDS/ΜL (ref 0–0.1)
BASOPHILS NFR BLD AUTO: 1 % (ref 0–1)
BILIRUB UR QL STRIP: NEGATIVE
BUN SERPL-MCNC: 27 MG/DL (ref 5–25)
CALCIUM SERPL-MCNC: 9.7 MG/DL (ref 8.3–10.1)
CHLORIDE SERPL-SCNC: 105 MMOL/L (ref 96–108)
CLARITY UR: CLEAR
CO2 SERPL-SCNC: 29 MMOL/L (ref 21–32)
COLOR UR: ABNORMAL
CREAT SERPL-MCNC: 1.41 MG/DL (ref 0.6–1.3)
CREAT UR-MCNC: 62.8 MG/DL
EOSINOPHIL # BLD AUTO: 0.22 THOUSAND/ΜL (ref 0–0.61)
EOSINOPHIL NFR BLD AUTO: 3 % (ref 0–6)
ERYTHROCYTE [DISTWIDTH] IN BLOOD BY AUTOMATED COUNT: 18.2 % (ref 11.6–15.1)
GFR SERPL CREATININE-BSD FRML MDRD: 31 ML/MIN/1.73SQ M
GLUCOSE P FAST SERPL-MCNC: 90 MG/DL (ref 65–99)
GLUCOSE UR STRIP-MCNC: NEGATIVE MG/DL
HCT VFR BLD AUTO: 38.5 % (ref 34.8–46.1)
HGB BLD-MCNC: 11 G/DL (ref 11.5–15.4)
HGB UR QL STRIP.AUTO: NEGATIVE
HYALINE CASTS #/AREA URNS LPF: ABNORMAL /LPF
IMM GRANULOCYTES # BLD AUTO: 0.04 THOUSAND/UL (ref 0–0.2)
IMM GRANULOCYTES NFR BLD AUTO: 1 % (ref 0–2)
KETONES UR STRIP-MCNC: NEGATIVE MG/DL
LEUKOCYTE ESTERASE UR QL STRIP: ABNORMAL
LYMPHOCYTES # BLD AUTO: 0.76 THOUSANDS/ΜL (ref 0.6–4.47)
LYMPHOCYTES NFR BLD AUTO: 12 % (ref 14–44)
MCH RBC QN AUTO: 26 PG (ref 26.8–34.3)
MCHC RBC AUTO-ENTMCNC: 28.6 G/DL (ref 31.4–37.4)
MCV RBC AUTO: 91 FL (ref 82–98)
MONOCYTES # BLD AUTO: 0.75 THOUSAND/ΜL (ref 0.17–1.22)
MONOCYTES NFR BLD AUTO: 12 % (ref 4–12)
NEUTROPHILS # BLD AUTO: 4.55 THOUSANDS/ΜL (ref 1.85–7.62)
NEUTS SEG NFR BLD AUTO: 71 % (ref 43–75)
NITRITE UR QL STRIP: NEGATIVE
NON-SQ EPI CELLS URNS QL MICRO: ABNORMAL /HPF
NRBC BLD AUTO-RTO: 0 /100 WBCS
PH UR STRIP.AUTO: 6.5 [PH]
PHOSPHATE SERPL-MCNC: 3.3 MG/DL (ref 2.3–4.1)
PLATELET # BLD AUTO: 202 THOUSANDS/UL (ref 149–390)
PMV BLD AUTO: 13.7 FL (ref 8.9–12.7)
POTASSIUM SERPL-SCNC: 4.9 MMOL/L (ref 3.5–5.3)
PROT UR STRIP-MCNC: NEGATIVE MG/DL
PROT UR-MCNC: 16 MG/DL
PROT/CREAT UR: 0.25 MG/G{CREAT} (ref 0–0.1)
PTH-INTACT SERPL-MCNC: 107.9 PG/ML (ref 18.4–80.1)
RBC # BLD AUTO: 4.23 MILLION/UL (ref 3.81–5.12)
RBC #/AREA URNS AUTO: ABNORMAL /HPF
SODIUM SERPL-SCNC: 141 MMOL/L (ref 135–147)
SP GR UR STRIP.AUTO: 1.01 (ref 1–1.03)
UROBILINOGEN UR STRIP-ACNC: <2 MG/DL
WBC # BLD AUTO: 6.38 THOUSAND/UL (ref 4.31–10.16)
WBC #/AREA URNS AUTO: ABNORMAL /HPF

## 2022-10-13 PROCEDURE — 85025 COMPLETE CBC W/AUTO DIFF WBC: CPT

## 2022-10-13 PROCEDURE — 36415 COLL VENOUS BLD VENIPUNCTURE: CPT

## 2022-10-13 PROCEDURE — 83970 ASSAY OF PARATHORMONE: CPT

## 2022-10-13 PROCEDURE — 84100 ASSAY OF PHOSPHORUS: CPT

## 2022-10-13 PROCEDURE — 82570 ASSAY OF URINE CREATININE: CPT

## 2022-10-13 PROCEDURE — 84156 ASSAY OF PROTEIN URINE: CPT

## 2022-10-13 PROCEDURE — 82306 VITAMIN D 25 HYDROXY: CPT

## 2022-10-13 PROCEDURE — 81001 URINALYSIS AUTO W/SCOPE: CPT

## 2022-10-13 PROCEDURE — 80048 BASIC METABOLIC PNL TOTAL CA: CPT

## 2022-10-19 ENCOUNTER — TELEPHONE (OUTPATIENT)
Dept: NEPHROLOGY | Facility: CLINIC | Age: 87
End: 2022-10-19

## 2022-10-20 ENCOUNTER — TELEPHONE (OUTPATIENT)
Dept: INTERNAL MEDICINE CLINIC | Facility: CLINIC | Age: 87
End: 2022-10-20

## 2022-10-20 ENCOUNTER — OFFICE VISIT (OUTPATIENT)
Dept: NEPHROLOGY | Facility: CLINIC | Age: 87
End: 2022-10-20
Payer: MEDICARE

## 2022-10-20 ENCOUNTER — TELEPHONE (OUTPATIENT)
Dept: NEPHROLOGY | Facility: CLINIC | Age: 87
End: 2022-10-20

## 2022-10-20 VITALS
HEIGHT: 59 IN | SYSTOLIC BLOOD PRESSURE: 140 MMHG | TEMPERATURE: 97.5 F | HEART RATE: 93 BPM | RESPIRATION RATE: 16 BRPM | OXYGEN SATURATION: 98 % | WEIGHT: 122.2 LBS | BODY MASS INDEX: 24.64 KG/M2 | DIASTOLIC BLOOD PRESSURE: 80 MMHG

## 2022-10-20 DIAGNOSIS — I50.32 CHRONIC DIASTOLIC CONGESTIVE HEART FAILURE (HCC): ICD-10-CM

## 2022-10-20 DIAGNOSIS — M89.9 CHRONIC KIDNEY DISEASE-MINERAL AND BONE DISORDER: ICD-10-CM

## 2022-10-20 DIAGNOSIS — I48.0 PAROXYSMAL ATRIAL FIBRILLATION (HCC): ICD-10-CM

## 2022-10-20 DIAGNOSIS — N18.9 CHRONIC KIDNEY DISEASE-MINERAL AND BONE DISORDER: ICD-10-CM

## 2022-10-20 DIAGNOSIS — N18.32 STAGE 3B CHRONIC KIDNEY DISEASE (HCC): Primary | ICD-10-CM

## 2022-10-20 DIAGNOSIS — E83.9 CHRONIC KIDNEY DISEASE-MINERAL AND BONE DISORDER: ICD-10-CM

## 2022-10-20 DIAGNOSIS — I10 ESSENTIAL HYPERTENSION: ICD-10-CM

## 2022-10-20 PROCEDURE — 99214 OFFICE O/P EST MOD 30 MIN: CPT | Performed by: INTERNAL MEDICINE

## 2022-10-20 RX ORDER — AMMONIUM LACTATE 12 G/100G
LOTION TOPICAL
COMMUNITY
Start: 2022-09-26

## 2022-10-20 RX ORDER — COLCHICINE 0.6 MG/1
0.6 TABLET ORAL DAILY
COMMUNITY
Start: 2022-09-11

## 2022-10-20 RX ORDER — TRIAMCINOLONE ACETONIDE 1 MG/G
CREAM TOPICAL
COMMUNITY
Start: 2022-09-26

## 2022-10-20 NOTE — TELEPHONE ENCOUNTER
Patient had an appointment today with Dr Paul Edmonds  Patient presented  To me an [place] [place] c[employer] insurance card  [facility] needs a physician orders called Dr Fam Georges spoke with Cory Snider she will send a message to provider and will enter physician order in [address] [place] [address] Rd

## 2022-10-20 NOTE — PROGRESS NOTES
NEPHROLOGY OFFICE FOLLOW UP  Moises Osborne 80 y o  female MRN: [id number]    Encounter: [id number] 10/20/2022    REASON FOR VISIT: Moises Osborne is a 80 y o  female who is here on 10/20/2022 for Chronic Kidney Disease and Follow-up    HPI:    Anai Tran came in today for follow-up of CKD  [de-identified] year woman looks quite good for her age    Denies any acute complaint     No chest pain no palpitation or shortness of breath    No Nausea no vomiting      REVIEW OF SYSTEMS:    Review of Systems   Constitutional: Negative for activity change and fatigue  HENT: Negative for congestion and ear discharge  Eyes: Negative for photophobia and pain  Respiratory: Negative for apnea and choking  Cardiovascular: Negative for chest pain and palpitations  Gastrointestinal: Negative for abdominal distention and blood in stool  Endocrine: Negative for heat intolerance and polyphagia  Genitourinary: Negative for flank pain and urgency  Musculoskeletal: Negative for neck pain and neck stiffness  Skin: Negative for color change and wound  Allergic/Immunologic: Negative for food allergies and immunocompromised state  Neurological: Negative for seizures and facial asymmetry  Hematological: Negative for adenopathy  Does not bruise/bleed easily  Psychiatric/Behavioral: Negative for self-injury and suicidal ideas           PAST MEDICAL HISTORY:  Past Medical History:   Diagnosis Date   • Anemia    • Asteatotic eczema    • Chronic kidney disease    • Hypercholesterolemia    • Lichen sclerosus et atrophicus    • Mitral valve insufficiency    • Nosebleed    • Seborrheic keratoses    • Tricuspid regurgitation    • Vertigo        PAST SURGICAL HISTORY:  Past Surgical History:   Procedure Laterality Date   • APPENDECTOMY  11/14/1939   • CATARACT EXTRACTION, BILATERAL  04/2019   • HYSTERECTOMY  11/14/1962   • ORIF HIP FRACTURE Left 2020   • SC COLONOSCOPY FLX DX W/COLLJ SPEC WHEN PFRMD N/A 5/19/2016    Procedure: EGD AND COLONOSCOPY;  Surgeon: Amy Chavez MD;  Location[facility];   Service: Gastroenterology       SOCIAL HISTORY:  Social History     Substance and Sexual Activity   Alcohol Use Not Currently     Social History     Substance and Sexual Activity   Drug Use No     Social History     Tobacco Use   Smoking Status Never Smoker   Smokeless Tobacco Never Used       FAMILY HISTORY:  Family History   Problem Relation Age of Onset   • Heart disease Mother         Abnormality   • Heart disease Father         Abnormality   • No Known Problems Sister    • No Known Problems Brother        MEDICATIONS:    Current Outpatient Medications:   •  ammonium lactate (LAC-HYDRIN) 12 % lotion, APPLY TO BACK 2 TIMES PER DAY TO AFFECTED AREAS FOR DRY SKIN, Disp: , Rfl:   •  apixaban (Eliquis) 2 5 mg, Take 1 tablet (2 5 mg total) by mouth 2 (two) times a day, Disp: 180 tablet, Rfl: 3  •  colchicine (COLCRYS) 0 6 mg tablet, Take 0 6 mg by mouth daily As directed, Disp: , Rfl:   •  Dilt- MG 24 hr capsule, TAKE 1 CAPSULE BY MOUTH EVERY DAY, Disp: 90 capsule, Rfl: 1  •  ferrous sulfate 324 (65 Fe) mg, TAKE 1 TABLET (324 MG TOTAL) BY MOUTH DAILY BEFORE BREAKFAST, Disp: 90 tablet, Rfl: 1  •  furosemide (LASIX) 20 mg tablet, Take 3 tablets (60 mg total) by mouth daily (Patient taking differently: Take 20 mg by mouth daily), Disp: 90 tablet, Rfl: 11  •  Klor-Con M10 10 MEQ tablet, TAKE 1 TABLET BY MOUTH EVERY DAY, Disp: 90 tablet, Rfl: 0  •  metoprolol succinate (TOPROL-XL) 100 mg 24 hr tablet, TAKE 2 TABLETS BY MOUTH EVERY DAY, Disp: 180 tablet, Rfl: 1  •  triamcinolone (KENALOG) 0 1 % cream, APPLY TO AFFECTED AREAS ON BACK AT NIGHT 3-4 NIGHTS A WEEK AS NEEDED FOR ITCH, Disp: , Rfl:     PHYSICAL EXAM:  Vitals:    10/20/22 0947   BP: 140/80   BP Location: Right arm   Patient Position: Sitting   Pulse: 93   Resp: 16   Temp: 97 5 °F (36 4 °C)   TempSrc: Temporal   SpO2: 98%   Weight: 55 4 kg (122 lb 3 2 oz)   Height: 4' 11" (1 499 m) Body mass index is 24 68 kg/m²  Physical Exam  Constitutional:       General: She is not in acute distress  Appearance: She is well-developed  HENT:      Head: Normocephalic  Eyes:      General: No scleral icterus  Conjunctiva/sclera: Conjunctivae normal    Neck:      Vascular: No JVD  Cardiovascular:      Rate and Rhythm: Normal rate  Heart sounds: Normal heart sounds  Pulmonary:      Effort: Pulmonary effort is normal       Breath sounds: No wheezing  Abdominal:      Palpations: Abdomen is soft  Tenderness: There is no abdominal tenderness  Musculoskeletal:         General: Normal range of motion  Cervical back: Neck supple  Skin:     General: Skin is warm  Findings: No rash  Neurological:      Mental Status: She is alert and oriented to person, place, and time     Psychiatric:         Behavior: Behavior normal          LAB RESULTS:  Results for orders placed or performed in visit on 74/37/69   Basic metabolic panel   Result Value Ref Range    Sodium 141 135 - 147 mmol/L    Potassium 4 9 3 5 - 5 3 mmol/L    Chloride 105 96 - 108 mmol/L    CO2 29 21 - 32 mmol/L    ANION GAP 7 4 - 13 mmol/L    BUN 27 (H) 5 - 25 mg/dL    Creatinine 1 41 (H) 0 60 - 1 30 mg/dL    Glucose, Fasting 90 65 - 99 mg/dL    Calcium 9 7 8 3 - 10 1 mg/dL    eGFR 31 ml/min/1 73sq m   Phosphorus   Result Value Ref Range    Phosphorus 3 3 2 3 - 4 1 mg/dL   CBC and differential   Result Value Ref Range    WBC 6 38 4 31 - 10 16 Thousand/uL    RBC 4 23 3 81 - 5 12 Million/uL    Hemoglobin 11 0 (L) 11 5 - 15 4 g/dL    Hematocrit 38 5 34 8 - 46 1 %    MCV 91 82 - 98 fL    MCH 26 0 (L) 26 8 - 34 3 pg    MCHC 28 6 (L) 31 4 - 37 4 g/dL    RDW 18 2 (H) 11 6 - 15 1 %    MPV 13 7 (H) 8 9 - 12 7 fL    Platelets 857 678 - 961 Thousands/uL    nRBC 0 /100 WBCs    Neutrophils Relative 71 43 - 75 %    Immat GRANS % 1 0 - 2 %    Lymphocytes Relative 12 (L) 14 - 44 %    Monocytes Relative 12 4 - 12 %    Eosinophils Relative 3 0 - 6 %    Basophils Relative 1 0 - 1 %    Neutrophils Absolute 4 55 1 85 - 7 62 Thousands/µL    Immature Grans Absolute 0 04 0 00 - 0 20 Thousand/uL    Lymphocytes Absolute 0 76 0 60 - 4 47 Thousands/µL    Monocytes Absolute 0 75 0 17 - 1 22 Thousand/µL    Eosinophils Absolute 0 22 0 00 - 0 61 Thousand/µL    Basophils Absolute 0 06 0 00 - 0 10 Thousands/µL   Protein / creatinine ratio, urine   Result Value Ref Range    Creatinine, Ur 62 8 mg/dL    Protein Urine Random 16 mg/dL    Prot/Creat Ratio, Ur 0 25 (H) 0 00 - 0 10   PTH, intact   Result Value Ref Range     9 (H) 18 4 - 80 1 pg/mL   UA (URINE) with reflex to Scope   Result Value Ref Range    Color, UA Light Yellow     Clarity, UA Clear     Specific [place], UA 1 014 1 003 - 1 030    pH, UA 6 5 4 5, 5 0, 5 5, 6 0, 6 5, 7 0, 7 5, 8 0    Leukocytes, UA Small (A) Negative    Nitrite, UA Negative Negative    Protein, UA Negative Negative mg/dl    Glucose, UA Negative Negative mg/dl    Ketones, UA Negative Negative mg/dl    Urobilinogen, UA <2 0 <2 0 mg/dl mg/dl    Bilirubin, UA Negative Negative    Occult Blood, UA Negative Negative   Vitamin D 25 hydroxy   Result Value Ref Range    Vit D, 25-Hydroxy 50 9 30 0 - 100 0 ng/mL   Urine Microscopic   Result Value Ref Range    RBC, UA 1-2 None Seen, 1-2 /hpf    WBC, UA 1-2 None Seen, 1-2 /hpf    Epithelial Cells Occasional None Seen, Occasional /hpf    Bacteria, UA None Seen None Seen, Occasional /hpf    Hyaline Casts, UA 3-5 (A) None Seen /lpf       ASSESSMENT and PLAN:      Chronic kidney disease, stage III (moderate) ([facility])  Lab Results   Component Value Date    EGFR 31 10/13/2022    EGFR 36 07/11/2022    EGFR 33 07/10/2022    CREATININE 1 41 (H) 10/13/2022    CREATININE 1 24 07/11/2022    CREATININE 1 32 (H) 07/10/2022   Renal function is stable overall with some fluctuation    Advised hydration and avoiding nephrotoxic medicine    Chronic kidney disease-mineral and bone disorder  Lab Results Component Value Date    EGFR 31 10/13/2022    EGFR 36 07/11/2022    EGFR 33 07/10/2022    CREATININE 1 41 (H) 10/13/2022    CREATININE 1 24 07/11/2022    CREATININE 1 32 (H) 07/10/2022   PTH and phosphorus along with vitamin-D are within acceptable range and will continue to monitor    Essential hypertension  Blood pressure is very well control with present medication    Chronic diastolic congestive heart failure (HCC)  Wt Readings from Last 3 Encounters:   10/20/22 55 4 kg (122 lb 3 2 oz)   09/07/22 59 kg (130 lb)   07/21/22 59 5 kg (131 lb 3 2 oz)    stable and euvolemic with help of diuretic          Paroxysmal atrial fibrillation (HCC)  Rate is well control  Patient is on Eliquis  Was in emergency with the nosebleed  Advised to call cardiologist about dosage of Eliquis      Everything discussed the patient  I will see her back in 6 months  Will get blood and urine test before that visit      Portions of the record may have been created with voice recognition software  Occasional wrong word or "sound a like" substitutions may have occurred due to the inherent limitations of voice recognition software  Read the chart carefully and recognize, using context, where substitutions have occurred  If you have any questions, please contact the dictating provider

## 2022-10-20 NOTE — ASSESSMENT & PLAN NOTE
Rate is well control  Patient is on Eliquis  Was in emergency with the nosebleed    Advised to call cardiologist about dosage of Eliquis

## 2022-10-20 NOTE — TELEPHONE ENCOUNTER
Patient has appt w/ [facility] on 10/20/22  Dr Elie Teresa office called to request referral be placed in Epic      Please enter referral

## 2022-10-20 NOTE — ASSESSMENT & PLAN NOTE
Wt Readings from Last 3 Encounters:   10/20/22 55 4 kg (122 lb 3 2 oz)   09/07/22 59 kg (130 lb)   07/21/22 59 5 kg (131 lb 3 2 oz)    stable and euvolemic with help of diuretic

## 2022-10-20 NOTE — ASSESSMENT & PLAN NOTE
Lab Results   Component Value Date    EGFR 31 10/13/2022    EGFR 36 07/11/2022    EGFR 33 07/10/2022    CREATININE 1 41 (H) 10/13/2022    CREATININE 1 24 07/11/2022    CREATININE 1 32 (H) 07/10/2022   Renal function is stable overall with some fluctuation    Advised hydration and avoiding nephrotoxic medicine

## 2022-10-20 NOTE — ASSESSMENT & PLAN NOTE
Lab Results   Component Value Date    EGFR 31 10/13/2022    EGFR 36 07/11/2022    EGFR 33 07/10/2022    CREATININE 1 41 (H) 10/13/2022    CREATININE 1 24 07/11/2022    CREATININE 1 32 (H) 07/10/2022   PTH and phosphorus along with vitamin-D are within acceptable range and will continue to monitor

## 2022-10-21 NOTE — TELEPHONE ENCOUNTER
Please advise her to stay on it twice a day as that is how the metabolism of the drug works to prevent strokes  If she has recurrent bleeding we will need to reconsider her taking it as she is already on the low dose  Please schedule her for next available appt to discuss further   Thanks

## 2022-10-21 NOTE — TELEPHONE ENCOUNTER
Spoke with patient and she wanted to know if she should be taking eliquis once or twice daily because she having blotches on her arms   Patient want dr Perez to know she doing well      Please advise

## 2022-10-21 NOTE — TELEPHONE ENCOUNTER
Patient saw Dr Jonatan Theodore yesterday and Debi Soares was discussing how she has blotches on her arms and in the past had bleeding  They are not sure if it is from the Eliquis  She wants to know should she take the two tablets which she is taking now or to go down to one tablet  The bleeding has stopped   They cauterized it in the hospital back in September    [phone]

## 2022-10-24 ENCOUNTER — PATIENT OUTREACH (OUTPATIENT)
Dept: INTERNAL MEDICINE CLINIC | Facility: CLINIC | Age: 87
End: 2022-10-24

## 2022-10-24 ENCOUNTER — EPISODE CHANGES (OUTPATIENT)
Dept: CASE MANAGEMENT | Facility: OTHER | Age: 87
End: 2022-10-24

## 2022-10-24 NOTE — TELEPHONE ENCOUNTER
Spoke with patient and she verbally understood   Please call patient to be schedule for further evaluation  on medication with Dr Perez

## 2022-10-24 NOTE — PROGRESS NOTES
OutPatient Care Management Note:  Inbasket referral for [facility] received  Chart review completed for the following sections:  • Recent Vital Signs  • Allergies/Contradictions  • Medication Review   • History   • SDOH   • Problem List  • Immunizations  • Past hospitalizations and major procedures, including surgery  • Significant past illnesses and treatment history including: Other provider notes, and H&P  • Relevant past medications related to the patient's condition    Case does not meet NCQA criteria due to lack of MH/BH diagnosis

## 2022-11-16 ENCOUNTER — TELEPHONE (OUTPATIENT)
Dept: INTERNAL MEDICINE CLINIC | Facility: CLINIC | Age: 87
End: 2022-11-16

## 2022-12-02 ENCOUNTER — APPOINTMENT (OUTPATIENT)
Dept: CT IMAGING | Facility: HOSPITAL | Age: 87
End: 2022-12-02

## 2022-12-02 ENCOUNTER — OFFICE VISIT (OUTPATIENT)
Dept: INTERNAL MEDICINE CLINIC | Facility: CLINIC | Age: 87
End: 2022-12-02

## 2022-12-02 ENCOUNTER — HOSPITAL ENCOUNTER (INPATIENT)
Facility: HOSPITAL | Age: 87
LOS: 13 days | Discharge: HOME HEALTH | End: 2022-12-16
Attending: EMERGENCY MEDICINE | Admitting: INTERNAL MEDICINE

## 2022-12-02 VITALS
WEIGHT: 131.2 LBS | DIASTOLIC BLOOD PRESSURE: 72 MMHG | RESPIRATION RATE: 17 BRPM | HEIGHT: 59 IN | HEART RATE: 68 BPM | BODY MASS INDEX: 26.45 KG/M2 | OXYGEN SATURATION: 97 % | TEMPERATURE: 97.1 F | SYSTOLIC BLOOD PRESSURE: 128 MMHG

## 2022-12-02 DIAGNOSIS — I48.0 PAROXYSMAL ATRIAL FIBRILLATION (HCC): ICD-10-CM

## 2022-12-02 DIAGNOSIS — N39.0 URINARY TRACT INFECTION: ICD-10-CM

## 2022-12-02 DIAGNOSIS — N17.9 ACUTE KIDNEY INJURY (HCC): ICD-10-CM

## 2022-12-02 DIAGNOSIS — E87.1 HYPONATREMIA: ICD-10-CM

## 2022-12-02 DIAGNOSIS — R33.9 URINARY RETENTION: Primary | ICD-10-CM

## 2022-12-02 DIAGNOSIS — R19.7 DIARRHEA: ICD-10-CM

## 2022-12-02 DIAGNOSIS — R06.09 OTHER FORM OF DYSPNEA: ICD-10-CM

## 2022-12-02 LAB
ANION GAP SERPL CALCULATED.3IONS-SCNC: 12 MMOL/L (ref 4–13)
BACTERIA UR QL AUTO: ABNORMAL /HPF
BASOPHILS # BLD AUTO: 0.04 THOUSANDS/ÂΜL (ref 0–0.1)
BASOPHILS NFR BLD AUTO: 1 % (ref 0–1)
BILIRUB UR QL STRIP: NEGATIVE
BUN SERPL-MCNC: 50 MG/DL (ref 5–25)
CALCIUM SERPL-MCNC: 8.7 MG/DL (ref 8.3–10.1)
CHLORIDE SERPL-SCNC: 99 MMOL/L (ref 96–108)
CLARITY UR: ABNORMAL
CO2 SERPL-SCNC: 25 MMOL/L (ref 21–32)
COLOR UR: YELLOW
CREAT SERPL-MCNC: 1.9 MG/DL (ref 0.6–1.3)
EOSINOPHIL # BLD AUTO: 0.07 THOUSAND/ÂΜL (ref 0–0.61)
EOSINOPHIL NFR BLD AUTO: 1 % (ref 0–6)
ERYTHROCYTE [DISTWIDTH] IN BLOOD BY AUTOMATED COUNT: 17.2 % (ref 11.6–15.1)
GFR SERPL CREATININE-BSD FRML MDRD: 21 ML/MIN/1.73SQ M
GLUCOSE SERPL-MCNC: 100 MG/DL (ref 65–140)
GLUCOSE UR STRIP-MCNC: NEGATIVE MG/DL
HCT VFR BLD AUTO: 33.5 % (ref 34.8–46.1)
HGB BLD-MCNC: 10.1 G/DL (ref 11.5–15.4)
HGB UR QL STRIP.AUTO: ABNORMAL
IMM GRANULOCYTES # BLD AUTO: 0.04 THOUSAND/UL (ref 0–0.2)
IMM GRANULOCYTES NFR BLD AUTO: 1 % (ref 0–2)
KETONES UR STRIP-MCNC: NEGATIVE MG/DL
LEUKOCYTE ESTERASE UR QL STRIP: ABNORMAL
LYMPHOCYTES # BLD AUTO: 0.43 THOUSANDS/ÂΜL (ref 0.6–4.47)
LYMPHOCYTES NFR BLD AUTO: 6 % (ref 14–44)
MCH RBC QN AUTO: 27.6 PG (ref 26.8–34.3)
MCHC RBC AUTO-ENTMCNC: 30.1 G/DL (ref 31.4–37.4)
MCV RBC AUTO: 92 FL (ref 82–98)
MONOCYTES # BLD AUTO: 0.83 THOUSAND/ÂΜL (ref 0.17–1.22)
MONOCYTES NFR BLD AUTO: 12 % (ref 4–12)
NEUTROPHILS # BLD AUTO: 5.31 THOUSANDS/ÂΜL (ref 1.85–7.62)
NEUTS SEG NFR BLD AUTO: 79 % (ref 43–75)
NITRITE UR QL STRIP: NEGATIVE
NON-SQ EPI CELLS URNS QL MICRO: ABNORMAL /HPF
NRBC BLD AUTO-RTO: 0 /100 WBCS
PH UR STRIP.AUTO: 6 [PH]
PLATELET # BLD AUTO: 174 THOUSANDS/UL (ref 149–390)
PMV BLD AUTO: 11.6 FL (ref 8.9–12.7)
POTASSIUM SERPL-SCNC: 4.8 MMOL/L (ref 3.5–5.3)
PROT UR STRIP-MCNC: ABNORMAL MG/DL
RBC # BLD AUTO: 3.66 MILLION/UL (ref 3.81–5.12)
RBC #/AREA URNS AUTO: ABNORMAL /HPF
SODIUM SERPL-SCNC: 136 MMOL/L (ref 135–147)
SP GR UR STRIP.AUTO: 1.01 (ref 1–1.03)
UROBILINOGEN UR STRIP-ACNC: <2 MG/DL
WBC # BLD AUTO: 6.72 THOUSAND/UL (ref 4.31–10.16)
WBC #/AREA URNS AUTO: ABNORMAL /HPF

## 2022-12-02 RX ORDER — SULFAMETHOXAZOLE AND TRIMETHOPRIM 800; 160 MG/1; MG/1
1 TABLET ORAL 2 TIMES DAILY
Qty: 6 TABLET | Refills: 0 | Status: CANCELLED | OUTPATIENT
Start: 2022-12-02 | End: 2022-12-05

## 2022-12-02 RX ORDER — METOPROLOL SUCCINATE 100 MG/1
100 TABLET, EXTENDED RELEASE ORAL DAILY
Status: DISCONTINUED | OUTPATIENT
Start: 2022-12-02 | End: 2022-12-16 | Stop reason: HOSPADM

## 2022-12-02 RX ORDER — ACETAMINOPHEN 325 MG/1
650 TABLET ORAL EVERY 6 HOURS PRN
Status: DISCONTINUED | OUTPATIENT
Start: 2022-12-02 | End: 2022-12-16 | Stop reason: HOSPADM

## 2022-12-02 RX ORDER — SODIUM CHLORIDE 9 MG/ML
125 INJECTION, SOLUTION INTRAVENOUS CONTINUOUS
Status: DISCONTINUED | OUTPATIENT
Start: 2022-12-02 | End: 2022-12-03

## 2022-12-02 RX ORDER — DILTIAZEM HYDROCHLORIDE 60 MG/1
60 CAPSULE, EXTENDED RELEASE ORAL EVERY 12 HOURS SCHEDULED
Status: DISCONTINUED | OUTPATIENT
Start: 2022-12-02 | End: 2022-12-16 | Stop reason: HOSPADM

## 2022-12-02 RX ADMIN — CEFTRIAXONE SODIUM 1000 MG: 10 INJECTION, POWDER, FOR SOLUTION INTRAVENOUS at 15:50

## 2022-12-02 RX ADMIN — SODIUM CHLORIDE 125 ML/HR: 0.9 INJECTION, SOLUTION INTRAVENOUS at 19:50

## 2022-12-02 RX ADMIN — METOPROLOL SUCCINATE 100 MG: 100 TABLET, EXTENDED RELEASE ORAL at 17:44

## 2022-12-02 RX ADMIN — APIXABAN 2.5 MG: 2.5 TABLET, FILM COATED ORAL at 17:44

## 2022-12-02 RX ADMIN — DILTIAZEM HYDROCHLORIDE 60 MG: 60 CAPSULE, EXTENDED RELEASE ORAL at 21:33

## 2022-12-02 RX ADMIN — SODIUM CHLORIDE 500 ML: 0.9 INJECTION, SOLUTION INTRAVENOUS at 15:45

## 2022-12-02 NOTE — ED PROVIDER NOTES
History  Chief Complaint   Patient presents with   • Difficulty Urinating     Pt arrives to ED via EMS from her PCP  Pt was being seen by her PCP today for difficulty urinating x2 days  Pt denies abdominal pain, burning, and vomiting  Pt is alert and oriented on arrival       79yo female with a history of atrial fibrillation, hyperlipidemia, and CKD presenting via EMS for evaluation of difficulty urinating  She is been having issues with urinating for the past 2 days  She states she is only able to void in dribbles which is unusual for her  She denies any hematuria, dysuria, fevers, chills, abdominal pain, flank pain, vomiting  Patient was seen by her PCP today as she thought she had a UTI and just wanted some antibiotics although she was sent to the ED for evaluation  History provided by:  Patient and medical records   used: No    Difficulty Urinating  Pain severity:  No pain  Onset quality:  Gradual  Duration:  2 days  Timing:  Constant  Progression:  Unchanged  Chronicity:  New  Relieved by:  Nothing  Worsened by:  Nothing  Urinary symptoms: no hematuria and no bladder incontinence    Associated symptoms: no abdominal pain, no fever, no flank pain, no nausea and no vomiting        Prior to Admission Medications   Prescriptions Last Dose Informant Patient Reported? Taking?    Dilt- MG 24 hr capsule   No No   Sig: TAKE 1 CAPSULE BY MOUTH EVERY DAY   Klor-Con M10 10 MEQ tablet   No No   Sig: TAKE 1 TABLET BY MOUTH EVERY DAY   ammonium lactate (LAC-HYDRIN) 12 % lotion   Yes No   Sig: APPLY TO BACK 2 TIMES PER DAY TO AFFECTED AREAS FOR DRY SKIN   apixaban (Eliquis) 2 5 mg   No No   Sig: Take 1 tablet (2 5 mg total) by mouth 2 (two) times a day   colchicine (COLCRYS) 0 6 mg tablet   Yes No   Sig: Take 0 6 mg by mouth daily As directed   ferrous sulfate 324 (65 Fe) mg   No No   Sig: TAKE 1 TABLET (324 MG TOTAL) BY MOUTH DAILY BEFORE BREAKFAST   furosemide (LASIX) 20 mg tablet   No No Sig: Take 3 tablets (60 mg total) by mouth daily   Patient taking differently: Take 20 mg by mouth daily   metoprolol succinate (TOPROL-XL) 100 mg 24 hr tablet   No No   Sig: TAKE 2 TABLETS BY MOUTH EVERY DAY   triamcinolone (KENALOG) 0 1 % cream   Yes No   Sig: APPLY TO AFFECTED AREAS ON BACK AT NIGHT 3-4 NIGHTS A WEEK AS NEEDED FOR ITCH      Facility-Administered Medications: None       Past Medical History:   Diagnosis Date   • Anemia    • Asteatotic eczema    • Chronic kidney disease    • Hypercholesterolemia    • Lichen sclerosus et atrophicus    • Mitral valve insufficiency    • Nosebleed    • Seborrheic keratoses    • Tricuspid regurgitation    • Vertigo        Past Surgical History:   Procedure Laterality Date   • APPENDECTOMY  11/14/1939   • CATARACT EXTRACTION, BILATERAL  04/2019   • HYSTERECTOMY  11/14/1962   • ORIF HIP FRACTURE Left 2020   • AR COLONOSCOPY FLX DX W/COLLJ SPEC WHEN PFRMD N/A 5/19/2016    Procedure: EGD AND COLONOSCOPY;  Surgeon: Jessica Pringle MD;  Location: [facility]; Service: Gastroenterology       Family History   Problem Relation Age of Onset   • Heart disease Mother         Abnormality   • Heart disease Father         Abnormality   • No Known Problems Sister    • No Known Problems Brother      I have reviewed and agree with the history as documented  E-Cigarette/Vaping   • E-Cigarette Use Never User      E-Cigarette/Vaping Substances   • Nicotine No    • THC No    • CBD No    • Flavoring No    • Other No    • Unknown No      Social History     Tobacco Use   • Smoking status: Never   • Smokeless tobacco: Never   Vaping Use   • Vaping Use: Never used   Substance Use Topics   • Alcohol use: Not Currently   • Drug use: No       Review of Systems   Constitutional: Negative for chills and fever  HENT: Negative for drooling and voice change  Eyes: Negative for discharge and redness  Respiratory: Negative for shortness of breath and stridor      Cardiovascular: Negative for chest pain and leg swelling  Gastrointestinal: Negative for abdominal pain, nausea and vomiting  Genitourinary: Positive for difficulty urinating and dysuria  Negative for flank pain  Musculoskeletal: Negative for neck pain and neck stiffness  Skin: Negative for color change and rash  Neurological: Negative for seizures and syncope  Psychiatric/Behavioral: Negative for confusion  The patient is not nervous/anxious  All other systems reviewed and are negative  Physical Exam  Physical Exam  Vitals and nursing note reviewed  Constitutional:       General: She is not in acute distress  Appearance: She is well-developed  She is not diaphoretic  Comments: Well appearing, appears younger than stated age   HENT:      Head: Normocephalic and atraumatic  Right Ear: External ear normal       Left Ear: External ear normal       Nose: Nose normal    Eyes:      General: No scleral icterus  Right eye: No discharge  Left eye: No discharge  Conjunctiva/sclera: Conjunctivae normal    Cardiovascular:      Rate and Rhythm: Normal rate and regular rhythm  Heart sounds: Normal heart sounds  No murmur heard  Pulmonary:      Effort: Pulmonary effort is normal  No respiratory distress  Breath sounds: Normal breath sounds  No stridor  No wheezing or rales  Abdominal:      General: Bowel sounds are normal  There is no distension  Palpations: Abdomen is soft  Tenderness: There is no abdominal tenderness  There is no right CVA tenderness, left CVA tenderness or guarding  Comments: No abdominal or CVA tenderness   Musculoskeletal:         General: No deformity  Normal range of motion  Cervical back: Normal range of motion and neck supple  Lymphadenopathy:      Cervical: No cervical adenopathy  Skin:     General: Skin is warm and dry  Neurological:      Mental Status: She is alert  She is not disoriented  GCS: GCS eye subscore is 4   GCS verbal subscore is 5  GCS motor subscore is 6  Psychiatric:         Behavior: Behavior normal          Vital Signs  ED Triage Vitals   Temperature Pulse Respirations Blood Pressure SpO2   12/02/22 1328 12/02/22 1322 12/02/22 1322 12/02/22 1328 12/02/22 1322   97 5 °F (36 4 °C) (!) 51 18 139/65 96 %      Temp Source Heart Rate Source Patient Position - Orthostatic VS BP Location FiO2 (%)   12/02/22 1328 12/02/22 1322 12/02/22 1328 12/02/22 1328 --   Oral Monitor Lying Right arm       Pain Score       --                  Vitals:    12/02/22 1345 12/02/22 1400 12/02/22 1445 12/02/22 1500   BP: 164/70 159/76 140/67 156/72   Pulse:   58 61   Patient Position - Orthostatic VS: Lying Lying Lying          Visual Acuity      ED Medications  Medications   ceftriaxone (ROCEPHIN) 1 g/50 mL in dextrose IVPB (has no administration in time range)   sodium chloride 0 9 % bolus 500 mL (has no administration in time range)       Diagnostic Studies  Results Reviewed     Procedure Component Value Units Date/Time    Urine Microscopic [[id number]]  (Abnormal) Collected: 12/02/22 1434    Lab Status: Final result Specimen: Urine, Indwelling Hunter Catheter Updated: 12/02/22 1456     RBC, UA Innumerable /hpf      WBC, UA Innumerable /hpf      Epithelial Cells None Seen /hpf      Bacteria, UA Occasional /hpf     Urine culture [[id number]] Collected: 12/02/22 1434    Lab Status:  In process Specimen: Urine, Indwelling Hunter Catheter Updated: 12/02/22 1456    UA w Reflex to Microscopic w Reflex to Culture [[id number]]  (Abnormal) Collected: 12/02/22 1434    Lab Status: Final result Specimen: Urine, Indwelling Hunter Catheter Updated: 12/02/22 1444     Color, UA Yellow     Clarity, UA Turbid     Specific Gravity, UA 1 009     pH, UA 6 0     Leukocytes, UA Large     Nitrite, UA Negative     Protein, UA 70 (1+) mg/dl      Glucose, UA Negative mg/dl      Ketones, UA Negative mg/dl      Urobilinogen, UA <2 0 mg/dl      Bilirubin, UA Negative     Occult Blood, UA Large    Basic metabolic panel [[id number]]  (Abnormal) Collected: 12/02/22 1338    Lab Status: Final result Specimen: Blood from Arm, Right Updated: 12/02/22 1404     Sodium 136 mmol/L      Potassium 4 8 mmol/L      Chloride 99 mmol/L      CO2 25 mmol/L      ANION GAP 12 mmol/L      BUN 50 mg/dL      Creatinine 1 90 mg/dL      Glucose 100 mg/dL      Calcium 8 7 mg/dL      eGFR 21 ml/min/1 73sq m     Narrative:      [place] guidelines for Chronic Kidney Disease (CKD):   •  Stage 1 with normal or high GFR (GFR > 90 mL/min/1 73 square meters)  •  Stage 2 Mild CKD (GFR = 60-89 mL/min/1 73 square meters)  •  Stage 3A Moderate CKD (GFR = 45-59 mL/min/1 73 square meters)  •  Stage 3B Moderate CKD (GFR = 30-44 mL/min/1 73 square meters)  •  Stage 4 Severe CKD (GFR = 15-29 mL/min/1 73 square meters)  •  Stage 5 End Stage CKD (GFR <15 mL/min/1 73 square meters)  Note: GFR calculation is accurate only with a steady state creatinine    CBC and differential [[id number]]  (Abnormal) Collected: 12/02/22 1338    Lab Status: Final result Specimen: Blood from Arm, Right Updated: 12/02/22 1345     WBC 6 72 Thousand/uL      RBC 3 66 Million/uL      Hemoglobin 10 1 g/dL      Hematocrit 33 5 %      MCV 92 fL      MCH 27 6 pg      MCHC 30 1 g/dL      RDW 17 2 %      MPV 11 6 fL      Platelets 738 Thousands/uL      nRBC 0 /100 WBCs      Neutrophils Relative 79 %      Immat GRANS % 1 %      Lymphocytes Relative 6 %      Monocytes Relative 12 %      Eosinophils Relative 1 %      Basophils Relative 1 %      Neutrophils Absolute 5 31 Thousands/µL      Immature Grans Absolute 0 04 Thousand/uL      Lymphocytes Absolute 0 43 Thousands/µL      Monocytes Absolute 0 83 Thousand/µL      Eosinophils Absolute 0 07 Thousand/µL      Basophils Absolute 0 04 Thousands/µL                  CT abdomen pelvis wo contrast   Final Result by [place], MD (12/02 1654)      Hunter catheter decompresses the urinary bladder        Again seen are multiple benign renal cysts  There is no hydronephrosis  Colonic diverticulosis without diverticulitis  Workstation performed: [id number]                    Procedures  Procedures         ED Course  ED Course as of 12/02/22 1527   Fri Dec 02, 2022   1405 Creatinine(!): 1 90  Baseline 1 3-1 4  SBIRT 20yo+    Flowsheet Row Most Recent Value   SBIRT (25 yo +)    In order to provide better care to our patients, we are screening all of our patients for alcohol and drug use  Would it be okay to ask you these screening questions? No Filed at: 12/02/2022 1441                    MDM  Number of Diagnoses or Management Options  Acute kidney injury [facility]): new and requires workup  Urinary retention: new and requires workup  Urinary tract infection: new and requires workup  Diagnosis management comments: 79yo female presenting for difficulty urinating x 2 days  Only able to void in small amounts with dribbling  No f/c  No flank pain  She is afebrile and hemodynamically stable  Patient is well appearing in no distress  No abdominal or CVA tenderness on exam     Initial ED plan: Check CBC, BMP, UA, and bladder scan  Final assessment: Bladder scan between 100-200  Hunter catheter placed with return of 300cc of urine  Creatinine 1 9, up from baseline of 1 3-1 4  UA with innumerable WBC  CT added which is unremarkable  IV fluids and IV Rocephin ordered  Will admit for MARIA DEL CARMEN          Amount and/or Complexity of Data Reviewed  Clinical lab tests: ordered and reviewed  Tests in the radiology section of CPT®: ordered and reviewed  Review and summarize past medical records: yes  Discuss the patient with other providers: yes  Independent visualization of images, tracings, or specimens: yes    Risk of Complications, Morbidity, and/or Mortality  Presenting problems: moderate  Diagnostic procedures: moderate  Management options: moderate    Patient Progress  Patient progress: stable      Disposition  Final diagnoses:   Urinary retention   Urinary tract infection   Acute kidney injury ([facility] [address] )     Time reflects when diagnosis was documented in both MDM as applicable and the Disposition within this note     Time User Action Codes Description Comment    12/2/2022  3:26 PM [address] [place] [address] [address], [place] [R33 9] Urinary retention     12/2/2022  3:26 PM archieJamal [N39 0] Urinary tract infection     12/2/2022  3:26 PM Jenna Huerta [facility] [N17 9] Acute kidney injury [facility])       ED Disposition     ED Disposition   Admit    Condition   Stable    Date/Time   Fri Dec 2, 2022  3:26 PM    Comment   Case was discussed with Dr Arleen Duenas and the patient's admission status was agreed to be Admission Status: observation status to the service of Dr Arleen Duenas   Follow-up Information    None         Patient's Medications   Discharge Prescriptions    No medications on file       No discharge procedures on file      PDMP Review     None          ED Provider  Electronically Signed by           Gianna Turcios PA-C  12/02/22 5003

## 2022-12-02 NOTE — PROGRESS NOTES
Name: Stephanie Underwood      : 1924      MRN: [id number]  Encounter Provider: GUNJAN Lake  Encounter Date: 2022   [facility]   [address]     1  Urinary retention  Comments:  X2 days, pt on furosemide- last dose yesterday  Abdomen nondistended/ nontender  r/o dehydration vs urological disease vs infection  Pt transported via EMS           Elgin Stover presents for evaluation off decreased urine output and possible UTI  The patient reported that she has not voided in 2 days- she feels the urge but unable to void, she is on a diuretic and typically avoids multiple times per day  She denies back pain, fever, nausea  Recommend patient go to the emergency room to be evaluated  This patient lives independently, she is alert and oriented  Spoke to the patient's son Ahmet Grayson made him aware the patient is being transported to the hospital     Review of Systems   Constitutional: Negative  Negative for chills and fever  HENT: Negative  Eyes: Negative  Respiratory: Negative for chest tightness and shortness of breath  Cardiovascular: Negative for palpitations and leg swelling  Gastrointestinal: Negative for abdominal distention, abdominal pain, nausea and vomiting  Endocrine: Negative for polydipsia and polyphagia  Genitourinary: Positive for difficulty urinating  Negative for flank pain, hematuria and pelvic pain  Musculoskeletal: Negative  Skin: Negative for rash  Neurological: Negative for dizziness, syncope, weakness and headaches  Psychiatric/Behavioral: Negative for confusion         Current Outpatient Medications on File Prior to Visit   Medication Sig   • ammonium lactate (LAC-HYDRIN) 12 % lotion APPLY TO BACK 2 TIMES PER DAY TO AFFECTED AREAS FOR DRY SKIN   • apixaban (Eliquis) 2 5 mg Take 1 tablet (2 5 mg total) by mouth 2 (two) times a day   • colchicine (COLCRYS) 0 6 mg tablet Take 0 6 mg by mouth daily As directed   • Dilt- MG 24 hr capsule TAKE 1 CAPSULE BY MOUTH EVERY DAY   • ferrous sulfate 324 (65 Fe) mg TAKE 1 TABLET (324 MG TOTAL) BY MOUTH DAILY BEFORE BREAKFAST   • furosemide (LASIX) 20 mg tablet Take 3 tablets (60 mg total) by mouth daily (Patient taking differently: Take 20 mg by mouth daily)   • Klor-Con M10 10 MEQ tablet TAKE 1 TABLET BY MOUTH EVERY DAY   • metoprolol succinate (TOPROL-XL) 100 mg 24 hr tablet TAKE 2 TABLETS BY MOUTH EVERY DAY   • triamcinolone (KENALOG) 0 1 % cream APPLY TO AFFECTED AREAS ON BACK AT NIGHT 3-4 NIGHTS A WEEK AS NEEDED FOR ITCH       Objective     /72 (BP Location: Left arm, Patient Position: Sitting, Cuff Size: Standard)   Pulse 68   Temp (!) 97 1 °F (36 2 °C) (Temporal)   Resp 17   Ht 4' 11" (1 499 m)   Wt 59 5 kg (131 lb 3 2 oz) Comment: WITH SHOES ON  LMP  (LMP Unknown)   SpO2 97%   BMI 26 50 kg/m²     Physical Exam  Constitutional:       Appearance: Normal appearance  HENT:      Head:      Comments: Pursed lip breathing  Eyes:      Conjunctiva/sclera: Conjunctivae normal       Comments: ptosis   Cardiovascular:      Rate and Rhythm: Normal rate  Rhythm irregular  Pulses: Normal pulses  Pulmonary:      Effort: Pulmonary effort is normal       Breath sounds: Normal breath sounds  No wheezing  Abdominal:      General: Bowel sounds are normal  Distension: pt unable to sit on the exam table to palpate bladder  Palpations: There is no mass  Tenderness: There is no abdominal tenderness  There is no right CVA tenderness, left CVA tenderness or guarding  Musculoskeletal:         General: Normal range of motion  Cervical back: Normal range of motion  Neurological:      Mental Status: She is alert  Psychiatric:         Mood and Affect: Mood normal          Behavior: Behavior normal          Thought Content:  Thought content normal          Judgment: Judgment normal        GUNJAN Don

## 2022-12-02 NOTE — ASSESSMENT & PLAN NOTE
Baseline cr 1 2-1 4  Cr now 1 9  Possibly post renal has mahan in place  Will f/u with ct abdomen  Also component of volume depletion  Will provide ivf

## 2022-12-02 NOTE — H&P
[facility]  H&P- Tico Lewis 2/17/1924, 80 y o  female MRN: [id number]  Unit/Bed#: ED 10 Encounter: [id number]  Primary Care Provider: Maryam Simeon MD   Date and time admitted to hospital: 12/2/2022  1:18 PM    * MARIA DEL CARMEN (acute kidney injury) [facility])  Assessment & Plan  Baseline cr 1 2-1 4  Cr now 1 9  Possibly post renal has mahan in place  Will f/u with ct abdomen  Also component of volume depletion  Will provide ivf    UTI (urinary tract infection)  Assessment & Plan  UA noted to have bacteria and wbc  Will provide ceftriaxone  F/u uti    Essential hypertension  Assessment & Plan  bp controlled   C/w home meds    Paroxysmal atrial fibrillation (HCC)  Assessment & Plan  Rate controlled  C/w home toprol and diltiazem  C w eliquis      VTE Prophylaxis: Heparin  / sequential compression device   Code Status: full code  POLST: There is no POLST form on file for this patient (pre-hospital)  Discussion with family: pt    Anticipated Length of Stay:  Patient will be admitted on an Observation basis with an anticipated length of stay of  < 2 midnights  Justification for Hospital Stay: uti    Total Time for Visit, including Counseling / Coordination of Care: 60 minutes  Greater than 50% of this total time spent on direct patient counseling and coordination of care  Chief Complaint:   Fever    History of Present Illness:    Tico Lewis is a 80 y o  female  With PMH of afib on eliquis who presented with difficulty urinating for past couple days, denies any other complaints  Review of Systems:    Review of Systems   Constitutional: Negative for activity change, appetite change, chills, diaphoresis, fever and unexpected weight change  HENT: Negative for congestion, facial swelling and rhinorrhea  Eyes: Negative for photophobia and visual disturbance  Respiratory: Negative for cough, shortness of breath and wheezing  Cardiovascular: Negative for chest pain and palpitations  Gastrointestinal: Negative for abdominal pain, blood in stool, constipation, diarrhea, nausea and vomiting  Genitourinary: Negative for decreased urine volume, difficulty urinating, dysuria, flank pain, frequency, hematuria and urgency  Musculoskeletal: Negative for arthralgias, back pain, joint swelling and myalgias  Neurological: Negative for dizziness, syncope, facial asymmetry, light-headedness, numbness and headaches  Psychiatric/Behavioral: Negative for confusion and decreased concentration  The patient is not nervous/anxious  Past Medical and Surgical History:     Past Medical History:   Diagnosis Date   • Anemia    • Asteatotic eczema    • Chronic kidney disease    • Hypercholesterolemia    • Lichen sclerosus et atrophicus    • Mitral valve insufficiency    • Nosebleed    • Seborrheic keratoses    • Tricuspid regurgitation    • Vertigo        Past Surgical History:   Procedure Laterality Date   • APPENDECTOMY  11/14/1939   • CATARACT EXTRACTION, BILATERAL  04/2019   • HYSTERECTOMY  11/14/1962   • ORIF HIP FRACTURE Left 2020   • VT COLONOSCOPY FLX DX W/COLLJ SPEC WHEN PFRMD N/A 5/19/2016    Procedure: EGD AND COLONOSCOPY;  Surgeon: Des Ocasio MD;  Location: [facility]; Service: Gastroenterology       Meds/Allergies:    Prior to Admission medications    Medication Sig Start Date End Date Taking?  Authorizing Provider   ammonium lactate (LAC-HYDRIN) 12 % lotion APPLY TO BACK 2 TIMES PER DAY TO AFFECTED AREAS FOR DRY SKIN 9/26/22   Historical Provider, MD   apixaban (Eliquis) 2 5 mg Take 1 tablet (2 5 mg total) by mouth 2 (two) times a day 11/26/21   Khalida Betancourt MD   colchicine (COLCRYS) 0 6 mg tablet Take 0 6 mg by mouth daily As directed 9/11/22   Historical Provider, MD   Dilt- MG 24 hr capsule TAKE 1 CAPSULE BY MOUTH EVERY DAY 9/14/22   Khalida Betancourt MD   ferrous sulfate 324 (65 Fe) mg TAKE 1 TABLET (324 MG TOTAL) BY MOUTH DAILY BEFORE BREAKFAST 4/26/22   Khalida Betancourt MD   furosemide (LASIX) 20 mg tablet Take 3 tablets (60 mg total) by mouth daily  Patient taking differently: Take 20 mg by mouth daily 11/26/21 12/2/22  Oral Sarthak, MD [place] M10 10 MEQ tablet TAKE 1 TABLET BY MOUTH EVERY DAY 9/14/22   Mitchell De León MD   metoprolol succinate (TOPROL-XL) 100 mg 24 hr tablet TAKE 2 TABLETS BY MOUTH EVERY DAY 8/22/22   GUNJAN King   triamcinolone (KENALOG) 0 1 % cream APPLY TO AFFECTED AREAS ON BACK AT NIGHT 3-4 NIGHTS A WEEK AS NEEDED FOR [facility] 9/26/22   Historical Provider, MD     I have reviewed home medications with patient personally  Allergies: Allergies   Allergen Reactions   • Lactose Intolerance (Gi) - Food Allergy    • Penicillins Other (See Comments)     unknown       Social History:     Marital Status:      Patient Pre-hospital Living Situation: home  Patient Pre-hospital Level of Mobility: independent  Patient Pre-hospital Diet Restrictions: none  Substance Use History:   Social History     Substance and Sexual Activity   Alcohol Use Not Currently     Social History     Tobacco Use   Smoking Status Never   Smokeless Tobacco Never     Social History     Substance and Sexual Activity   Drug Use No       Family History:    Family History   Problem Relation Age of Onset   • Heart disease Mother         Abnormality   • Heart disease Father         Abnormality   • No Known Problems Sister    • No Known Problems Brother        Physical Exam:     Vitals:   Blood Pressure: 156/72 (12/02/22 1500)  Pulse: 61 (12/02/22 1500)  Temperature: 97 5 °F (36 4 °C) (12/02/22 1328)  Temp Source: Oral (12/02/22 1328)  Respirations: 18 (12/02/22 1445)  SpO2: 95 % (12/02/22 1500)    Physical Exam    (  Additional Data:     Lab Results: I have personally reviewed pertinent reports        Results from last 7 days   Lab Units 12/02/22  1338   WBC Thousand/uL 6 72   HEMOGLOBIN g/dL 10 1*   HEMATOCRIT % 33 5*   PLATELETS Thousands/uL 174   NEUTROS PCT % 79*   LYMPHS PCT % 6*   MONOS PCT % 12   EOS PCT % 1     Results from last 7 days   Lab Units 12/02/22  1338   SODIUM mmol/L 136   POTASSIUM mmol/L 4 8   CHLORIDE mmol/L 99   CO2 mmol/L 25   BUN mg/dL 50*   CREATININE mg/dL 1 90*   ANION GAP mmol/L 12   CALCIUM mg/dL 8 7   GLUCOSE RANDOM mg/dL 100                       Imaging: I have personally reviewed pertinent reports  CT abdomen pelvis wo contrast    (Results Pending)       EKG, Pathology, and Other Studies Reviewed on Admission:   · EKG: revciewed    Allscripts / Epic Records Reviewed: Yes     ** Please Note: This note has been constructed using a voice recognition system   **

## 2022-12-03 PROBLEM — N13.9 URINARY OBSTRUCTION: Status: ACTIVE | Noted: 2022-01-01

## 2022-12-03 LAB
ANION GAP SERPL CALCULATED.3IONS-SCNC: 11 MMOL/L (ref 4–13)
BASOPHILS # BLD AUTO: 0.04 THOUSANDS/ÂΜL (ref 0–0.1)
BASOPHILS NFR BLD AUTO: 1 % (ref 0–1)
BUN SERPL-MCNC: 45 MG/DL (ref 5–25)
CALCIUM SERPL-MCNC: 8.3 MG/DL (ref 8.3–10.1)
CHLORIDE SERPL-SCNC: 102 MMOL/L (ref 96–108)
CO2 SERPL-SCNC: 25 MMOL/L (ref 21–32)
CREAT SERPL-MCNC: 1.58 MG/DL (ref 0.6–1.3)
EOSINOPHIL # BLD AUTO: 0.13 THOUSAND/ÂΜL (ref 0–0.61)
EOSINOPHIL NFR BLD AUTO: 2 % (ref 0–6)
ERYTHROCYTE [DISTWIDTH] IN BLOOD BY AUTOMATED COUNT: 17.4 % (ref 11.6–15.1)
GFR SERPL CREATININE-BSD FRML MDRD: 27 ML/MIN/1.73SQ M
GLUCOSE P FAST SERPL-MCNC: 93 MG/DL (ref 65–99)
GLUCOSE SERPL-MCNC: 93 MG/DL (ref 65–140)
HCT VFR BLD AUTO: 32.8 % (ref 34.8–46.1)
HGB BLD-MCNC: 9.7 G/DL (ref 11.5–15.4)
IMM GRANULOCYTES # BLD AUTO: 0.03 THOUSAND/UL (ref 0–0.2)
IMM GRANULOCYTES NFR BLD AUTO: 1 % (ref 0–2)
LYMPHOCYTES # BLD AUTO: 0.45 THOUSANDS/ÂΜL (ref 0.6–4.47)
LYMPHOCYTES NFR BLD AUTO: 7 % (ref 14–44)
MCH RBC QN AUTO: 27.2 PG (ref 26.8–34.3)
MCHC RBC AUTO-ENTMCNC: 29.6 G/DL (ref 31.4–37.4)
MCV RBC AUTO: 92 FL (ref 82–98)
MONOCYTES # BLD AUTO: 0.82 THOUSAND/ÂΜL (ref 0.17–1.22)
MONOCYTES NFR BLD AUTO: 13 % (ref 4–12)
NEUTROPHILS # BLD AUTO: 4.82 THOUSANDS/ÂΜL (ref 1.85–7.62)
NEUTS SEG NFR BLD AUTO: 76 % (ref 43–75)
NRBC BLD AUTO-RTO: 0 /100 WBCS
PLATELET # BLD AUTO: 174 THOUSANDS/UL (ref 149–390)
PMV BLD AUTO: 12.4 FL (ref 8.9–12.7)
POTASSIUM SERPL-SCNC: 4.5 MMOL/L (ref 3.5–5.3)
RBC # BLD AUTO: 3.57 MILLION/UL (ref 3.81–5.12)
SODIUM SERPL-SCNC: 138 MMOL/L (ref 135–147)
WBC # BLD AUTO: 6.29 THOUSAND/UL (ref 4.31–10.16)

## 2022-12-03 RX ORDER — SODIUM CHLORIDE 9 MG/ML
100 INJECTION, SOLUTION INTRAVENOUS CONTINUOUS
Status: DISCONTINUED | OUTPATIENT
Start: 2022-12-03 | End: 2022-12-06

## 2022-12-03 RX ADMIN — APIXABAN 2.5 MG: 2.5 TABLET, FILM COATED ORAL at 17:31

## 2022-12-03 RX ADMIN — APIXABAN 2.5 MG: 2.5 TABLET, FILM COATED ORAL at 08:48

## 2022-12-03 RX ADMIN — METOPROLOL SUCCINATE 100 MG: 100 TABLET, EXTENDED RELEASE ORAL at 08:48

## 2022-12-03 RX ADMIN — DILTIAZEM HYDROCHLORIDE 60 MG: 60 CAPSULE, EXTENDED RELEASE ORAL at 09:41

## 2022-12-03 RX ADMIN — SODIUM CHLORIDE 100 ML/HR: 0.9 INJECTION, SOLUTION INTRAVENOUS at 15:41

## 2022-12-03 RX ADMIN — DILTIAZEM HYDROCHLORIDE 60 MG: 60 CAPSULE, EXTENDED RELEASE ORAL at 21:54

## 2022-12-03 NOTE — PROGRESS NOTES
[address] [facility]  Progress Note - Mohamud Dye 2/17/1924, 80 y o  female MRN: [id number]  Unit/Bed#: -01 Encounter: [id number]  Primary Care Provider: Eileen De La Torre MD   Date and time admitted to hospital: 12/2/2022  1:18 PM    Urinary obstruction  Assessment & Plan  · Patient came in with urinary obstruction, has indwelling Mahan catheter  · Will do trial of voiding in a m  Of 12/04/2022  · If she does not void, consider urology consult  ·     UTI (urinary tract infection)  Assessment & Plan  UA noted to have bacteria and wbc  Will continue intravenous ceftriaxone  Patient continues to have dysuria, this is her 2nd UTI in the last 6 months  No indication for long-term suppressive antibiotic prophylaxis at this point of time    Essential hypertension  Assessment & Plan  bp controlled on metoprolol and Cardizem    Paroxysmal atrial fibrillation (HCC)  Assessment & Plan  Rate controlled, C/w home toprol and diltiazem  C w eliquis for anticoagulation    * MARIA DEL CARMEN (acute kidney injury) ([facility] [address] )  Assessment & Plan  Baseline cr 1 2-1 4  Cr was 1 9 on admission, now improved to 1 56  Possibly post renal has mahan in place  Will do voiding trial in a m  Also component of volume depletion  Will provide ivf, but decrease the rate from 125 mL/hour to 100 mL/hour      TE Pharmacologic Prophylaxis: Apixaban (Eliquis)    Patient Centered Rounds: I have performed bedside rounds with nursing staff today    Discussions with Specialists or Other Care Team Provider:  Care team  Education and Discussions with Family / Patient:  Patient    Current Length of Stay: 0 day(s)  Current Patient Status: Inpatient     Certification Statement: The patient will continue to require additional inpatient hospital stay due to MARIA DEL CARMEN (acute kidney injury) [facility])  Discharge Plan / Estimated Discharge Date:  1-2 days    Code Status: Level 1 - Full Code  ______________________________________________________________________________    Subjective:   Patient seen and examined by me  No chest pain, palpitations or diaphoresis at this point of time  Patient does have weakness of the weakness is generalized  No fever or dysuria  Patient does have generalized weakness but states she is a little better today compared to yesterday  Objective:   Vitals: Blood pressure 141/75, pulse 64, temperature 97 7 °F (36 5 °C), resp  rate 18, weight 58 5 kg (128 lb 15 5 oz), SpO2 90 %  Physical Exam:   General appearance: alert, appears stated age and cooperative  Constitutional- looks a little weak  HEENT - atraumatic and normocephalic  Neck- supple  Skin - no fresh rash  Extremities no fresh focal deformities  Cardiovascular- S1-S2 heard  Respiratory- bilateral air entry present, no crackles or rhonchi  Skin - no fresh rash  Abdomen - normal bowel sounds present, no rebound tenderness  CNS- No fresh focal deficits  Psych- no acute psychosis     Additional Data:   Labs:  Results from last 7 days   Lab Units 12/03/22  0535 12/02/22  1338   WBC Thousand/uL 6 29 6 72   HEMOGLOBIN g/dL 9 7* 10 1*   HEMATOCRIT % 32 8* 33 5*   MCV fL 92 92   PLATELETS Thousands/uL 174 174     Results from last 7 days   Lab Units 12/03/22  0535 12/02/22  1338   SODIUM mmol/L 138 136   POTASSIUM mmol/L 4 5 4 8   CHLORIDE mmol/L 102 99   CO2 mmol/L 25 25   ANION GAP mmol/L 11 12   BUN mg/dL 45* 50*   CREATININE mg/dL 1 58* 1 90*   CALCIUM mg/dL 8 3 8 7   EGFR ml/min/1 73sq m 27 21   GLUCOSE RANDOM mg/dL 93 100                                  * I Have Reviewed All Lab Data Listed Above  Cultures:               Imaging:  Imaging Reports Reviewed Today Include:   CT abdomen pelvis wo contrast    Result Date: 12/2/2022  Impression: Hunter catheter decompresses the urinary bladder  Again seen are multiple benign renal cysts  There is no hydronephrosis   Colonic diverticulosis without diverticulitis  Workstation performed: [id number]     Scheduled Meds:  Current Facility-Administered Medications   Medication Dose Route Frequency Provider Last Rate   • acetaminophen  650 mg Oral Q6H PRN Darren Davis MD     • apixaban  2 5 mg Oral BID Darren Davis MD     • diltiazem  60 mg Oral Q12H [facility] Darren Davis MD     • metoprolol succinate  100 mg Oral Daily Darren Davis MD     • sodium chloride  100 mL/hr Intravenous Continuous Ailyn Guardado  mL/hr (12/03/22 1152)       Ailyn Guardado MD  [facility] Internal Medicine    ** Please Note: This note has been constructed using a voice recognition system   **

## 2022-12-03 NOTE — ASSESSMENT & PLAN NOTE
· Patient came in with urinary obstruction, has indwelling Hunter catheter  · Will do trial of voiding in a m   Of 12/04/2022  · If she does not void, consider urology consult  ·

## 2022-12-03 NOTE — ASSESSMENT & PLAN NOTE
Baseline cr 1 2-1 4  Cr was 1 9 on admission, now improved to 1 56  Possibly post renal has mahan in place  Will do voiding trial in a m    Also component of volume depletion  Will provide ivf, but decrease the rate from 125 mL/hour to 100 mL/hour

## 2022-12-03 NOTE — ASSESSMENT & PLAN NOTE
UA noted to have bacteria and wbc  Will continue intravenous ceftriaxone  Patient continues to have dysuria, this is her 2nd UTI in the last 6 months  No indication for long-term suppressive antibiotic prophylaxis at this point of time

## 2022-12-03 NOTE — PLAN OF CARE
Problem: PAIN - ADULT  Goal: Verbalizes/displays adequate comfort level or baseline comfort level  Description: Interventions:  - Encourage patient to monitor pain and request assistance  - Assess pain using appropriate pain scale  - Administer analgesics based on type and severity of pain and evaluate response  - Implement non-pharmacological measures as appropriate and evaluate response  - Consider cultural and social influences on pain and pain management  - Notify physician/advanced practitioner if interventions unsuccessful or patient reports new pain  Outcome: Progressing     Problem: INFECTION - ADULT  Goal: Absence or prevention of progression during hospitalization  Description: INTERVENTIONS:  - Assess and monitor for signs and symptoms of infection  - Monitor lab/diagnostic results  - Monitor all insertion sites, i e  indwelling lines, tubes, and drains  - Monitor endotracheal if appropriate and nasal secretions for changes in amount and color  - [place] appropriate cooling/warming therapies per order  - Administer medications as ordered  - Instruct and encourage patient and family to use good hand hygiene technique  - Identify and instruct in appropriate isolation precautions for identified infection/condition  Outcome: Progressing  Goal: Absence of fever/infection during neutropenic period  Description: INTERVENTIONS:  - Monitor WBC    Outcome: Progressing

## 2022-12-04 LAB
ANION GAP SERPL CALCULATED.3IONS-SCNC: 12 MMOL/L (ref 4–13)
BACTERIA UR CULT: ABNORMAL
BACTERIA UR CULT: ABNORMAL
BUN SERPL-MCNC: 34 MG/DL (ref 5–25)
CALCIUM SERPL-MCNC: 8.2 MG/DL (ref 8.3–10.1)
CHLORIDE SERPL-SCNC: 106 MMOL/L (ref 96–108)
CO2 SERPL-SCNC: 24 MMOL/L (ref 21–32)
CREAT SERPL-MCNC: 1.4 MG/DL (ref 0.6–1.3)
ERYTHROCYTE [DISTWIDTH] IN BLOOD BY AUTOMATED COUNT: 17.3 % (ref 11.6–15.1)
GFR SERPL CREATININE-BSD FRML MDRD: 31 ML/MIN/1.73SQ M
GLUCOSE SERPL-MCNC: 98 MG/DL (ref 65–140)
HCT VFR BLD AUTO: 34 % (ref 34.8–46.1)
HGB BLD-MCNC: 9.9 G/DL (ref 11.5–15.4)
MCH RBC QN AUTO: 27.3 PG (ref 26.8–34.3)
MCHC RBC AUTO-ENTMCNC: 29.1 G/DL (ref 31.4–37.4)
MCV RBC AUTO: 94 FL (ref 82–98)
PLATELET # BLD AUTO: 173 THOUSANDS/UL (ref 149–390)
PMV BLD AUTO: 11.7 FL (ref 8.9–12.7)
POTASSIUM SERPL-SCNC: 4.4 MMOL/L (ref 3.5–5.3)
RBC # BLD AUTO: 3.62 MILLION/UL (ref 3.81–5.12)
SODIUM SERPL-SCNC: 142 MMOL/L (ref 135–147)
WBC # BLD AUTO: 6.55 THOUSAND/UL (ref 4.31–10.16)

## 2022-12-04 RX ADMIN — SODIUM CHLORIDE 100 ML/HR: 0.9 INJECTION, SOLUTION INTRAVENOUS at 10:05

## 2022-12-04 RX ADMIN — CEFTRIAXONE SODIUM 1000 MG: 10 INJECTION, POWDER, FOR SOLUTION INTRAVENOUS at 10:01

## 2022-12-04 RX ADMIN — APIXABAN 2.5 MG: 2.5 TABLET, FILM COATED ORAL at 10:05

## 2022-12-04 RX ADMIN — METOPROLOL SUCCINATE 100 MG: 100 TABLET, EXTENDED RELEASE ORAL at 10:05

## 2022-12-04 RX ADMIN — DILTIAZEM HYDROCHLORIDE 60 MG: 60 CAPSULE, EXTENDED RELEASE ORAL at 22:00

## 2022-12-04 RX ADMIN — APIXABAN 2.5 MG: 2.5 TABLET, FILM COATED ORAL at 17:27

## 2022-12-04 RX ADMIN — DILTIAZEM HYDROCHLORIDE 60 MG: 60 CAPSULE, EXTENDED RELEASE ORAL at 10:08

## 2022-12-04 RX ADMIN — SODIUM CHLORIDE 100 ML/HR: 0.9 INJECTION, SOLUTION INTRAVENOUS at 20:04

## 2022-12-04 NOTE — CASE MANAGEMENT
Case Management Assessment & Discharge Planning Note    Patient name Bailey Morales  Location /-76 MRN [id number]  : 1924 Date 2022       Current Admission Date: 2022  Current Admission Diagnosis:MARIA DEL CARMEN (acute kidney injury) [facility])   Patient Active Problem List    Diagnosis Date Noted   • Urinary obstruction 2022   • UTI (urinary tract infection) 2022   • Hyponatremia 2022   • Bilateral renal cysts 2022   • Nonrheumatic aortic valve stenosis 2022   • Dyspnea 2021   • Hypoxia 2021   • Acute respiratory distress 2021   • Severe pulmonary hypertension ([address] ) 2021   • Acute kidney injury superimposed on CKD ([address] ) 2021   • Chronic renal disease, stage IV ([address] ) 2021   • Lower GI bleed 2021   • Seasonal allergic rhinitis due to pollen 10/26/2021   • Iron deficiency anemia 07/15/2021   • Fecal occult blood test positive 07/15/2021   • Anemia 07/15/2021   • Chronic diastolic congestive heart failure ([address] ) 2021   • Fracture of right orbital wall ([address] ) 2020   • Chronic kidney disease-mineral and bone disorder 2019   • Epistaxis    • Diastolic dysfunction    • Anemia due to chronic kidney disease 10/18/2018   • Mitral valve insufficiency 2018   • Hypercholesterolemia 2018   • MARIA DEL CARMEN (acute kidney injury) ([address] ) 2017   • Paroxysmal atrial fibrillation ([address] ) 2017   • Long term current use of anticoagulant 2017   • Chronic kidney disease, stage III (moderate) ([address] ) 2017   • Essential hypertension 2017      LOS (days): 1  Geometric Mean LOS (GMLOS) (days):   Days to GMLOS:     OBJECTIVE:    Risk of Unplanned Readmission Score: 23 37     Current admission status: Inpatient       Preferred Pharmacy:   [facility]/pharmacy #[id number]  Phone: [phone] Fax: [phone]    Primary Care Provider: Emelia Shin, MD    Primary Insurance: MEDICARE  Secondary Insurance: [facility]    ASSESSMENT:  [address] [facility] [place] - Son   Primary Phone: [phone] (Home)               Advance Directives  Does patient have a [address]?: Yes  Does patient have Advance Directives?: Yes  Advance Directives: Living will, Power of  for health care, Power of  for finance  Primary Contact: Patient's Son    Readmission Root Cause  30 Day Readmission: No    Patient Information  Admitted from[de-identified] Home  Mental Status: Alert  During Assessment patient was accompanied by: Not accompanied during assessment  Assessment information provided by[de-identified] Patient  Primary Caregiver: Self  Support Systems: Children, [address] of Residence: [place] [address] do you live in?: [place] entry access options   Select all that apply : No steps to enter home  Type of Current Residence: [facility]  In the last 12 months, was there a time when you were not able to pay the mortgage or rent on time?: No  In the last 12 months, how many places have you lived?: 1  In the last 12 months, was there a time when you did not have a steady place to sleep or slept in a shelter (including now)?: No  Homeless/housing insecurity resource given?: N/A  Living Arrangements: Lives Alone  Is patient a ?: No    Activities of Daily Living Prior to Admission  Functional Status: Independent  Completes ADLs independently?: Yes  Ambulates independently?: Yes  Does patient use assisted devices?: Yes  Assisted Devices (DME) used: Gennaro Villavicencio  Does patient currently own DME?: Yes  What DME does the patient currently own?: Gennaro Villavicencio  Does patient have a history of Outpatient Therapy (PT/OT)?: No  Does the patient have a history of Short-Term Rehab?: No  Does patient have a history of HHC?: Yes  Does patient currently have [address]?: No    Patient Information Continued  Income Source: Pension/FDC  Does patient have prescription coverage?: Yes (Patient confirmed that she uses [facility] in [place], and she denied any barriers to obtaining or affording prescriptions )  Within the past 12 months, you worried that your food would run out before you got the money to buy more : Never true  Within the past 12 months, the food you bought just didn't last and you didn't have money to get more : Never true  Food insecurity resource given?: N/A  Does patient receive dialysis treatments?: No  Does patient have a history of substance abuse?: No  Does patient have a history of Mental Health Diagnosis?: No    PHQ 2/9 Screening   Reviewed PHQ 2/9 Depression Screening Score?: No    Means of Transportation  Means of Transport to Appts[de-identified] Family transport  In the past 12 months, has lack of transportation kept you from medical appointments or from getting medications?: No  In the past 12 months, has lack of transportation kept you from meetings, work, or from getting things needed for daily living?: No  Was application for public transport provided?: N/A    DISCHARGE DETAILS:    Discharge planning discussed with[de-identified] Patient  Freedom of Choice: Yes  Comments - Freedom of Choice: CM discussed freedom of choice as it pertains to discharge planning  Patient denied any needs at this time and is not interested in [facility]  She reported that she will follow up with her PCP if she changes her mind    CM contacted family/caregiver?: No- see comments (Patient declined phone call at this time )  Were Treatment Team discharge recommendations reviewed with patient/caregiver?: Yes  Did patient/caregiver verbalize understanding of patient care needs?: Yes  Were patient/caregiver advised of the risks associated with not following Treatment Team discharge recommendations?: Yes    [address]         Is the patient interested in [facility] at discharge?: No    DME Referral Provided  Referral made for DME?: No    Would you like to participate in our [address] service program?  : No - Declined    Treatment Team Recommendation: Home  Discharge Destination Plan[de-identified] Home  Transport at Discharge : Free Local Transportation  Dispatcher Contacted:  No

## 2022-12-04 NOTE — ASSESSMENT & PLAN NOTE
· Urine culture shows Citrobacter and Klebsiella  · Ceftriaxone to be continued, dose 2   Today  · Denies any symptoms of UTI today

## 2022-12-04 NOTE — ASSESSMENT & PLAN NOTE
· Patient presented to the ED with complaints of difficulty urinating  · Patient was found to have a creatinine of 1 9 on admission with a baseline creatinine between 1 2-1 4  · Now improved to 1 40 s/p mahan and IVF  · Failed voiding trial inpatient, Mahan reinserted  · Urology consult, appreciate input

## 2022-12-04 NOTE — PROGRESS NOTES
[address] [facility]  Progress Note - Mohamud Dye 2/17/1924, 80 y o  female MRN: [id number]  Unit/Bed#: -Susan Encounter: [id number]  Primary Care Provider: Eileen De La Torre MD   Date and time admitted to hospital: 12/2/2022  1:18 PM    * MARIA DEL CARMEN (acute kidney injury) [facility])  Assessment & Plan  · Patient presented to the ED with complaints of difficulty urinating  · Patient was found to have a creatinine of 1 9 on admission with a baseline creatinine between 1 2-1 4  · Now improved to 1 40 s/p mahan and IVF  · Failed voiding trial inpatient, Mahan reinserted  · Urology consult, appreciate input    UTI (urinary tract infection)  Assessment & Plan  · Urine culture shows Citrobacter and Klebsiella  · Ceftriaxone to be continued, dose 2  Today  · Denies any symptoms of UTI today      Urinary obstruction  Assessment & Plan  · Patient came in with urinary obstruction, has indwelling Mahan catheter  · Could this be from urinary tract infection, continue antibiotic and see if it improves  · Failed void trial, continue Mahan trial tomorrow, voiding trial tomorrow if okayed by urology  · Urology consult    Essential hypertension  Assessment & Plan  · /59   Pulse 63   Temp 97 7 °F (36 5 °C)   Resp 18   Wt 58 5 kg (128 lb 15 5 oz)   LMP  (LMP Unknown)   SpO2 91%   BMI 26 05 kg/m²   Stable pressures  · Continue on home Metoprolol and Cardizem    Paroxysmal atrial fibrillation (HCC)  Assessment & Plan  · Rate controlled, C/w home toprol and diltiazem  · C w eliquis for anticoagulation      VTE Pharmacologic Prophylaxis: VTE Score: 6 High Risk (Score >/= 5) - Pharmacological DVT Prophylaxis Ordered: apixaban (Eliquis)  Sequential Compression Devices Ordered  Patient Centered Rounds: I performed bedside rounds with nursing staff today  Discussions with Specialists or Other Care Team Provider: n/a    Education and Discussions with Family / Patient: Updated patient  Time Spent for Care: 20 minutes  More than 50% of total time spent on counseling and coordination of care as described above  Current Length of Stay: 1 day(s)  Current Patient Status: Inpatient   Certification Statement: The patient will continue to require additional inpatient hospital stay due to Need for further urology input  Continue IV antibiotics and pending urine cultures  Discharge Plan: Anticipate discharge in 24-48 hrs to home  Code Status: Level 1 - Full Code    Subjective:   Seen and examined at bedside  Patient denies any new symptoms  Patient failed voiding trial today and has a new Hunter  Denies any dysuria or flank pain or fever or chills    Objective:     Vitals:   Temp (24hrs), Av 7 °F (36 5 °C), Min:97 4 °F (36 3 °C), Max:98 3 °F (36 8 °C)    Temp:  [97 4 °F (36 3 °C)-98 3 °F (36 8 °C)] 97 7 °F (36 5 °C)  HR:  [56-68] 63  Resp:  [16-18] 18  BP: (118-154)/(59-83) 130/59  SpO2:  [90 %-92 %] 91 %  Body mass index is 26 05 kg/m²  Input and Output Summary (last 24 hours):      Intake/Output Summary (Last 24 hours) at 2022 1317  Last data filed at 2022 1100  Gross per 24 hour   Intake 1420 ml   Output 1325 ml   Net 95 ml       Physical Exam:   Physical Exam General- Awake, alert and oriented x 3, looks comfortable  HEENT- Normocephalic, atraumatic, oral mucosa- moist  Neck- Supple, No carotid bruit, no JVD  CVS- Normal S1/ S2, Regular rate and rhythm, No murmur, No edema  Respiratory system- B/L clear breath sounds, no wheezing  Abdomen- Soft, Non distended, no tenderness, Bowel sound- present 4 quads  Genitourinary- No suprapubic tenderness, No CVA tenderness, Hunter catheter  Skin- No new bruise or rash  Musculoskeletal- No gross deformity  Psych- No acute psychosis  CNS- CN II- XII grossly intact, No acute focal neurologic deficit noted      Additional Data:     Labs:  Results from last 7 days   Lab Units 22  0553 22  0535   WBC Thousand/uL 6 55 6 29   HEMOGLOBIN g/dL 9 9* 9 7*   HEMATOCRIT % 34 0* 32 8*   PLATELETS Thousands/uL 173 174   NEUTROS PCT %  --  76*   LYMPHS PCT %  --  7*   MONOS PCT %  --  13*   EOS PCT %  --  2     Results from last 7 days   Lab Units 12/04/22  0553   SODIUM mmol/L 142   POTASSIUM mmol/L 4 4   CHLORIDE mmol/L 106   CO2 mmol/L 24   BUN mg/dL 34*   CREATININE mg/dL 1 40*   ANION GAP mmol/L 12   CALCIUM mg/dL 8 2*   GLUCOSE RANDOM mg/dL 98                       Lines/Drains:  Invasive Devices     Peripheral Intravenous Line  Duration           Peripheral IV 12/03/22 Dorsal (posterior); Right Forearm 1 day          Drain  Duration           Urethral Catheter Non-latex 16 Fr  <1 day              Urinary Catheter:  Goal for removal: Plan for voiding trial tomorrow or day after or as per further urology input               Imaging: No pertinent imaging reviewed  Recent Cultures (last 7 days):   Results from last 7 days   Lab Units 12/02/22  1434   URINE CULTURE  >100,000 cfu/ml Citrobacter freundii*  >100,000 cfu/ml Klebsiella oxytoca*       Last 24 Hours Medication List:   Current Facility-Administered Medications   Medication Dose Route Frequency Provider Last Rate   • acetaminophen  650 mg Oral Q6H PRN Darren Davis MD     • apixaban  2 5 mg Oral BID Darren Davis MD     • cefTRIAXone  1,000 mg Intravenous Q24H Kristofer Bansal MD 1,000 mg (12/04/22 1001)   • diltiazem  60 mg Oral Q12H [facility] Darren Davis MD     • metoprolol succinate  100 mg Oral Daily Darren Davis MD     • sodium chloride  100 mL/hr Intravenous Continuous Kostas Carrington  mL/hr (12/04/22 1005)        Today, Patient Was Seen By: Kristofer Bansal MD    **Please Note: This note may have been constructed using a voice recognition system  **

## 2022-12-04 NOTE — ASSESSMENT & PLAN NOTE
· /59   Pulse 63   Temp 97 7 °F (36 5 °C)   Resp 18   Wt 58 5 kg (128 lb 15 5 oz)   LMP  (LMP Unknown)   SpO2 91%   BMI 26 05 kg/m²   Stable pressures  · Continue on home Metoprolol and Cardizem

## 2022-12-04 NOTE — ASSESSMENT & PLAN NOTE
· Patient came in with urinary obstruction, has indwelling Hunter catheter  · Could this be from urinary tract infection, continue antibiotic and see if it improves  · Failed void trial, continue Hunter trial tomorrow, voiding trial tomorrow if okayed by urology  · Urology consult

## 2022-12-05 LAB
ANION GAP SERPL CALCULATED.3IONS-SCNC: 11 MMOL/L (ref 4–13)
BUN SERPL-MCNC: 25 MG/DL (ref 5–25)
CALCIUM SERPL-MCNC: 8.1 MG/DL (ref 8.3–10.1)
CHLORIDE SERPL-SCNC: 107 MMOL/L (ref 96–108)
CO2 SERPL-SCNC: 22 MMOL/L (ref 21–32)
CREAT SERPL-MCNC: 1.19 MG/DL (ref 0.6–1.3)
ERYTHROCYTE [DISTWIDTH] IN BLOOD BY AUTOMATED COUNT: 17 % (ref 11.6–15.1)
GFR SERPL CREATININE-BSD FRML MDRD: 38 ML/MIN/1.73SQ M
GLUCOSE SERPL-MCNC: 97 MG/DL (ref 65–140)
HCT VFR BLD AUTO: 34.3 % (ref 34.8–46.1)
HGB BLD-MCNC: 10 G/DL (ref 11.5–15.4)
MCH RBC QN AUTO: 27.2 PG (ref 26.8–34.3)
MCHC RBC AUTO-ENTMCNC: 29.2 G/DL (ref 31.4–37.4)
MCV RBC AUTO: 94 FL (ref 82–98)
PLATELET # BLD AUTO: 169 THOUSANDS/UL (ref 149–390)
PMV BLD AUTO: 11.4 FL (ref 8.9–12.7)
POTASSIUM SERPL-SCNC: 4.7 MMOL/L (ref 3.5–5.3)
RBC # BLD AUTO: 3.67 MILLION/UL (ref 3.81–5.12)
SODIUM SERPL-SCNC: 140 MMOL/L (ref 135–147)
WBC # BLD AUTO: 7.58 THOUSAND/UL (ref 4.31–10.16)

## 2022-12-05 RX ADMIN — CEFEPIME 500 MG: 2 INJECTION, POWDER, FOR SOLUTION INTRAVENOUS at 22:22

## 2022-12-05 RX ADMIN — APIXABAN 2.5 MG: 2.5 TABLET, FILM COATED ORAL at 17:12

## 2022-12-05 RX ADMIN — DILTIAZEM HYDROCHLORIDE 60 MG: 60 CAPSULE, EXTENDED RELEASE ORAL at 08:25

## 2022-12-05 RX ADMIN — APIXABAN 2.5 MG: 2.5 TABLET, FILM COATED ORAL at 08:25

## 2022-12-05 RX ADMIN — METOPROLOL SUCCINATE 100 MG: 100 TABLET, EXTENDED RELEASE ORAL at 08:25

## 2022-12-05 RX ADMIN — SODIUM CHLORIDE 100 ML/HR: 0.9 INJECTION, SOLUTION INTRAVENOUS at 06:37

## 2022-12-05 RX ADMIN — SODIUM CHLORIDE 100 ML/HR: 0.9 INJECTION, SOLUTION INTRAVENOUS at 17:12

## 2022-12-05 RX ADMIN — DILTIAZEM HYDROCHLORIDE 60 MG: 60 CAPSULE, EXTENDED RELEASE ORAL at 22:18

## 2022-12-05 RX ADMIN — CEFTRIAXONE SODIUM 1000 MG: 10 INJECTION, POWDER, FOR SOLUTION INTRAVENOUS at 08:26

## 2022-12-05 NOTE — PHYSICAL THERAPY NOTE
Physical Therapy Evaluation     Patient's Name: René Cruz    Admitting Diagnosis  Urinary retention [R33 9]  Urinary tract infection [N39 0]  Difficulty urinating [R39 198]  Acute kidney injury ([facility] [address] ) [N17 9]    Problem List  Patient Active Problem List   Diagnosis    MARIA DEL CARMEN (acute kidney injury) ([facility] [address] )    Paroxysmal atrial fibrillation ([facility] )    Long term current use of anticoagulant    Chronic kidney disease, stage III (moderate) ([facility] )    Essential hypertension    Mitral valve insufficiency    Hypercholesterolemia    Anemia due to chronic kidney disease    Diastolic dysfunction    Epistaxis    Chronic kidney disease-mineral and bone disorder    Fracture of right orbital wall (HCC)    Chronic diastolic congestive heart failure (HCC)    Iron deficiency anemia    Fecal occult blood test positive    Anemia    Seasonal allergic rhinitis due to pollen    Lower GI bleed    Chronic renal disease, stage IV (HCC)    Acute kidney injury superimposed on CKD (HCC)    Dyspnea    Hypoxia    Acute respiratory distress    Severe pulmonary hypertension (HCC)    Nonrheumatic aortic valve stenosis    UTI (urinary tract infection)    Hyponatremia    Bilateral renal cysts    Urinary obstruction     Past Medical History  Past Medical History:   Diagnosis Date    Anemia     Asteatotic eczema     Chronic kidney disease     Hypercholesterolemia     Lichen sclerosus et atrophicus     Mitral valve insufficiency     Nosebleed     Seborrheic keratoses     Tricuspid regurgitation     Vertigo      Past Surgical History  Past Surgical History:   Procedure Laterality Date    APPENDECTOMY  11/14/1939    CATARACT EXTRACTION, BILATERAL  04/2019    HYSTERECTOMY  11/14/1962    ORIF HIP FRACTURE Left 2020    HI COLONOSCOPY FLX DX W/COLLJ SPEC WHEN PFRMD N/A 5/19/2016    Procedure: EGD AND COLONOSCOPY;  Surgeon: Ridge Rahman MD;  [facility]: [facility];   Service: Gastroenterology      12/05/22 0800   PT Last Visit   PT Visit Date 12/05/22   Note Type Note type Evaluation   Pain Assessment   Pain Assessment Tool 0-10   Pain Score No Pain   Restrictions/Precautions   Weight Bearing Precautions Per Order No   Braces or Orthoses Other (Comment)  (none per patient)   Other Precautions Contact/isolation; Chair Alarm; Bed Alarm;Multiple lines; Fall Risk   Home Living   Type of Home Apartment  (Senior Living)   Home Layout One level; Able to live on main level with bedroom/bathroom  (No ABA)   Bathroom Shower/Tub Tub/shower unit   Bathroom Toilet Standard   Bathroom Equipment Grab bars in shower; Shower chair;Grab bars around toilet   [address] Walker;Cane   Additional Comments Pt ambulates without an AD for household distances and uses a cane for community distances   Prior Function   Level of [place] Independent with functional mobility; Independent with ADLs   Lives With Alone   Receives Help From Neighbor   IADLs Independent with medication management; Independent with meal prep; Family/Friend/Other provides transportation   Falls in the last 6 months 0   Vocational Retired   General   Family/Caregiver Present No   Cognition   Overall Cognitive Status WFL   Arousal/Participation Alert   Attention Within functional limits   Orientation Level Oriented X4   Memory Within functional limits   Following Commands Follows one step commands without difficulty   Comments Pt agreeable to PT     Subjective   Subjective "I'm ok to walk "   RUE Assessment   RUE Assessment   (defer to OT assessment)   LUE Assessment   LUE Assessment   (defer to OT assessment)   RLE Assessment   RLE Assessment X   Strength RLE   RLE Overall Strength 4-/5   LLE Assessment   LLE Assessment X   Strength LLE   LLE Overall Strength 4-/5   Light Touch   RLE Light Touch Grossly intact   LLE Light Touch Grossly intact   Bed Mobility   Additional Comments Pt was received seated at EOB in NAD   Transfers   Sit to Stand 5  Supervision   Additional items Assist x 1;Verbal cues   Stand to Sit 5  Supervision   Additional items Assist x 1; Armrests; Verbal cues   Ambulation/Elevation   Gait pattern Decreased toe off;Decreased heel strike;Decreased hip extension; Short stride; Shuffling   Gait Assistance   (close supervision/CG assist)   Additional items Assist x 1;Verbal cues   Assistive Device Rolling walker   Distance 120 feet   Balance   Static Sitting Good   Dynamic Sitting Fair +   Static Standing Fair   Dynamic Standing Fair -   Ambulatory Fair -   Endurance Deficit   Endurance Deficit Yes   Endurance Deficit Description decreased activity tolerance; pt received on room air; SpO2 decreased to mid 80's (questionable accuracy); pt with WILLOUGHBY   Activity Tolerance   Activity Tolerance Patient limited by fatigue   Medical Staff Made Aware OT Colton Ackerman  (Co-evaluation performed with OT secondary to complex medical condition of patient  PT/OT goals were addressed separately )   Assessment   Prognosis Good   Problem List Decreased strength;Decreased endurance; Impaired balance;Decreased mobility   Assessment Pt is 80year old female seen for PT evaluation s/p admit to [facility] on 12/2/2022 with MARIA DEL CARMEN (acute kidney injury) ([facility] [address] )  PT consulted to assess pt's functional mobility and d/c needs  Order placed for PT eval and tx, with up and OOB as tolerated order  Comorbidities affecting pt's physical performance at time of assessment include paroxysmal atrial fibrillation, essential hypertension, UTI, and urinary obstruction  PTA, pt was independent with all functional mobility without an AD for household distances and with a cane for community distances  Pt resides alone in a one level apartment with no steps to enter  Personal factors affecting pt at time of IE include ambulating with an assistive device, inability to navigate community distances, inability to navigate level surfaces without external assistance, limited home support, inability to perform IADLs, and difficulty performing ADLs  Please find objective findings from PT assessment regarding body systems outlined above with impairments and limitations including weakness, impaired balance, decreased endurance, gait deviations, decreased activity tolerance, decreased functional mobility tolerance, fall risk, and SOB upon exertion  The following objective measures performed on IE also reveal limitations: Barthel Index: 50/100, Modified Andrews: 3 (moderate disability) and -PAC 6-Clicks: 39/70  Pt's clinical presentation is currently evolving seen in pt's presentation of need for ongoing medical management/monitoring, pt is a fall risk, and pt requires cues and assist for safety with functional mobility  Pt to benefit from continued PT tx to address deficits as defined above and maximize level of functional independent mobility and consistency  From PT/mobility standpoint, recommendation at time of d/c would be Home PT with family support pending progress in order to facilitate return to PLOF  Barriers to Discharge Decreased caregiver support   Goals   Patient Goals to go home   STG Expiration Date 12/15/22   Short Term Goal #1 In 10 days: Increase bilateral LE strength 1/2 grade to facilitate independent mobility, Perform all bed mobility tasks modified independent to decrease caregiver burden, Perform all transfers modified independent to improve independence, Ambulate > 150 ft  with least restrictive assistive device modified independent w/o LOB and w/ normalized gait pattern 100% of the time and Increase all balance 1/2 grade to decrease risk for falls   Plan   Treatment/Interventions Functional transfer training;LE strengthening/ROM; Therapeutic exercise; Endurance training;Patient/family training;Bed mobility;Gait training;OT   PT Frequency 2-3x/wk   Recommendation   PT Discharge Recommendation Home with home health rehabilitation   AM-PAC Basic Mobility Inpatient   Turning in Bed Without Bedrails 3   Lying on Back to Sitting on Edge of Flat Bed 3   Moving Bed to Chair 3   Standing Up From Chair 3   Walk in Room 3   Climb 3-5 Stairs 3   Basic Mobility Inpatient Raw Score 18   Basic Mobility Standardized Score 41 05   Highest Level Of Mobility   -[facility] Goal 6: Walk 10 steps or more   -HLM Achieved 7: Walk 25 feet or more   Modified [place] Scale   Modified [place] Scale 3   Barthel Index   Feeding 10   Bathing 0   Grooming Score 5   Dressing Score 10   Bladder Score 0   Bowels Score 10   Toilet Use Score 5   Transfers (Bed/Chair) Score 10   Mobility (Level Surface) Score 0   Stairs Score 0   Barthel Index Score 50     PT Evaluation Time: 5978-1723  Earlyjose miguel Bolivar, PT, DPT

## 2022-12-05 NOTE — PLAN OF CARE
Problem: GENITOURINARY - ADULT  Goal: Maintains or returns to baseline urinary function  Description: INTERVENTIONS:  - Assess urinary function  - Encourage oral fluids to ensure adequate hydration if ordered  - Administer IV fluids as ordered to ensure adequate hydration  - Administer ordered medications as needed  - Offer frequent toileting  - Follow urinary retention protocol if ordered  Outcome: Not Progressing  Goal: Absence of urinary retention  Description: INTERVENTIONS:  - Assess patient’s ability to void and empty bladder  - Monitor I/O  - Bladder scan as needed  - Discuss with physician/AP medications to alleviate retention as needed  - Discuss catheterization for long term situations as appropriate  Outcome: Not Progressing  Note: Patient failed voiding trial requiring the reinsertion of mahan catheter  Urology consulted  Goal: Urinary catheter remains patent  Description: INTERVENTIONS:  - Assess patency of urinary catheter  - If patient has a chronic mahan, consider changing catheter if non-functioning  - Follow guidelines for intermittent irrigation of non-functioning urinary catheter  Outcome: Progressing  Note: Mahan is patent, draining clear urine

## 2022-12-05 NOTE — CONSULTS
Consult - Urology   James Valenzuela 2/17/1924, 80 y o  female MRN: [id number]    Unit/Bed#: -01 Encounter: [id number]      Assessment & Plan:  1  Urinary retention  2  UTI  - Urine culture from 12/04 shows Citrobacter freundii growth  Adjust abx from culture data  - Patient failed voiding trial 12/04, unable to void spontaneously in 7 hours, bladder scanned and 16 Bulgarian Hunter reinserted for 400 mL   - VSS, afebrile  - Cr 1 19, yesterday 1 40  - Continue medical optimization and antibiosis per primary team  - Maintain Hunter  - No  surgical intervention   - Patient will follow-up outpatient with our office 1 week for trial of void  Patient agreeable with plan  Discussed with patient and daughter Marcia Combs  - Urology will sign off at this time, please do not hesitate to reach out with any questions or concerns    Subjective:   Patient is a 80year-old with past medical history AFib on Eliquis who presented with UTI and difficulty urinating  Patient admitted on 12/02, due to patient not being able to void for past 2 days  In the emergency department a Hunter was placed  On 12/04 patient was unable to pass trial of void  Prior to this admission patient reports no urologic complaints including urgency, frequency, dysuria, difficulty urinating, abdominal pain, flank pain  Patient denies any urologic history  Review of Systems   Constitutional: Negative for chills, diaphoresis and fever  Respiratory: Negative for cough and shortness of breath  Cardiovascular: Negative for chest pain and palpitations  Gastrointestinal: Negative for abdominal distention, abdominal pain, nausea and vomiting  Genitourinary: Negative for decreased urine volume, difficulty urinating, dysuria, flank pain and hematuria  Skin: Negative for color change and rash  Neurological: Negative for seizures and syncope  All other systems reviewed and are negative        Objective:  Vitals: Blood pressure 149/73, pulse 64, temperature (!) 97 2 °F (36 2 °C), resp  rate 17, weight 59 kg (130 lb), SpO2 92 %  ,Body mass index is 26 26 kg/m²  Physical Exam    Imaging:  CT ABDOMEN AND PELVIS WITHOUT IV CONTRAST     INDICATION:   Urinary retention, MARIA DEL CARMEN      COMPARISON:  Abdomen and pelvic CT from 7/11/2022      TECHNIQUE:  CT examination of the abdomen and pelvis was performed without intravenous contrast  Axial, sagittal, and coronal 2D reformatted images were created from the source data and submitted for interpretation       Radiation dose length product (DLP) for this visit:  656 mGy-cm   This examination, like all CT scans performed in the [facility], was performed utilizing techniques to minimize radiation dose exposure, including the use of iterative   reconstruction and automated exposure control       Enteric contrast was not administered       FINDINGS:     ABDOMEN     LOWER CHEST:  Unchanged moderate cardiomegaly      Unchanged small right pleural effusion      Again seen is a right Bochdalek hernia containing fat only      LIVER/BILIARY TREE:  Small simple cyst in the left lobe of liver anteriorly  Otherwise unremarkable      GALLBLADDER:  No calcified gallstones  No pericholecystic inflammatory change      SPLEEN:  Unremarkable      PANCREAS:  Unremarkable      ADRENAL GLANDS:  Unremarkable      KIDNEYS/URETERS:  Multiple large simple cyst in both kidneys  There is also an unchanged exophytic hemorrhagic cyst in the right kidney upper pole  No hydronephrosis      STOMACH AND BOWEL:  There is colonic diverticulosis without evidence of acute diverticulitis      APPENDIX:  No findings to suggest appendicitis      ABDOMINOPELVIC CAVITY:  No ascites  No pneumoperitoneum    No lymphadenopathy      VESSELS:  Unremarkable for patient's age      PELVIS     REPRODUCTIVE ORGANS:  Unremarkable for patient's age      URINARY BLADDER:  Urinary bladder decompressed by Hunter catheter      ABDOMINAL WALL/INGUINAL REGIONS: Unremarkable      OSSEOUS STRUCTURES:  No acute fracture or destructive osseous lesion  Again seen is a left femoral intramedullary nail  Again seen is avascular necrosis of the left femoral head and severe left hip osteoarthritis      IMPRESSION:     Hunter catheter decompresses the urinary bladder      Again seen are multiple benign renal cysts  There is no hydronephrosis      Colonic diverticulosis without diverticulitis           Workstation performed: [id number]    Labs:  Recent Labs     12/02/22  1338 12/03/22  0535 12/04/22  0553 12/05/22  0531   WBC 6 72 6 29 6 55 7 58     Recent Labs     12/02/22  1338 12/03/22  0535 12/04/22  0553 12/05/22  0531   HGB 10 1* 9 7* 9 9* 10 0*     Recent Labs     12/02/22  1338 12/03/22  0535 12/04/22  0553 12/05/22  0531   CREATININE 1 90* 1 58* 1 40* 1 19       History:  Social History     Socioeconomic History   • Marital status:      Spouse name: None   • Number of children: None   • Years of education: None   • Highest education level: None   Occupational History   • None   Tobacco Use   • Smoking status: Never   • Smokeless tobacco: Never   Vaping Use   • Vaping Use: Never used   Substance and Sexual Activity   • Alcohol use: Not Currently   • Drug use: No   • Sexual activity: Not Currently     Partners: Male   Other Topics Concern   • None   Social History Narrative    Advance directive declined by patient     Social Determinants of Health     Financial Resource Strain: Not on file   Food Insecurity: No Food Insecurity   • Worried About Running Out of Food in the Last Year: Never true   • Ran Out of Food in the Last Year: Never true   Transportation Needs: No Transportation Needs   • Lack of Transportation (Medical): No   • Lack of Transportation (Non-Medical):  No   Physical Activity: Not on file   Stress: No Stress Concern Present   • Feeling of Stress : Not at all   Social Connections: Not on file   Intimate Partner Violence: Not on file   Housing Stability: Low Risk    • Unable to Pay for Housing in the Last Year: No   • Number of Places Lived in the Last Year: 1   • Unstable Housing in the Last Year: No     Past Medical History:   Diagnosis Date   • Anemia    • Asteatotic eczema    • Chronic kidney disease    • Hypercholesterolemia    • Lichen sclerosus et atrophicus    • Mitral valve insufficiency    • Nosebleed    • Seborrheic keratoses    • Tricuspid regurgitation    • Vertigo      Past Surgical History:   Procedure Laterality Date   • APPENDECTOMY  11/14/1939   • CATARACT EXTRACTION, BILATERAL  04/2019   • HYSTERECTOMY  11/14/1962   • ORIF HIP FRACTURE Left 2020   • MA COLONOSCOPY FLX DX W/COLLJ SPEC WHEN PFRMD N/A 5/19/2016    Procedure: EGD AND COLONOSCOPY;  Surgeon: Laci David MD;  Location[facility];   Service: Gastroenterology     Family History   Problem Relation Age of Onset   • Heart disease Mother         Abnormality   • Heart disease Father         Abnormality   • No Known Problems Sister    • No Known Problems Brother        Dominique Theodore [place]  Date: 12/5/2022 Time: 8:38 AM

## 2022-12-05 NOTE — OCCUPATIONAL THERAPY NOTE
Occupational Therapy Evaluation      [employer]    12/5/2022    Patient Active Problem List   Diagnosis    MARIA DEL CARMEN (acute kidney injury) ([facility] [address] )    Paroxysmal atrial fibrillation (HCC)    Long term current use of anticoagulant    Chronic kidney disease, stage III (moderate) ([facility] [address] )    Essential hypertension    Mitral valve insufficiency    Hypercholesterolemia    Anemia due to chronic kidney disease    Diastolic dysfunction    Epistaxis    Chronic kidney disease-mineral and bone disorder    Fracture of right orbital wall (HCC)    Chronic diastolic congestive heart failure (HCC)    Iron deficiency anemia    Fecal occult blood test positive    Anemia    Seasonal allergic rhinitis due to pollen    Lower GI bleed    Chronic renal disease, stage IV (HCC)    Acute kidney injury superimposed on CKD (HCC)    Dyspnea    Hypoxia    Acute respiratory distress    Severe pulmonary hypertension (HCC)    Nonrheumatic aortic valve stenosis    UTI (urinary tract infection)    Hyponatremia    Bilateral renal cysts    Urinary obstruction       Past Medical History:   Diagnosis Date    Anemia     Asteatotic eczema     Chronic kidney disease     Hypercholesterolemia     Lichen sclerosus et atrophicus     Mitral valve insufficiency     Nosebleed     Seborrheic keratoses     Tricuspid regurgitation     Vertigo        Past Surgical History:   Procedure Laterality Date    APPENDECTOMY  11/14/1939    CATARACT EXTRACTION, BILATERAL  04/2019    HYSTERECTOMY  11/14/1962    ORIF HIP FRACTURE Left 2020    WA COLONOSCOPY FLX DX W/COLLJ SPEC WHEN PFRMD N/A 5/19/2016    Procedure: EGD AND COLONOSCOPY;  Surgeon: Maury Bang MD;  Location: [facility]; Service: Gastroenterology        12/05/22 0746   OT Last Visit   OT Visit Date 12/05/22   Note Type   Note type Evaluation   Additional Comments Pt agreeable to OT eval  Upon arrival pt seated at EOB     Pain Assessment   Pain Assessment Tool 0-10   Pain Score No Pain   Restrictions/Precautions Weight Bearing Precautions Per Order No   Braces or Orthoses   (none per pt)   Other Precautions Multiple lines; Fall Risk; Chair Alarm; Bed Alarm;Contact/isolation   Home Living   Type of Home Apartment  (Senior living)   Home Layout One level;Performs ADLs on one level; Able to live on main level with bedroom/bathroom; Access  (0 ABA)   Bathroom Shower/Tub Tub/shower unit   Bathroom Toilet Standard   Bathroom Equipment Grab bars in shower;Grab bars around toilet; Shower chair   [address] Cane;Walker  (utilizes no AD in apartment; [facility] in [facility])   Prior Function   Level of [place] Independent with ADLs; Independent with functional mobility   Lives With Alone   Receives Help From Neighbor   IADLs Family/Friend/Other provides transportation; Independent with medication management; Independent with meal prep  (utilizes uber or neighbor for transportation)   Falls in the last 6 months 0  (pt report fall ~3 years ago that resulted in hip fx)   Vocational Retired   Lifestyle   Autonomy Patient reporting being independent with ADLs/IADLs, ambulatory with no AD or SPC, and lives alone in a one level apartment   Reciprocal Relationships Supportive neighbors  Dtr visits occasionally per pt   Service to Others Retired   [address] Enjoys painting and playing cards   ADL   Eating Assistance 6  Modified independent   [address] [facility] 5  [address] [facility] 5  [address]  [address] [facility]  4  Minimal Assistance   Bed Mobility   Additional Comments DNT bed mobility: pt seated at EOB upon arrival and seated in recliner at end of session   Transfers   Sit to Stand 5  Supervision   Additional items Assist x 1;Verbal cues; Bedrails   Stand to Sit 5  Supervision   Additional items Assist x 1;Armrests; Increased time required;Verbal cues   Additional Comments Occasional cues provided for safety  Functional Mobility   Functional Mobility   (CGA)   Additional Comments Pt ambulated household distance in hallway  Pt grossly unsteady and noted to have moderate SOB  SpO2 decreased to mid-80's with exertion  Pt demonstrated poor insight to symptoms   Additional items Rolling walker   Balance   Static Sitting Good   Dynamic Sitting Fair +   Static Standing Fair -   Dynamic Standing Poor +   Activity Tolerance   Activity Tolerance Patient limited by fatigue;Treatment limited secondary to medical complications (Comment)  (SOB/WILLOUGHBY)   Medical Staff Made Aware Pt seen as a co-eval with PT Stephane Moreau due to the patient's co-morbidities, clinically unstable presentation, and present impairments which are a regression from the patient's baseline  RUE Assessment   RUE Assessment WFL  (full AROM, 4-/5 MMT)   LUE Assessment   LUE Assessment WFL  (full AROM, 4-/5 MMT)   Hand Function   Gross Motor Coordination Functional   Fine Motor Coordination Functional   Sensation   Light Touch No apparent deficits  (Per pt report)   Vision-Basic Assessment   Current Vision Wears glasses all the time   Cognition   Overall Cognitive Status [facility]   Arousal/Participation Alert; Responsive; Cooperative   Attention Within functional limits   Orientation Level Oriented X4   Memory Within functional limits   Following Commands Follows one step commands without difficulty   Assessment   Limitation Decreased ADL status; Decreased UE strength;Decreased endurance;Decreased self-care trans;Decreased high-level ADLs   Prognosis Good   Assessment Patient is a 80 y o  female seen for OT evaluation s/p admit to [address] [facility]  on 12/2/2022 w/MARIA DEL CAMREN (acute kidney injury) ([facility] [address] )  Commorbidities affecting patient's functional performance at time of assessment include: A-fib, HTN, UTI and urinary obstruction   Orders placed for OT evaluation and treatment and up and OOB as tolerated   Performed at least two patient identifiers during session including name and wristband  Prior to admission, Patient reporting being independent with ADLs/IADLs, ambulatory with no AD or SPC, and lives alone in a one level apartment  Personal factors affecting patient at time of initial evaluation include: limited caregiver support, limited insight into deficits and difficulty performing IADLs  Upon evaluation, patient requires supervision assist for UB ADLs, minimal  assist for LB ADLs, transfers and functional ambulation in room and bathroom with supervision and contact guard assist, with the use of [address]  Patient is oriented x 4  Occupational performance is affected by the following deficits: decreased muscle strength, dynamic sit/ stand balance deficit with poor standing tolerance time for self care and functional mobility, decreased activity tolerance, decreased safety awareness and delayed righting and equilibrium reactions  Patient to benefit from continued Occupational Therapy treatment while in the hospital to address deficits as defined above and maximize level of functional independence with ADLs and functional mobility  Occupational Performance areas to address include: grooming , bathing/ shower, dressing, toilet hygiene, transfer to all surfaces, functional ambulation, medication routine/ management, IADLS: Household maintenance, IADLs: safety procedures and IADLs: meal prep/ clean up  From OT standpoint, recommendation at time of d/c would be Home with home health rehabilitation  Goals   Patient Goals to go home soon   Plan   Treatment Interventions ADL retraining;Functional transfer training;UE strengthening/ROM; Endurance training;Patient/family training; Compensatory technique education; Energy conservation; Activityengagement   Goal Expiration Date 12/15/22   OT Treatment Day 0   OT Frequency 2-3x/wk   Recommendation   OT Discharge Recommendation Home with home health rehabilitation   AM-PAC Daily Activity Inpatient   Lower Body Dressing 3   Bathing 3   Toileting 3   Upper Body Dressing 3   Grooming 4   Eating 4   Daily Activity Raw Score 20   Daily Activity Standardized Score (Calc for Raw Score >=11) 42 03   AM-PAC Applied Cognition Inpatient   Following a Speech/Presentation 3   Understanding Ordinary Conversation 4   Taking Medications 3   Remembering Where Things Are Placed or Put Away 3   Remembering List of 4-5 Errands 3   Taking Care of Complicated Tasks 3   Applied Cognition Raw Score 19   Applied Cognition Standardized Score 39 77   Mini-Cog Assessment   Word Version Version 1: Banana, Sunrise, Chair   Clock Draw (CDT) 0   Word Recall 3   Mini-Cog Score 3   Mini-Cog Results Negative screen for dementia   Modified Cata[place] Scale   Modified Cata[place]o Scale 3   End of Consult   Patient Position at End of Consult Bedside chair;Bed/Chair alarm activated; All needs within reach   Nurse Communication Nurse aware of consult     GOALS:    *ADL transfers with (I) for inc'd independence with ADLs/purposeful tasks    *UB ADL with (I) for inc'd independence with self cares    *LB ADL with (I) using AE prn for inc'd independence with self cares    *Toileting with (I) for clothing management and hygiene for return to PLOF with personal care    *Increase stand tolerance x 5  m for inc'd tolerance with standing purposeful tasks    *Participate in 10m UE therex to increase overall stamina/activity tolerance for purposeful tasks    *Bed mobility- (I) for inc'd independence to manage own comfort and initiate EOB & OOB purposeful tasks    *Patient will verbalize 3 safety awareness/ principles to prevent falls in the home setting  *Patient will verbalize and demonstrate use of energy conservation/deep breathing techniques and work simplification skills during functional activities with no verbal cues       *Patient will increase OOB/sitting tolerance to 2-4 hours per day to increase participation in self-care and leisure tasks with no s/s of exertion       Tamela Leblanc, ANNITA, OTR/L

## 2022-12-05 NOTE — PLAN OF CARE
Problem: PHYSICAL THERAPY ADULT  Goal: Performs mobility at highest level of function for planned discharge setting  See evaluation for individualized goals  Description: Treatment/Interventions: Functional transfer training, LE strengthening/ROM, Therapeutic exercise, Endurance training, Patient/family training, Bed mobility, Gait training, OT          See flowsheet documentation for full assessment, interventions and recommendations  Note: Prognosis: Good  Problem List: Decreased strength, Decreased endurance, Impaired balance, Decreased mobility  Assessment: Pt is 80year old female seen for PT evaluation s/p admit to [facility] on 12/2/2022 with MARIA DEL CARMEN (acute kidney injury) ([facility] [address] )  PT consulted to assess pt's functional mobility and d/c needs  Order placed for PT eval and tx, with up and OOB as tolerated order  Comorbidities affecting pt's physical performance at time of assessment include paroxysmal atrial fibrillation, essential hypertension, UTI, and urinary obstruction  PTA, pt was independent with all functional mobility without an AD for household distances and with a cane for community distances  Pt resides alone in a one level apartment with no steps to enter  Personal factors affecting pt at time of IE include ambulating with an assistive device, inability to navigate community distances, inability to navigate level surfaces without external assistance, limited home support, inability to perform IADLs, and difficulty performing ADLs  Please find objective findings from PT assessment regarding body systems outlined above with impairments and limitations including weakness, impaired balance, decreased endurance, gait deviations, decreased activity tolerance, decreased functional mobility tolerance, fall risk, and SOB upon exertion  The following objective measures performed on IE also reveal limitations: Barthel Index: 50/100, Modified Jered: 3 (moderate disability) and -PAC 6-Clicks: 80/57  Pt's clinical presentation is currently evolving seen in pt's presentation of need for ongoing medical management/monitoring, pt is a fall risk, and pt requires cues and assist for safety with functional mobility  Pt to benefit from continued PT tx to address deficits as defined above and maximize level of functional independent mobility and consistency  From PT/mobility standpoint, recommendation at time of d/c would be Home PT with family support pending progress in order to facilitate return to PLOF  Barriers to Discharge: Decreased caregiver support     PT Discharge Recommendation: Home with home health rehabilitation    See flowsheet documentation for full assessment

## 2022-12-05 NOTE — PLAN OF CARE
Problem: OCCUPATIONAL THERAPY ADULT  Goal: Performs self-care activities at highest level of function for planned discharge setting  See evaluation for individualized goals  Description: Treatment Interventions: ADL retraining, Functional transfer training, UE strengthening/ROM, Endurance training, Patient/family training, Compensatory technique education, Energy conservation, Activityengagement          See flowsheet documentation for full assessment, interventions and recommendations  Note: Limitation: Decreased ADL status, Decreased UE strength, Decreased endurance, Decreased self-care trans, Decreased high-level ADLs  Prognosis: Good  Assessment: Patient is a 80 y o  female seen for OT evaluation s/p admit to [id number] [place] [address]  on 12/2/2022 w/MARIA DEL CARMEN (acute kidney injury) ([facility] [address] )  Commorbidities affecting patient's functional performance at time of assessment include: A-fib, HTN, UTI and urinary obstruction  Orders placed for OT evaluation and treatment and up and OOB as tolerated   Performed at least two patient identifiers during session including name and wristband  Prior to admission, Patient reporting being independent with ADLs/IADLs, ambulatory with no AD or SPC, and lives alone in a one level apartment  Personal factors affecting patient at time of initial evaluation include: limited caregiver support, limited insight into deficits and difficulty performing IADLs  Upon evaluation, patient requires supervision assist for UB ADLs, minimal  assist for LB ADLs, transfers and functional ambulation in room and bathroom with supervision and contact guard assist, with the use of [address] [facility]  Patient is oriented x 4    Occupational performance is affected by the following deficits: decreased muscle strength, dynamic sit/ stand balance deficit with poor standing tolerance time for self care and functional mobility, decreased activity tolerance, decreased safety awareness and delayed righting and equilibrium reactions  Patient to benefit from continued Occupational Therapy treatment while in the hospital to address deficits as defined above and maximize level of functional independence with ADLs and functional mobility  Occupational Performance areas to address include: grooming , bathing/ shower, dressing, toilet hygiene, transfer to all surfaces, functional ambulation, medication routine/ management, IADLS: Household maintenance, IADLs: safety procedures and IADLs: meal prep/ clean up  From OT standpoint, recommendation at time of d/c would be Home with home health rehabilitation       OT Discharge Recommendation: Home with home health rehabilitation     Rena

## 2022-12-06 LAB
ALBUMIN SERPL BCP-MCNC: 3.4 G/DL (ref 3.5–5)
ALP SERPL-CCNC: 87 U/L (ref 46–116)
ALT SERPL W P-5'-P-CCNC: 19 U/L (ref 12–78)
ANION GAP SERPL CALCULATED.3IONS-SCNC: 10 MMOL/L (ref 4–13)
AST SERPL W P-5'-P-CCNC: 24 U/L (ref 5–45)
BASOPHILS # BLD AUTO: 0.04 THOUSANDS/ÂΜL (ref 0–0.1)
BASOPHILS NFR BLD AUTO: 1 % (ref 0–1)
BILIRUB SERPL-MCNC: 0.45 MG/DL (ref 0.2–1)
BUN SERPL-MCNC: 20 MG/DL (ref 5–25)
CALCIUM ALBUM COR SERPL-MCNC: 8.5 MG/DL (ref 8.3–10.1)
CALCIUM SERPL-MCNC: 8 MG/DL (ref 8.3–10.1)
CHLORIDE SERPL-SCNC: 106 MMOL/L (ref 96–108)
CO2 SERPL-SCNC: 21 MMOL/L (ref 21–32)
CREAT SERPL-MCNC: 1.12 MG/DL (ref 0.6–1.3)
EOSINOPHIL # BLD AUTO: 0.23 THOUSAND/ÂΜL (ref 0–0.61)
EOSINOPHIL NFR BLD AUTO: 3 % (ref 0–6)
ERYTHROCYTE [DISTWIDTH] IN BLOOD BY AUTOMATED COUNT: 16.9 % (ref 11.6–15.1)
GFR SERPL CREATININE-BSD FRML MDRD: 40 ML/MIN/1.73SQ M
GLUCOSE SERPL-MCNC: 132 MG/DL (ref 65–140)
HCT VFR BLD AUTO: 34.8 % (ref 34.8–46.1)
HGB BLD-MCNC: 10.1 G/DL (ref 11.5–15.4)
IMM GRANULOCYTES # BLD AUTO: 0.05 THOUSAND/UL (ref 0–0.2)
IMM GRANULOCYTES NFR BLD AUTO: 1 % (ref 0–2)
LYMPHOCYTES # BLD AUTO: 0.35 THOUSANDS/ÂΜL (ref 0.6–4.47)
LYMPHOCYTES NFR BLD AUTO: 5 % (ref 14–44)
MCH RBC QN AUTO: 27 PG (ref 26.8–34.3)
MCHC RBC AUTO-ENTMCNC: 29 G/DL (ref 31.4–37.4)
MCV RBC AUTO: 93 FL (ref 82–98)
MONOCYTES # BLD AUTO: 0.82 THOUSAND/ÂΜL (ref 0.17–1.22)
MONOCYTES NFR BLD AUTO: 11 % (ref 4–12)
NEUTROPHILS # BLD AUTO: 6.06 THOUSANDS/ÂΜL (ref 1.85–7.62)
NEUTS SEG NFR BLD AUTO: 79 % (ref 43–75)
NRBC BLD AUTO-RTO: 0 /100 WBCS
PLATELET # BLD AUTO: 191 THOUSANDS/UL (ref 149–390)
PMV BLD AUTO: 11.7 FL (ref 8.9–12.7)
POTASSIUM SERPL-SCNC: 4.5 MMOL/L (ref 3.5–5.3)
PROT SERPL-MCNC: 6.8 G/DL (ref 6.4–8.4)
RBC # BLD AUTO: 3.74 MILLION/UL (ref 3.81–5.12)
SODIUM SERPL-SCNC: 137 MMOL/L (ref 135–147)
WBC # BLD AUTO: 7.55 THOUSAND/UL (ref 4.31–10.16)

## 2022-12-06 RX ORDER — FUROSEMIDE 10 MG/ML
40 INJECTION INTRAMUSCULAR; INTRAVENOUS ONCE
Status: COMPLETED | OUTPATIENT
Start: 2022-12-06 | End: 2022-12-06

## 2022-12-06 RX ADMIN — APIXABAN 2.5 MG: 2.5 TABLET, FILM COATED ORAL at 17:40

## 2022-12-06 RX ADMIN — DILTIAZEM HYDROCHLORIDE 60 MG: 60 CAPSULE, EXTENDED RELEASE ORAL at 21:10

## 2022-12-06 RX ADMIN — METOPROLOL SUCCINATE 100 MG: 100 TABLET, EXTENDED RELEASE ORAL at 09:09

## 2022-12-06 RX ADMIN — APIXABAN 2.5 MG: 2.5 TABLET, FILM COATED ORAL at 09:09

## 2022-12-06 RX ADMIN — CEFEPIME 500 MG: 2 INJECTION, POWDER, FOR SOLUTION INTRAVENOUS at 21:06

## 2022-12-06 RX ADMIN — FUROSEMIDE 40 MG: 10 INJECTION, SOLUTION INTRAMUSCULAR; INTRAVENOUS at 21:06

## 2022-12-06 RX ADMIN — FUROSEMIDE 40 MG: 10 INJECTION, SOLUTION INTRAMUSCULAR; INTRAVENOUS at 09:09

## 2022-12-06 RX ADMIN — DILTIAZEM HYDROCHLORIDE 60 MG: 60 CAPSULE, EXTENDED RELEASE ORAL at 09:10

## 2022-12-06 NOTE — ASSESSMENT & PLAN NOTE
Recent Labs     12/03/22  0535 12/04/22  0553 12/05/22  0531   CREATININE 1 58* 1 40* 1 19   EGFR 27 31 38     Estimated Creatinine Clearance: 22 mL/min (by C-G formula based on SCr of 1 19 mg/dL)      · Patient presented to the ED with complaints of difficulty urinating  · Patient was found to have a creatinine of 1 9 on admission with a baseline creatinine between 1 2-1 4  · Now improved s/p mahan and IVF  · Failed voiding trial inpatient, Mahan reinserted  · Urology consult, appreciate input

## 2022-12-06 NOTE — PLAN OF CARE
Problem: Potential for Falls  Goal: Patient will remain free of falls  Description: INTERVENTIONS:  - Educate patient/family on patient safety including physical limitations  - Instruct patient to call for assistance with activity   - Consult OT/PT to assist with strengthening/mobility   - Keep Call bell within reach  - Keep bed low and locked with side rails adjusted as appropriate  - Keep care items and personal belongings within reach  - Initiate and maintain comfort rounds  - Make Fall Risk Sign visible to staff  - Offer Toileting every 2 Hours, in advance of need  - Initiate/Maintain bed alarm  - Obtain necessary fall risk management equipment: walker  - Apply yellow socks and bracelet for high fall risk patients  - Consider moving patient to room near nurses station  Outcome: Progressing     Problem: PAIN - ADULT  Goal: Verbalizes/displays adequate comfort level or baseline comfort level  Description: Interventions:  - Encourage patient to monitor pain and request assistance  - Assess pain using appropriate pain scale  - Administer analgesics based on type and severity of pain and evaluate response  - Implement non-pharmacological measures as appropriate and evaluate response  - Consider cultural and social influences on pain and pain management  - Notify physician/advanced practitioner if interventions unsuccessful or patient reports new pain  Outcome: Progressing     Problem: INFECTION - ADULT  Goal: Absence or prevention of progression during hospitalization  Description: INTERVENTIONS:  - Assess and monitor for signs and symptoms of infection  - Monitor lab/diagnostic results  - Monitor all insertion sites, i e  indwelling lines, tubes, and drains  - Monitor endotracheal if appropriate and nasal secretions for changes in amount and color  - Bliss appropriate cooling/warming therapies per order  - Administer medications as ordered  - Instruct and encourage patient and family to use good hand hygiene technique  - Identify and instruct in appropriate isolation precautions for identified infection/condition  Outcome: Progressing  Goal: Absence of fever/infection during neutropenic period  Description: INTERVENTIONS:  - Monitor WBC    Outcome: Progressing     Problem: SAFETY ADULT  Goal: Patient will remain free of falls  Description: INTERVENTIONS:  - Educate patient/family on patient safety including physical limitations  - Instruct patient to call for assistance with activity   - Consult OT/PT to assist with strengthening/mobility   - Keep Call bell within reach  - Keep bed low and locked with side rails adjusted as appropriate  - Keep care items and personal belongings within reach  - Initiate and maintain comfort rounds  - Make Fall Risk Sign visible to staff  - Offer Toileting every 2 Hours, in advance of need  - Initiate/Maintain bed alarm  - Apply yellow socks and bracelet for high fall risk patients  - Consider moving patient to room near nurses station  Outcome: Progressing  Goal: Maintain or return to baseline ADL function  Description: INTERVENTIONS:  -  Assess patient's ability to carry out ADLs; assess patient's baseline for ADL function and identify physical deficits which impact ability to perform ADLs (bathing, care of mouth/teeth, toileting, grooming, dressing, etc )  - Assess/evaluate cause of self-care deficits   - Assess range of motion  - Assess patient's mobility; develop plan if impaired  - Assess patient's need for assistive devices and provide as appropriate  - Encourage maximum independence but intervene and supervise when necessary  - Involve family in performance of ADLs  - Assess for home care needs following discharge   - Consider OT consult to assist with ADL evaluation and planning for discharge  - Provide patient education as appropriate  Outcome: Progressing  Goal: Maintains/Returns to pre admission functional level  Description: INTERVENTIONS:  - Perform BMAT or MOVE assessment daily    - Set and communicate daily mobility goal to care team and patient/family/caregiver  - Collaborate with rehabilitation services on mobility goals if consulted  - Dangle patient 3 times a day  - Stand patient 3 times a day  - Ambulate patient 3 times a day  - Out of bed for toileting  - Record patient progress and toleration of activity level   Outcome: Progressing     Problem: DISCHARGE PLANNING  Goal: Discharge to home or other facility with appropriate resources  Description: INTERVENTIONS:  - Identify barriers to discharge w/patient and caregiver  - Arrange for needed discharge resources and transportation as appropriate  - Identify discharge learning needs (meds, wound care, etc )  - Arrange for interpretive services to assist at discharge as needed  - Refer to Case Management Department for coordinating discharge planning if the patient needs post-hospital services based on physician/advanced practitioner order or complex needs related to functional status, cognitive ability, or social support system  Outcome: Progressing     Problem: Knowledge Deficit  Goal: Patient/family/caregiver demonstrates understanding of disease process, treatment plan, medications, and discharge instructions  Description: Complete learning assessment and assess knowledge base    Interventions:  - Provide teaching at level of understanding  - Provide teaching via preferred learning methods  Outcome: Progressing     Problem: MOBILITY - ADULT  Goal: Maintain or return to baseline ADL function  Description: INTERVENTIONS:  -  Assess patient's ability to carry out ADLs; assess patient's baseline for ADL function and identify physical deficits which impact ability to perform ADLs (bathing, care of mouth/teeth, toileting, grooming, dressing, etc )  - Assess/evaluate cause of self-care deficits   - Assess range of motion  - Assess patient's mobility; develop plan if impaired  - Assess patient's need for assistive devices and provide as appropriate  - Encourage maximum independence but intervene and supervise when necessary  - Involve family in performance of ADLs  - Assess for home care needs following discharge   - Consider OT consult to assist with ADL evaluation and planning for discharge  - Provide patient education as appropriate  Outcome: Progressing  Goal: Maintains/Returns to pre admission functional level  Description: INTERVENTIONS:  - Perform BMAT or MOVE assessment daily    - Set and communicate daily mobility goal to care team and patient/family/caregiver     - Collaborate with rehabilitation services on mobility goals if consulted  - Dangle patient 3 times a day  - Stand patient 3 times a day  - Ambulate patient 3 times a day  - Out of bed for toileting  - Record patient progress and toleration of activity level   Outcome: Progressing     Problem: GENITOURINARY - ADULT  Goal: Maintains or returns to baseline urinary function  Description: INTERVENTIONS:  - Assess urinary function  - Encourage oral fluids to ensure adequate hydration if ordered  - Administer IV fluids as ordered to ensure adequate hydration  - Administer ordered medications as needed  - Offer frequent toileting  - Follow urinary retention protocol if ordered  Outcome: Progressing  Goal: Absence of urinary retention  Description: INTERVENTIONS:  - Assess patient’s ability to void and empty bladder  - Monitor I/O  - Bladder scan as needed  - Discuss with physician/AP medications to alleviate retention as needed  - Discuss catheterization for long term situations as appropriate  Outcome: Progressing  Goal: Urinary catheter remains patent  Description: INTERVENTIONS:  - Assess patency of urinary catheter  - If patient has a chronic mahan, consider changing catheter if non-functioning  - Follow guidelines for intermittent irrigation of non-functioning urinary catheter  Outcome: Progressing

## 2022-12-06 NOTE — ASSESSMENT & PLAN NOTE
· Patient came in with urinary obstruction, has indwelling Hunter catheter  · Likely 2/2 urinary tract infection, continue antibiotic and see if it improves  · Failed void trial, continue Hunter trial tomorrow, voiding trial tomorrow if okayed by urology  · Urology consult

## 2022-12-06 NOTE — ASSESSMENT & PLAN NOTE
· Urine culture shows Citrobacter and Klebsiella  · Ceftriaxone to be continued, day 3  · Denies any symptoms of UTI today

## 2022-12-06 NOTE — CASE MANAGEMENT
Case Management Discharge Planning Note    Patient name Rivera Vo  Location /-99 MRN [id number]  : 1924 Date 2022       Current Admission Date: 2022  Current Admission Diagnosis:MARIA DEL CARMEN (acute kidney injury) [facility])   Patient Active Problem List    Diagnosis Date Noted   • Urinary obstruction 2022   • UTI (urinary tract infection) 2022   • Hyponatremia 2022   • Bilateral renal cysts 2022   • Nonrheumatic aortic valve stenosis 2022   • Dyspnea 2021   • Hypoxia 2021   • Acute respiratory distress 2021   • Severe pulmonary hypertension ([address] ) 2021   • Acute kidney injury superimposed on CKD ([address] ) 2021   • Chronic renal disease, stage IV ([address] ) 2021   • Lower GI bleed 2021   • Seasonal allergic rhinitis due to pollen 10/26/2021   • Iron deficiency anemia 07/15/2021   • Fecal occult blood test positive 07/15/2021   • Anemia 07/15/2021   • Chronic diastolic congestive heart failure ([address] ) 2021   • Fracture of right orbital wall ([facility] ) 2020   • Chronic kidney disease-mineral and bone disorder 2019   • Epistaxis    • Diastolic dysfunction    • Anemia due to chronic kidney disease 10/18/2018   • Mitral valve insufficiency 2018   • Hypercholesterolemia 2018   • MARIA DEL CARMEN (acute kidney injury) ([address] ) 2017   • Paroxysmal atrial fibrillation ([address] ) 2017   • Long term current use of anticoagulant 2017   • Chronic kidney disease, stage III (moderate) ([address] ) 2017   • Essential hypertension 2017      LOS (days): 3  Geometric Mean LOS (GMLOS) (days): 3 10  Days to GMLOS:-0 1     OBJECTIVE:  Risk of Unplanned Readmission Score: 20 5         Current admission status: Inpatient   Preferred Pharmacy:   [facility] #[id number]- [address]  Phone: [phone] Fax: [phone]    Primary Care Provider: Adryan Castellanos, MD    Primary Insurance: MEDICARE  Secondary Insurance: [facility]    DISCHARGE DETAILS:                                          Other Referral/Resources/Interventions Provided:  Referral Comments: Per SLIM rounds this AM, pt now w fluid overload  Given IV lasix today  IV abx continued  D/C is anticipated for tomorrow;  pt declining home health  Treatment Team Recommendation: Home  Discharge Destination Plan[de-identified] Home  Transport at Discharge :  Other (Comment) (lyrené vs /caitie Vincent)

## 2022-12-06 NOTE — PROGRESS NOTES
[facility]  Progress Note - Mark Otero 2/17/1924, 80 y o  female MRN: [id number]  Unit/Bed#: -01 Encounter: [id number]  Primary Care Provider: Penny Burkitt, MD   Date and time admitted to hospital: 12/2/2022  1:18 PM    * MARIA DEL CARMEN (acute kidney injury) [facility])  Assessment & Plan  Recent Labs     12/03/22  0535 12/04/22  0553 12/05/22  0531   CREATININE 1 58* 1 40* 1 19   EGFR 27 31 38     Estimated Creatinine Clearance: 22 mL/min (by C-G formula based on SCr of 1 19 mg/dL)  · Patient presented to the ED with complaints of difficulty urinating  · Patient was found to have a creatinine of 1 9 on admission with a baseline creatinine between 1 2-1 4  · Now improved s/p mahan and IVF  · Failed voiding trial inpatient, Mahan reinserted  · Urology consult, appreciate input    Urinary obstruction  Assessment & Plan  · Patient came in with urinary obstruction, has indwelling Mahan catheter  · Likely 2/2 urinary tract infection, continue antibiotic and see if it improves  · Failed void trial, continue Mahan trial tomorrow, voiding trial tomorrow if okayed by urology  · Urology consult    UTI (urinary tract infection)  Assessment & Plan  · Urine culture shows Citrobacter and Klebsiella  · Ceftriaxone to be continued, day 3  · Denies any symptoms of UTI today    Essential hypertension  Assessment & Plan  Blood Pressure: 138/71      Stable pressures  · Continue on home Metoprolol and Cardizem    Paroxysmal atrial fibrillation (HCC)  Assessment & Plan  · Rate controlled, C/w home toprol and diltiazem  · C w eliquis for anticoagulation        VTE Pharmacologic Prophylaxis: VTE Score: 6 High Risk (Score >/= 5) - Pharmacological DVT Prophylaxis Ordered: apixaban (Eliquis)  Sequential Compression Devices Ordered  Patient Centered Rounds: I performed bedside rounds with nursing staff today    Discussions with Specialists or Other Care Team Provider: Urology    Education and Discussions with Family / Patient: Attempted to update  (son) via phone  Unable to contact  Darryl Frankel    Time Spent for Care: 70 minutes  More than 50% of total time spent on counseling and coordination of care as described above  Current Length of Stay: 2 day(s)  Current Patient Status: Inpatient   Certification Statement: The patient will continue to require additional inpatient hospital stay due to plan as above  Discharge Plan: Anticipate discharge in 24-48 hrs to home with home services  Code Status: Level 1 - Full Code    Subjective: Today patient eager to be evaluated by urology team  Denies abd pain, feevers, nausea, vomitting, change in appetite  Wishes to go home soon but understands plan to complete treatment  All questions answered  Objective:     Vitals:   Temp (24hrs), Av 4 °F (36 3 °C), Min:97 3 °F (36 3 °C), Max:97 5 °F (36 4 °C)    Temp:  [97 3 °F (36 3 °C)-97 5 °F (36 4 °C)] 97 3 °F (36 3 °C)  HR:  [55-73] 73  Resp:  [17] 17  BP: (138-149)/(70-73) 138/71  SpO2:  [89 %-98 %] 98 %  Body mass index is 26 26 kg/m²  Input and Output Summary (last 24 hours): Intake/Output Summary (Last 24 hours) at 2022  Last data filed at 2022 1712  Gross per 24 hour   Intake 4693 33 ml   Output 1300 ml   Net 3393 33 ml       Physical Exam:   Physical Exam  Vitals and nursing note reviewed  Constitutional:       General: She is not in acute distress  Appearance: She is ill-appearing (chronically)  She is not toxic-appearing  HENT:      Head: Normocephalic  Nose: Nose normal       Mouth/Throat:      Mouth: Mucous membranes are dry  Eyes:      General: No scleral icterus  Conjunctiva/sclera: Conjunctivae normal    Cardiovascular:      Rate and Rhythm: Normal rate  Pulses: Normal pulses  Radial pulses are 2+ on the right side and 2+ on the left side  Heart sounds: Normal heart sounds     Pulmonary:      Effort: Pulmonary effort is normal       Breath sounds: Normal breath sounds  Abdominal:      General: Bowel sounds are normal  There is no distension  Palpations: Abdomen is soft  Tenderness: There is no abdominal tenderness  Musculoskeletal:         General: No tenderness  Skin:     General: Skin is dry  Coloration: Skin is not jaundiced  Neurological:      Mental Status: She is alert  Psychiatric:         Mood and Affect: Mood normal          Behavior: Behavior normal  Behavior is cooperative  Additional Data:     Labs:  Results from last 7 days   Lab Units 12/05/22  0531 12/04/22  0553 12/03/22  0535   WBC Thousand/uL 7 58   < > 6 29   HEMOGLOBIN g/dL 10 0*   < > 9 7*   HEMATOCRIT % 34 3*   < > 32 8*   PLATELETS Thousands/uL 169   < > 174   NEUTROS PCT %  --   --  76*   LYMPHS PCT %  --   --  7*   MONOS PCT %  --   --  13*   EOS PCT %  --   --  2    < > = values in this interval not displayed  Results from last 7 days   Lab Units 12/05/22  0531   SODIUM mmol/L 140   POTASSIUM mmol/L 4 7   CHLORIDE mmol/L 107   CO2 mmol/L 22   BUN mg/dL 25   CREATININE mg/dL 1 19   ANION GAP mmol/L 11   CALCIUM mg/dL 8 1*   GLUCOSE RANDOM mg/dL 97                       Lines/Drains:  Invasive Devices     Peripheral Intravenous Line  Duration           Peripheral IV 12/03/22 Dorsal (posterior); Right Forearm 2 days          Drain  Duration           Urethral Catheter Non-latex 16 Fr  1 day              Urinary Catheter:  Goal for removal: N/A- Discharging with Hunter               Imaging: Reviewed radiology reports from this admission including: abdominal/pelvic CT    Recent Cultures (last 7 days):   Results from last 7 days   Lab Units 12/02/22  1434   URINE CULTURE  >100,000 cfu/ml Citrobacter freundii*  >100,000 cfu/ml Klebsiella oxytoca*       Last 24 Hours Medication List:   Current Facility-Administered Medications   Medication Dose Route Frequency Provider Last Rate   • acetaminophen  650 mg Oral Q6H PRN Lilia Chicas MD     • apixaban  2 5 mg Oral BID Darren Davis MD     • cefTRIAXone  1,000 mg Intravenous Q24H Mel Dawn MD 1,000 mg (12/05/22 1324)   • diltiazem  60 mg Oral Q12H [facility] Darren Davis MD     • metoprolol succinate  100 mg Oral Daily Darren Davis MD     • sodium chloride  100 mL/hr Intravenous Continuous Unique Macario  mL/hr (12/05/22 1672)        Today, Patient Was Seen By: Jonathan Hood DO    **Please Note: This note may have been constructed using a voice recognition system  **

## 2022-12-07 ENCOUNTER — APPOINTMENT (INPATIENT)
Dept: RADIOLOGY | Facility: HOSPITAL | Age: 87
End: 2022-12-07

## 2022-12-07 PROBLEM — I50.33 ACUTE ON CHRONIC DIASTOLIC (CONGESTIVE) HEART FAILURE (HCC): Status: ACTIVE | Noted: 2021-04-22

## 2022-12-07 LAB
ANION GAP SERPL CALCULATED.3IONS-SCNC: 11 MMOL/L (ref 4–13)
BUN SERPL-MCNC: 22 MG/DL (ref 5–25)
CALCIUM SERPL-MCNC: 8.4 MG/DL (ref 8.3–10.1)
CHLORIDE SERPL-SCNC: 106 MMOL/L (ref 96–108)
CO2 SERPL-SCNC: 24 MMOL/L (ref 21–32)
CREAT SERPL-MCNC: 1.24 MG/DL (ref 0.6–1.3)
ERYTHROCYTE [DISTWIDTH] IN BLOOD BY AUTOMATED COUNT: 16.6 % (ref 11.6–15.1)
FLUAV RNA RESP QL NAA+PROBE: NEGATIVE
FLUBV RNA RESP QL NAA+PROBE: NEGATIVE
GFR SERPL CREATININE-BSD FRML MDRD: 36 ML/MIN/1.73SQ M
GLUCOSE SERPL-MCNC: 92 MG/DL (ref 65–140)
HCT VFR BLD AUTO: 33.5 % (ref 34.8–46.1)
HGB BLD-MCNC: 9.9 G/DL (ref 11.5–15.4)
MCH RBC QN AUTO: 27.3 PG (ref 26.8–34.3)
MCHC RBC AUTO-ENTMCNC: 29.6 G/DL (ref 31.4–37.4)
MCV RBC AUTO: 92 FL (ref 82–98)
PLATELET # BLD AUTO: 169 THOUSANDS/UL (ref 149–390)
PMV BLD AUTO: 11.4 FL (ref 8.9–12.7)
POTASSIUM SERPL-SCNC: 4.3 MMOL/L (ref 3.5–5.3)
RBC # BLD AUTO: 3.63 MILLION/UL (ref 3.81–5.12)
RSV RNA RESP QL NAA+PROBE: NEGATIVE
SARS-COV-2 RNA RESP QL NAA+PROBE: NEGATIVE
SODIUM SERPL-SCNC: 141 MMOL/L (ref 135–147)
WBC # BLD AUTO: 6.55 THOUSAND/UL (ref 4.31–10.16)

## 2022-12-07 RX ORDER — FUROSEMIDE 10 MG/ML
40 INJECTION INTRAMUSCULAR; INTRAVENOUS
Status: DISCONTINUED | OUTPATIENT
Start: 2022-12-07 | End: 2022-12-08

## 2022-12-07 RX ORDER — ALBUTEROL SULFATE 2.5 MG/3ML
2.5 SOLUTION RESPIRATORY (INHALATION) EVERY 6 HOURS PRN
Status: DISCONTINUED | OUTPATIENT
Start: 2022-12-07 | End: 2022-12-16 | Stop reason: HOSPADM

## 2022-12-07 RX ORDER — LEVALBUTEROL 1.25 MG/.5ML
1.25 SOLUTION, CONCENTRATE RESPIRATORY (INHALATION) EVERY 6 HOURS PRN
Status: DISCONTINUED | OUTPATIENT
Start: 2022-12-07 | End: 2022-12-07

## 2022-12-07 RX ORDER — GUAIFENESIN 600 MG/1
600 TABLET, EXTENDED RELEASE ORAL EVERY 12 HOURS SCHEDULED
Status: DISCONTINUED | OUTPATIENT
Start: 2022-12-07 | End: 2022-12-08

## 2022-12-07 RX ORDER — FUROSEMIDE 10 MG/ML
40 INJECTION INTRAMUSCULAR; INTRAVENOUS ONCE
Status: COMPLETED | OUTPATIENT
Start: 2022-12-07 | End: 2022-12-07

## 2022-12-07 RX ORDER — SODIUM CHLORIDE FOR INHALATION 0.9 %
3 VIAL, NEBULIZER (ML) INHALATION EVERY 6 HOURS PRN
Status: DISCONTINUED | OUTPATIENT
Start: 2022-12-07 | End: 2022-12-07

## 2022-12-07 RX ADMIN — DILTIAZEM HYDROCHLORIDE 60 MG: 60 CAPSULE, EXTENDED RELEASE ORAL at 09:30

## 2022-12-07 RX ADMIN — CEFEPIME 500 MG: 2 INJECTION, POWDER, FOR SOLUTION INTRAVENOUS at 21:48

## 2022-12-07 RX ADMIN — DILTIAZEM HYDROCHLORIDE 60 MG: 60 CAPSULE, EXTENDED RELEASE ORAL at 21:48

## 2022-12-07 RX ADMIN — GUAIFENESIN 600 MG: 600 TABLET ORAL at 10:25

## 2022-12-07 RX ADMIN — FUROSEMIDE 40 MG: 10 INJECTION, SOLUTION INTRAMUSCULAR; INTRAVENOUS at 16:45

## 2022-12-07 RX ADMIN — FUROSEMIDE 40 MG: 10 INJECTION, SOLUTION INTRAMUSCULAR; INTRAVENOUS at 12:40

## 2022-12-07 RX ADMIN — APIXABAN 2.5 MG: 2.5 TABLET, FILM COATED ORAL at 17:38

## 2022-12-07 RX ADMIN — APIXABAN 2.5 MG: 2.5 TABLET, FILM COATED ORAL at 09:25

## 2022-12-07 RX ADMIN — GUAIFENESIN 600 MG: 600 TABLET ORAL at 21:48

## 2022-12-07 RX ADMIN — METOPROLOL SUCCINATE 100 MG: 100 TABLET, EXTENDED RELEASE ORAL at 09:23

## 2022-12-07 NOTE — PLAN OF CARE
Problem: INFECTION - ADULT  Goal: Absence or prevention of progression during hospitalization  Description: INTERVENTIONS:  - Assess and monitor for signs and symptoms of infection  - Monitor lab/diagnostic results  - Monitor all insertion sites, i e  indwelling lines, tubes, and drains  - Monitor endotracheal if appropriate and nasal secretions for changes in amount and color  - [place] appropriate cooling/warming therapies per order  - Administer medications as ordered  - Instruct and encourage patient and family to use good hand hygiene technique  - Identify and instruct in appropriate isolation precautions for identified infection/condition  Outcome: Progressing  Goal: Absence of fever/infection during neutropenic period  Description: INTERVENTIONS:  - Monitor WBC    Outcome: Progressing     Problem: MOBILITY - ADULT  Goal: Maintain or return to baseline ADL function  Description: INTERVENTIONS:  -  Assess patient's ability to carry out ADLs; assess patient's baseline for ADL function and identify physical deficits which impact ability to perform ADLs (bathing, care of mouth/teeth, toileting, grooming, dressing, etc )  - Assess/evaluate cause of self-care deficits   - Assess range of motion  - Assess patient's mobility; develop plan if impaired  - Assess patient's need for assistive devices and provide as appropriate  - Encourage maximum independence but intervene and supervise when necessary  - Involve family in performance of ADLs  - Assess for home care needs following discharge   - Consider OT consult to assist with ADL evaluation and planning for discharge  - Provide patient education as appropriate  Outcome: Progressing  Problem: GENITOURINARY - ADULT  Goal: Maintains or returns to baseline urinary function  Description: INTERVENTIONS:  - Assess urinary function  - Encourage oral fluids to ensure adequate hydration if ordered  - Administer IV fluids as ordered to ensure adequate hydration  - Administer ordered medications as needed  - Offer frequent toileting  - Follow urinary retention protocol if ordered  Outcome: Progressing  Goal: Absence of urinary retention  Description: INTERVENTIONS:  - Assess patient’s ability to void and empty bladder  - Monitor I/O  - Bladder scan as needed  - Discuss with physician/AP medications to alleviate retention as needed  - Discuss catheterization for long term situations as appropriate  Outcome: Progressing  Goal: Urinary catheter remains patent  Description: INTERVENTIONS:  - Assess patency of urinary catheter  - If patient has a chronic mahan, consider changing catheter if non-functioning  - Follow guidelines for intermittent irrigation of non-functioning urinary catheter  Outcome: Progressing

## 2022-12-07 NOTE — PROGRESS NOTES
[facility]  Progress Note - Twiggs Maples 2/17/1924, 80 y o  female MRN: [id number]  Unit/Bed#: -Susan Encounter: [id number]  Primary Care Provider: Kusum James MD   Date and time admitted to hospital: 12/2/2022  1:18 PM    * MARIA DEL CARMEN (acute kidney injury) [facility])  Assessment & Plan  Recent Labs     12/04/22  0553 12/05/22  0531 12/06/22  0502   CREATININE 1 40* 1 19 1 12   EGFR 31 38 40     Estimated Creatinine Clearance: 24 7 mL/min (by C-G formula based on SCr of 1 12 mg/dL)  · Patient presented to the ED with complaints of difficulty urinating  · Patient was found to have a creatinine of 1 9 on admission with a baseline creatinine between 1 2-1 4  · Now improved s/p mahan and IVF  · Failed voiding trial inpatient, Mahan reinserted  · Urology consult, appreciate input    Urinary obstruction  Assessment & Plan  · Patient came in with urinary obstruction, has indwelling Mahan catheter  · Likely 2/2 urinary tract infection, continue antibiotic and see if it improves  · Failed void trial, continue Mahan trial tomorrow, voiding trial tomorrow if okayed by urology  · Urology consult    UTI (urinary tract infection)  Assessment & Plan  · Urine culture shows Citrobacter and Klebsiella  · Ceftriaxone to be continued, day 2  · Denies any symptoms of UTI today    Essential hypertension  Assessment & Plan  Blood Pressure: 135/76      Stable pressures  · Continue on home Metoprolol and Cardizem    Paroxysmal atrial fibrillation (HCC)  Assessment & Plan  · Rate controlled, C/w home toprol and diltiazem  · C w eliquis for anticoagulation        VTE Pharmacologic Prophylaxis:   Pharmacologic: Apixaban (Eliquis)  Mechanical VTE Prophylaxis in Place: Yes    Review of Systems:    Review of Systems   Constitutional: Negative for chills and fever  HENT: Negative for ear pain and sore throat  Eyes: Negative for pain and visual disturbance  Respiratory: Negative for cough and shortness of breath  Cardiovascular: Negative for chest pain and palpitations  Gastrointestinal: Negative for abdominal pain and vomiting  Genitourinary: Negative for dysuria and hematuria  Musculoskeletal: Negative for arthralgias and back pain  Skin: Negative for color change and rash  Neurological: Negative for seizures and syncope  All other systems reviewed and are negative  Past Medical and Surgical History:     Past Medical History:   Diagnosis Date   • Anemia    • Asteatotic eczema    • Chronic kidney disease    • Hypercholesterolemia    • Lichen sclerosus et atrophicus    • Mitral valve insufficiency    • Nosebleed    • Seborrheic keratoses    • Tricuspid regurgitation    • Vertigo        Past Surgical History:   Procedure Laterality Date   • APPENDECTOMY  1939   • CATARACT EXTRACTION, BILATERAL  2019   • HYSTERECTOMY  1962   • ORIF HIP FRACTURE Left    • WI COLONOSCOPY FLX DX W/COLLJ SPEC WHEN PFRMD N/A 2016    Procedure: EGD AND COLONOSCOPY;  Surgeon: Vinny Oro MD;  Location: [facility]; Service: Gastroenterology       Patient Centered Rounds: I have performed bedside rounds with nursing staff today  Discussions with Specialists or Other Care Team Provider: Nursing, case management    Education and Discussions with Family / Patient: Patient and discussed with patient's daughter over the phone and all questions answered    Time Spent for Care: 1 hour  More than 50% of total time spent on counseling and coordination of care as described above      Current Length of Stay: 3 day(s)    Current Patient Status: Inpatient   Certification Statement: The patient will continue to require additional inpatient hospital stay due to Receiving IV Lasix    Discharge Plan: Likely tomorrow    Code Status: Level 1 - Full Code      Subjective:   Patient denies any complaints    Objective:     Vitals:   Temp (24hrs), Av 3 °F (36 3 °C), Min:97 2 °F (36 2 °C), Max:97 5 °F (36 4 °C)    Temp: [97 2 °F (36 2 °C)-97 5 °F (36 4 °C)] 97 2 °F (36 2 °C)  HR:  [56-69] 65  Resp:  [18-19] 18  BP: (135-142)/(72-76) 135/76  SpO2:  [86 %-93 %] 89 %  Body mass index is 29 48 kg/m²  Input and Output Summary (last 24 hours): Intake/Output Summary (Last 24 hours) at 12/6/2022 2021  Last data filed at 12/6/2022 1351  Gross per 24 hour   Intake 240 ml   Output 1200 ml   Net -960 ml       Physical Exam:     Physical Exam  Vitals and nursing note reviewed  Constitutional:       General: She is not in acute distress  Appearance: She is well-developed  She is not ill-appearing or diaphoretic  HENT:      Head: Normocephalic and atraumatic  Eyes:      Extraocular Movements: Extraocular movements intact  Conjunctiva/sclera: Conjunctivae normal    Cardiovascular:      Rate and Rhythm: Normal rate and regular rhythm  Heart sounds: No murmur heard  Pulmonary:      Effort: Pulmonary effort is normal  No respiratory distress  Breath sounds: Rales present  Abdominal:      Palpations: Abdomen is soft  Tenderness: There is no abdominal tenderness  Musculoskeletal:         General: No swelling  Cervical back: Neck supple  Right lower leg: Edema present  Left lower leg: Edema present  Skin:     General: Skin is warm and dry  Capillary Refill: Capillary refill takes less than 2 seconds  Neurological:      General: No focal deficit present  Mental Status: She is alert and oriented to person, place, and time     Psychiatric:         Mood and Affect: Mood normal          Behavior: Behavior normal            Additional Data:     Labs:    Results from last 7 days   Lab Units 12/06/22  0502   WBC Thousand/uL 7 55   HEMOGLOBIN g/dL 10 1*   HEMATOCRIT % 34 8   PLATELETS Thousands/uL 191   NEUTROS PCT % 79*   LYMPHS PCT % 5*   MONOS PCT % 11   EOS PCT % 3     Results from last 7 days   Lab Units 12/06/22  0502   SODIUM mmol/L 137   POTASSIUM mmol/L 4 5   CHLORIDE mmol/L 106 CO2 mmol/L 21   BUN mg/dL 20   CREATININE mg/dL 1 12   ANION GAP mmol/L 10   CALCIUM mg/dL 8 0*   ALBUMIN g/dL 3 4*   TOTAL BILIRUBIN mg/dL 0 45   ALK PHOS U/L 87   ALT U/L 19   AST U/L 24   GLUCOSE RANDOM mg/dL 132                           * I Have Reviewed All Lab Data Listed Above  * Additional Pertinent Lab Tests Reviewed: [address] Admission Reviewed    Imaging:    Imaging Reports Reviewed Today Include: CT abdomen  Imaging Personally Reviewed by Myself Includes: None    Recent Cultures (last 7 days):     Results from last 7 days   Lab Units 12/02/22  1434   URINE CULTURE  >100,000 cfu/ml Citrobacter freundii*  >100,000 cfu/ml Klebsiella oxytoca*       Last 24 Hours Medication List:   Current Facility-Administered Medications   Medication Dose Route Frequency Provider Last Rate   • acetaminophen  650 mg Oral Q6H PRN Darren Davis MD     • apixaban  2 5 mg Oral BID Darern Davis MD     • cefepime  500 mg Intravenous Q24H [facility] Parameswaran,  mg (12/05/22 2222)   • diltiazem  60 mg Oral Q12H [facility] Darren Davis MD     • furosemide  40 mg Intravenous Once Varsha Dos Santos MD     • [START ON 12/7/2022] furosemide  60 mg Oral Daily Varsha Dos Santos MD     • metoprolol succinate  100 mg Oral Daily Jorge Sahni MD          Today, Patient Was Seen By: Abimael Avery MD    ** Please Note: Dictation voice to text software may have been used in the creation of this document   **

## 2022-12-07 NOTE — ASSESSMENT & PLAN NOTE
· Urine culture shows Citrobacter and Klebsiella  · Cefepime to be continued, day 3/3  · Denies any symptoms of UTI today

## 2022-12-07 NOTE — ASSESSMENT & PLAN NOTE
· Urine culture shows Citrobacter and Klebsiella  · Ceftriaxone to be continued, day 6/7  · Denies any symptoms of UTI today

## 2022-12-07 NOTE — RESPIRATORY THERAPY NOTE
RT Protocol Note  Christiano Lockhart 80 y o  female MRN: [id number]  Unit/Bed#: -01 Encounter: [id number]    Assessment    Principal Problem:    MARIA DEL CARMEN (acute kidney injury) ([facility] [address] )  Active Problems:    Paroxysmal atrial fibrillation (HCC)    Essential hypertension    UTI (urinary tract infection)    Urinary obstruction      Home Pulmonary Medications:  none       Past Medical History:   Diagnosis Date   • Anemia    • Asteatotic eczema    • Chronic kidney disease    • Hypercholesterolemia    • Lichen sclerosus et atrophicus    • Mitral valve insufficiency    • Nosebleed    • Seborrheic keratoses    • Tricuspid regurgitation    • Vertigo      Social History     Socioeconomic History   • Marital status:      Spouse name: None   • Number of children: None   • Years of education: None   • Highest education level: None   Occupational History   • None   Tobacco Use   • Smoking status: Never   • Smokeless tobacco: Never   Vaping Use   • Vaping Use: Never used   Substance and Sexual Activity   • Alcohol use: Not Currently   • Drug use: No   • Sexual activity: Not Currently     Partners: Male   Other Topics Concern   • None   Social History Narrative    Advance directive declined by patient     Social Determinants of Health     Financial Resource Strain: Not on file   Food Insecurity: No Food Insecurity   • Worried About Running Out of Food in the Last Year: Never true   • Ran Out of Food in the Last Year: Never true   Transportation Needs: No Transportation Needs   • Lack of Transportation (Medical): No   • Lack of Transportation (Non-Medical):  No   Physical Activity: Not on file   Stress: No Stress Concern Present   • Feeling of Stress : Not at all   Social Connections: Not on file   Intimate Partner Violence: Not on file   Housing Stability: Low Risk    • Unable to Pay for Housing in the Last Year: No   • Number of Places Lived in the Last Year: 1   • Unstable Housing in the Last Year: No       Subjective Objective    Physical Exam:   Assessment Type: Assess only  General Appearance: Alert, Awake  Respiratory Pattern: Dyspnea with exertion  Chest Assessment: Chest expansion symmetrical  Bilateral Breath Sounds: Diminished, Crackles  Cough: Non-productive, Congested, Strong  O2 Device: Nasal cannula    Vitals:  Blood pressure 139/69, pulse 60, temperature 97 5 °F (36 4 °C), resp  rate 20, weight 66 3 kg (146 lb 2 6 oz), SpO2 94 %  Imaging and other studies: I have personally reviewed pertinent reports  O2 Device: Nasal cannula     Plan    Respiratory Plan: No distress/Pulmonary history      Will order PRN Albuterol

## 2022-12-07 NOTE — ASSESSMENT & PLAN NOTE
Wt Readings from Last 3 Encounters:   12/07/22 66 3 kg (146 lb 2 6 oz)   12/02/22 59 5 kg (131 lb 3 2 oz)   10/20/22 55 4 kg (122 lb 3 2 oz)     · Echo in 2021 showed EF 60%  · Patient developed SOB with hypoxia requiring O2 supplement likely secondary to IV fluid and holding home diuretics for MARIA DEL CARMEN on admission   · IV fluid discontinued  · Continue with IV lasix 40 mg bid  · Monitor I/Os and daily weights

## 2022-12-07 NOTE — ASSESSMENT & PLAN NOTE
Recent Labs     12/04/22  0553 12/05/22  0531 12/06/22  0502   CREATININE 1 40* 1 19 1 12   EGFR 31 38 40     Estimated Creatinine Clearance: 24 7 mL/min (by C-G formula based on SCr of 1 12 mg/dL)      · Patient presented to the ED with complaints of difficulty urinating  · Patient was found to have a creatinine of 1 9 on admission with a baseline creatinine between 1 2-1 4  · Now improved s/p mahan and IVF  · Failed voiding trial inpatient, Mahan reinserted  · Urology consult, appreciate input

## 2022-12-07 NOTE — ASSESSMENT & PLAN NOTE
· Patient presented to the ED with complaints of difficulty urinating  · Patient was found to have a creatinine of 1 9 on admission with a baseline creatinine between 1 2-1 4  · Now improved s/p mahan and IVF  · Failed voiding trial inpatient, Mahan reinserted  · Urology consulted, appreciate input   Patient will follow up with urology in office for voiding trial

## 2022-12-07 NOTE — PLAN OF CARE
Problem: PAIN - ADULT  Goal: Verbalizes/displays adequate comfort level or baseline comfort level  Description: Interventions:  - Encourage patient to monitor pain and request assistance  - Assess pain using appropriate pain scale  - Administer analgesics based on type and severity of pain and evaluate response  - Implement non-pharmacological measures as appropriate and evaluate response  - Consider cultural and social influences on pain and pain management  - Notify physician/advanced practitioner if interventions unsuccessful or patient reports new pain  Outcome: Progressing     Problem: INFECTION - ADULT  Goal: Absence or prevention of progression during hospitalization  Description: INTERVENTIONS:  - Assess and monitor for signs and symptoms of infection  - Monitor lab/diagnostic results  - Monitor all insertion sites, i e  indwelling lines, tubes, and drains  - Monitor endotracheal if appropriate and nasal secretions for changes in amount and color  - [place] appropriate cooling/warming therapies per order  - Administer medications as ordered  - Instruct and encourage patient and family to use good hand hygiene technique  - Identify and instruct in appropriate isolation precautions for identified infection/condition  Outcome: Progressing  Goal: Absence of fever/infection during neutropenic period  Description: INTERVENTIONS:  - Monitor WBC    Outcome: Progressing

## 2022-12-07 NOTE — PROGRESS NOTES
[facility]  Progress Note - Jonny Nguyen 2/17/1924, 80 y o  female MRN: [id number]  Unit/Bed#: -01 Encounter: [id number]  Primary Care Provider: Miguel James MD   Date and time admitted to hospital: 12/2/2022  1:18 PM    * MARIA DEL CARMEN (acute kidney injury) [facility])  Assessment & Plan  · Patient presented to the ED with complaints of difficulty urinating  · Patient was found to have a creatinine of 1 9 on admission with a baseline creatinine between 1 2-1 4  · Now improved s/p mahan and IVF  · Failed voiding trial inpatient, Mahan reinserted  · Urology consulted, appreciate input   Patient will follow up with urology in office for voiding trial     Urinary obstruction  Assessment & Plan  · Patient came in with urinary obstruction, has indwelling Mahan catheter  · Likely 2/2 urinary tract infection, continue antibiotic and see if it improves  · Failed void trial, continue Mahan trial tomorrow, voiding trial tomorrow if okayed by urology  · Urology consult    UTI (urinary tract infection)  Assessment & Plan  · Urine culture shows Citrobacter and Klebsiella  · Cefepime to be continued, day 3/3  · Denies any symptoms of UTI today    Acute on chronic diastolic (congestive) heart failure (HCC)  Assessment & Plan  Wt Readings from Last 3 Encounters:   12/07/22 66 3 kg (146 lb 2 6 oz)   12/02/22 59 5 kg (131 lb 3 2 oz)   10/20/22 55 4 kg (122 lb 3 2 oz)     · Echo in 2021 showed EF 60%  · Patient developed SOB with hypoxia requiring O2 supplement likely secondary to IV fluid and holding home diuretics for MARIA DEL CARMEN on admission   · IV fluid discontinued  · Continue with IV lasix 40 mg bid  · Monitor I/Os and daily weights         Essential hypertension  Assessment & Plan  Blood Pressure: 139/69      Stable pressures  · Continue on home Metoprolol and Cardizem    Paroxysmal atrial fibrillation (HCC)  Assessment & Plan  · Rate controlled, C/w home toprol and diltiazem  · C w eliquis for anticoagulation      VTE Pharmacologic Prophylaxis:   Pharmacologic: Apixaban (Eliquis)  Mechanical VTE Prophylaxis in Place: Yes    Review of Systems:    Review of Systems   Constitutional: Negative for chills and fever  HENT: Negative for ear pain and sore throat  Eyes: Negative for pain and visual disturbance  Respiratory: Positive for shortness of breath  Negative for cough  Cardiovascular: Negative for chest pain and palpitations  Gastrointestinal: Negative for abdominal pain and vomiting  Genitourinary: Negative for dysuria and hematuria  Musculoskeletal: Negative for arthralgias and back pain  Skin: Negative for color change and rash  Neurological: Negative for seizures and syncope  All other systems reviewed and are negative  Past Medical and Surgical History:     Past Medical History:   Diagnosis Date   • Anemia    • Asteatotic eczema    • Chronic kidney disease    • Hypercholesterolemia    • Lichen sclerosus et atrophicus    • Mitral valve insufficiency    • Nosebleed    • Seborrheic keratoses    • Tricuspid regurgitation    • Vertigo        Past Surgical History:   Procedure Laterality Date   • APPENDECTOMY  11/14/1939   • CATARACT EXTRACTION, BILATERAL  04/2019   • HYSTERECTOMY  11/14/1962   • ORIF HIP FRACTURE Left 2020   • MO COLONOSCOPY FLX DX W/COLLJ SPEC WHEN PFRMD N/A 5/19/2016    Procedure: EGD AND COLONOSCOPY;  Surgeon: Ana Brewer MD;  Location: [facility]; Service: Gastroenterology       Patient Centered Rounds: I have performed bedside rounds with nursing staff today  Discussions with Specialists or Other Care Team Provider: Nursing, CM    Education and Discussions with Family / Patient: Patient and her daughter     Time Spent for Care: 1 hour  More than 50% of total time spent on counseling and coordination of care as described above      Current Length of Stay: 4 day(s)    Current Patient Status: Inpatient   Certification Statement: The patient will continue to require additional inpatient hospital stay due to IV diurestics     Discharge Plan: likely next 24-48 hours to home with home health     Code Status: Level 1 - Full Code      Subjective:   Reports SOB     Objective:     Vitals:   Temp (24hrs), Av 5 °F (36 4 °C), Min:97 5 °F (36 4 °C), Max:97 5 °F (36 4 °C)    Temp:  [97 5 °F (36 4 °C)] 97 5 °F (36 4 °C)  HR:  [60-86] 60  Resp:  [20] 20  BP: (109-139)/(68-92) 139/69  SpO2:  [94 %-96 %] 94 %  Body mass index is 29 52 kg/m²  Input and Output Summary (last 24 hours): Intake/Output Summary (Last 24 hours) at 2022 1544  Last data filed at 2022 1448  Gross per 24 hour   Intake 970 ml   Output 2800 ml   Net -1830 ml       Physical Exam:     Physical Exam  Vitals and nursing note reviewed  Constitutional:       General: She is not in acute distress  Appearance: She is well-developed  She is not ill-appearing or diaphoretic  Interventions: Nasal cannula in place  HENT:      Head: Normocephalic and atraumatic  Eyes:      General: No scleral icterus  Conjunctiva/sclera: Conjunctivae normal    Cardiovascular:      Rate and Rhythm: Normal rate and regular rhythm  Heart sounds: No murmur heard  Pulmonary:      Effort: Pulmonary effort is normal  No respiratory distress  Breath sounds: Rales present  Abdominal:      General: Bowel sounds are normal       Palpations: Abdomen is soft  Tenderness: There is no abdominal tenderness  Musculoskeletal:         General: No swelling  Cervical back: Normal range of motion and neck supple  Right lower leg: No edema  Left lower leg: No edema  Skin:     General: Skin is warm and dry  Capillary Refill: Capillary refill takes less than 2 seconds  Neurological:      General: No focal deficit present  Mental Status: She is alert and oriented to person, place, and time     Psychiatric:         Mood and Affect: Mood normal          Behavior: Behavior normal            Additional Data:     Labs:    Results from last 7 days   Lab Units 12/07/22  0529 12/06/22  0502   WBC Thousand/uL 6 55 7 55   HEMOGLOBIN g/dL 9 9* 10 1*   HEMATOCRIT % 33 5* 34 8   PLATELETS Thousands/uL 169 191   NEUTROS PCT %  --  79*   LYMPHS PCT %  --  5*   MONOS PCT %  --  11   EOS PCT %  --  3     Results from last 7 days   Lab Units 12/07/22  0529 12/06/22  0502   SODIUM mmol/L 141 137   POTASSIUM mmol/L 4 3 4 5   CHLORIDE mmol/L 106 106   CO2 mmol/L 24 21   BUN mg/dL 22 20   CREATININE mg/dL 1 24 1 12   ANION GAP mmol/L 11 10   CALCIUM mg/dL 8 4 8 0*   ALBUMIN g/dL  --  3 4*   TOTAL BILIRUBIN mg/dL  --  0 45   ALK PHOS U/L  --  87   ALT U/L  --  19   AST U/L  --  24   GLUCOSE RANDOM mg/dL 92 132                           * I Have Reviewed All Lab Data Listed Above  * Additional Pertinent Lab Tests Reviewed:  [facility] [facility] Admission Reviewed    Imaging:    Imaging Reports Reviewed Today Include: CXR  Imaging Personally Reviewed by Myself Includes:  none    Recent Cultures (last 7 days):     Results from last 7 days   Lab Units 12/02/22  1434   URINE CULTURE  >100,000 cfu/ml Citrobacter freundii*  >100,000 cfu/ml Klebsiella oxytoca*       Last 24 Hours Medication List:   Current Facility-Administered Medications   Medication Dose Route Frequency Provider Last Rate   • acetaminophen  650 mg Oral Q6H PRN Darren Davis MD     • albuterol  2 5 mg Nebulization Q6H PRN Laura Fitch MD     • apixaban  2 5 mg Oral BID Darren Davis MD     • cefepime  500 mg Intravenous Q24H Laura Fitch MD     • diltiazem  60 mg Oral Q12H [address] Darren Davis MD     • furosemide  40 mg Intravenous BID (diuretic) Laura Fitch MD     • guaiFENesin  600 mg Oral Q12H [address] Laura Fitch MD     • metoprolol succinate  100 mg Oral Daily Nancy Bradley MD          Today, Patient Was Seen By: Oscar Martinez MD    ** Please Note: Dictation voice to text software may have been used in the creation of this document   **

## 2022-12-08 LAB
ANION GAP SERPL CALCULATED.3IONS-SCNC: 9 MMOL/L (ref 4–13)
BUN SERPL-MCNC: 20 MG/DL (ref 5–25)
CALCIUM SERPL-MCNC: 8.3 MG/DL (ref 8.3–10.1)
CHLORIDE SERPL-SCNC: 105 MMOL/L (ref 96–108)
CO2 SERPL-SCNC: 26 MMOL/L (ref 21–32)
CREAT SERPL-MCNC: 1.19 MG/DL (ref 0.6–1.3)
ERYTHROCYTE [DISTWIDTH] IN BLOOD BY AUTOMATED COUNT: 16.5 % (ref 11.6–15.1)
GFR SERPL CREATININE-BSD FRML MDRD: 38 ML/MIN/1.73SQ M
GLUCOSE SERPL-MCNC: 104 MG/DL (ref 65–140)
HCT VFR BLD AUTO: 32.5 % (ref 34.8–46.1)
HGB BLD-MCNC: 9.7 G/DL (ref 11.5–15.4)
MCH RBC QN AUTO: 27.6 PG (ref 26.8–34.3)
MCHC RBC AUTO-ENTMCNC: 29.8 G/DL (ref 31.4–37.4)
MCV RBC AUTO: 92 FL (ref 82–98)
PLATELET # BLD AUTO: 167 THOUSANDS/UL (ref 149–390)
PMV BLD AUTO: 11.4 FL (ref 8.9–12.7)
POTASSIUM SERPL-SCNC: 4.1 MMOL/L (ref 3.5–5.3)
RBC # BLD AUTO: 3.52 MILLION/UL (ref 3.81–5.12)
SODIUM SERPL-SCNC: 140 MMOL/L (ref 135–147)
WBC # BLD AUTO: 7.76 THOUSAND/UL (ref 4.31–10.16)

## 2022-12-08 RX ORDER — FUROSEMIDE 10 MG/ML
40 INJECTION INTRAMUSCULAR; INTRAVENOUS
Status: DISCONTINUED | OUTPATIENT
Start: 2022-12-08 | End: 2022-12-09

## 2022-12-08 RX ORDER — GUAIFENESIN 600 MG/1
1200 TABLET, EXTENDED RELEASE ORAL EVERY 12 HOURS SCHEDULED
Status: DISCONTINUED | OUTPATIENT
Start: 2022-12-08 | End: 2022-12-16 | Stop reason: HOSPADM

## 2022-12-08 RX ADMIN — GUAIFENESIN 1200 MG: 600 TABLET ORAL at 21:19

## 2022-12-08 RX ADMIN — METOPROLOL SUCCINATE 100 MG: 100 TABLET, EXTENDED RELEASE ORAL at 10:06

## 2022-12-08 RX ADMIN — DILTIAZEM HYDROCHLORIDE 60 MG: 60 CAPSULE, EXTENDED RELEASE ORAL at 10:07

## 2022-12-08 RX ADMIN — FUROSEMIDE 40 MG: 10 INJECTION, SOLUTION INTRAMUSCULAR; INTRAVENOUS at 19:38

## 2022-12-08 RX ADMIN — APIXABAN 2.5 MG: 2.5 TABLET, FILM COATED ORAL at 18:23

## 2022-12-08 RX ADMIN — GUAIFENESIN 1200 MG: 600 TABLET ORAL at 10:06

## 2022-12-08 RX ADMIN — FUROSEMIDE 40 MG: 10 INJECTION, SOLUTION INTRAMUSCULAR; INTRAVENOUS at 13:18

## 2022-12-08 RX ADMIN — APIXABAN 2.5 MG: 2.5 TABLET, FILM COATED ORAL at 10:06

## 2022-12-08 RX ADMIN — DILTIAZEM HYDROCHLORIDE 60 MG: 60 CAPSULE, EXTENDED RELEASE ORAL at 21:19

## 2022-12-08 RX ADMIN — FUROSEMIDE 40 MG: 10 INJECTION, SOLUTION INTRAMUSCULAR; INTRAVENOUS at 08:29

## 2022-12-08 NOTE — ASSESSMENT & PLAN NOTE
· Urine culture shows Citrobacter and Klebsiella  · Completed course of antibiotics  · Denies any symptoms of UTI today

## 2022-12-08 NOTE — PROGRESS NOTES
[facility]  Progress Note - Kusilvak Fernando 2/17/1924, 80 y o  female MRN: [id number]  Unit/Bed#: -01 Encounter: [id number]  Primary Care Provider: Kusum James MD   Date and time admitted to hospital: 12/2/2022  1:18 PM    * MARIA DEL CARMEN (acute kidney injury) [facility])  Assessment & Plan  · Patient presented to the ED with complaints of difficulty urinating  · Patient was found to have a creatinine of 1 9 on admission with a baseline creatinine between 1 2-1 4  · Now improved s/p mahan and IVF and back to baseline  · Failed voiding trial inpatient, Mahan reinserted  · Urology consulted, appreciate input   Patient will follow up with urology in office for voiding trial   · Monitor creatinine while on IV Lasix    Urinary obstruction  Assessment & Plan  · Patient came in with urinary obstruction, has indwelling Mahan catheter  · Likely 2/2 urinary tract infection, continue antibiotic and see if it improves  · Failed void trial, continue Mahan trial tomorrow, voiding trial tomorrow if okayed by urology  · Urology consult    UTI (urinary tract infection)  Assessment & Plan  · Urine culture shows Citrobacter and Klebsiella  · Completed course of antibiotics  · Denies any symptoms of UTI today    Acute on chronic diastolic (congestive) heart failure ([address] )  Assessment & Plan  Wt Readings from Last 3 Encounters:   12/08/22 62 kg (136 lb 11 oz)   12/02/22 59 5 kg (131 lb 3 2 oz)   10/20/22 55 4 kg (122 lb 3 2 oz)     · Echo in 2021 showed EF 60%  · Patient developed SOB with hypoxia requiring O2 supplement likely secondary to IV fluid and holding home diuretics for MARIA DEL CARMEN on admission   · IV fluid discontinued  · Continue with IV lasix 40 mg and increase frequency to 3 times daily  · Monitor I/Os and daily weights         Essential hypertension  Assessment & Plan  Blood Pressure: 142/69    Stable pressures  · Continue on home Metoprolol and Cardizem    Paroxysmal atrial fibrillation ([address] )  Assessment & Plan  · Rate controlled, C/w home toprol and diltiazem  · C w eliquis for anticoagulation      VTE Pharmacologic Prophylaxis:   Pharmacologic: Apixaban (Eliquis)  Mechanical VTE Prophylaxis in Place: Yes    Review of Systems:    Review of Systems   Constitutional: Negative for chills and fever  HENT: Negative for ear pain and sore throat  Eyes: Negative for pain and visual disturbance  Respiratory: Positive for shortness of breath  Negative for cough  Cardiovascular: Negative for chest pain and palpitations  Gastrointestinal: Negative for abdominal pain and vomiting  Genitourinary: Negative for dysuria and hematuria  Musculoskeletal: Negative for arthralgias and back pain  Skin: Negative for color change and rash  Neurological: Negative for seizures and syncope  All other systems reviewed and are negative  Past Medical and Surgical History:     Past Medical History:   Diagnosis Date   • Anemia    • Asteatotic eczema    • Chronic kidney disease    • Hypercholesterolemia    • Lichen sclerosus et atrophicus    • Mitral valve insufficiency    • Nosebleed    • Seborrheic keratoses    • Tricuspid regurgitation    • Vertigo        Past Surgical History:   Procedure Laterality Date   • APPENDECTOMY  11/14/1939   • CATARACT EXTRACTION, BILATERAL  04/2019   • HYSTERECTOMY  11/14/1962   • ORIF HIP FRACTURE Left 2020   • [place] COLONOSCOPY FLX DX W/COLLJ SPEC WHEN PFRMD N/A 5/19/2016    Procedure: EGD AND COLONOSCOPY;  Surgeon: Sirena Puri MD;  [facility]; Service: Gastroenterology       Patient Centered Rounds: I have performed bedside rounds with nursing staff today  Discussions with Specialists or Other Care Team Provider: Nursing, case management    Education and Discussions with Family / Patient: Patient and spoke with patient's daughter and all questions answered  Time Spent for Care: 54    More than 50% of total time spent on counseling and coordination of care as described above     Current Length of Stay: 5 day(s)    Current Patient Status: Inpatient   Certification Statement: The patient will continue to require additional inpatient hospital stay due to IV diuresing    Discharge Plan: Possibly the next 48 hours    Code Status: Level 1 - Full Code      Subjective:   Patient still complains of shortness of breath    Objective:     Vitals:   Temp (24hrs), Av 6 °F (36 4 °C), Min:97 4 °F (36 3 °C), Max:98 1 °F (36 7 °C)    Temp:  [97 4 °F (36 3 °C)-98 1 °F (36 7 °C)] 98 1 °F (36 7 °C)  HR:  [60-71] 71  Resp:  [18-20] 18  BP: (139-161)/(69-70) 142/69  SpO2:  [88 %-96 %] 88 %  Body mass index is 27 61 kg/m²  Input and Output Summary (last 24 hours): Intake/Output Summary (Last 24 hours) at 2022 1216  Last data filed at 2022 1145  Gross per 24 hour   Intake 720 ml   Output 1500 ml   Net -780 ml       Physical Exam:     Physical Exam  Vitals and nursing note reviewed  Constitutional:       General: She is not in acute distress  Appearance: She is well-developed  She is not ill-appearing or diaphoretic  HENT:      Head: Normocephalic and atraumatic  Mouth/Throat:      Mouth: Mucous membranes are moist       Pharynx: Oropharynx is clear  Eyes:      General: No scleral icterus  Conjunctiva/sclera: Conjunctivae normal    Cardiovascular:      Rate and Rhythm: Normal rate and regular rhythm  Heart sounds: No murmur heard  Pulmonary:      Effort: Pulmonary effort is normal  No respiratory distress  Breath sounds: Wheezing and rales present  Abdominal:      General: Bowel sounds are normal       Palpations: Abdomen is soft  Tenderness: There is no abdominal tenderness  Musculoskeletal:         General: No swelling  Cervical back: Normal range of motion and neck supple  Right lower leg: Edema present  Left lower leg: Edema present  Skin:     General: Skin is warm and dry        Capillary Refill: Capillary refill takes less than 2 seconds  Neurological:      General: No focal deficit present  Mental Status: She is alert and oriented to person, place, and time  Psychiatric:         Mood and Affect: Mood normal          Behavior: Behavior normal            Additional Data:     Labs:    Results from last 7 days   Lab Units 12/08/22 0525 12/07/22  0529 12/06/22  0502   WBC Thousand/uL 7 76   < > 7 55   HEMOGLOBIN g/dL 9 7*   < > 10 1*   HEMATOCRIT % 32 5*   < > 34 8   PLATELETS Thousands/uL 167   < > 191   NEUTROS PCT %  --   --  79*   LYMPHS PCT %  --   --  5*   MONOS PCT %  --   --  11   EOS PCT %  --   --  3    < > = values in this interval not displayed  Results from last 7 days   Lab Units 12/08/22 0525 12/07/22  0529 12/06/22  0502   SODIUM mmol/L 140   < > 137   POTASSIUM mmol/L 4 1   < > 4 5   CHLORIDE mmol/L 105   < > 106   CO2 mmol/L 26   < > 21   BUN mg/dL 20   < > 20   CREATININE mg/dL 1 19   < > 1 12   ANION GAP mmol/L 9   < > 10   CALCIUM mg/dL 8 3   < > 8 0*   ALBUMIN g/dL  --   --  3 4*   TOTAL BILIRUBIN mg/dL  --   --  0 45   ALK PHOS U/L  --   --  87   ALT U/L  --   --  19   AST U/L  --   --  24   GLUCOSE RANDOM mg/dL 104   < > 132    < > = values in this interval not displayed  * I Have Reviewed All Lab Data Listed Above  * Additional Pertinent Lab Tests Reviewed:  [facility] Admission Reviewed    Imaging:    Imaging Reports Reviewed Today Include: Chest x-ray  Imaging Personally Reviewed by Myself Includes: None    Recent Cultures (last 7 days):     Results from last 7 days   Lab Units 12/02/22  1434   URINE CULTURE  >100,000 cfu/ml Citrobacter freundii*  >100,000 cfu/ml Klebsiella oxytoca*       Last 24 Hours Medication List:   Current Facility-Administered Medications   Medication Dose Route Frequency Provider Last Rate   • acetaminophen  650 mg Oral Q6H PRN Darren Davis MD     • albuterol  2 5 mg Nebulization Q6H PRN Rosi Hernandez MD     • apixaban  2 5 mg Oral BID Darren Davis MD     • diltiazem  60 mg Oral Q12H [address] Darren Davis MD     • furosemide  40 mg Intravenous TID (diuretic) Melissa Akins MD     • guaiFENesin  1,200 mg Oral Q12H [address] Melissa Akins MD     • metoprolol succinate  100 mg Oral Daily Marina Arriaza MD          Today, Patient Was Seen By: Martina Daley MD    ** Please Note: Dictation voice to text software may have been used in the creation of this document   **

## 2022-12-08 NOTE — ASSESSMENT & PLAN NOTE
· Patient presented to the ED with complaints of difficulty urinating  · Patient was found to have a creatinine of 1 9 on admission with a baseline creatinine between 1 2-1 4  · Now improved s/p mahan and IVF and back to baseline  · Failed voiding trial inpatient, Mahan reinserted  · Urology consulted, appreciate input   Patient will follow up with urology in office for voiding trial   · Monitor creatinine while on IV Lasix

## 2022-12-08 NOTE — PLAN OF CARE
Problem: Potential for Falls  Goal: Patient will remain free of falls  Description: INTERVENTIONS:  - Educate patient/family on patient safety including physical limitations  - Instruct patient to call for assistance with activity   - Consult OT/PT to assist with strengthening/mobility   - Keep Call bell within reach  - Keep bed low and locked with side rails adjusted as appropriate  - Keep care items and personal belongings within reach  - Initiate and maintain comfort rounds  - Make Fall Risk Sign visible to staff  - Offer Toileting every  Hours, in advance of need  - Initiate/Maintain alarm  - Obtain necessary fall risk management equipment:   - Apply yellow socks and bracelet for high fall risk patients  - Consider moving patient to room near nurses station  Outcome: Progressing     Problem: PAIN - ADULT  Goal: Verbalizes/displays adequate comfort level or baseline comfort level  Description: Interventions:  - Encourage patient to monitor pain and request assistance  - Assess pain using appropriate pain scale  - Administer analgesics based on type and severity of pain and evaluate response  - Implement non-pharmacological measures as appropriate and evaluate response  - Consider cultural and social influences on pain and pain management  - Notify physician/advanced practitioner if interventions unsuccessful or patient reports new pain  Outcome: Progressing     Problem: INFECTION - ADULT  Goal: Absence or prevention of progression during hospitalization  Description: INTERVENTIONS:  - Assess and monitor for signs and symptoms of infection  - Monitor lab/diagnostic results  - Monitor all insertion sites, i e  indwelling lines, tubes, and drains  - Monitor endotracheal if appropriate and nasal secretions for changes in amount and color  - Adams appropriate cooling/warming therapies per order  - Administer medications as ordered  - Instruct and encourage patient and family to use good hand hygiene technique  - Identify and instruct in appropriate isolation precautions for identified infection/condition  Outcome: Progressing  Goal: Absence of fever/infection during neutropenic period  Description: INTERVENTIONS:  - Monitor WBC    Outcome: Progressing     Problem: SAFETY ADULT  Goal: Patient will remain free of falls  Description: INTERVENTIONS:  - Educate patient/family on patient safety including physical limitations  - Instruct patient to call for assistance with activity   - Consult OT/PT to assist with strengthening/mobility   - Keep Call bell within reach  - Keep bed low and locked with side rails adjusted as appropriate  - Keep care items and personal belongings within reach  - Initiate and maintain comfort rounds  - Make Fall Risk Sign visible to staff  - Offer Toileting every  Hours, in advance of need  - Initiate/Maintain alarm  - Obtain necessary fall risk management equipment:   - Apply yellow socks and bracelet for high fall risk patients  - Consider moving patient to room near nurses station  Outcome: Progressing  Goal: Maintain or return to baseline ADL function  Description: INTERVENTIONS:  -  Assess patient's ability to carry out ADLs; assess patient's baseline for ADL function and identify physical deficits which impact ability to perform ADLs (bathing, care of mouth/teeth, toileting, grooming, dressing, etc )  - Assess/evaluate cause of self-care deficits   - Assess range of motion  - Assess patient's mobility; develop plan if impaired  - Assess patient's need for assistive devices and provide as appropriate  - Encourage maximum independence but intervene and supervise when necessary  - Involve family in performance of ADLs  - Assess for home care needs following discharge   - Consider OT consult to assist with ADL evaluation and planning for discharge  - Provide patient education as appropriate  Outcome: Progressing  Goal: Maintains/Returns to pre admission functional level  Description: INTERVENTIONS:  - Perform BMAT or MOVE assessment daily    - Set and communicate daily mobility goal to care team and patient/family/caregiver  - Collaborate with rehabilitation services on mobility goals if consulted  - Perform Range of Motion  times a day  - Reposition patient every  hours  - Dangle patient  times a day  - Stand patient  times a day  - Ambulate patient  times a day  - Out of bed to chair  times a day   - Out of bed for meals  times a day  - Out of bed for toileting  - Record patient progress and toleration of activity level   Outcome: Progressing     Problem: DISCHARGE PLANNING  Goal: Discharge to home or other facility with appropriate resources  Description: INTERVENTIONS:  - Identify barriers to discharge w/patient and caregiver  - Arrange for needed discharge resources and transportation as appropriate  - Identify discharge learning needs (meds, wound care, etc )  - Arrange for interpretive services to assist at discharge as needed  - Refer to Case Management Department for coordinating discharge planning if the patient needs post-hospital services based on physician/advanced practitioner order or complex needs related to functional status, cognitive ability, or social support system  Outcome: Progressing     Problem: Knowledge Deficit  Goal: Patient/family/caregiver demonstrates understanding of disease process, treatment plan, medications, and discharge instructions  Description: Complete learning assessment and assess knowledge base    Interventions:  - Provide teaching at level of understanding  - Provide teaching via preferred learning methods  Outcome: Progressing     Problem: MOBILITY - ADULT  Goal: Maintain or return to baseline ADL function  Description: INTERVENTIONS:  -  Assess patient's ability to carry out ADLs; assess patient's baseline for ADL function and identify physical deficits which impact ability to perform ADLs (bathing, care of mouth/teeth, toileting, grooming, dressing, etc )  - Assess/evaluate cause of self-care deficits   - Assess range of motion  - Assess patient's mobility; develop plan if impaired  - Assess patient's need for assistive devices and provide as appropriate  - Encourage maximum independence but intervene and supervise when necessary  - Involve family in performance of ADLs  - Assess for home care needs following discharge   - Consider OT consult to assist with ADL evaluation and planning for discharge  - Provide patient education as appropriate  Outcome: Progressing  Goal: Maintains/Returns to pre admission functional level  Description: INTERVENTIONS:  - Perform BMAT or MOVE assessment daily    - Set and communicate daily mobility goal to care team and patient/family/caregiver  - Collaborate with rehabilitation services on mobility goals if consulted  - Perform Range of Motion  times a day  - Reposition patient every  hours    - Dangle patient  times a day  - Stand patient  times a day  - Ambulate patient  times a day  - Out of bed to chair  times a day   - Out of bed for meals times a day  - Out of bed for toileting  - Record patient progress and toleration of activity level   Outcome: Progressing     Problem: GENITOURINARY - ADULT  Goal: Maintains or returns to baseline urinary function  Description: INTERVENTIONS:  - Assess urinary function  - Encourage oral fluids to ensure adequate hydration if ordered  - Administer IV fluids as ordered to ensure adequate hydration  - Administer ordered medications as needed  - Offer frequent toileting  - Follow urinary retention protocol if ordered  Outcome: Progressing  Goal: Absence of urinary retention  Description: INTERVENTIONS:  - Assess patient’s ability to void and empty bladder  - Monitor I/O  - Bladder scan as needed  - Discuss with physician/AP medications to alleviate retention as needed  - Discuss catheterization for long term situations as appropriate  Outcome: Progressing  Goal: Urinary catheter remains patent  Description: INTERVENTIONS:  - Assess patency of urinary catheter  - If patient has a chronic mahan, consider changing catheter if non-functioning  - Follow guidelines for intermittent irrigation of non-functioning urinary catheter  Outcome: Progressing

## 2022-12-08 NOTE — ASSESSMENT & PLAN NOTE
Wt Readings from Last 3 Encounters:   12/08/22 62 kg (136 lb 11 oz)   12/02/22 59 5 kg (131 lb 3 2 oz)   10/20/22 55 4 kg (122 lb 3 2 oz)     · Echo in 2021 showed EF 60%  · Patient developed SOB with hypoxia requiring O2 supplement likely secondary to IV fluid and holding home diuretics for MARIA DEL CARMEN on admission   · IV fluid discontinued  · Continue with IV lasix 40 mg and increase frequency to 3 times daily  · Monitor I/Os and daily weights

## 2022-12-08 NOTE — PLAN OF CARE
Problem: Potential for Falls  Goal: Patient will remain free of falls  Description: INTERVENTIONS:  - Educate patient/family on patient safety including physical limitations  - Instruct patient to call for assistance with activity   - Consult OT/PT to assist with strengthening/mobility   - Keep Call bell within reach  - Keep bed low and locked with side rails adjusted as appropriate  - Keep care items and personal belongings within reach  - Initiate and maintain comfort rounds  - Make Fall Risk Sign visible to staff  - Offer Toileting every 2 Hours, in advance of need  - Initiate/Maintain bedalarm  - Obtain necessary fall risk management equipment:   - Apply yellow socks and bracelet for high fall risk patients  - Consider moving patient to room near nurses station  Outcome: Progressing     Problem: SAFETY ADULT  Goal: Patient will remain free of falls  Description: INTERVENTIONS:  - Educate patient/family on patient safety including physical limitations  - Instruct patient to call for assistance with activity   - Consult OT/PT to assist with strengthening/mobility   - Keep Call bell within reach  - Keep bed low and locked with side rails adjusted as appropriate  - Keep care items and personal belongings within reach  - Initiate and maintain comfort rounds  - Make Fall Risk Sign visible to staff  - Offer Toileting every 2 Hours, in advance of need  - Initiate/Maintain bedalarm  - Obtain necessary fall risk management equipment:   - Apply yellow socks and bracelet for high fall risk patients  - Consider moving patient to room near nurses station  Outcome: Progressing  Goal: Maintain or return to baseline ADL function  Description: INTERVENTIONS:  -  Assess patient's ability to carry out ADLs; assess patient's baseline for ADL function and identify physical deficits which impact ability to perform ADLs (bathing, care of mouth/teeth, toileting, grooming, dressing, etc )  - Assess/evaluate cause of self-care deficits - Assess range of motion  - Assess patient's mobility; develop plan if impaired  - Assess patient's need for assistive devices and provide as appropriate  - Encourage maximum independence but intervene and supervise when necessary  - Involve family in performance of ADLs  - Assess for home care needs following discharge   - Consider OT consult to assist with ADL evaluation and planning for discharge  - Provide patient education as appropriate  Outcome: Progressing  Goal: Maintains/Returns to pre admission functional level  Description: INTERVENTIONS:  - Perform BMAT or MOVE assessment daily    - Set and communicate daily mobility goal to care team and patient/family/caregiver  - Collaborate with rehabilitation services on mobility goals if consulted  - Perform Range of Motion   - Reposition patient every 2 hours    - Dangle patient   - Stand patient   - Ambulate patient   - Out of bed to chair    - Out of bed for meals   - Out of bed for toileting  - Record patient progress and toleration of activity level   Outcome: Progressing

## 2022-12-09 PROBLEM — J96.01 ACUTE RESPIRATORY FAILURE WITH HYPOXIA (HCC): Status: ACTIVE | Noted: 2022-12-09

## 2022-12-09 LAB
ANION GAP SERPL CALCULATED.3IONS-SCNC: 9 MMOL/L (ref 4–13)
BUN SERPL-MCNC: 18 MG/DL (ref 5–25)
CALCIUM SERPL-MCNC: 8.4 MG/DL (ref 8.3–10.1)
CHLORIDE SERPL-SCNC: 100 MMOL/L (ref 96–108)
CO2 SERPL-SCNC: 30 MMOL/L (ref 21–32)
CREAT SERPL-MCNC: 1.07 MG/DL (ref 0.6–1.3)
GFR SERPL CREATININE-BSD FRML MDRD: 43 ML/MIN/1.73SQ M
GLUCOSE SERPL-MCNC: 96 MG/DL (ref 65–140)
POTASSIUM SERPL-SCNC: 3.9 MMOL/L (ref 3.5–5.3)
SODIUM SERPL-SCNC: 139 MMOL/L (ref 135–147)

## 2022-12-09 RX ORDER — FUROSEMIDE 10 MG/ML
40 INJECTION INTRAMUSCULAR; INTRAVENOUS
Status: DISCONTINUED | OUTPATIENT
Start: 2022-12-09 | End: 2022-12-09

## 2022-12-09 RX ORDER — FLUTICASONE PROPIONATE 50 MCG
1 SPRAY, SUSPENSION (ML) NASAL DAILY
Status: DISCONTINUED | OUTPATIENT
Start: 2022-12-09 | End: 2022-12-16 | Stop reason: HOSPADM

## 2022-12-09 RX ORDER — METHYLPREDNISOLONE SODIUM SUCCINATE 40 MG/ML
40 INJECTION, POWDER, LYOPHILIZED, FOR SOLUTION INTRAMUSCULAR; INTRAVENOUS EVERY 12 HOURS SCHEDULED
Status: DISCONTINUED | OUTPATIENT
Start: 2022-12-09 | End: 2022-12-12

## 2022-12-09 RX ORDER — COLCHICINE 0.6 MG/1
0.6 TABLET ORAL ONCE
Status: COMPLETED | OUTPATIENT
Start: 2022-12-09 | End: 2022-12-09

## 2022-12-09 RX ADMIN — COLCHICINE 0.6 MG: 0.6 TABLET ORAL at 09:56

## 2022-12-09 RX ADMIN — METOPROLOL SUCCINATE 100 MG: 100 TABLET, EXTENDED RELEASE ORAL at 08:47

## 2022-12-09 RX ADMIN — APIXABAN 2.5 MG: 2.5 TABLET, FILM COATED ORAL at 17:54

## 2022-12-09 RX ADMIN — FLUTICASONE PROPIONATE 1 SPRAY: 50 SPRAY, METERED NASAL at 17:54

## 2022-12-09 RX ADMIN — ALBUTEROL SULFATE 2.5 MG: 2.5 SOLUTION RESPIRATORY (INHALATION) at 20:46

## 2022-12-09 RX ADMIN — GUAIFENESIN 1200 MG: 600 TABLET ORAL at 08:47

## 2022-12-09 RX ADMIN — GUAIFENESIN 1200 MG: 600 TABLET ORAL at 20:44

## 2022-12-09 RX ADMIN — FUROSEMIDE 40 MG: 10 INJECTION, SOLUTION INTRAMUSCULAR; INTRAVENOUS at 05:49

## 2022-12-09 RX ADMIN — APIXABAN 2.5 MG: 2.5 TABLET, FILM COATED ORAL at 08:47

## 2022-12-09 RX ADMIN — DILTIAZEM HYDROCHLORIDE 60 MG: 60 CAPSULE, EXTENDED RELEASE ORAL at 20:44

## 2022-12-09 RX ADMIN — METHYLPREDNISOLONE SODIUM SUCCINATE 40 MG: 40 INJECTION, POWDER, FOR SOLUTION INTRAMUSCULAR; INTRAVENOUS at 09:56

## 2022-12-09 RX ADMIN — DILTIAZEM HYDROCHLORIDE 60 MG: 60 CAPSULE, EXTENDED RELEASE ORAL at 08:48

## 2022-12-09 RX ADMIN — METHYLPREDNISOLONE SODIUM SUCCINATE 40 MG: 40 INJECTION, POWDER, FOR SOLUTION INTRAMUSCULAR; INTRAVENOUS at 21:07

## 2022-12-09 RX ADMIN — ALBUTEROL SULFATE 2.5 MG: 2.5 SOLUTION RESPIRATORY (INHALATION) at 09:04

## 2022-12-09 NOTE — PROGRESS NOTES
[facility]  Progress Note - Gladys Moe 2/17/1924, 80 y o  female MRN: [id number]  Unit/Bed#: -01 Encounter: [id number]  Primary Care Provider: Samaria Iraheta MD   Date and time admitted to hospital: 12/2/2022  1:18 PM    * MARIA DEL CARMEN (acute kidney injury) [facility])  Assessment & Plan  · Patient presented to the ED with complaints of difficulty urinating  · Patient was found to have a creatinine of 1 9 on admission with a baseline creatinine between 1 2-1 4  · Now improved s/p mahan and IVF and back to baseline  · Failed voiding trial inpatient, Mahan reinserted  · Urology consulted, appreciate input   Patient will follow up with urology in office for voiding trial   · Monitor creatinine while on IV Lasix    Acute respiratory failure with hypoxia ([facility] [address] )  Assessment & Plan  · Likely secondary to acute on chronic CHF and bronchospasm  · Status post IV diuresing  · Start IV Solu-Medrol  · Respiratory protocol      Urinary obstruction  Assessment & Plan  · Patient came in with urinary obstruction, has indwelling Mahan catheter  · Likely 2/2 urinary tract infection, continue antibiotic and see if it improves  · Failed void trial, continue Mahan trial tomorrow, voiding trial tomorrow if okayed by urology  · Urology consulted, appreciate recommendation     UTI (urinary tract infection)  Assessment & Plan  · Urine culture shows Citrobacter and Klebsiella  · Completed course of antibiotics  · Denies any symptoms of UTI today    Acute on chronic diastolic (congestive) heart failure ([address] )  Assessment & Plan  Wt Readings from Last 3 Encounters:   12/09/22 59 8 kg (131 lb 13 4 oz)   12/02/22 59 5 kg (131 lb 3 2 oz)   10/20/22 55 4 kg (122 lb 3 2 oz)     · Echo in 2021 showed EF 60%  · Patient developed SOB with hypoxia requiring O2 supplement likely secondary to IV fluid and holding home diuretics for MARIA DEL CARMEN on admission   · IV fluid discontinued  · Transition to home dose po Lasix monitor   · I/Os and daily weights         Essential hypertension  Assessment & Plan  Blood Pressure: 156/97    Stable pressures  · Continue on home Metoprolol and Cardizem    Paroxysmal atrial fibrillation (HCC)  Assessment & Plan  · Rate controlled, C/w home toprol and diltiazem  · C w eliquis for anticoagulation      VTE Pharmacologic Prophylaxis:   Pharmacologic: Apixaban (Eliquis)  Mechanical VTE Prophylaxis in Place: Yes    Review of Systems:    Review of Systems   Constitutional: Negative for chills and fever  HENT: Negative for ear pain and sore throat  Eyes: Negative for pain and visual disturbance  Respiratory: Positive for cough and shortness of breath  Cardiovascular: Negative for chest pain and palpitations  Gastrointestinal: Negative for abdominal pain and vomiting  Genitourinary: Negative for dysuria and hematuria  Musculoskeletal: Negative for arthralgias and back pain  Skin: Negative for color change and rash  Neurological: Negative for seizures and syncope  All other systems reviewed and are negative  Past Medical and Surgical History:     Past Medical History:   Diagnosis Date   • Anemia    • Asteatotic eczema    • Chronic kidney disease    • Hypercholesterolemia    • Lichen sclerosus et atrophicus    • Mitral valve insufficiency    • Nosebleed    • Seborrheic keratoses    • Tricuspid regurgitation    • Vertigo        Past Surgical History:   Procedure Laterality Date   • APPENDECTOMY  11/14/1939   • CATARACT EXTRACTION, BILATERAL  04/2019   • HYSTERECTOMY  11/14/1962   • ORIF HIP FRACTURE Left 2020   • VT COLONOSCOPY FLX DX W/COLLJ SPEC WHEN PFRMD N/A 5/19/2016    Procedure: EGD AND COLONOSCOPY;  Surgeon: Dillon Brock MD;  Location: [facility]; Service: Gastroenterology       Patient Centered Rounds: I have performed bedside rounds with nursing staff today      Discussions with Specialists or Other Care Team Provider: Nursing, case management    Education and Discussions with Family / Patient: Patient    Time Spent for Care: 43  More than 50% of total time spent on counseling and coordination of care as described above  Current Length of Stay: 6 day(s)    Current Patient Status: Inpatient   Certification Statement: The patient will continue to require additional inpatient hospital stay due to Requiring IV steroid    Discharge Plan: Likely the next 48-72 hours    Code Status: Level 1 - Full Code      Subjective:   Patient still complains of shortness of breath and wheezing    Objective:     Vitals:   Temp (24hrs), Av 6 °F (36 4 °C), Min:97 1 °F (36 2 °C), Max:98 2 °F (36 8 °C)    Temp:  [97 1 °F (36 2 °C)-98 2 °F (36 8 °C)] 97 1 °F (36 2 °C)  HR:  [70-92] 92  Resp:  [14] 14  BP: (145-156)/(74-97) 156/97  SpO2:  [92 %-97 %] 95 %  Body mass index is 26 63 kg/m²  Input and Output Summary (last 24 hours): Intake/Output Summary (Last 24 hours) at 2022 1445  Last data filed at 2022 1444  Gross per 24 hour   Intake 490 ml   Output 4200 ml   Net -3710 ml       Physical Exam:     Physical Exam  Vitals and nursing note reviewed  Constitutional:       General: She is not in acute distress  Appearance: She is well-developed  She is not ill-appearing or diaphoretic  HENT:      Head: Normocephalic and atraumatic  Mouth/Throat:      Mouth: Mucous membranes are moist       Pharynx: Oropharynx is clear  Eyes:      General: No scleral icterus  Conjunctiva/sclera: Conjunctivae normal    Cardiovascular:      Rate and Rhythm: Normal rate and regular rhythm  Heart sounds: No murmur heard  Pulmonary:      Effort: Pulmonary effort is normal  No respiratory distress  Breath sounds: Wheezing present  No rales  Abdominal:      Palpations: Abdomen is soft  Tenderness: There is no abdominal tenderness  Musculoskeletal:         General: No swelling  Cervical back: Normal range of motion and neck supple  Right lower leg: No edema        Left lower leg: No edema  Skin:     General: Skin is warm and dry  Capillary Refill: Capillary refill takes less than 2 seconds  Neurological:      General: No focal deficit present  Mental Status: She is alert and oriented to person, place, and time  Psychiatric:         Mood and Affect: Mood normal          Behavior: Behavior normal            Additional Data:     Labs:    Results from last 7 days   Lab Units 12/08/22  0525 12/07/22  0529 12/06/22  0502   WBC Thousand/uL 7 76   < > 7 55   HEMOGLOBIN g/dL 9 7*   < > 10 1*   HEMATOCRIT % 32 5*   < > 34 8   PLATELETS Thousands/uL 167   < > 191   NEUTROS PCT %  --   --  79*   LYMPHS PCT %  --   --  5*   MONOS PCT %  --   --  11   EOS PCT %  --   --  3    < > = values in this interval not displayed  Results from last 7 days   Lab Units 12/09/22  0534 12/07/22  0529 12/06/22  0502   SODIUM mmol/L 139   < > 137   POTASSIUM mmol/L 3 9   < > 4 5   CHLORIDE mmol/L 100   < > 106   CO2 mmol/L 30   < > 21   BUN mg/dL 18   < > 20   CREATININE mg/dL 1 07   < > 1 12   ANION GAP mmol/L 9   < > 10   CALCIUM mg/dL 8 4   < > 8 0*   ALBUMIN g/dL  --   --  3 4*   TOTAL BILIRUBIN mg/dL  --   --  0 45   ALK PHOS U/L  --   --  87   ALT U/L  --   --  19   AST U/L  --   --  24   GLUCOSE RANDOM mg/dL 96   < > 132    < > = values in this interval not displayed  * I Have Reviewed All Lab Data Listed Above  * Additional Pertinent Lab Tests Reviewed:  [facility]    Imaging:    Imaging Reports Reviewed Today Include: none  Imaging Personally Reviewed by Myself Includes:  none    Recent Cultures (last 7 days):           Last 24 Hours Medication List:   Current Facility-Administered Medications   Medication Dose Route Frequency Provider Last Rate   • acetaminophen  650 mg Oral Q6H PRN Darren Davis MD     • albuterol  2 5 mg Nebulization Q6H PRN Yamil Manriquez MD     • apixaban  2 5 mg Oral BID Darren Davis MD     • diltiazem  60 mg Oral Q12H [address] Darren Davis MD     • [START ON 12/10/2022] furosemide  60 mg Oral Daily Brad Lozoya MD     • guaiFENesin  1,200 mg Oral Q12H [address] Brad Lozoya MD     • methylPREDNISolone sodium succinate  40 mg Intravenous Q12H [address] Brad Lozoya MD     • metoprolol succinate  100 mg Oral Daily Joe Lopez MD          Today, Patient Was Seen By: Armida Davila MD    ** Please Note: Dictation voice to text software may have been used in the creation of this document   **

## 2022-12-09 NOTE — ASSESSMENT & PLAN NOTE
· Likely secondary to acute on chronic CHF and bronchospasm  · Status post IV diuresing  · Start IV Solu-Medrol  · Respiratory protocol

## 2022-12-09 NOTE — CASE MANAGEMENT
Case Management Discharge Planning Note    Patient name Mohamud Dye  Location /-54 MRN [id number]  : 1924 Date 2022       Current Admission Date: 2022  Current Admission Diagnosis:MARIA DEL CARMEN (acute kidney injury) [facility])   Patient Active Problem List    Diagnosis Date Noted   • Urinary obstruction 2022   • UTI (urinary tract infection) 2022   • Hyponatremia 2022   • Bilateral renal cysts 2022   • Nonrheumatic aortic valve stenosis 2022   • Dyspnea 2021   • Hypoxia 2021   • Acute respiratory distress 2021   • Severe pulmonary hypertension ([address] ) 2021   • Acute kidney injury superimposed on CKD ([facility] [address] ) 2021   • Chronic renal disease, stage IV ([address] ) 2021   • Lower GI bleed 2021   • Seasonal allergic rhinitis due to pollen 10/26/2021   • Iron deficiency anemia 07/15/2021   • Fecal occult blood test positive 07/15/2021   • Anemia 07/15/2021   • Acute on chronic diastolic (congestive) heart failure ([address] ) 2021   • Fracture of right orbital wall ([facility] [address] ) 2020   • Chronic kidney disease-mineral and bone disorder 2019   • Epistaxis    • Diastolic dysfunction 8863   • Anemia due to chronic kidney disease 10/18/2018   • Mitral valve insufficiency 2018   • Hypercholesterolemia 2018   • MARIA DEL CARMEN (acute kidney injury) ([address] ) 2017   • Paroxysmal atrial fibrillation ([address] ) 2017   • Long term current use of anticoagulant 2017   • Chronic kidney disease, stage III (moderate) ([address] ) 2017   • Essential hypertension 2017      LOS (days): 6  Geometric Mean LOS (GMLOS) (days): 3 10  Days to GMLOS:-2 9     OBJECTIVE:  Risk of Unplanned Readmission Score: 18 81         Current admission status: Inpatient   Preferred Pharmacy:   [facility]/pharmacy #[id number] [address] [place],[address]  Phone: [phone] Fax: [phone]    Primary Care Provider: Terence Jules Trini Portillo MD    Primary Insurance: MEDICARE  Secondary Insurance: [facility]    DISCHARGE DETAILS:    Other Referral/Resources/Interventions Provided:  Referral Comments: The patient and family is now agreeable to home care  The accepting home care agency will be [place] [id number] [facility] who is known to the patient  The CM notified the patients daughter Idalmis Mahan and she is agreeable with [facility] [id number] home care  CM will continue to follow

## 2022-12-09 NOTE — ASSESSMENT & PLAN NOTE
Wt Readings from Last 3 Encounters:   12/09/22 59 8 kg (131 lb 13 4 oz)   12/02/22 59 5 kg (131 lb 3 2 oz)   10/20/22 55 4 kg (122 lb 3 2 oz)     · Echo in 2021 showed EF 60%  · Patient developed SOB with hypoxia requiring O2 supplement likely secondary to IV fluid and holding home diuretics for MRAIA DEL CARMEN on admission   · IV fluid discontinued  · Transition to home dose po Lasix monitor   · I/Os and daily weights

## 2022-12-09 NOTE — PLAN OF CARE
Problem: GENITOURINARY - ADULT  Goal: Maintains or returns to baseline urinary function  Description: INTERVENTIONS:  - Assess urinary function  - Encourage oral fluids to ensure adequate hydration if ordered  - Administer IV fluids as ordered to ensure adequate hydration  - Administer ordered medications as needed  - Offer frequent toileting  - Follow urinary retention protocol if ordered  Outcome: Progressing     Problem: GENITOURINARY - ADULT  Goal: Absence of urinary retention  Description: INTERVENTIONS:  - Assess patient’s ability to void and empty bladder  - Monitor I/O  - Bladder scan as needed  - Discuss with physician/AP medications to alleviate retention as needed  - Discuss catheterization for long term situations as appropriate  Outcome: Progressing     Problem: GENITOURINARY - ADULT  Goal: Urinary catheter remains patent  Description: INTERVENTIONS:  - Assess patency of urinary catheter  - If patient has a chronic mahan, consider changing catheter if non-functioning  - Follow guidelines for intermittent irrigation of non-functioning urinary catheter  Outcome: Progressing

## 2022-12-09 NOTE — PLAN OF CARE
Problem: PAIN - ADULT  Goal: Verbalizes/displays adequate comfort level or baseline comfort level  Description: Interventions:  - Encourage patient to monitor pain and request assistance  - Assess pain using appropriate pain scale  - Administer analgesics based on type and severity of pain and evaluate response  - Implement non-pharmacological measures as appropriate and evaluate response  - Consider cultural and social influences on pain and pain management  - Notify physician/advanced practitioner if interventions unsuccessful or patient reports new pain  Outcome: Progressing     Problem: Knowledge Deficit  Goal: Patient/family/caregiver demonstrates understanding of disease process, treatment plan, medications, and discharge instructions  Description: Complete learning assessment and assess knowledge base    Interventions:  - Provide teaching at level of understanding BP results and interpretation    - Provide teaching via preferred learning methods  Outcome: Progressing

## 2022-12-09 NOTE — ASSESSMENT & PLAN NOTE
· Patient came in with urinary obstruction, has indwelling Hunter catheter  · Likely 2/2 urinary tract infection, continue antibiotic and see if it improves  · Failed void trial, continue Hunter trial tomorrow, voiding trial tomorrow if okayed by urology  · Urology consulted, appreciate recommendation

## 2022-12-09 NOTE — PHYSICAL THERAPY NOTE
Physical Therapy Cancellation Note    Chart review performed  At this time, PT treatment session cancelled due to patient refusal stating increased pain to R foot/ankle  PT notified RN of patients pain  PT will follow and provide PT interventions as appropriate         12/09/22 0940   PT Last Visit   PT Visit Date 12/09/22   Note Type   Note Type Cancelled Session   Cancel Reasons Refusal     Sue Saldaña, PT

## 2022-12-09 NOTE — NURSING NOTE
Went over after visit luis with patient and spouse  Demonstration provided on rudi gonzalez drain and education on post op mastectomy care  Intra venous access device removed and discharged from [facility]

## 2022-12-09 NOTE — RESPIRATORY THERAPY NOTE
RT Protocol Note  Kath Frost 80 y o  female MRN: [id number]  Unit/Bed#: -01 Encounter: [id number]    Assessment    Principal Problem:    MARIA DEL CARMEN (acute kidney injury) ([address] )  Active Problems:    Paroxysmal atrial fibrillation (HCC)    Essential hypertension    Acute on chronic diastolic (congestive) heart failure (HCC)    UTI (urinary tract infection)    Urinary obstruction      Home Pulmonary Medications:         Past Medical History:   Diagnosis Date   • Anemia    • Asteatotic eczema    • Chronic kidney disease    • Hypercholesterolemia    • Lichen sclerosus et atrophicus    • Mitral valve insufficiency    • Nosebleed    • Seborrheic keratoses    • Tricuspid regurgitation    • Vertigo      Social History     Socioeconomic History   • Marital status:      Spouse name: None   • Number of children: None   • Years of education: None   • Highest education level: None   Occupational History   • None   Tobacco Use   • Smoking status: Never   • Smokeless tobacco: Never   Vaping Use   • Vaping Use: Never used   Substance and Sexual Activity   • Alcohol use: Not Currently   • Drug use: No   • Sexual activity: Not Currently     Partners: Male   Other Topics Concern   • None   Social History Narrative    Advance directive declined by patient     Social Determinants of Health     Financial Resource Strain: Not on file   Food Insecurity: No Food Insecurity   • Worried About Running Out of Food in the Last Year: Never true   • Ran Out of Food in the Last Year: Never true   Transportation Needs: No Transportation Needs   • Lack of Transportation (Medical): No   • Lack of Transportation (Non-Medical):  No   Physical Activity: Not on file   Stress: No Stress Concern Present   • Feeling of Stress : Not at all   Social Connections: Not on file   Intimate Partner Violence: Not on file   Housing Stability: Low Risk    • Unable to Pay for Housing in the Last Year: No   • Number of Places Lived in the Last Year: 1   • Unstable Housing in the Last Year: No       Subjective         Objective    Physical Exam:   Assessment Type: Pre-treatment  General Appearance: Alert, Awake  Respiratory Pattern: Dyspnea with exertion  Chest Assessment: Chest expansion symmetrical  Bilateral Breath Sounds: Diminished, Expiratory wheezes  O2 Device: nc    Vitals:  Blood pressure 156/97, pulse 92, temperature (!) 97 1 °F (36 2 °C), resp  rate 14, weight 59 8 kg (131 lb 13 4 oz), SpO2 95 %  Imaging and other studies: I have personally reviewed pertinent reports  O2 Device: nc     Plan    Respiratory Plan: No distress/Pulmonary history        Resp Comments: pt was having some trouble breathing  prn albuterol given  lungs are diminished with scattered whheezes  spo2 on 2L nc 95%

## 2022-12-10 LAB
ANION GAP SERPL CALCULATED.3IONS-SCNC: 10 MMOL/L (ref 4–13)
BUN SERPL-MCNC: 25 MG/DL (ref 5–25)
CALCIUM SERPL-MCNC: 8.5 MG/DL (ref 8.3–10.1)
CHLORIDE SERPL-SCNC: 99 MMOL/L (ref 96–108)
CO2 SERPL-SCNC: 30 MMOL/L (ref 21–32)
CREAT SERPL-MCNC: 1.17 MG/DL (ref 0.6–1.3)
GFR SERPL CREATININE-BSD FRML MDRD: 38 ML/MIN/1.73SQ M
GLUCOSE SERPL-MCNC: 143 MG/DL (ref 65–140)
POTASSIUM SERPL-SCNC: 3.4 MMOL/L (ref 3.5–5.3)
SODIUM SERPL-SCNC: 139 MMOL/L (ref 135–147)

## 2022-12-10 RX ORDER — LOPERAMIDE HYDROCHLORIDE 2 MG/1
2 CAPSULE ORAL ONCE
Status: COMPLETED | OUTPATIENT
Start: 2022-12-10 | End: 2022-12-10

## 2022-12-10 RX ORDER — LOPERAMIDE HYDROCHLORIDE 2 MG/1
2 CAPSULE ORAL 4 TIMES DAILY PRN
Status: DISCONTINUED | OUTPATIENT
Start: 2022-12-10 | End: 2022-12-16 | Stop reason: HOSPADM

## 2022-12-10 RX ORDER — POTASSIUM CHLORIDE 20 MEQ/1
20 TABLET, EXTENDED RELEASE ORAL ONCE
Status: COMPLETED | OUTPATIENT
Start: 2022-12-10 | End: 2022-12-10

## 2022-12-10 RX ADMIN — LOPERAMIDE HYDROCHLORIDE 2 MG: 2 CAPSULE ORAL at 05:00

## 2022-12-10 RX ADMIN — GUAIFENESIN 1200 MG: 600 TABLET ORAL at 21:51

## 2022-12-10 RX ADMIN — METHYLPREDNISOLONE SODIUM SUCCINATE 40 MG: 40 INJECTION, POWDER, FOR SOLUTION INTRAMUSCULAR; INTRAVENOUS at 21:51

## 2022-12-10 RX ADMIN — FUROSEMIDE 60 MG: 40 TABLET ORAL at 09:36

## 2022-12-10 RX ADMIN — GUAIFENESIN 1200 MG: 600 TABLET ORAL at 09:35

## 2022-12-10 RX ADMIN — METOPROLOL SUCCINATE 100 MG: 100 TABLET, EXTENDED RELEASE ORAL at 09:35

## 2022-12-10 RX ADMIN — APIXABAN 2.5 MG: 2.5 TABLET, FILM COATED ORAL at 17:55

## 2022-12-10 RX ADMIN — DILTIAZEM HYDROCHLORIDE 60 MG: 60 CAPSULE, EXTENDED RELEASE ORAL at 09:37

## 2022-12-10 RX ADMIN — POTASSIUM CHLORIDE 20 MEQ: 1500 TABLET, EXTENDED RELEASE ORAL at 09:35

## 2022-12-10 RX ADMIN — DILTIAZEM HYDROCHLORIDE 60 MG: 60 CAPSULE, EXTENDED RELEASE ORAL at 21:50

## 2022-12-10 RX ADMIN — METHYLPREDNISOLONE SODIUM SUCCINATE 40 MG: 40 INJECTION, POWDER, FOR SOLUTION INTRAMUSCULAR; INTRAVENOUS at 09:35

## 2022-12-10 RX ADMIN — LOPERAMIDE HYDROCHLORIDE 2 MG: 2 CAPSULE ORAL at 09:44

## 2022-12-10 RX ADMIN — APIXABAN 2.5 MG: 2.5 TABLET, FILM COATED ORAL at 09:36

## 2022-12-10 RX ADMIN — FLUTICASONE PROPIONATE 1 SPRAY: 50 SPRAY, METERED NASAL at 09:37

## 2022-12-10 NOTE — ASSESSMENT & PLAN NOTE
· Likely secondary to acute on chronic CHF and bronchospasm  · Status post IV diuresing  · Continue  IV Solu-Medrol and wean as able   · Respiratory protocol 116

## 2022-12-10 NOTE — ASSESSMENT & PLAN NOTE
· Patient presented to the ED with complaints of difficulty urinating  · Patient was found to have a creatinine of 1 9 on admission with a baseline creatinine between 1 2-1 4  · Now improved s/p mahan and IVF and back to baseline  · Failed voiding trial inpatient, Mahan reinserted  · Urology consulted, appreciate input   Patient will follow up with urology in office for voiding trial   · Monitor creatinine while on Lasix

## 2022-12-10 NOTE — RESPIRATORY THERAPY NOTE
RT Protocol Note  Nisha Goldberg 80 y o  female MRN: [id number]  Unit/Bed#: -01 Encounter: [id number]    Assessment    Principal Problem:    MARIA DEL CARMEN (acute kidney injury) [facility])  Active Problems:    Paroxysmal atrial fibrillation (HCC)    Essential hypertension    Acute on chronic diastolic (congestive) heart failure (HCC)    UTI (urinary tract infection)    Urinary obstruction    Acute respiratory failure with hypoxia [facility])      Home Pulmonary Medications:     12/09/22 2046   Respiratory Protocol   Protocol Initiated? No   Protocol Selection Respiratory   Language Barrier? No   Medical & Social History Reviewed? Yes   Diagnostic Studies Reviewed? Yes   Physical Assessment Performed? Yes   Respiratory Plan No distress/Pulmonary history   Respiratory Assessment   Assessment Type During-treatment   General Appearance Alert; Awake   Respiratory Pattern Normal   Chest Assessment Chest expansion symmetrical   Bilateral Breath Sounds Diminished; Expiratory wheezes   Location Specific No   Cough Non-productive   Resp Comments Resp protocol completed   Cough Description   Sputum Amount None   Additional Assessments   Pulse 92   Respirations 16   SpO2 93 %          Past Medical History:   Diagnosis Date    Anemia     Asteatotic eczema     Chronic kidney disease     Hypercholesterolemia     Lichen sclerosus et atrophicus     Mitral valve insufficiency     Nosebleed     Seborrheic keratoses     Tricuspid regurgitation     Vertigo      Social History     Socioeconomic History    Marital status:       Spouse name: None    Number of children: None    Years of education: None    Highest education level: None   Occupational History    None   Tobacco Use    Smoking status: Never    Smokeless tobacco: Never   Vaping Use    Vaping Use: Never used   Substance and Sexual Activity    Alcohol use: Not Currently    Drug use: No    Sexual activity: Not Currently     Partners: Male   Other Topics Concern    None   Social History Narrative    Advance directive declined by patient     Social Determinants of Health     Financial Resource Strain: Not on file   Food Insecurity: No Food Insecurity    Worried About [address] in the Last Year: Never true    Ran Out of Food in the Last Year: Never true   Transportation Needs: No Transportation Needs    Lack of Transportation (Medical): No    Lack of Transportation (Non-Medical): No   Physical Activity: Not on file   Stress: No Stress Concern Present    Feeling of Stress : Not at all   Social Connections: Not on file   Intimate Partner Violence: Not on file   Housing Stability: Low Risk     Unable to Pay for Housing in the Last Year: No    Number of Places Lived in the Last Year: 1    Unstable Housing in the Last Year: No       Subjective         Objective    Physical Exam:   Assessment Type: During-treatment  General Appearance: Alert, Awake  Respiratory Pattern: Normal  Chest Assessment: Chest expansion symmetrical  Bilateral Breath Sounds: Diminished, Expiratory wheezes  Location Specific: No  Cough: Non-productive  O2 Device: nc    Vitals:  Blood pressure 156/97, pulse 92, temperature (!) 97 1 °F (36 2 °C), resp  rate 16, weight 59 8 kg (131 lb 13 4 oz), SpO2 93 %  Imaging and other studies: I have personally reviewed pertinent reports        O2 Device: nc     Plan    Respiratory Plan: No distress/Pulmonary history        Resp Comments: Resp protocol completed

## 2022-12-10 NOTE — PLAN OF CARE
Problem: INFECTION - ADULT  Goal: Absence or prevention of progression during hospitalization  Description: INTERVENTIONS:  - Assess and monitor for signs and symptoms of infection  - Monitor lab/diagnostic results  - Monitor all insertion sites, i e  indwelling lines, tubes, and drains  - Monitor endotracheal if appropriate and nasal secretions for changes in amount and color  - [place] appropriate cooling/warming therapies per order  - Administer medications as ordered  - Instruct and encourage patient and family to use good hand hygiene technique  - Identify and instruct in appropriate isolation precautions for identified infection/condition  Outcome: Progressing  Goal: Absence of fever/infection during neutropenic period  Description: INTERVENTIONS:  - Monitor WBC    Outcome: Progressing     Problem: GENITOURINARY - ADULT  Goal: Maintains or returns to baseline urinary function  Description: INTERVENTIONS:  - Assess urinary function  - Encourage oral fluids to ensure adequate hydration if ordered  - Administer IV fluids as ordered to ensure adequate hydration  - Administer ordered medications as needed  - Offer frequent toileting  - Follow urinary retention protocol if ordered  Outcome: Progressing  Goal: Absence of urinary retention  Description: INTERVENTIONS:  - Assess patient’s ability to void and empty bladder  - Monitor I/O  - Bladder scan as needed  - Discuss with physician/AP medications to alleviate retention as needed  - Discuss catheterization for long term situations as appropriate  Outcome: Progressing  Goal: Urinary catheter remains patent  Description: INTERVENTIONS:  - Assess patency of urinary catheter  - If patient has a chronic mahan, consider changing catheter if non-functioning  - Follow guidelines for intermittent irrigation of non-functioning urinary catheter  Outcome: Progressing

## 2022-12-10 NOTE — PROGRESS NOTES
[address] [facility]  Progress Note - Camilo Cortes 2/17/1924, 80 y o  female MRN: [id number]  Unit/Bed#: -01 Encounter: [id number]  Primary Care Provider: Rito Interiano MD   Date and time admitted to hospital: 12/2/2022  1:18 PM    * MARIA DEL CARMEN (acute kidney injury) [facility])  Assessment & Plan  · Patient presented to the ED with complaints of difficulty urinating  · Patient was found to have a creatinine of 1 9 on admission with a baseline creatinine between 1 2-1 4  · Now improved s/p mahan and IVF and back to baseline  · Failed voiding trial inpatient, Mahan reinserted  · Urology consulted, appreciate input   Patient will follow up with urology in office for voiding trial   · Monitor creatinine while on Lasix    Acute respiratory failure with hypoxia ([facility] [address] )  Assessment & Plan  · Likely secondary to acute on chronic CHF and bronchospasm  · Status post IV diuresing  · Continue  IV Solu-Medrol and wean as able   · Respiratory protocol      Urinary obstruction  Assessment & Plan  · Patient came in with urinary obstruction, has indwelling Mahan catheter  · Likely 2/2 urinary tract infection, continue antibiotic and see if it improves  · Failed void trial, continue Mahan trial tomorrow, voiding trial tomorrow if okayed by urology  · Urology consulted, appreciate recommendation     UTI (urinary tract infection)  Assessment & Plan  · Urine culture shows Citrobacter and Klebsiella  · Completed course of antibiotics  · Denies any symptoms of UTI today    Acute on chronic diastolic (congestive) heart failure ([address] )  Assessment & Plan  Wt Readings from Last 3 Encounters:   12/09/22 59 8 kg (131 lb 13 4 oz)   12/02/22 59 5 kg (131 lb 3 2 oz)   10/20/22 55 4 kg (122 lb 3 2 oz)     · Echo in 2021 showed EF 60%  · Patient developed SOB with hypoxia requiring O2 supplement likely secondary to IV fluid and holding home diuretics for MARIA DEL CARMEN on admission   · IV fluid discontinued  · Transitioned to home dose po Lasix monitor · I/Os and daily weights     Essential hypertension  Assessment & Plan  Blood Pressure: 125/69    Stable pressures  · Continue on home Metoprolol and Cardizem    Paroxysmal atrial fibrillation (HCC)  Assessment & Plan  · Rate controlled, C/w home toprol and diltiazem  · C w eliquis for anticoagulation        VTE Pharmacologic Prophylaxis:   Pharmacologic: Apixaban (Eliquis)  Mechanical VTE Prophylaxis in Place: Yes    Review of Systems:    Review of Systems   Constitutional: Negative for chills and fever  HENT: Negative for ear pain and sore throat  Eyes: Negative for pain and visual disturbance  Respiratory: Positive for cough and shortness of breath (Improving )  Cardiovascular: Negative for chest pain and palpitations  Gastrointestinal: Negative for abdominal pain and vomiting  Genitourinary: Negative for dysuria and hematuria  Musculoskeletal: Negative for arthralgias and back pain  Skin: Negative for color change and rash  Neurological: Negative for seizures and syncope  All other systems reviewed and are negative  Past Medical and Surgical History:     Past Medical History:   Diagnosis Date   • Anemia    • Asteatotic eczema    • Chronic kidney disease    • Hypercholesterolemia    • Lichen sclerosus et atrophicus    • Mitral valve insufficiency    • Nosebleed    • Seborrheic keratoses    • Tricuspid regurgitation    • Vertigo        Past Surgical History:   Procedure Laterality Date   • APPENDECTOMY  11/14/1939   • CATARACT EXTRACTION, BILATERAL  04/2019   • HYSTERECTOMY  11/14/1962   • ORIF HIP FRACTURE Left 2020   • OK COLONOSCOPY FLX DX W/COLLJ SPEC WHEN PFRMD N/A 5/19/2016    Procedure: EGD AND COLONOSCOPY;  Surgeon: Dillon Brock MD;  [facility]: [facility]; Service: Gastroenterology       Patient Centered Rounds: I have performed bedside rounds with nursing staff today      Discussions with Specialists or Other Care Team Provider: nursing     Education and Discussions with Family / Patient: Patient     Time Spent for Care: 50  More than 50% of total time spent on counseling and coordination of care as described above  Current Length of Stay: 7 day(s)    Current Patient Status: Inpatient   Certification Statement: The patient will continue to require additional inpatient hospital stay due to requiring IV solumedrol     Discharge Plan: next 24-48 hours     Code Status: Level 1 - Full Code      Subjective:   Reports improving SOB     Objective:     Vitals:   Temp (24hrs), Av 6 °F (37 °C), Min:98 6 °F (37 °C), Max:98 6 °F (37 °C)    Temp:  [98 6 °F (37 °C)] 98 6 °F (37 °C)  HR:  [80-92] 81  Resp:  [16] 16  BP: (124-125)/(69-70) 125/69  SpO2:  [93 %-94 %] 94 %  Body mass index is 26 63 kg/m²  Input and Output Summary (last 24 hours): Intake/Output Summary (Last 24 hours) at 12/10/2022 1106  Last data filed at 12/10/2022 0926  Gross per 24 hour   Intake 960 ml   Output 700 ml   Net 260 ml       Physical Exam:     Physical Exam  Vitals and nursing note reviewed  Constitutional:       General: She is not in acute distress  Appearance: She is well-developed  She is not ill-appearing or diaphoretic  HENT:      Head: Normocephalic and atraumatic  Mouth/Throat:      Mouth: Mucous membranes are moist       Pharynx: Oropharynx is clear  Eyes:      General: No scleral icterus  Conjunctiva/sclera: Conjunctivae normal    Cardiovascular:      Rate and Rhythm: Normal rate and regular rhythm  Heart sounds: Normal heart sounds  No murmur heard  Pulmonary:      Effort: Pulmonary effort is normal  No respiratory distress  Breath sounds: Wheezing present  No rales  Abdominal:      General: Bowel sounds are normal       Palpations: Abdomen is soft  Tenderness: There is no abdominal tenderness  Musculoskeletal:         General: No swelling  Cervical back: Normal range of motion and neck supple  Right lower leg: No edema        Left lower leg: No edema    Skin:     General: Skin is warm and dry  Capillary Refill: Capillary refill takes less than 2 seconds  Neurological:      General: No focal deficit present  Mental Status: She is alert and oriented to person, place, and time  Psychiatric:         Mood and Affect: Mood normal          Behavior: Behavior normal            Additional Data:     Labs:    Results from last 7 days   Lab Units 12/08/22  0525 12/07/22  0529 12/06/22  0502   WBC Thousand/uL 7 76   < > 7 55   HEMOGLOBIN g/dL 9 7*   < > 10 1*   HEMATOCRIT % 32 5*   < > 34 8   PLATELETS Thousands/uL 167   < > 191   NEUTROS PCT %  --   --  79*   LYMPHS PCT %  --   --  5*   MONOS PCT %  --   --  11   EOS PCT %  --   --  3    < > = values in this interval not displayed  Results from last 7 days   Lab Units 12/10/22  0512 12/07/22  0529 12/06/22  0502   SODIUM mmol/L 139   < > 137   POTASSIUM mmol/L 3 4*   < > 4 5   CHLORIDE mmol/L 99   < > 106   CO2 mmol/L 30   < > 21   BUN mg/dL 25   < > 20   CREATININE mg/dL 1 17   < > 1 12   ANION GAP mmol/L 10   < > 10   CALCIUM mg/dL 8 5   < > 8 0*   ALBUMIN g/dL  --   --  3 4*   TOTAL BILIRUBIN mg/dL  --   --  0 45   ALK PHOS U/L  --   --  87   ALT U/L  --   --  19   AST U/L  --   --  24   GLUCOSE RANDOM mg/dL 143*   < > 132    < > = values in this interval not displayed  * I Have Reviewed All Lab Data Listed Above  * Additional Pertinent Lab Tests Reviewed:  [facility]    Imaging:    Imaging Reports Reviewed Today Include: none  Imaging Personally Reviewed by Myself Includes:  non    Recent Cultures (last 7 days):           Last 24 Hours Medication List:   Current Facility-Administered Medications   Medication Dose Route Frequency Provider Last Rate   • acetaminophen  650 mg Oral Q6H PRN Darren Davis MD     • albuterol  2 5 mg Nebulization Q6H PRN Varsha Dos Santos MD     • apixaban  2 5 mg Oral BID Darren Davis MD     • diltiazem  60 mg Oral Q12H [facility] Darren Davis MD     • fluticasone  1 spray Each Nare Daily Jim Germain MD     • furosemide  60 mg Oral Daily Jim Germain MD     • guaiFENesin  1,200 mg Oral Q12H [facility] Jim Germain MD     • loperamide  2 mg Oral 4x Daily PRN Jim Germain MD     • methylPREDNISolone sodium succinate  40 mg Intravenous Q12H [facility] Jim Germain MD     • metoprolol succinate  100 mg Oral Daily Ana Guerrero MD          Today, Patient Was Seen By: Stephane Daniel MD    ** Please Note: Dictation voice to text software may have been used in the creation of this document   **

## 2022-12-10 NOTE — ASSESSMENT & PLAN NOTE
Wt Readings from Last 3 Encounters:   12/09/22 59 8 kg (131 lb 13 4 oz)   12/02/22 59 5 kg (131 lb 3 2 oz)   10/20/22 55 4 kg (122 lb 3 2 oz)     · Echo in 2021 showed EF 60%  · Patient developed SOB with hypoxia requiring O2 supplement likely secondary to IV fluid and holding home diuretics for MARIA DEL CARMEN on admission   · IV fluid discontinued  · Transitioned to home dose po Lasix monitor   · I/Os and daily weights

## 2022-12-11 LAB
ANION GAP SERPL CALCULATED.3IONS-SCNC: 8 MMOL/L (ref 4–13)
BUN SERPL-MCNC: 37 MG/DL (ref 5–25)
CALCIUM SERPL-MCNC: 8.5 MG/DL (ref 8.3–10.1)
CHLORIDE SERPL-SCNC: 98 MMOL/L (ref 96–108)
CO2 SERPL-SCNC: 33 MMOL/L (ref 21–32)
CREAT SERPL-MCNC: 1.21 MG/DL (ref 0.6–1.3)
GFR SERPL CREATININE-BSD FRML MDRD: 37 ML/MIN/1.73SQ M
GLUCOSE SERPL-MCNC: 124 MG/DL (ref 65–140)
POTASSIUM SERPL-SCNC: 4.2 MMOL/L (ref 3.5–5.3)
SODIUM SERPL-SCNC: 139 MMOL/L (ref 135–147)

## 2022-12-11 RX ADMIN — METHYLPREDNISOLONE SODIUM SUCCINATE 40 MG: 40 INJECTION, POWDER, FOR SOLUTION INTRAMUSCULAR; INTRAVENOUS at 21:44

## 2022-12-11 RX ADMIN — DILTIAZEM HYDROCHLORIDE 60 MG: 60 CAPSULE, EXTENDED RELEASE ORAL at 21:45

## 2022-12-11 RX ADMIN — APIXABAN 2.5 MG: 2.5 TABLET, FILM COATED ORAL at 17:28

## 2022-12-11 RX ADMIN — GUAIFENESIN 1200 MG: 600 TABLET ORAL at 21:44

## 2022-12-11 RX ADMIN — APIXABAN 2.5 MG: 2.5 TABLET, FILM COATED ORAL at 10:22

## 2022-12-11 RX ADMIN — DILTIAZEM HYDROCHLORIDE 60 MG: 60 CAPSULE, EXTENDED RELEASE ORAL at 10:23

## 2022-12-11 RX ADMIN — METOPROLOL SUCCINATE 100 MG: 100 TABLET, EXTENDED RELEASE ORAL at 10:22

## 2022-12-11 RX ADMIN — GUAIFENESIN 1200 MG: 600 TABLET ORAL at 10:22

## 2022-12-11 RX ADMIN — FUROSEMIDE 60 MG: 40 TABLET ORAL at 10:22

## 2022-12-11 RX ADMIN — METHYLPREDNISOLONE SODIUM SUCCINATE 40 MG: 40 INJECTION, POWDER, FOR SOLUTION INTRAMUSCULAR; INTRAVENOUS at 10:22

## 2022-12-11 RX ADMIN — FLUTICASONE PROPIONATE 1 SPRAY: 50 SPRAY, METERED NASAL at 10:23

## 2022-12-11 NOTE — ASSESSMENT & PLAN NOTE
Wt Readings from Last 3 Encounters:   12/09/22 59 8 kg (131 lb 13 4 oz)   12/02/22 59 5 kg (131 lb 3 2 oz)   10/20/22 55 4 kg (122 lb 3 2 oz)     · Echo in 2021 showed EF 60%  · Patient developed SOB with hypoxia requiring O2 supplement likely secondary to IV fluid and holding home diuretics for MARIA DE LCARMEN on admission   · IV fluid discontinued  · Transitioned to home dose po Lasix   · I/Os and daily weights

## 2022-12-11 NOTE — PLAN OF CARE
Problem: INFECTION - ADULT  Goal: Absence or prevention of progression during hospitalization  Description: INTERVENTIONS:  - Assess and monitor for signs and symptoms of infection  - Monitor lab/diagnostic results  - Monitor all insertion sites, i e  indwelling lines, tubes, and drains  - Monitor endotracheal if appropriate and nasal secretions for changes in amount and color  - [place] appropriate cooling/warming therapies per order  - Administer medications as ordered  - Instruct and encourage patient and family to use good hand hygiene technique  - Identify and instruct in appropriate isolation precautions for identified infection/condition  Outcome: Progressing

## 2022-12-11 NOTE — RESPIRATORY THERAPY NOTE
RT Protocol Note  Xander Shelter 80 y o  female MRN: [id number]  Unit/Bed#: -01 Encounter: [id number]    Assessment    Principal Problem:    MARIA DEL CARMEN (acute kidney injury) [facility])  Active Problems:    Paroxysmal atrial fibrillation (HCC)    Essential hypertension    Acute on chronic diastolic (congestive) heart failure (HCC)    UTI (urinary tract infection)    Urinary obstruction    Acute respiratory failure with hypoxia [facility])      Home Pulmonary Medications:     12/11/22 0500   Respiratory Protocol   Protocol Initiated? No   Protocol Selection Respiratory   Language Barrier? No   Medical & Social History Reviewed? Yes   Diagnostic Studies Reviewed? Yes   Physical Assessment Performed? Yes   Respiratory Plan No distress/Pulmonary history   Respiratory Assessment   Assessment Type Assess only   General Appearance Alert   Respiratory Pattern Normal   Chest Assessment Chest expansion symmetrical   Bilateral Breath Sounds Diminished   R Breath Sounds Diminished   L Breath Sounds Diminished   Location Specific No   Cough None   Resp Comments resp protocol completed   O2 Device NC   Additional Assessments   Pulse 75   Respirations 16   SpO2 97 %            Past Medical History:   Diagnosis Date    Anemia     Asteatotic eczema     Chronic kidney disease     Hypercholesterolemia     Lichen sclerosus et atrophicus     Mitral valve insufficiency     Nosebleed     Seborrheic keratoses     Tricuspid regurgitation     Vertigo      Social History     Socioeconomic History    Marital status:       Spouse name: None    Number of children: None    Years of education: None    Highest education level: None   Occupational History    None   Tobacco Use    Smoking status: Never    Smokeless tobacco: Never   Vaping Use    Vaping Use: Never used   Substance and Sexual Activity    Alcohol use: Not Currently    Drug use: No    Sexual activity: Not Currently     Partners: Male   Other Topics Concern    None   Social History Narrative Advance directive declined by patient     Social Determinants of Health     Financial Resource Strain: Not on file   Food Insecurity: No Food Insecurity    Worried About [employer] in the Last Year: Never true    Ran Out of Food in the Last Year: Never true   Transportation Needs: No Transportation Needs    Lack of Transportation (Medical): No    Lack of Transportation (Non-Medical): No   Physical Activity: Not on file   Stress: No Stress Concern Present    Feeling of Stress : Not at all   Social Connections: Not on file   Intimate Partner Violence: Not on file   Housing Stability: Low Risk     Unable to Pay for Housing in the Last Year: No    Number of Places Lived in the Last Year: 1    Unstable Housing in the Last Year: No       Subjective         Objective    Physical Exam:   Assessment Type: Assess only  General Appearance: Alert  Respiratory Pattern: Normal  Chest Assessment: Chest expansion symmetrical  Bilateral Breath Sounds: Diminished  R Breath Sounds: Diminished  L Breath Sounds: Diminished  Location Specific: No  Cough: None  O2 Device: NC    Vitals:  Blood pressure 148/90, pulse 75, temperature 97 9 °F (36 6 °C), resp  rate 16, weight 59 8 kg (131 lb 13 4 oz), SpO2 97 %  Imaging and other studies: I have personally reviewed pertinent reports        O2 Device: NC     Plan    Respiratory Plan: No distress/Pulmonary history        Resp Comments: resp protocol completed

## 2022-12-11 NOTE — ASSESSMENT & PLAN NOTE
· Urine culture shows Citrobacter and Klebsiella  · Completed course of antibiotics  · Urinary symptoms resolved

## 2022-12-11 NOTE — PROGRESS NOTES
[facility]  Progress Note - Christiano Borders 2/17/1924, 80 y o  female MRN: [id number]  Unit/Bed#: -01 Encounter: [id number]  Primary Care Provider: Viv Mccracken MD   Date and time admitted to hospital: 12/2/2022  1:18 PM    * MARIA DEL CARMEN (acute kidney injury) [facility])  Assessment & Plan  · Patient presented to the ED with complaints of difficulty urinating  · Patient was found to have a creatinine of 1 9 on admission with a baseline creatinine between 1 2-1 4  · Now improved s/p mahan and IVF and back to baseline  · Failed voiding trial inpatient, Mahan reinserted  · Urology consulted, appreciate input   Patient will follow up with urology in office for voiding trial   · Monitor creatinine while on Lasix    Acute respiratory failure with hypoxia ([facility] [address] )  Assessment & Plan  · Likely secondary to acute on chronic CHF and bronchospasm  · Status post IV diuresing  · Continue  IV Solu-Medrol and wean as able   · Respiratory protocol      Urinary obstruction  Assessment & Plan  · Patient came in with urinary obstruction, has indwelling Mahan catheter  · Likely 2/2 urinary tract infection, continue antibiotic and see if it improves  · Failed void trial, continue Mahan trial tomorrow, voiding trial tomorrow if okayed by urology  · Urology consulted, appreciate recommendation     UTI (urinary tract infection)  Assessment & Plan  · Urine culture shows Citrobacter and Klebsiella  · Completed course of antibiotics  · Urinary symptoms resolved     Acute on chronic diastolic (congestive) heart failure ([address] )  Assessment & Plan  Wt Readings from Last 3 Encounters:   12/09/22 59 8 kg (131 lb 13 4 oz)   12/02/22 59 5 kg (131 lb 3 2 oz)   10/20/22 55 4 kg (122 lb 3 2 oz)     · Echo in 2021 showed EF 60%  · Patient developed SOB with hypoxia requiring O2 supplement likely secondary to IV fluid and holding home diuretics for MARIA DEL CARMEN on admission   · IV fluid discontinued  · Transitioned to home dose po Lasix   · I/Os and daily weights     Essential hypertension  Assessment & Plan  Blood Pressure: 140/79    Stable pressures  · Continue on home Metoprolol and Cardizem    Paroxysmal atrial fibrillation (HCC)  Assessment & Plan  · Rate controlled, C/w home toprol and diltiazem  · C w eliquis for anticoagulation        VTE Pharmacologic Prophylaxis:   Pharmacologic: Apixaban (Eliquis)  Mechanical VTE Prophylaxis in Place: Yes    Review of Systems:    Review of Systems   Constitutional: Negative for chills and fever  HENT: Negative for ear pain and sore throat  Eyes: Negative for pain and visual disturbance  Respiratory: Positive for cough  Negative for shortness of breath  Cardiovascular: Negative for chest pain and palpitations  Gastrointestinal: Negative for abdominal pain and vomiting  Genitourinary: Negative for dysuria and hematuria  Musculoskeletal: Negative for arthralgias and back pain  Skin: Negative for color change and rash  Neurological: Negative for seizures and syncope  All other systems reviewed and are negative  Past Medical and Surgical History:     Past Medical History:   Diagnosis Date   • Anemia    • Asteatotic eczema    • Chronic kidney disease    • Hypercholesterolemia    • Lichen sclerosus et atrophicus    • Mitral valve insufficiency    • Nosebleed    • Seborrheic keratoses    • Tricuspid regurgitation    • Vertigo        Past Surgical History:   Procedure Laterality Date   • APPENDECTOMY  11/14/1939   • CATARACT EXTRACTION, BILATERAL  04/2019   • HYSTERECTOMY  11/14/1962   • ORIF HIP FRACTURE Left 2020   • NV COLONOSCOPY FLX DX W/COLLJ SPEC WHEN PFRMD N/A 5/19/2016    Procedure: EGD AND COLONOSCOPY;  Surgeon: Vince Denver, MD;  Location: [facility]; Service: Gastroenterology       Patient Centered Rounds: I have performed bedside rounds with nursing staff today      Discussions with Specialists or Other Care Team Provider: nursing     Education and Discussions with Family / Patient: Patient     Time Spent for Care: 45 minutes  More than 50% of total time spent on counseling and coordination of care as described above  Current Length of Stay: 8 day(s)    Current Patient Status: Inpatient   Certification Statement: The patient will continue to require additional inpatient hospital stay due to requiring IV steroid     Discharge Plan: likely in 24-48 hours     Code Status: Level 1 - Full Code      Subjective:   Reports improvement of SOB, still with some cough      Objective:     Vitals:   Temp (24hrs), Av 7 °F (36 5 °C), Min:97 5 °F (36 4 °C), Max:97 9 °F (36 6 °C)    Temp:  [97 5 °F (36 4 °C)-97 9 °F (36 6 °C)] 97 9 °F (36 6 °C)  HR:  [72-80] 80  Resp:  [15-16] 16  BP: (112-148)/(70-90) 140/79  SpO2:  [95 %-97 %] 95 %  Body mass index is 26 63 kg/m²  Input and Output Summary (last 24 hours): Intake/Output Summary (Last 24 hours) at 2022 1223  Last data filed at 2022 0514  Gross per 24 hour   Intake --   Output 1500 ml   Net -1500 ml       Physical Exam:     Physical Exam  Vitals and nursing note reviewed  Constitutional:       General: She is not in acute distress  Appearance: She is well-developed  She is not ill-appearing or diaphoretic  HENT:      Head: Normocephalic and atraumatic  Mouth/Throat:      Mouth: Mucous membranes are moist       Pharynx: Oropharynx is clear  Eyes:      General: No scleral icterus  Conjunctiva/sclera: Conjunctivae normal    Cardiovascular:      Rate and Rhythm: Normal rate and regular rhythm  Heart sounds: No murmur heard  Pulmonary:      Effort: Pulmonary effort is normal  No respiratory distress  Breath sounds: Wheezing (mild ) present  No rales  Abdominal:      General: Bowel sounds are normal       Palpations: Abdomen is soft  Tenderness: There is no abdominal tenderness  Genitourinary:     Comments: Hunter in place, draining clear yellow urine   Musculoskeletal:         General: No swelling  Cervical back: Normal range of motion and neck supple  Right lower leg: No edema  Left lower leg: No edema  Skin:     General: Skin is warm and dry  Capillary Refill: Capillary refill takes less than 2 seconds  Neurological:      General: No focal deficit present  Mental Status: She is alert and oriented to person, place, and time  Psychiatric:         Mood and Affect: Mood normal          Behavior: Behavior normal            Additional Data:     Labs:    Results from last 7 days   Lab Units 12/08/22  0525 12/07/22  0529 12/06/22  0502   WBC Thousand/uL 7 76   < > 7 55   HEMOGLOBIN g/dL 9 7*   < > 10 1*   HEMATOCRIT % 32 5*   < > 34 8   PLATELETS Thousands/uL 167   < > 191   NEUTROS PCT %  --   --  79*   LYMPHS PCT %  --   --  5*   MONOS PCT %  --   --  11   EOS PCT %  --   --  3    < > = values in this interval not displayed  Results from last 7 days   Lab Units 12/11/22  0508 12/07/22  0529 12/06/22  0502   SODIUM mmol/L 139   < > 137   POTASSIUM mmol/L 4 2   < > 4 5   CHLORIDE mmol/L 98   < > 106   CO2 mmol/L 33*   < > 21   BUN mg/dL 37*   < > 20   CREATININE mg/dL 1 21   < > 1 12   ANION GAP mmol/L 8   < > 10   CALCIUM mg/dL 8 5   < > 8 0*   ALBUMIN g/dL  --   --  3 4*   TOTAL BILIRUBIN mg/dL  --   --  0 45   ALK PHOS U/L  --   --  87   ALT U/L  --   --  19   AST U/L  --   --  24   GLUCOSE RANDOM mg/dL 124   < > 132    < > = values in this interval not displayed  * I Have Reviewed All Lab Data Listed Above  * Additional Pertinent Lab Tests Reviewed:  [facility] Admission Reviewed    Imaging:    Imaging Reports Reviewed Today Include: none  Imaging Personally Reviewed by Myself Includes:  none    Recent Cultures (last 7 days):           Last 24 Hours Medication List:   Current Facility-Administered Medications   Medication Dose Route Frequency Provider Last Rate   • acetaminophen  650 mg Oral Q6H PRN Darren Davis MD     • albuterol  2 5 mg Nebulization Q6H PRN Afsaneh German MD     • apixaban  2 5 mg Oral BID Darren Davis MD     • diltiazem  60 mg Oral Q12H [address] Darren Davis MD     • fluticasone  1 spray Each Nare Daily Afsaneh German MD     • furosemide  60 mg Oral Daily Afsaneh German MD     • guaiFENesin  1,200 mg Oral Q12H [address] Afsaneh German MD     • loperamide  2 mg Oral 4x Daily PRN Afsaneh German MD     • methylPREDNISolone sodium succinate  40 mg Intravenous Q12H [address] Lu[address] Pineda MD     • metoprolol succinate  100 mg Oral Daily Brian Carr MD          Today, Patient Was Seen By: Becki Tucker MD    ** Please Note: Dictation voice to text software may have been used in the creation of this document   **

## 2022-12-11 NOTE — ASSESSMENT & PLAN NOTE
· Likely secondary to acute on chronic CHF and bronchospasm  · Status post IV diuresing  · Continue  IV Solu-Medrol and wean as able   · Respiratory protocol

## 2022-12-12 ENCOUNTER — APPOINTMENT (INPATIENT)
Dept: CT IMAGING | Facility: HOSPITAL | Age: 87
End: 2022-12-12

## 2022-12-12 LAB
ANION GAP SERPL CALCULATED.3IONS-SCNC: 9 MMOL/L (ref 4–13)
BUN SERPL-MCNC: 45 MG/DL (ref 5–25)
CALCIUM SERPL-MCNC: 8.5 MG/DL (ref 8.3–10.1)
CHLORIDE SERPL-SCNC: 98 MMOL/L (ref 96–108)
CO2 SERPL-SCNC: 33 MMOL/L (ref 21–32)
CREAT SERPL-MCNC: 1.18 MG/DL (ref 0.6–1.3)
ERYTHROCYTE [DISTWIDTH] IN BLOOD BY AUTOMATED COUNT: 16.7 % (ref 11.6–15.1)
GFR SERPL CREATININE-BSD FRML MDRD: 38 ML/MIN/1.73SQ M
GLUCOSE SERPL-MCNC: 127 MG/DL (ref 65–140)
GLUCOSE SERPL-MCNC: 154 MG/DL (ref 65–140)
HCT VFR BLD AUTO: 34.1 % (ref 34.8–46.1)
HGB BLD-MCNC: 10.3 G/DL (ref 11.5–15.4)
MCH RBC QN AUTO: 26.6 PG (ref 26.8–34.3)
MCHC RBC AUTO-ENTMCNC: 30.2 G/DL (ref 31.4–37.4)
MCV RBC AUTO: 88 FL (ref 82–98)
PLATELET # BLD AUTO: 194 THOUSANDS/UL (ref 149–390)
PMV BLD AUTO: 11.3 FL (ref 8.9–12.7)
POTASSIUM SERPL-SCNC: 3.7 MMOL/L (ref 3.5–5.3)
RBC # BLD AUTO: 3.87 MILLION/UL (ref 3.81–5.12)
SODIUM SERPL-SCNC: 140 MMOL/L (ref 135–147)
WBC # BLD AUTO: 9.46 THOUSAND/UL (ref 4.31–10.16)

## 2022-12-12 RX ORDER — PREDNISONE 20 MG/1
40 TABLET ORAL DAILY
Status: COMPLETED | OUTPATIENT
Start: 2022-12-13 | End: 2022-12-15

## 2022-12-12 RX ORDER — PREDNISONE 10 MG/1
10 TABLET ORAL DAILY
Status: DISCONTINUED | OUTPATIENT
Start: 2022-12-22 | End: 2022-12-16 | Stop reason: HOSPADM

## 2022-12-12 RX ORDER — PREDNISONE 20 MG/1
20 TABLET ORAL DAILY
Status: DISCONTINUED | OUTPATIENT
Start: 2022-12-19 | End: 2022-12-16 | Stop reason: HOSPADM

## 2022-12-12 RX ADMIN — GUAIFENESIN 1200 MG: 600 TABLET ORAL at 21:01

## 2022-12-12 RX ADMIN — DILTIAZEM HYDROCHLORIDE 60 MG: 60 CAPSULE, EXTENDED RELEASE ORAL at 21:02

## 2022-12-12 RX ADMIN — APIXABAN 2.5 MG: 2.5 TABLET, FILM COATED ORAL at 17:15

## 2022-12-12 RX ADMIN — METOPROLOL SUCCINATE 100 MG: 100 TABLET, EXTENDED RELEASE ORAL at 10:11

## 2022-12-12 RX ADMIN — DILTIAZEM HYDROCHLORIDE 60 MG: 60 CAPSULE, EXTENDED RELEASE ORAL at 10:22

## 2022-12-12 RX ADMIN — FUROSEMIDE 60 MG: 40 TABLET ORAL at 10:11

## 2022-12-12 RX ADMIN — FLUTICASONE PROPIONATE 1 SPRAY: 50 SPRAY, METERED NASAL at 10:12

## 2022-12-12 RX ADMIN — GUAIFENESIN 1200 MG: 600 TABLET ORAL at 10:11

## 2022-12-12 RX ADMIN — APIXABAN 2.5 MG: 2.5 TABLET, FILM COATED ORAL at 10:11

## 2022-12-12 RX ADMIN — METHYLPREDNISOLONE SODIUM SUCCINATE 40 MG: 40 INJECTION, POWDER, FOR SOLUTION INTRAMUSCULAR; INTRAVENOUS at 10:45

## 2022-12-12 RX ADMIN — LOPERAMIDE HYDROCHLORIDE 2 MG: 2 CAPSULE ORAL at 21:01

## 2022-12-12 NOTE — PLAN OF CARE
Problem: Potential for Falls  Goal: Patient will remain free of falls  Description: INTERVENTIONS:  - Educate patient/family on patient safety including physical limitations  - Instruct patient to call for assistance with activity   - Consult OT/PT to assist with strengthening/mobility   - Keep Call bell within reach  - Keep bed low and locked with side rails adjusted as appropriate  - Keep care items and personal belongings within reach  - Initiate and maintain comfort rounds  - Make Fall Risk Sign visible to staff  - Offer Toileting every  Hours, in advance of need  - Initiate/Maintain alarm  - Obtain necessary fall risk management equipment:   - Apply yellow socks and bracelet for high fall risk patients  - Consider moving patient to room near nurses station  Outcome: Progressing     Problem: PAIN - ADULT  Goal: Verbalizes/displays adequate comfort level or baseline comfort level  Description: Interventions:  - Encourage patient to monitor pain and request assistance  - Assess pain using appropriate pain scale  - Administer analgesics based on type and severity of pain and evaluate response  - Implement non-pharmacological measures as appropriate and evaluate response  - Consider cultural and social influences on pain and pain management  - Notify physician/advanced practitioner if interventions unsuccessful or patient reports new pain  Outcome: Progressing     Problem: INFECTION - ADULT  Goal: Absence or prevention of progression during hospitalization  Description: INTERVENTIONS:  - Assess and monitor for signs and symptoms of infection  - Monitor lab/diagnostic results  - Monitor all insertion sites, i e  indwelling lines, tubes, and drains  - Monitor endotracheal if appropriate and nasal secretions for changes in amount and color  - [place] appropriate cooling/warming therapies per order  - Administer medications as ordered  - Instruct and encourage patient and family to use good hand hygiene technique  - Identify and instruct in appropriate isolation precautions for identified infection/condition  Outcome: Progressing  Goal: Absence of fever/infection during neutropenic period  Description: INTERVENTIONS:  - Monitor WBC    Outcome: Progressing     Problem: SAFETY ADULT  Goal: Patient will remain free of falls  Description: INTERVENTIONS:  - Educate patient/family on patient safety including physical limitations  - Instruct patient to call for assistance with activity   - Consult OT/PT to assist with strengthening/mobility   - Keep Call bell within reach  - Keep bed low and locked with side rails adjusted as appropriate  - Keep care items and personal belongings within reach  - Initiate and maintain comfort rounds  - Make Fall Risk Sign visible to staff  - Offer Toileting every  Hours, in advance of need  - Initiate/Maintain alarm  - Obtain necessary fall risk management equipment:   - Apply yellow socks and bracelet for high fall risk patients  - Consider moving patient to room near nurses station  Outcome: Progressing  Goal: Maintain or return to baseline ADL function  Description: INTERVENTIONS:  -  Assess patient's ability to carry out ADLs; assess patient's baseline for ADL function and identify physical deficits which impact ability to perform ADLs (bathing, care of mouth/teeth, toileting, grooming, dressing, etc )  - Assess/evaluate cause of self-care deficits   - Assess range of motion  - Assess patient's mobility; develop plan if impaired  - Assess patient's need for assistive devices and provide as appropriate  - Encourage maximum independence but intervene and supervise when necessary  - Involve family in performance of ADLs  - Assess for home care needs following discharge   - Consider OT consult to assist with ADL evaluation and planning for discharge  - Provide patient education as appropriate  Outcome: Progressing  Goal: Maintains/Returns to pre admission functional level  Description: INTERVENTIONS:  - Perform BMAT or MOVE assessment daily    - Set and communicate daily mobility goal to care team and patient/family/caregiver  - Collaborate with rehabilitation services on mobility goals if consulted  - Perform Range of Motion  times a day  - Reposition patient every  hours  - Dangle patient  times a day  - Stand patient  times a day  - Ambulate patient  times a day  - Out of bed to chair  times a day   - Out of bed for meals  times a day  - Out of bed for toileting  - Record patient progress and toleration of activity level   Outcome: Progressing     Problem: DISCHARGE PLANNING  Goal: Discharge to home or other facility with appropriate resources  Description: INTERVENTIONS:  - Identify barriers to discharge w/patient and caregiver  - Arrange for needed discharge resources and transportation as appropriate  - Identify discharge learning needs (meds, wound care, etc )  - Arrange for interpretive services to assist at discharge as needed  - Refer to Case Management Department for coordinating discharge planning if the patient needs post-hospital services based on physician/advanced practitioner order or complex needs related to functional status, cognitive ability, or social support system  Outcome: Progressing     Problem: Knowledge Deficit  Goal: Patient/family/caregiver demonstrates understanding of disease process, treatment plan, medications, and discharge instructions  Description: Complete learning assessment and assess knowledge base    Interventions:  - Provide teaching at level of understanding  - Provide teaching via preferred learning methods  Outcome: Progressing     Problem: MOBILITY - ADULT  Goal: Maintain or return to baseline ADL function  Description: INTERVENTIONS:  -  Assess patient's ability to carry out ADLs; assess patient's baseline for ADL function and identify physical deficits which impact ability to perform ADLs (bathing, care of mouth/teeth, toileting, grooming, dressing, etc )  - Assess/evaluate cause of self-care deficits   - Assess range of motion  - Assess patient's mobility; develop plan if impaired  - Assess patient's need for assistive devices and provide as appropriate  - Encourage maximum independence but intervene and supervise when necessary  - Involve family in performance of ADLs  - Assess for home care needs following discharge   - Consider OT consult to assist with ADL evaluation and planning for discharge  - Provide patient education as appropriate  Outcome: Progressing  Goal: Maintains/Returns to pre admission functional level  Description: INTERVENTIONS:  - Perform BMAT or MOVE assessment daily    - Set and communicate daily mobility goal to care team and patient/family/caregiver  - Collaborate with rehabilitation services on mobility goals if consulted  - Perform Range of Motion  times a day  - Reposition patient every  hours    - Dangle patient  times a day  - Stand patient  times a day  - Ambulate patient  times a day  - Out of bed to chair  times a day   - Out of bed for meals  times a day  - Out of bed for toileting  - Record patient progress and toleration of activity level   Outcome: Progressing     Problem: GENITOURINARY - ADULT  Goal: Maintains or returns to baseline urinary function  Description: INTERVENTIONS:  - Assess urinary function  - Encourage oral fluids to ensure adequate hydration if ordered  - Administer IV fluids as ordered to ensure adequate hydration  - Administer ordered medications as needed  - Offer frequent toileting  - Follow urinary retention protocol if ordered  Outcome: Progressing  Goal: Absence of urinary retention  Description: INTERVENTIONS:  - Assess patient’s ability to void and empty bladder  - Monitor I/O  - Bladder scan as needed  - Discuss with physician/AP medications to alleviate retention as needed  - Discuss catheterization for long term situations as appropriate  Outcome: Progressing  Goal: Urinary catheter remains patent  Description: INTERVENTIONS:  - Assess patency of urinary catheter  - If patient has a chronic mahan, consider changing catheter if non-functioning  - Follow guidelines for intermittent irrigation of non-functioning urinary catheter  Outcome: Progressing

## 2022-12-12 NOTE — ASSESSMENT & PLAN NOTE
· Likely secondary to acute on chronic CHF and bronchospasm  · Status post IV diuresing  · Transition to po steroid taper   · Respiratory protocol  · Pulse O2 checked on ambulation today and patient maintained good saturation on room air therefore do does not require O2 on discharge

## 2022-12-12 NOTE — RESPIRATORY THERAPY NOTE
RT Protocol Note  Jeff Cristina 80 y o  female MRN: [id number]  Unit/Bed#: -01 Encounter: [id number]    Assessment    Principal Problem:    MARIA DEL CARMEN (acute kidney injury) [facility])  Active Problems:    Paroxysmal atrial fibrillation (HCC)    Essential hypertension    Acute on chronic diastolic (congestive) heart failure (HCC)    UTI (urinary tract infection)    Urinary obstruction    Acute respiratory failure with hypoxia [facility])      Home Pulmonary Medications:     12/11/22 2000   Respiratory Protocol   Protocol Initiated? No   Protocol Selection Respiratory   Language Barrier? No   Medical & Social History Reviewed? Yes   Diagnostic Studies Reviewed? Yes   Physical Assessment Performed? Yes   Respiratory Plan No distress/Pulmonary history   Respiratory Assessment   Assessment Type Assess only   General Appearance Alert; Awake   Respiratory Pattern Normal;Dyspnea with exertion   Chest Assessment Chest expansion symmetrical   Bilateral Breath Sounds Diminished   R Breath Sounds Diminished   L Breath Sounds Diminished   Cough None   Resp Comments Resp protocol completed   O2 Device NC   Cough Description   Sputum Amount None   Additional Assessments   Pulse 77   Respirations 18   SpO2 95 %            Past Medical History:   Diagnosis Date    Anemia     Asteatotic eczema     Chronic kidney disease     Hypercholesterolemia     Lichen sclerosus et atrophicus     Mitral valve insufficiency     Nosebleed     Seborrheic keratoses     Tricuspid regurgitation     Vertigo      Social History     Socioeconomic History    Marital status:       Spouse name: None    Number of children: None    Years of education: None    Highest education level: None   Occupational History    None   Tobacco Use    Smoking status: Never    Smokeless tobacco: Never   Vaping Use    Vaping Use: Never used   Substance and Sexual Activity    Alcohol use: Not Currently    Drug use: No    Sexual activity: Not Currently     Partners: Male   Other Topics Concern    None   Social History Narrative    Advance directive declined by patient     Social Determinants of Health     Financial Resource Strain: Not on file   Food Insecurity: No Food Insecurity    Worried About [employer] in the Last Year: Never true    Ran Out of Food in the Last Year: Never true   Transportation Needs: No Transportation Needs    Lack of Transportation (Medical): No    Lack of Transportation (Non-Medical): No   Physical Activity: Not on file   Stress: No Stress Concern Present    Feeling of Stress : Not at all   Social Connections: Not on file   Intimate Partner Violence: Not on file   Housing Stability: Low Risk     Unable to Pay for Housing in the Last Year: No    Number of Places Lived in the Last Year: 1    Unstable Housing in the Last Year: No       Subjective         Objective    Physical Exam:   Assessment Type: Assess only  General Appearance: Alert, Awake  O2 Device: NC    Vitals:  Blood pressure 160/87, pulse 77, temperature 97 7 °F (36 5 °C), resp  rate 18, weight 59 8 kg (131 lb 13 4 oz), SpO2 95 %  Imaging and other studies: I have personally reviewed pertinent reports        O2 Device: NC     Plan    Respiratory Plan: No distress/Pulmonary history        Resp Comments: Resp protocol completed

## 2022-12-12 NOTE — PLAN OF CARE
Problem: GENITOURINARY - ADULT  Goal: Maintains or returns to baseline urinary function  Description: INTERVENTIONS:  - Assess urinary function  - Encourage oral fluids to ensure adequate hydration if ordered  - Administer IV fluids as ordered to ensure adequate hydration  - Administer ordered medications as needed  - Offer frequent toileting  - Follow urinary retention protocol if ordered  Outcome: Progressing  Goal: Absence of urinary retention  Description: INTERVENTIONS:  - Assess patient’s ability to void and empty bladder  - Monitor I/O  - Bladder scan as needed  - Discuss with physician/AP medications to alleviate retention as needed  - Discuss catheterization for long term situations as appropriate  Outcome: Progressing  Goal: Urinary catheter remains patent  Description: INTERVENTIONS:  - Assess patency of urinary catheter  - If patient has a chronic mahan, consider changing catheter if non-functioning  - Follow guidelines for intermittent irrigation of non-functioning urinary catheter  Outcome: Progressing     Problem: Knowledge Deficit  Goal: Patient/family/caregiver demonstrates understanding of disease process, treatment plan, medications, and discharge instructions  Description: Complete learning assessment and assess knowledge base    Interventions:  - Provide teaching at level of understanding  - Provide teaching via preferred learning methods  Outcome: Progressing     Problem: DISCHARGE PLANNING  Goal: Discharge to home or other facility with appropriate resources  Description: INTERVENTIONS:  - Identify barriers to discharge w/patient and caregiver  - Arrange for needed discharge resources and transportation as appropriate  - Identify discharge learning needs (meds, wound care, etc )  - Arrange for interpretive services to assist at discharge as needed  - Refer to Case Management Department for coordinating discharge planning if the patient needs post-hospital services based on physician/advanced practitioner order or complex needs related to functional status, cognitive ability, or social support system  Outcome: Progressing

## 2022-12-12 NOTE — ASSESSMENT & PLAN NOTE
· Patient presented to the ED with complaints of difficulty urinating  · Patient was found to have a creatinine of 1 9 on admission with a baseline creatinine between 1 2-1 4  · Now improved s/p mahan and IVF and back to baseline  · Urology consulted, appreciate input     · Monitor creatinine while on Lasix

## 2022-12-12 NOTE — ASSESSMENT & PLAN NOTE
· Patient came in with urinary obstruction  · Failed voiding trial initially and Hunter reinserted  Repeat voiding trial done again a week later and was successful however patient is hesitant to go home right away therefore will monitor her with bladder scans/urnary retention protocol tonight and discharge tomorrow if no retention noted     · Likely 2/2 urinary tract infection  · Urology consulted, appreciate recommendation

## 2022-12-12 NOTE — PROGRESS NOTES
[address] [facility]  Progress Note - Bailey Morales 2/17/1924, 80 y o  female MRN: [id number]  Unit/Bed#: -01 Encounter: [id number]  Primary Care Provider: Emelia Shin MD   Date and time admitted to hospital: 12/2/2022  1:18 PM    * MARIA DEL CARMEN (acute kidney injury) [facility])  Assessment & Plan  · Patient presented to the ED with complaints of difficulty urinating  · Patient was found to have a creatinine of 1 9 on admission with a baseline creatinine between 1 2-1 4  · Now improved s/p mahan and IVF and back to baseline  · Urology consulted, appreciate input  · Monitor creatinine while on Lasix    Acute respiratory failure with hypoxia (HCC)  Assessment & Plan  · Likely secondary to acute on chronic CHF and bronchospasm  · Status post IV diuresing  · Transition to po steroid taper   · Respiratory protocol  · Pulse O2 checked on ambulation today and patient maintained good saturation on room air therefore do does not require O2 on discharge  Urinary obstruction  Assessment & Plan  · Patient came in with urinary obstruction  · Failed voiding trial initially and Mahan reinserted  Repeat voiding trial done again a week later and was successful however patient is hesitant to go home right away therefore will monitor her with bladder scans/urnary retention protocol tonight and discharge tomorrow if no retention noted     · Likely 2/2 urinary tract infection  · Urology consulted, appreciate recommendation     UTI (urinary tract infection)  Assessment & Plan  · Urine culture shows Citrobacter and Klebsiella  · Completed course of antibiotics  · Urinary symptoms resolved     Acute on chronic diastolic (congestive) heart failure (HCC)  Assessment & Plan  Wt Readings from Last 3 Encounters:   12/12/22 58 1 kg (128 lb 1 4 oz)   12/02/22 59 5 kg (131 lb 3 2 oz)   10/20/22 55 4 kg (122 lb 3 2 oz)     · Echo in 2021 showed EF 60%  · Patient developed SOB with hypoxia requiring O2 supplement likely secondary to IV fluid and holding home diuretics for MARIA DEL CARMEN on admission   · IV fluid discontinued  · Transitioned to home dose po Lasix   · I/Os and daily weights     Essential hypertension  Assessment & Plan  Blood Pressure: 144/73    Stable pressures  · Continue on home Metoprolol and Cardizem    Paroxysmal atrial fibrillation (HCC)  Assessment & Plan  · Rate controlled, C/w home toprol and diltiazem  · C w eliquis for anticoagulation        VTE Pharmacologic Prophylaxis:   Pharmacologic: Apixaban (Eliquis)  Mechanical VTE Prophylaxis in Place: Yes    Review of Systems:    Review of Systems   Constitutional: Negative for chills and fever  HENT: Negative for ear pain and sore throat  Eyes: Negative for pain and visual disturbance  Respiratory: Negative for cough and shortness of breath  Cardiovascular: Negative for chest pain and palpitations  Gastrointestinal: Negative for abdominal pain and vomiting  Genitourinary: Negative for dysuria and hematuria  Musculoskeletal: Negative for arthralgias and back pain  Skin: Negative for color change and rash  Neurological: Negative for seizures and syncope  All other systems reviewed and are negative  Past Medical and Surgical History:     Past Medical History:   Diagnosis Date   • Anemia    • Asteatotic eczema    • Chronic kidney disease    • Hypercholesterolemia    • Lichen sclerosus et atrophicus    • Mitral valve insufficiency    • Nosebleed    • Seborrheic keratoses    • Tricuspid regurgitation    • Vertigo        Past Surgical History:   Procedure Laterality Date   • APPENDECTOMY  11/14/1939   • CATARACT EXTRACTION, BILATERAL  04/2019   • HYSTERECTOMY  11/14/1962   • ORIF HIP FRACTURE Left 2020   • NV COLONOSCOPY FLX DX W/COLLJ SPEC WHEN PFRMD N/A 5/19/2016    Procedure: EGD AND COLONOSCOPY;  Surgeon: Sonny Barraza MD;  Location: [facility];   Service: Gastroenterology       Patient Centered Rounds: I have performed bedside rounds with nursing staff today     Discussions with Specialists or Other Care Team Provider: CM, nursing    Education and Discussions with Family / Patient: Patient and updated patient's daughter over the phone     Time Spent for Care: 1 hour  More than 50% of total time spent on counseling and coordination of care as described above  Current Length of Stay: 9 day(s)    Current Patient Status: Inpatient   Certification Statement: The patient will continue to require additional inpatient hospital stay due to monitoring for urinary retention     Discharge Plan: tomorrow if no further urinary retention noted     Code Status: Level 1 - Full Code      Subjective:   Patient denies any complaints     Objective:     Vitals:   Temp (24hrs), Av °F (36 1 °C), Min:96 3 °F (35 7 °C), Max:97 7 °F (36 5 °C)    Temp:  [96 3 °F (35 7 °C)-97 7 °F (36 5 °C)] 96 3 °F (35 7 °C)  HR:  [69-83] 71  Resp:  [16-18] 16  BP: (136-160)/(73-87) 144/73  SpO2:  [91 %-97 %] 94 %  Body mass index is 25 87 kg/m²  Input and Output Summary (last 24 hours): Intake/Output Summary (Last 24 hours) at 2022 1502  Last data filed at 2022 1435  Gross per 24 hour   Intake 370 ml   Output 2125 ml   Net -1755 ml       Physical Exam:     Physical Exam  Vitals and nursing note reviewed  Constitutional:       General: She is not in acute distress  Appearance: She is well-developed  She is not ill-appearing or diaphoretic  HENT:      Head: Normocephalic and atraumatic  Mouth/Throat:      Mouth: Mucous membranes are moist       Pharynx: Oropharynx is clear  Eyes:      General: No scleral icterus  Conjunctiva/sclera: Conjunctivae normal    Cardiovascular:      Rate and Rhythm: Normal rate and regular rhythm  Heart sounds: Normal heart sounds  No murmur heard  Pulmonary:      Effort: Pulmonary effort is normal  No respiratory distress  Breath sounds: Normal breath sounds  No wheezing or rales     Abdominal:      General: Bowel sounds are normal       Palpations: Abdomen is soft  Tenderness: There is no abdominal tenderness  Musculoskeletal:         General: No swelling  Cervical back: Normal range of motion and neck supple  Right lower leg: No edema  Left lower leg: No edema  Skin:     General: Skin is warm and dry  Capillary Refill: Capillary refill takes less than 2 seconds  Neurological:      General: No focal deficit present  Mental Status: She is alert and oriented to person, place, and time  Psychiatric:         Mood and Affect: Mood normal          Behavior: Behavior normal            Additional Data:     Labs:    Results from last 7 days   Lab Units 12/12/22 0604 12/07/22 0529 12/06/22  0502   WBC Thousand/uL 9 46   < > 7 55   HEMOGLOBIN g/dL 10 3*   < > 10 1*   HEMATOCRIT % 34 1*   < > 34 8   PLATELETS Thousands/uL 194   < > 191   NEUTROS PCT %  --   --  79*   LYMPHS PCT %  --   --  5*   MONOS PCT %  --   --  11   EOS PCT %  --   --  3    < > = values in this interval not displayed  Results from last 7 days   Lab Units 12/12/22 0604 12/07/22 0529 12/06/22  0502   SODIUM mmol/L 140   < > 137   POTASSIUM mmol/L 3 7   < > 4 5   CHLORIDE mmol/L 98   < > 106   CO2 mmol/L 33*   < > 21   BUN mg/dL 45*   < > 20   CREATININE mg/dL 1 18   < > 1 12   ANION GAP mmol/L 9   < > 10   CALCIUM mg/dL 8 5   < > 8 0*   ALBUMIN g/dL  --   --  3 4*   TOTAL BILIRUBIN mg/dL  --   --  0 45   ALK PHOS U/L  --   --  87   ALT U/L  --   --  19   AST U/L  --   --  24   GLUCOSE RANDOM mg/dL 127   < > 132    < > = values in this interval not displayed  * I Have Reviewed All Lab Data Listed Above  * Additional Pertinent Lab Tests Reviewed:  Leatha 66 Admission Reviewed    Imaging:    Imaging Reports Reviewed Today Include: none  Imaging Personally Reviewed by Myself Includes:  none    Recent Cultures (last 7 days):           Last 24 Hours Medication List:   Current Facility-Administered Medications   Medication Dose Route Frequency Provider Last Rate   • acetaminophen  650 mg Oral Q6H PRN Darren Davis MD     • albuterol  2 5 mg Nebulization Q6H PRN Zarina Cazares MD     • apixaban  2 5 mg Oral BID Darren Davis MD     • diltiazem  60 mg Oral Q12H [facility] Darren Davis MD     • fluticasone  1 spray Each Nare Daily Zarina Cazares MD     • furosemide  60 mg Oral Daily Zarina Cazares MD     • guaiFENesin  1,200 mg Oral Q12H [facility] Zarina Cazares MD     • loperamide  2 mg Oral 4x Daily PRN Zarina Cazares MD     • metoprolol succinate  100 mg Oral Daily Darren Davis MD     • [START ON 12/13/2022] predniSONE  40 mg Oral Daily Zarina Cazares MD      Followed by   • [START ON 12/16/2022] predniSONE  30 mg Oral Daily Zarina Cazares MD      Followed by   • [START ON 12/19/2022] predniSONE  20 mg Oral Daily Zarina Cazares MD      Followed by   • [START ON 12/22/2022] predniSONE  10 mg Oral Daily Zarina Cazares MD          Today, Patient Was Seen By: Seema Adame MD    ** Please Note: Dictation voice to text software may have been used in the creation of this document   **

## 2022-12-12 NOTE — ASSESSMENT & PLAN NOTE
Wt Readings from Last 3 Encounters:   12/12/22 58 1 kg (128 lb 1 4 oz)   12/02/22 59 5 kg (131 lb 3 2 oz)   10/20/22 55 4 kg (122 lb 3 2 oz)     · Echo in 2021 showed EF 60%  · Patient developed SOB with hypoxia requiring O2 supplement likely secondary to IV fluid and holding home diuretics for MARIA DEL CARMEN on admission   · IV fluid discontinued  · Transitioned to home dose po Lasix   · I/Os and daily weights

## 2022-12-12 NOTE — NURSING NOTE
Pt's O2 measured during ambulation with 2L O2 via NC Pox 95%  Ambulation RA Pox 90%  Pt at rest 2L O2 Pox 99%  Rest RA Pox 96%  Pt shows no s/s acute respiratory distress, no SOB or use of accessory muscles  Will continue to monitor Pt for any changes in condition

## 2022-12-13 PROBLEM — G93.41 ACUTE METABOLIC ENCEPHALOPATHY: Status: ACTIVE | Noted: 2022-12-13

## 2022-12-13 PROBLEM — T68.XXXA HYPOTHERMIA: Status: ACTIVE | Noted: 2022-01-01

## 2022-12-13 LAB
ANION GAP SERPL CALCULATED.3IONS-SCNC: 11 MMOL/L (ref 4–13)
ANION GAP SERPL CALCULATED.3IONS-SCNC: 5 MMOL/L (ref 4–13)
ANION GAP SERPL CALCULATED.3IONS-SCNC: 7 MMOL/L (ref 4–13)
ATRIAL RATE: 127 BPM
ATRIAL RATE: 89 BPM
BUN SERPL-MCNC: 42 MG/DL (ref 5–25)
BUN SERPL-MCNC: 43 MG/DL (ref 5–25)
BUN SERPL-MCNC: 44 MG/DL (ref 5–25)
CALCIUM SERPL-MCNC: 8 MG/DL (ref 8.3–10.1)
CALCIUM SERPL-MCNC: 8.2 MG/DL (ref 8.3–10.1)
CALCIUM SERPL-MCNC: 8.3 MG/DL (ref 8.3–10.1)
CHLORIDE SERPL-SCNC: 87 MMOL/L (ref 96–108)
CHLORIDE SERPL-SCNC: 89 MMOL/L (ref 96–108)
CHLORIDE SERPL-SCNC: 90 MMOL/L (ref 96–108)
CO2 SERPL-SCNC: 28 MMOL/L (ref 21–32)
CO2 SERPL-SCNC: 29 MMOL/L (ref 21–32)
CO2 SERPL-SCNC: 30 MMOL/L (ref 21–32)
CREAT SERPL-MCNC: 1.11 MG/DL (ref 0.6–1.3)
CREAT SERPL-MCNC: 1.17 MG/DL (ref 0.6–1.3)
CREAT SERPL-MCNC: 1.36 MG/DL (ref 0.6–1.3)
CREAT UR-MCNC: 13.2 MG/DL
GFR SERPL CREATININE-BSD FRML MDRD: 32 ML/MIN/1.73SQ M
GFR SERPL CREATININE-BSD FRML MDRD: 38 ML/MIN/1.73SQ M
GFR SERPL CREATININE-BSD FRML MDRD: 41 ML/MIN/1.73SQ M
GLUCOSE SERPL-MCNC: 104 MG/DL (ref 65–140)
GLUCOSE SERPL-MCNC: 142 MG/DL (ref 65–140)
GLUCOSE SERPL-MCNC: 158 MG/DL (ref 65–140)
POTASSIUM SERPL-SCNC: 3.8 MMOL/L (ref 3.5–5.3)
POTASSIUM SERPL-SCNC: 3.8 MMOL/L (ref 3.5–5.3)
POTASSIUM SERPL-SCNC: 4 MMOL/L (ref 3.5–5.3)
QRS AXIS: 92 DEGREES
QRS AXIS: 96 DEGREES
QRSD INTERVAL: 80 MS
QRSD INTERVAL: 86 MS
QT INTERVAL: 426 MS
QT INTERVAL: 432 MS
QTC INTERVAL: 479 MS
QTC INTERVAL: 485 MS
SODIUM 24H UR-SCNC: 15 MOL/L
SODIUM SERPL-SCNC: 122 MMOL/L (ref 135–147)
SODIUM SERPL-SCNC: 126 MMOL/L (ref 135–147)
SODIUM SERPL-SCNC: 128 MMOL/L (ref 135–147)
T WAVE AXIS: -32 DEGREES
T WAVE AXIS: 10 DEGREES
TSH SERPL DL<=0.05 MIU/L-ACNC: 0.71 UIU/ML (ref 0.45–4.5)
VENTRICULAR RATE: 74 BPM
VENTRICULAR RATE: 78 BPM

## 2022-12-13 RX ORDER — SODIUM CHLORIDE 450 MG/100ML
75 INJECTION, SOLUTION INTRAVENOUS CONTINUOUS
Status: DISCONTINUED | OUTPATIENT
Start: 2022-12-13 | End: 2022-12-14

## 2022-12-13 RX ADMIN — APIXABAN 2.5 MG: 2.5 TABLET, FILM COATED ORAL at 17:26

## 2022-12-13 RX ADMIN — GUAIFENESIN 1200 MG: 600 TABLET ORAL at 20:37

## 2022-12-13 RX ADMIN — GUAIFENESIN 1200 MG: 600 TABLET ORAL at 09:50

## 2022-12-13 RX ADMIN — PREDNISONE 40 MG: 20 TABLET ORAL at 09:50

## 2022-12-13 RX ADMIN — APIXABAN 2.5 MG: 2.5 TABLET, FILM COATED ORAL at 09:51

## 2022-12-13 RX ADMIN — SODIUM CHLORIDE 75 ML/HR: 0.45 INJECTION, SOLUTION INTRAVENOUS at 09:57

## 2022-12-13 RX ADMIN — DILTIAZEM HYDROCHLORIDE 60 MG: 60 CAPSULE, EXTENDED RELEASE ORAL at 20:37

## 2022-12-13 RX ADMIN — METOPROLOL SUCCINATE 100 MG: 100 TABLET, EXTENDED RELEASE ORAL at 09:50

## 2022-12-13 RX ADMIN — FLUTICASONE PROPIONATE 1 SPRAY: 50 SPRAY, METERED NASAL at 09:52

## 2022-12-13 RX ADMIN — DILTIAZEM HYDROCHLORIDE 60 MG: 60 CAPSULE, EXTENDED RELEASE ORAL at 09:52

## 2022-12-13 NOTE — ASSESSMENT & PLAN NOTE
· Patient came in with urinary obstruction  · Failed voiding trial initially and Mahan reinserted  Repeat voiding trial done again a week later and was successful however patient is hesitant to go home right away therefore will monitor her with bladder scans/urnary retention protocol tonight and discharge tomorrow if no retention noted     · Patient daughter scan this morning with 451 still showing evidence of retention we will have nursing check another PVR and retention Mahan will need to be replaced and patient will need to discharge with mahan  · Likely 2/2 urinary tract infection  · Urology consulted, appreciate recommendation

## 2022-12-13 NOTE — ASSESSMENT & PLAN NOTE
Wt Readings from Last 3 Encounters:   12/13/22 58 1 kg (128 lb)   12/02/22 59 5 kg (131 lb 3 2 oz)   10/20/22 55 4 kg (122 lb 3 2 oz)     · Echo in 2021 showed EF 60%  · Patient developed SOB with hypoxia requiring O2 supplement likely secondary to IV fluid and holding home diuretics for MARIA DEL CARMEN on admission   · IV fluid discontinued at that time  · Transitioned to home dose po Lasix   · Will again hold lasix today start gentle IVF in setting of hyponatremia with acute mental status change   · I/Os and daily weights

## 2022-12-13 NOTE — QUICK NOTE
Attended rapid response  Per nursing patient with confusion and word finding difficulty while on phone with daughter, not at baseline  /99 while in room  Critical care assessing patient at bedside, currently oriented to person only, but is otherwise pleasant and able to follow commands  Neuro exam without focal deficit  BG euglycemic  AM labs without major electrolyte disturbance  Patient on eliquis, no recent fall, to obtain head CT non-con  Patient due for PO BP medications Continue close neurologic and mentation monitoring  Addendum: updated by nursing, patient's rectal temp 93 5F  To apply reji hugger, and maintain close temperature monitoring overnight  CT head awaiting official read  Addendum: CT head without acute findings  Patient's temperature now improving at 94 6F  Called and updated patient's daughter Alexy Theodore) over the phone, all questions answered to the best of my ability

## 2022-12-13 NOTE — ASSESSMENT & PLAN NOTE
· Patient with hyponatremia 128 sodium normal this admission  · Given AMS acute change  · Will initiate 1/2 NSS   · BMP q6h  · If no improvement will consult nephrology   · Further will assess urine lytes

## 2022-12-13 NOTE — ASSESSMENT & PLAN NOTE
· Patient presented to the ED with complaints of difficulty urinating  · Patient was found to have a creatinine of 1 9 on admission with a baseline creatinine between 1 2-1 4  · Now improved s/p mahan and IVF and back to baseline  · Urology consulted, appreciate input     · CR stable currently

## 2022-12-13 NOTE — PROGRESS NOTES
[address] [facility]  Progress Note - Gladys Moe 2/17/1924, 80 y o  female MRN: [id number]  Unit/Bed#: -01 Encounter: [id number]  Primary Care Provider: Samaria Iraheta MD   Date and time admitted to hospital: 12/2/2022  1:18 PM    * MARIA DEL CARMEN (acute kidney injury) [facility])  Assessment & Plan  · Patient presented to the ED with complaints of difficulty urinating  · Patient was found to have a creatinine of 1 9 on admission with a baseline creatinine between 1 2-1 4  · Now improved s/p mahan and IVF and back to baseline  · Urology consulted, appreciate input  · CR stable currently     Hypothermia  Assessment & Plan  · Patient rapid response overnight due to AMS below baseline  · CT head negative  · Patient found to have hypothermia 93 5 requiring reji hugger   · Temp now improved 97 5  · Will check TSH     Urinary obstruction  Assessment & Plan  · Patient came in with urinary obstruction  · Failed voiding trial initially and Mahan reinserted  Repeat voiding trial done again a week later and was successful however patient is hesitant to go home right away therefore will monitor her with bladder scans/urnary retention protocol tonight and discharge tomorrow if no retention noted     · Patient daughter scan this morning with 451 still showing evidence of retention we will have nursing check another PVR and retention Mahan will need to be replaced and patient will need to discharge with mahan  · Likely 2/2 urinary tract infection  · Urology consulted, appreciate recommendation     Hyponatremia  Assessment & Plan  · Patient with hyponatremia 128 sodium normal this admission  · Given AMS acute change  · Will initiate 1/2 NSS   · BMP q6h  · If no improvement will consult nephrology   · Further will assess urine lytes     Acute metabolic encephalopathy  Assessment & Plan  · She with altered mental status overnight suspect acute metabolic encephalopathy setting of hyponatremia and hypothermia  · Mentation appears back at baseline  · Treatment for hypothermia and hyponatremia as mentioned above    Acute respiratory failure with hypoxia (HCC)  Assessment & Plan  · Likely secondary to acute on chronic CHF and bronchospasm  · Status post IV diuresing  · Transition to po steroid taper   · Respiratory protocol  · Pulse O2 checked on ambulation today and patient maintained good saturation on room air therefore do does not require O2 on discharge  UTI (urinary tract infection)  Assessment & Plan  · Urine culture shows Citrobacter and Klebsiella  · Completed course of antibiotics  · Urinary symptoms resolved     Acute on chronic diastolic (congestive) heart failure (HCC)  Assessment & Plan  Wt Readings from Last 3 Encounters:   12/13/22 58 1 kg (128 lb)   12/02/22 59 5 kg (131 lb 3 2 oz)   10/20/22 55 4 kg (122 lb 3 2 oz)     · Echo in 2021 showed EF 60%  · Patient developed SOB with hypoxia requiring O2 supplement likely secondary to IV fluid and holding home diuretics for MARIA DEL CARMEN on admission   · IV fluid discontinued at that time  · Transitioned to home dose po Lasix   · Will again hold lasix today start gentle IVF in setting of hyponatremia with acute mental status change   · I/Os and daily weights     Essential hypertension  Assessment & Plan  Blood Pressure: (!) 120/61    Hypertensive urgency improved  · Continue on home Metoprolol and Cardizem with parameters  · Hold lasix in setting of hyponatremia and IVF     Paroxysmal atrial fibrillation (HCC)  Assessment & Plan  · Rate controlled, C/w home toprol and diltiazem  · C w eliquis for anticoagulation        VTE Pharmacologic Prophylaxis: VTE Score: 6 High Risk (Score >/= 5) - Pharmacological DVT Prophylaxis Ordered: apixaban (Eliquis)  Sequential Compression Devices Ordered  Patient Centered Rounds: I performed bedside rounds with nursing staff today    Discussions with Specialists or Other Care Team Provider: none    Education and Discussions with Family / Patient: Updated  (daughter) via phone  Time Spent for Care: 35 minutes  More than 50% of total time spent on counseling and coordination of care as described above  Current Length of Stay: 10 day(s)  Current Patient Status: Inpatient   Certification Statement: The patient will continue to require additional inpatient hospital stay due to Altered mental status hypothermia hyponatremia  Discharge Plan: Anticipate discharge tomorrow to home  Code Status: Level 1 - Full Code    Subjective:   Patient appears alert and oriented x3 occasionally forgetful patient without complaint was hoping she was going home today  Discussion regarding the plan patient states standing no need for further hospitalization  Discussion with daughter over last night's events also with the bladder scan this morning  Objective:     Vitals:   Temp (24hrs), Av 7 °F (35 9 °C), Min:93 5 °F (34 2 °C), Max:98 °F (36 7 °C)    Temp:  [93 5 °F (34 2 °C)-98 °F (36 7 °C)] 97 8 °F (36 6 °C)  HR:  [64-84] 68  Resp:  [16-20] 20  BP: (120-201)/(61-99) 120/61  SpO2:  [90 %-96 %] 94 %  Body mass index is 25 85 kg/m²  Input and Output Summary (last 24 hours): Intake/Output Summary (Last 24 hours) at 2022 1105  Last data filed at 2022 1010  Gross per 24 hour   Intake 360 ml   Output 850 ml   Net -490 ml       Physical Exam:   Physical Exam  Vitals and nursing note reviewed  Constitutional:       General: She is not in acute distress  Appearance: She is well-developed  HENT:      Head: Normocephalic and atraumatic  Eyes:      Conjunctiva/sclera: Conjunctivae normal    Cardiovascular:      Rate and Rhythm: Normal rate and regular rhythm  Heart sounds: No murmur heard  Pulmonary:      Effort: Pulmonary effort is normal  No respiratory distress  Breath sounds: Normal breath sounds  Abdominal:      Palpations: Abdomen is soft  Tenderness: There is no abdominal tenderness  Musculoskeletal:         General: No swelling  Cervical back: Neck supple  Skin:     General: Skin is warm and dry  Capillary Refill: Capillary refill takes less than 2 seconds  Neurological:      Mental Status: She is alert and oriented to person, place, and time  Psychiatric:         Mood and Affect: Mood normal           Additional Data:     Labs:  Results from last 7 days   Lab Units 12/12/22  0604   WBC Thousand/uL 9 46   HEMOGLOBIN g/dL 10 3*   HEMATOCRIT % 34 1*   PLATELETS Thousands/uL 194     Results from last 7 days   Lab Units 12/13/22  0621   SODIUM mmol/L 128*   POTASSIUM mmol/L 3 8   CHLORIDE mmol/L 89*   CO2 mmol/L 28   BUN mg/dL 44*   CREATININE mg/dL 1 11   ANION GAP mmol/L 11   CALCIUM mg/dL 8 3   GLUCOSE RANDOM mg/dL 104         Results from last 7 days   Lab Units 12/12/22 2054   POC GLUCOSE mg/dl 154*               Lines/Drains:  Invasive Devices     Peripheral Intravenous Line  Duration           Peripheral IV 12/10/22 Dorsal (posterior); Right Wrist 3 days                      Imaging: Reviewed radiology reports from this admission including: CT head    Recent Cultures (last 7 days):         Last 24 Hours Medication List:   Current Facility-Administered Medications   Medication Dose Route Frequency Provider Last Rate   • acetaminophen  650 mg Oral Q6H PRN Darren Davis MD     • albuterol  2 5 mg Nebulization Q6H PRN Carolin Kohler MD     • apixaban  2 5 mg Oral BID Darren Davis MD     • diltiazem  60 mg Oral Q12H [address] Darren Davis MD     • fluticasone  1 spray Each Nare Daily Carolin Kohler MD     • [START ON 12/14/2022] furosemide  60 mg Oral Daily GUNJAN Kelley     • guaiFENesin  1,200 mg Oral Q12H [address] Carolin Kohler MD     • loperamide  2 mg Oral 4x Daily PRN Carolin Kohler MD     • metoprolol succinate  100 mg Oral Daily Darren Davis MD     • predniSONE  40 mg Oral Daily Carolin Kohler MD      Followed by   • [START ON 12/16/2022] predniSONE  30 mg Oral Daily Christiano Godoy MD      Followed by   • [START ON 12/19/2022] predniSONE  20 mg Oral Daily Christiano Godoy MD      Followed by   • [START ON 12/22/2022] predniSONE  10 mg Oral Daily Christiano Godoy MD     • sodium chloride  75 mL/hr Intravenous Continuous GUNJAN Toscano 75 mL/hr (12/13/22 0957)        Today, Patient Was Seen By: GUNJAN Toscano    **Please Note: This note may have been constructed using a voice recognition system  **

## 2022-12-13 NOTE — RAPID RESPONSE
Rapid Response Note  Jag Theodore 80 y o  female MRN: [id number]  Unit/Bed#: -01 Encounter: [id number]    Rapid Response Notification(s):   Response called date/time:  12/12/2022 8:53 PM  Response team arrival date/time:  12/12/2022 8:54 PM  Response end date/time:  12/12/2022 9:03 PM  Level of care:  [facility]  Rapid response location:  [facility] unit  Primary reason for rapid response call:  Acute change in neuro status    Rapid Response Intervention(s):   Airway:  None  Breathing:  None  Circulation:  None  Fluids administered:  None  Medications administered:  None  Comments:  NCCT       Assessment:   · Altered mental status    Plan:   · Patient with out focal findings; She has disorientation and some word (mostly number) finding difficulties, but no CN or lateralizing signs  · Patient already anticoagulated so not a candidate for TNK  · She did have elevated SBP but was due for her BP medications  · Recommendations as below (that was discussed with SLIM AP who was at bedside and in attendance of RRT    · Due to Eliquis use would do a NCCT head  · Would control SBP for <160, patient currently due for nighttime medications and therefore likely not need any PRNs but would consider adding  · Reach out to neuro/CCM if any changes in neuro status, any focal deficits arise for further investigation, possibly CTA but need to weight risks/benefits given her improving MARIA DEL CARMEN and CrCl of 38 currently     Rapid Response Outcome:   Transfer:  Remain on floor  Primary service notified of transfer: Yes    Code Status: Level 1 (Full Code)      Family notified of transfer: N/A  Family member contacted: N/A     Background/Situation:   Jag Theodore is a 80 y o  female who is hospital day 10 admitted with UTI and MARIA DEL CARMEN who was progresssing towards discharge   This evening she was on the phone with her daughter who expressed concern to nursing staff regarding confusion (not knowing orientation questions) and some word finding difficulty therefore RRT was called  Patient was on the commode when RRT was called  She is awake and alert  Patient was easily able to state name, with difficulty and stating a bunch of numbers finally answered 1984 and did not know she was in the hospital or [facility]  Remainder of neuro exam was unremarkable  On review of vital signs she was hypertensive with , but otherwise unremarkable  Her Blood glucose was WNL and her labs from this morning and over past several days were improving/at baseline  She herself offers no complaints  Review of Systems   All other systems reviewed and are negative  Objective:   Vitals:    12/12/22 0831 12/12/22 0854 12/12/22 1459 12/12/22 2057   BP:   144/73 (!) 194/99   Pulse: 83  71 82   Resp:   16    Temp:       TempSrc:       SpO2: 97% 94% 94% 94%   Weight:         Physical Exam  Vitals and nursing note reviewed  Constitutional:       General: She is not in acute distress  Appearance: She is well-developed and well-groomed  HENT:      Head: Normocephalic and atraumatic  Mouth/Throat:      Mouth: Mucous membranes are moist    Eyes:      Comments: See neuro section   Cardiovascular:      Rate and Rhythm: Normal rate and regular rhythm  Pulses: Normal pulses  Heart sounds: Normal heart sounds  Pulmonary:      Breath sounds: Normal breath sounds  No wheezing, rhonchi or rales  Abdominal:      General: Abdomen is flat  There is no distension  Palpations: Abdomen is soft  Tenderness: There is no abdominal tenderness  Genitourinary:     Comments: Deferred  Musculoskeletal:         General: Normal range of motion  Cervical back: Neck supple  No tenderness  Right lower leg: Edema (2+ pitting) present  Left lower leg: Edema (2+ pitting) present  Skin:     General: Skin is warm and dry  Capillary Refill: Capillary refill takes less than 2 seconds  Neurological:      Mental Status: She is alert        Cranial Nerves: Cranial nerves 2-12 are intact  Motor: Motor strength is normal      Neurologic Exam     Mental Status   Disoriented to person  Oriented to place  Disoriented to city  Disoriented to year, month and date  Attention: decreased  Concentration: decreased  Level of consciousness: alert  Knowledge: good  Repetitive particularly with numbers, repeating several numbers over and over again  Stated it was 1984     Cranial Nerves   Cranial nerves II through XII intact  Motor Exam   Overall muscle tone: normal    Strength   Strength 5/5 throughout  Sensory Exam   Light touch normal      Portions of the record may have been created with voice recognition software  Occasional wrong word or "sound a like" substitutions may have occurred due to the inherent limitations of voice recognition software  Read the chart carefully and recognize, using context, where substitutions have occurred      Dorinda Shi PA-C

## 2022-12-13 NOTE — ASSESSMENT & PLAN NOTE
· Patient rapid response overnight due to AMS below baseline  · CT head negative  · Patient found to have hypothermia 93 5 requiring reji hugger   · Temp now improved 97 5  · Will check TSH

## 2022-12-13 NOTE — ASSESSMENT & PLAN NOTE
· She with altered mental status overnight suspect acute metabolic encephalopathy setting of hyponatremia and hypothermia  · Mentation appears back at baseline  · Treatment for hypothermia and hyponatremia as mentioned above

## 2022-12-13 NOTE — ASSESSMENT & PLAN NOTE
Blood Pressure: (!) 120/61    Hypertensive urgency improved  · Continue on home Metoprolol and Cardizem with parameters  · Hold lasix in setting of hyponatremia and IVF

## 2022-12-13 NOTE — RESPIRATORY THERAPY NOTE
Called to patient room for rapid response alert patient with acute mental status change as noted by nursing  Patient OOB following commands and answering questions accurately  No apparent respiratory distress nothing further from respiratory at this time

## 2022-12-13 NOTE — PLAN OF CARE
Pt resting between care  New onset confusion overnight  Pt was found sitting on commode trying to call daughter  Pt not making sense  Speech more garbled and incoherent  Pt normally A&O4  Now only alert to self only  Rapid response was called  BP elevated and rectal temp of 93 5  Placed on bear hugger with improvement  Neuro checks q4h unchanged  New pressure injury to buttocks and B/L posterior thighs, possibly from prolonged use of BSC  Pt voiding  Grossly incontinent of bladder  Requiring frequent linen changes      Problem: INFECTION - ADULT  Goal: Absence or prevention of progression during hospitalization  Description: INTERVENTIONS:  - Assess and monitor for signs and symptoms of infection  - Monitor lab/diagnostic results  - Monitor all insertion sites, i e  indwelling lines, tubes, and drains  - Monitor endotracheal if appropriate and nasal secretions for changes in amount and color  - [place] appropriate cooling/warming therapies per order  - Administer medications as ordered  - Instruct and encourage patient and family to use good hand hygiene technique  - Identify and instruct in appropriate isolation precautions for identified infection/condition  Outcome: Progressing  Goal: Absence of fever/infection during neutropenic period  Description: INTERVENTIONS:  - Monitor WBC    Outcome: Progressing     Problem: GENITOURINARY - ADULT  Goal: Maintains or returns to baseline urinary function  Description: INTERVENTIONS:  - Assess urinary function  - Encourage oral fluids to ensure adequate hydration if ordered  - Administer IV fluids as ordered to ensure adequate hydration  - Administer ordered medications as needed  - Offer frequent toileting  - Follow urinary retention protocol if ordered  Outcome: Progressing  Goal: Absence of urinary retention  Description: INTERVENTIONS:  - Assess patient’s ability to void and empty bladder  - Monitor I/O  - Bladder scan as needed  - Discuss with physician/AP medications to alleviate retention as needed  - Discuss catheterization for long term situations as appropriate  Outcome: Progressing  Goal: Urinary catheter remains patent  Description: INTERVENTIONS:  - Assess patency of urinary catheter  - If patient has a chronic mahan, consider changing catheter if non-functioning  - Follow guidelines for intermittent irrigation of non-functioning urinary catheter  Outcome: Progressing

## 2022-12-14 LAB
ANION GAP SERPL CALCULATED.3IONS-SCNC: 10 MMOL/L (ref 4–13)
ANION GAP SERPL CALCULATED.3IONS-SCNC: 10 MMOL/L (ref 4–13)
ANION GAP SERPL CALCULATED.3IONS-SCNC: 6 MMOL/L (ref 4–13)
ANION GAP SERPL CALCULATED.3IONS-SCNC: 7 MMOL/L (ref 4–13)
ATRIAL RATE: 67 BPM
BUN SERPL-MCNC: 36 MG/DL (ref 5–25)
BUN SERPL-MCNC: 39 MG/DL (ref 5–25)
BUN SERPL-MCNC: 40 MG/DL (ref 5–25)
BUN SERPL-MCNC: 41 MG/DL (ref 5–25)
CALCIUM SERPL-MCNC: 8.1 MG/DL (ref 8.3–10.1)
CALCIUM SERPL-MCNC: 8.1 MG/DL (ref 8.3–10.1)
CALCIUM SERPL-MCNC: 8.3 MG/DL (ref 8.3–10.1)
CALCIUM SERPL-MCNC: 8.4 MG/DL (ref 8.3–10.1)
CHLORIDE SERPL-SCNC: 86 MMOL/L (ref 96–108)
CHLORIDE SERPL-SCNC: 88 MMOL/L (ref 96–108)
CHLORIDE SERPL-SCNC: 89 MMOL/L (ref 96–108)
CHLORIDE SERPL-SCNC: 91 MMOL/L (ref 96–108)
CO2 SERPL-SCNC: 27 MMOL/L (ref 21–32)
CO2 SERPL-SCNC: 28 MMOL/L (ref 21–32)
CO2 SERPL-SCNC: 30 MMOL/L (ref 21–32)
CO2 SERPL-SCNC: 30 MMOL/L (ref 21–32)
CREAT SERPL-MCNC: 1.03 MG/DL (ref 0.6–1.3)
CREAT SERPL-MCNC: 1.05 MG/DL (ref 0.6–1.3)
CREAT SERPL-MCNC: 1.07 MG/DL (ref 0.6–1.3)
CREAT SERPL-MCNC: 1.1 MG/DL (ref 0.6–1.3)
GFR SERPL CREATININE-BSD FRML MDRD: 41 ML/MIN/1.73SQ M
GFR SERPL CREATININE-BSD FRML MDRD: 43 ML/MIN/1.73SQ M
GFR SERPL CREATININE-BSD FRML MDRD: 44 ML/MIN/1.73SQ M
GFR SERPL CREATININE-BSD FRML MDRD: 45 ML/MIN/1.73SQ M
GLUCOSE SERPL-MCNC: 116 MG/DL (ref 65–140)
GLUCOSE SERPL-MCNC: 121 MG/DL (ref 65–140)
GLUCOSE SERPL-MCNC: 125 MG/DL (ref 65–140)
GLUCOSE SERPL-MCNC: 135 MG/DL (ref 65–140)
P AXIS: 37 DEGREES
POTASSIUM SERPL-SCNC: 3.6 MMOL/L (ref 3.5–5.3)
POTASSIUM SERPL-SCNC: 3.7 MMOL/L (ref 3.5–5.3)
POTASSIUM SERPL-SCNC: 3.8 MMOL/L (ref 3.5–5.3)
POTASSIUM SERPL-SCNC: 3.9 MMOL/L (ref 3.5–5.3)
PR INTERVAL: 174 MS
QRS AXIS: 32 DEGREES
QRSD INTERVAL: 86 MS
QT INTERVAL: 398 MS
QTC INTERVAL: 420 MS
SODIUM SERPL-SCNC: 124 MMOL/L (ref 135–147)
SODIUM SERPL-SCNC: 126 MMOL/L (ref 135–147)
T WAVE AXIS: 24 DEGREES
URATE SERPL-MCNC: 7.8 MG/DL (ref 2–7.5)
VENTRICULAR RATE: 67 BPM

## 2022-12-14 RX ORDER — SODIUM CHLORIDE 1000 MG
1 TABLET, SOLUBLE MISCELLANEOUS
Status: DISCONTINUED | OUTPATIENT
Start: 2022-12-14 | End: 2022-12-15

## 2022-12-14 RX ADMIN — APIXABAN 2.5 MG: 2.5 TABLET, FILM COATED ORAL at 17:21

## 2022-12-14 RX ADMIN — Medication 1 G: at 15:56

## 2022-12-14 RX ADMIN — DILTIAZEM HYDROCHLORIDE 60 MG: 60 CAPSULE, EXTENDED RELEASE ORAL at 09:09

## 2022-12-14 RX ADMIN — FUROSEMIDE 60 MG: 40 TABLET ORAL at 09:07

## 2022-12-14 RX ADMIN — GUAIFENESIN 1200 MG: 600 TABLET ORAL at 09:06

## 2022-12-14 RX ADMIN — GUAIFENESIN 1200 MG: 600 TABLET ORAL at 21:43

## 2022-12-14 RX ADMIN — PREDNISONE 40 MG: 20 TABLET ORAL at 09:07

## 2022-12-14 RX ADMIN — DILTIAZEM HYDROCHLORIDE 60 MG: 60 CAPSULE, EXTENDED RELEASE ORAL at 21:43

## 2022-12-14 RX ADMIN — FLUTICASONE PROPIONATE 1 SPRAY: 50 SPRAY, METERED NASAL at 09:09

## 2022-12-14 RX ADMIN — APIXABAN 2.5 MG: 2.5 TABLET, FILM COATED ORAL at 09:07

## 2022-12-14 RX ADMIN — METOPROLOL SUCCINATE 100 MG: 100 TABLET, EXTENDED RELEASE ORAL at 09:07

## 2022-12-14 NOTE — PROGRESS NOTES
Spiritual Care Progress Note    2022  Patient: Darrell Loco : 1924  Admission Date & Time: 2022 1318  MRN: [id number] [facility]: [id number]    Patient received communion and blessing from [address] [place] [address]       22 1000   Clinical Encounter Type   Visited With Patient   Routine Visit Introduction   Sacramental Encounters   Communion Given Indicator Yes

## 2022-12-14 NOTE — PLAN OF CARE
Problem: INFECTION - ADULT  Goal: Absence or prevention of progression during hospitalization  Description: INTERVENTIONS:  - Assess and monitor for signs and symptoms of infection  - Monitor lab/diagnostic results  - Monitor all insertion sites, i e  indwelling lines, tubes, and drains  - Monitor endotracheal if appropriate and nasal secretions for changes in amount and color  - [place] appropriate cooling/warming therapies per order  - Administer medications as ordered  - Instruct and encourage patient and family to use good hand hygiene technique  - Identify and instruct in appropriate isolation precautions for identified infection/condition  12/14/2022 0606 by Kelly Cabrales RN  Outcome: Progressing  12/14/2022 0606 by Kelly Cabrales RN  Outcome: Progressing  Goal: Absence of fever/infection during neutropenic period  Description: INTERVENTIONS:  - Monitor WBC    12/14/2022 0606 by Kelly Cabrales RN  Outcome: Progressing  12/14/2022 0606 by Kelly Cabrales RN  Outcome: Progressing     Problem: GENITOURINARY - ADULT  Goal: Maintains or returns to baseline urinary function  Description: INTERVENTIONS:  - Assess urinary function  - Encourage oral fluids to ensure adequate hydration if ordered  - Administer IV fluids as ordered to ensure adequate hydration  - Administer ordered medications as needed  - Offer frequent toileting  - Follow urinary retention protocol if ordered  12/14/2022 0606 by Kelly Cabrales RN  Outcome: Progressing  12/14/2022 0606 by Kelly Cabrales RN  Outcome: Progressing  Goal: Absence of urinary retention  Description: INTERVENTIONS:  - Assess patient’s ability to void and empty bladder  - Monitor I/O  - Bladder scan as needed  - Discuss with physician/AP medications to alleviate retention as needed  - Discuss catheterization for long term situations as appropriate  12/14/2022 0606 by Kelly Cabrales RN  Outcome: Progressing  12/14/2022 0606 by Kelly Cabrales RN  Outcome: Progressing  Goal: Urinary catheter remains patent  Description: INTERVENTIONS:  - Assess patency of urinary catheter  - If patient has a chronic mahan, consider changing catheter if non-functioning  - Follow guidelines for intermittent irrigation of non-functioning urinary catheter  12/14/2022 0606 by Marilyn Nugent RN  Outcome: Progressing  12/14/2022 0606 by Marilyn Nugent RN  Outcome: Progressing     Problem: Nutrition/Hydration-ADULT  Goal: Nutrient/Hydration intake appropriate for improving, restoring or maintaining nutritional needs  Description: Monitor and assess patient's nutrition/hydration status for malnutrition  Collaborate with interdisciplinary team and initiate plan and interventions as ordered  Monitor patient's weight and dietary intake as ordered or per policy  Utilize nutrition screening tool and intervene as necessary  Determine patient's food preferences and provide high-protein, high-caloric foods as appropriate       INTERVENTIONS:  - Monitor oral intake, urinary output, labs, and treatment plans  - Assess nutrition and hydration status and recommend course of action  - Evaluate amount of meals eaten  - Assist patient with eating if necessary   - Allow adequate time for meals  - Recommend/ encourage appropriate diets, oral nutritional supplements, and vitamin/mineral supplements  - Order, calculate, and assess calorie counts as needed  - Recommend, monitor, and adjust tube feedings and TPN/PPN based on assessed needs  - Assess need for intravenous fluids  - Provide specific nutrition/hydration education as appropriate  - Include patient/family/caregiver in decisions related to nutrition  12/14/2022 0606 by Marilyn Nugent RN  Outcome: Progressing  12/14/2022 0606 by Marilyn Nugent RN  Outcome: Progressing

## 2022-12-14 NOTE — ASSESSMENT & PLAN NOTE
Wt Readings from Last 3 Encounters:   12/13/22 58 1 kg (128 lb)   12/02/22 59 5 kg (131 lb 3 2 oz)   10/20/22 55 4 kg (122 lb 3 2 oz)     · Echo in 2021 showed EF 60%  · Patient developed SOB with hypoxia requiring O2 supplement likely secondary to IV fluid and holding home diuretics for MARIA DEL CARMEN on admission   · Diuretic on hold in setting of hyponatremia and gentle hydration monitor closely for signs of volume overload

## 2022-12-14 NOTE — ASSESSMENT & PLAN NOTE
Blood Pressure: 145/76    Hypertensive urgency improved  · Continue on home Metoprolol and Cardizem with parameters  · Hold lasix in setting of hyponatremia and IVF

## 2022-12-14 NOTE — ASSESSMENT & PLAN NOTE
· Patient came in with urinary obstruction  · Failed voiding trial initially and Hunter reinserted  Repeat voiding trial done again a week later and was successful repeat trial 12/13 appears to be successful  · Bladder scan yesterday early was prior to voiding obtained a PVR and patient had 0 mL per nursing no evidence of retention patient can continue without Hunter at this time

## 2022-12-14 NOTE — PROGRESS NOTES
[facility]  Progress Note - Mohamud Dye 2/17/1924, 80 y o  female MRN: [id number]  Unit/Bed#: -01 Encounter: [id number]  Primary Care Provider: Eileen De La Torre MD   Date and time admitted to hospital: 12/2/2022  1:18 PM    * MARIA DEL CARMEN (acute kidney injury) [facility])  Assessment & Plan  · Patient presented to the ED with complaints of difficulty urinating  · Patient was found to have a creatinine of 1 9 on admission with a baseline creatinine between 1 2-1 4  · Now improved s/p mahan and IVF and back to baseline  · Urology consulted, appreciate input  · CR stable currently     Hypothermia  Assessment & Plan  · Patient rapid response overnight due to AMS below baseline  · CT head negative  · Patient again with hypothermia this morning temperature 91 9 Emma hugger again in place  · TSH is normal    Urinary obstruction  Assessment & Plan  · Patient came in with urinary obstruction  · Failed voiding trial initially and Mahan reinserted  Repeat voiding trial done again a week later and was successful repeat trial 12/13 appears to be successful  · Bladder scan yesterday early was prior to voiding obtained a PVR and patient had 0 mL per nursing no evidence of retention patient can continue without Mahan at this time      Hyponatremia  Assessment & Plan  · Patient with hyponatremia 128 sodium normal this admission  · Given AMS acute change  · Consult to nephrology  · Discontinue IVF   · Add urine osmolarity   · Bmp in am     Acute metabolic encephalopathy  Assessment & Plan  · She with altered mental status overnight suspect acute metabolic encephalopathy setting of hyponatremia and hypothermia  · Mentation appears back at baseline  · Treatment for hypothermia and hyponatremia as mentioned above    Acute respiratory failure with hypoxia (HCC)  Assessment & Plan  · Likely secondary to acute on chronic CHF and bronchospasm  · Status post IV diuresing  · Transition to po steroid taper   · Respiratory protocol  · Pulse O2 checked on ambulation today and patient maintained good saturation on room air therefore do does not require O2 on discharge  UTI (urinary tract infection)  Assessment & Plan  · Urine culture shows Citrobacter and Klebsiella  · Completed course of antibiotics  · Urinary symptoms resolved     Acute on chronic diastolic (congestive) heart failure (HCC)  Assessment & Plan  Wt Readings from Last 3 Encounters:   12/13/22 58 1 kg (128 lb)   12/02/22 59 5 kg (131 lb 3 2 oz)   10/20/22 55 4 kg (122 lb 3 2 oz)     · Echo in 2021 showed EF 60%  · Patient developed SOB with hypoxia requiring O2 supplement likely secondary to IV fluid and holding home diuretics for MARIA DEL CARMEN on admission   · Diuretic on hold in setting of hyponatremia and gentle hydration monitor closely for signs of volume overload    Essential hypertension  Assessment & Plan  Blood Pressure: 145/76    Hypertensive urgency improved  · Continue on home Metoprolol and Cardizem with parameters  · Hold lasix in setting of hyponatremia and IVF     Paroxysmal atrial fibrillation (HCC)  Assessment & Plan  · Rate controlled, C/w home toprol and diltiazem  · C w eliquis for anticoagulation        VTE Pharmacologic Prophylaxis: VTE Score: 6 High Risk (Score >/= 5) - Pharmacological DVT Prophylaxis Ordered: apixaban (Eliquis)  Sequential Compression Devices Ordered  Patient Centered Rounds: I performed bedside rounds with nursing staff today  Discussions with Specialists or Other Care Team Provider: nephrology    Education and Discussions with Family / Patient: Attempted to update  (daughter) via phone  Left voicemail  Time Spent for Care: 35 minutes  More than 50% of total time spent on counseling and coordination of care as described above      Current Length of Stay: 11 day(s)  Current Patient Status: Inpatient   Certification Statement: The patient will continue to require additional inpatient hospital stay due to hyponatremia  Discharge Plan: Anticipate discharge tomorrow to home with home services  Code Status: Level 1 - Full Code    Subjective:   Patient alert and pleasant has no complaints at this time  Objective:     Vitals:   Temp (24hrs), Av 5 °F (35 8 °C), Min:91 9 °F (33 3 °C), Max:97 9 °F (36 6 °C)    Temp:  [91 9 °F (33 3 °C)-97 9 °F (36 6 °C)] 97 2 °F (36 2 °C)  HR:  [63-69] 68  Resp:  [17-18] 17  BP: (142-150)/(75-90) 145/79  SpO2:  [92 %-96 %] 95 %  Body mass index is 26 76 kg/m²  Input and Output Summary (last 24 hours): Intake/Output Summary (Last 24 hours) at 2022 1444  Last data filed at 2022 1101  Gross per 24 hour   Intake 180 ml   Output 980 ml   Net -800 ml       Physical Exam:   Physical Exam  Vitals and nursing note reviewed  Constitutional:       General: She is not in acute distress  Appearance: She is well-developed  HENT:      Head: Normocephalic and atraumatic  Eyes:      Conjunctiva/sclera: Conjunctivae normal    Cardiovascular:      Rate and Rhythm: Normal rate and regular rhythm  Heart sounds: No murmur heard  Pulmonary:      Effort: Pulmonary effort is normal  No respiratory distress  Breath sounds: Normal breath sounds  Abdominal:      Palpations: Abdomen is soft  Tenderness: There is no abdominal tenderness  Musculoskeletal:         General: No swelling  Cervical back: Neck supple  Skin:     General: Skin is warm and dry  Capillary Refill: Capillary refill takes less than 2 seconds  Neurological:      Mental Status: She is alert and oriented to person, place, and time  Mental status is at baseline     Psychiatric:         Mood and Affect: Mood normal           Additional Data:     Labs:  Results from last 7 days   Lab Units 22  0604   WBC Thousand/uL 9 46   HEMOGLOBIN g/dL 10 3*   HEMATOCRIT % 34 1*   PLATELETS Thousands/uL 194     Results from last 7 days   Lab Units 22  1327   SODIUM mmol/L 124* POTASSIUM mmol/L 3 8   CHLORIDE mmol/L 91*   CO2 mmol/L 27   BUN mg/dL 40*   CREATININE mg/dL 1 10   ANION GAP mmol/L 6   CALCIUM mg/dL 8 1*   GLUCOSE RANDOM mg/dL 125         Results from last 7 days   Lab Units 12/12/22 2054   POC GLUCOSE mg/dl 154*               Lines/Drains:  Invasive Devices     Peripheral Intravenous Line  Duration           Peripheral IV 12/14/22 Left;Ventral (anterior) Forearm <1 day          Drain  Duration           External Urinary Catheter 1 day                      Imaging: No pertinent imaging reviewed  Recent Cultures (last 7 days):         Last 24 Hours Medication List:   Current Facility-Administered Medications   Medication Dose Route Frequency Provider Last Rate   • acetaminophen  650 mg Oral Q6H PRN Darren Davis MD     • albuterol  2 5 mg Nebulization Q6H PRN Merrill Chahal MD     • apixaban  2 5 mg Oral BID Darren Davis MD     • diltiazem  60 mg Oral Q12H [address] Darren Davis MD     • fluticasone  1 spray Each Nare Daily Merrill Chahal MD     • furosemide  60 mg Oral Daily GUNJAN Tovar     • guaiFENesin  1,200 mg Oral Q12H [address] Merrill Chahal MD     • loperamide  2 mg Oral 4x Daily PRN Merrill Chahal MD     • metoprolol succinate  100 mg Oral Daily Darren Davis MD     • predniSONE  40 mg Oral Daily Merrill Chahal MD      Followed by   • [START ON 12/16/2022] predniSONE  30 mg Oral Daily Merrill Chahal MD      Followed by   • [START ON 12/19/2022] predniSONE  20 mg Oral Daily Merrlil Chahal MD      Followed by   • [START ON 12/22/2022] predniSONE  10 mg Oral Daily Merrill Chahal MD     • sodium chloride  1 g Oral TID With Pat Mittal MD          Today, Patient Was Seen By: GUNJAN Tovar    **Please Note: This note may have been constructed using a voice recognition system  **

## 2022-12-14 NOTE — ASSESSMENT & PLAN NOTE
· Patient with hyponatremia 128 sodium normal this admission  · Given AMS acute change  · Consult to nephrology  · Discontinue IVF   · Add urine osmolarity   · Bmp in am

## 2022-12-14 NOTE — ASSESSMENT & PLAN NOTE
· Patient rapid response overnight due to AMS below baseline  · CT head negative  · Patient again with hypothermia this morning temperature 91 9 Emma hugger again in place  · TSH is normal

## 2022-12-14 NOTE — CONSULTS
Consultation - Nephrology   Madhavi Baeza 80 y o  female MRN: [id number]  Unit/Bed#: -01 Encounter: [id number]    Referring PHYSICIAN: Alexander Gunderson    REASON FOR THE CONSULTATION: Hyponatremia    DATE OF CONSULTATION: December 14, 2022    ADMISSION DIAGNOSIS: MARIA DEL CARMEN (acute kidney injury) ([facility] [address] )     [facility]     Patient known to be CKD came to the hospital with confusion and feeling weak and was found to be hyponatremic    HPI     Patient moderate stage III CKD  80 woman who looks quite good for her age and overall doing quite well    Patient was brought in emergency room and was admitted with difficulty urination with possible UTI  Patient also found to be confused at that time and had acute kidney injury which responded to IV fluid    Patient developed hyponatremia thus I am being consulted    She denies any complaint    Wants to go home    No chest pain no palpitation no shortness of breath    Denies any urinary complaint for now        PAST MEDICAL HISTORY     Past Medical History:   Diagnosis Date   • Anemia    • Asteatotic eczema    • Chronic kidney disease    • Hypercholesterolemia    • Lichen sclerosus et atrophicus    • Mitral valve insufficiency    • Nosebleed    • Seborrheic keratoses    • Tricuspid regurgitation    • Vertigo        PAST SURGICAL HISTORY     Past Surgical History:   Procedure Laterality Date   • APPENDECTOMY  11/14/1939   • CATARACT EXTRACTION, BILATERAL  04/2019   • HYSTERECTOMY  11/14/1962   • ORIF HIP FRACTURE Left 2020   • MN COLONOSCOPY FLX DX W/COLLJ SPEC WHEN PFRMD N/A 5/19/2016    Procedure: EGD AND COLONOSCOPY;  Surgeon: Khalida Lee MD;  [facility]: [facility];   Service: Gastroenterology       ALLERGIES     Allergies   Allergen Reactions   • Lactose Intolerance (Gi) - Food Allergy    • Penicillins Other (See Comments)     unknown       SOCIAL HISTORY     Social History     Substance and Sexual Activity   Alcohol Use Not Currently     Social History     Substance and Sexual Activity   Drug Use No     Social History     Tobacco Use   Smoking Status Never   Smokeless Tobacco Never       FAMILY HISTORY     Family History   Problem Relation Age of Onset   • Heart disease Mother         Abnormality   • Heart disease Father         Abnormality   • No Known Problems Sister    • No Known Problems Brother        CURRENT MEDICATIONS       Current Facility-Administered Medications:   •  acetaminophen (TYLENOL) tablet 650 mg, 650 mg, Oral, Q6H PRN, Darren Davis MD  •  albuterol inhalation solution 2 5 mg, 2 5 mg, Nebulization, Q6H PRN, Natalie Cordero MD, 2 5 mg at 12/09/22 2046  •  apixaban (ELIQUIS) tablet 2 5 mg, 2 5 mg, Oral, BID, Darren Davis MD, 2 5 mg at 12/14/22 0907  •  diltiazem (CARDIZEM SR) 12 hr capsule 60 mg, 60 mg, Oral, Q12H [address], Darren Davis MD, 60 mg at 12/14/22 0909  •  fluticasone (FLONASE) 50 mcg/act nasal spray 1 spray, 1 spray, Each Nare, Daily, Natalie Cordero MD, 1 spray at 12/14/22 0909  •  furosemide (LASIX) tablet 60 mg, 60 mg, Oral, Daily, Etoile Rosin, CRNP, 60 mg at 12/14/22 0907  •  guaiFENesin (MUCINEX) 12 hr tablet 1,200 mg, 1,200 mg, Oral, Q12H [address], Natalie Cordero MD, 1,200 mg at 12/14/22 0906  •  loperamide (IMODIUM) capsule 2 mg, 2 mg, Oral, 4x Daily PRN, Natalie Cordero MD, 2 mg at 12/12/22 2101  •  metoprolol succinate (TOPROL-XL) 24 hr tablet 100 mg, 100 mg, Oral, Daily, Darren Davis MD, 100 mg at 12/14/22 0907  •  predniSONE tablet 40 mg, 40 mg, Oral, Daily, 40 mg at 12/14/22 0907 **FOLLOWED BY** [START ON 12/16/2022] predniSONE tablet 30 mg, 30 mg, Oral, Daily **FOLLOWED BY** [START ON 12/19/2022] predniSONE tablet 20 mg, 20 mg, Oral, Daily **FOLLOWED BY** [START ON 12/22/2022] predniSONE tablet 10 mg, 10 mg, Oral, Daily, Natalie Cordero MD    REVIEW OF SYSTEMS     Review of Systems   Constitutional: Negative for activity change and fatigue  HENT: Negative for congestion and ear discharge  Eyes: Negative for photophobia and pain  Respiratory: Negative for apnea and choking  Cardiovascular: Negative for chest pain and palpitations  Gastrointestinal: Negative for abdominal distention and blood in stool  Endocrine: Negative for heat intolerance and polyphagia  Genitourinary: Negative for flank pain and urgency  Musculoskeletal: Negative for neck pain and neck stiffness  Skin: Negative for color change and wound  Allergic/Immunologic: Negative for food allergies and immunocompromised state  Neurological: Negative for seizures and facial asymmetry  Hematological: Negative for adenopathy  Does not bruise/bleed easily  Psychiatric/Behavioral: Negative for self-injury and suicidal ideas  LAB RESULTS        Results from last 7 days   Lab Units 12/14/22  1327 12/14/22  0959 12/14/22  0045 12/13/22  1739 12/13/22  1311 12/13/22  0621 12/12/22  0604 12/09/22  0534 12/08/22  0525   WBC Thousand/uL  --   --   --   --   --   --  9 46  --  7 76   HEMOGLOBIN g/dL  --   --   --   --   --   --  10 3*  --  9 7*   HEMATOCRIT %  --   --   --   --   --   --  34 1*  --  32 5*   PLATELETS Thousands/uL  --   --   --   --   --   --  194  --  167   POTASSIUM mmol/L 3 8 3 7 3 9 4 0 3 8 3 8 3 7   < > 4 1   CHLORIDE mmol/L 91* 88* 89* 87* 90* 89* 98   < > 105   CO2 mmol/L 27 28 30 30 29 28 33*   < > 26   BUN mg/dL 40* 36* 41* 42* 43* 44* 45*   < > 20   CREATININE mg/dL 1 10 1 03 1 05 1 17 1 36* 1 11 1 18   < > 1 19   EGFR ml/min/1 73sq m 41 45 44 38 32 41 38   < > 38   CALCIUM mg/dL 8 1* 8 1* 8 4 8 2* 8 0* 8 3 8 5   < > 8 3    < > = values in this interval not displayed         I have personally reviewed the old medical records and patient's previously known baseline creatinine level is ~ 1 1    RADIOLOGY RESULTS     Results for orders placed during the hospital encounter of 12/02/22    XR chest portable    Narrative  CHEST    INDICATION:   SOB     COMPARISON:  Compared with 11/18/2021    EXAM PERFORMED/VIEWS:  XR CHEST PORTABLE      FINDINGS:    Mild cardiomegaly  Poor inspiration  Prominent vascular interstitial markings  Bibasilar haziness  Osseous structures appear within normal limits for patient age  Impression  Moderate congestive changes  Bilateral small effusions  Workstation performed: [id number]    Results for orders placed during the hospital encounter of 11/18/21    XR chest 2 views    Narrative  CHEST    INDICATION:   sob  COMPARISON:  11/11/2021; 3/29/2017    EXAM PERFORMED/VIEWS:  XR CHEST PA & LATERAL      FINDINGS:    Cardiomegaly is present  Aortic calcification is present  No infiltrate is identified  Pulmonary vascularity and interstitial markings have slightly increased and trace effusions are noted on the lateral view  No pneumothorax or pleural effusion  Osseous structures appear within normal limits for patient age  Impression  Evidence of trace edema and effusions  Workstation performed: [id number]    Results for orders placed during the hospital encounter of 03/20/17    CT chest wo contrast    Narrative  CT CHEST WITHOUT IV CONTRAST    INDICATION:  Acute respiratory illness    COMPARISON: 7/1/2016    TECHNIQUE: CT examination of the chest was performed without intravenous contrast   Reformatted images were created in axial, sagittal, and coronal planes  Radiation dose length product (DLP) for this visit:  377 mGy-cm -cm  This examination, like all CT scans performed in the [facility], was performed utilizing techniques to minimize radiation dose exposure, including the use of iterative  reconstruction and automated exposure control  FINDINGS:    LUNGS:  Bibasilar consolidation likely represents atelectasis  Subsegmental atelectasis also seen  No tracheal endobronchial lesions  PLEURA:  Small bilateral pleural effusions are present  HEART/GREAT VESSELS:  Unremarkable for patient's age      MEDIASTINUM AND MARCK: Unremarkable  CHEST WALL AND LOWER NECK:  Unremarkable  VISUALIZED STRUCTURES IN THE UPPER ABDOMEN:  Left hepatic cysts appear stable  Large cyst arising from the left kidney is partially visualized  OSSEOUS STRUCTURES:  No acute fracture  No destructive osseous lesion  Impression  Small bilateral pleural effusions with likely bibasilar compressive atelectasis as well as scattered subsegmental atelectasis  Workstation performed: [id number]    No results found for this or any previous visit  Results for orders placed during the hospital encounter of 12/02/22    CT abdomen pelvis wo contrast    Narrative  CT ABDOMEN AND PELVIS WITHOUT IV CONTRAST    INDICATION:   Urinary retention, MARIA DEL CARMEN  COMPARISON:  Abdomen and pelvic CT from 7/11/2022  TECHNIQUE:  CT examination of the abdomen and pelvis was performed without intravenous contrast  Axial, sagittal, and coronal 2D reformatted images were created from the source data and submitted for interpretation  Radiation dose length product (DLP) for this visit:  656 mGy-cm   This examination, like all CT scans performed in the [facility], was performed utilizing techniques to minimize radiation dose exposure, including the use of iterative  reconstruction and automated exposure control  Enteric contrast was not administered  FINDINGS:    ABDOMEN    LOWER CHEST:  Unchanged moderate cardiomegaly  Unchanged small right pleural effusion  Again seen is a right Bochdalek hernia containing fat only  LIVER/BILIARY TREE:  Small simple cyst in the left lobe of liver anteriorly  Otherwise unremarkable  GALLBLADDER:  No calcified gallstones  No pericholecystic inflammatory change  SPLEEN:  Unremarkable  PANCREAS:  Unremarkable  ADRENAL GLANDS:  Unremarkable  KIDNEYS/URETERS:  Multiple large simple cyst in both kidneys  There is also an unchanged exophytic hemorrhagic cyst in the right kidney upper pole    No hydronephrosis  STOMACH AND BOWEL:  There is colonic diverticulosis without evidence of acute diverticulitis  APPENDIX:  No findings to suggest appendicitis  ABDOMINOPELVIC CAVITY:  No ascites  No pneumoperitoneum  No lymphadenopathy  VESSELS:  Unremarkable for patient's age  PELVIS    REPRODUCTIVE ORGANS:  Unremarkable for patient's age  URINARY BLADDER:  Urinary bladder decompressed by Hunter catheter  ABDOMINAL WALL/INGUINAL REGIONS:  Unremarkable  OSSEOUS STRUCTURES:  No acute fracture or destructive osseous lesion  Again seen is a left femoral intramedullary nail  Again seen is avascular necrosis of the left femoral head and severe left hip osteoarthritis  Impression  Hunter catheter decompresses the urinary bladder  Again seen are multiple benign renal cysts  There is no hydronephrosis  Colonic diverticulosis without diverticulitis  Workstation performed: [id number]    No results found for this or any previous visit  OBJECTIVE     Current Weight: Weight - Scale: 60 1 kg (132 lb 7 9 oz)  Vitals:    12/14/22 1425   BP: 145/79   Pulse:    Resp: 17   Temp: (!) 97 2 °F (36 2 °C)   SpO2:        Intake/Output Summary (Last 24 hours) at 12/14/2022 1442  Last data filed at 12/14/2022 1101  Gross per 24 hour   Intake 180 ml   Output 980 ml   Net -800 ml       PHYSICAL EXAMINATION     Physical Exam  Constitutional:       General: She is not in acute distress  Appearance: She is well-developed  HENT:      Head: Normocephalic  Eyes:      General: No scleral icterus  Conjunctiva/sclera: Conjunctivae normal    Neck:      Vascular: No JVD  Cardiovascular:      Rate and Rhythm: Normal rate  Heart sounds: Normal heart sounds  Pulmonary:      Effort: Pulmonary effort is normal       Breath sounds: No wheezing  Abdominal:      Palpations: Abdomen is soft  Tenderness: There is no abdominal tenderness     Musculoskeletal:         General: Normal range of motion  Cervical back: Neck supple  Skin:     General: Skin is warm  Findings: No rash  Neurological:      Mental Status: She is alert and oriented to person, place, and time  Psychiatric:         Behavior: Behavior normal           PLAN / RECOMMENDATIONS      Hyponatremia: Based on low urine sodium suggesting possible volume depletion  Stable at this point  Patient also getting hypotonic solution which I stopped  Will start on salt tablet  We will check urine osmolarity at this point  Will advised to keep patient 1 more day in hospital to make sure there is improving trend    CKD stage III: Stable and at baseline    Hypertension: Very well controlled    We will continue to monitor with you    Thank you for the consultation to participate in patient's care  I have personally discussed my plan with the referring physician  Maria Fernanda Wilhelm MD  Nephrology  12/14/2022        Portions of the record may have been created with voice recognition software  Occasional wrong word or "sound a like" substitutions may have occurred due to the inherent limitations of voice recognition software  Read the chart carefully and recognize, using context, where substitutions have occurred

## 2022-12-15 LAB
ANION GAP SERPL CALCULATED.3IONS-SCNC: 11 MMOL/L (ref 4–13)
ANION GAP SERPL CALCULATED.3IONS-SCNC: 12 MMOL/L (ref 4–13)
ANION GAP SERPL CALCULATED.3IONS-SCNC: 13 MMOL/L (ref 4–13)
ANION GAP SERPL CALCULATED.3IONS-SCNC: 8 MMOL/L (ref 4–13)
BUN SERPL-MCNC: 35 MG/DL (ref 5–25)
BUN SERPL-MCNC: 36 MG/DL (ref 5–25)
BUN SERPL-MCNC: 38 MG/DL (ref 5–25)
BUN SERPL-MCNC: 39 MG/DL (ref 5–25)
CALCIUM SERPL-MCNC: 8.1 MG/DL (ref 8.3–10.1)
CALCIUM SERPL-MCNC: 8.6 MG/DL (ref 8.3–10.1)
CALCIUM SERPL-MCNC: 8.7 MG/DL (ref 8.3–10.1)
CALCIUM SERPL-MCNC: 8.9 MG/DL (ref 8.3–10.1)
CHLORIDE SERPL-SCNC: 90 MMOL/L (ref 96–108)
CHLORIDE SERPL-SCNC: 94 MMOL/L (ref 96–108)
CO2 SERPL-SCNC: 28 MMOL/L (ref 21–32)
CO2 SERPL-SCNC: 31 MMOL/L (ref 21–32)
CO2 SERPL-SCNC: 32 MMOL/L (ref 21–32)
CO2 SERPL-SCNC: 33 MMOL/L (ref 21–32)
CREAT SERPL-MCNC: 1.05 MG/DL (ref 0.6–1.3)
CREAT SERPL-MCNC: 1.07 MG/DL (ref 0.6–1.3)
CREAT SERPL-MCNC: 1.09 MG/DL (ref 0.6–1.3)
CREAT SERPL-MCNC: 1.1 MG/DL (ref 0.6–1.3)
GFR SERPL CREATININE-BSD FRML MDRD: 41 ML/MIN/1.73SQ M
GFR SERPL CREATININE-BSD FRML MDRD: 42 ML/MIN/1.73SQ M
GFR SERPL CREATININE-BSD FRML MDRD: 43 ML/MIN/1.73SQ M
GFR SERPL CREATININE-BSD FRML MDRD: 44 ML/MIN/1.73SQ M
GLUCOSE SERPL-MCNC: 107 MG/DL (ref 65–140)
GLUCOSE SERPL-MCNC: 108 MG/DL (ref 65–140)
GLUCOSE SERPL-MCNC: 141 MG/DL (ref 65–140)
GLUCOSE SERPL-MCNC: 92 MG/DL (ref 65–140)
OSMOLALITY UR: 168 MMOL/KG
POTASSIUM SERPL-SCNC: 3.4 MMOL/L (ref 3.5–5.3)
POTASSIUM SERPL-SCNC: 3.4 MMOL/L (ref 3.5–5.3)
POTASSIUM SERPL-SCNC: 3.5 MMOL/L (ref 3.5–5.3)
POTASSIUM SERPL-SCNC: 3.8 MMOL/L (ref 3.5–5.3)
SODIUM SERPL-SCNC: 129 MMOL/L (ref 135–147)
SODIUM SERPL-SCNC: 135 MMOL/L (ref 135–147)
SODIUM SERPL-SCNC: 138 MMOL/L (ref 135–147)
SODIUM SERPL-SCNC: 138 MMOL/L (ref 135–147)

## 2022-12-15 RX ORDER — PREDNISONE 20 MG/1
20 TABLET ORAL DAILY
Qty: 3 TABLET | Refills: 0 | Status: SHIPPED | OUTPATIENT
Start: 2022-12-19 | End: 2022-12-28

## 2022-12-15 RX ORDER — PREDNISONE 10 MG/1
10 TABLET ORAL DAILY
Qty: 3 TABLET | Refills: 0 | Status: SHIPPED | OUTPATIENT
Start: 2022-12-22 | End: 2022-12-28

## 2022-12-15 RX ORDER — DILTIAZEM HYDROCHLORIDE 60 MG/1
60 CAPSULE, EXTENDED RELEASE ORAL EVERY 12 HOURS SCHEDULED
Qty: 60 CAPSULE | Refills: 0 | Status: SHIPPED | OUTPATIENT
Start: 2022-12-15 | End: 2023-01-14

## 2022-12-15 RX ORDER — LOPERAMIDE HYDROCHLORIDE 2 MG/1
2 CAPSULE ORAL 4 TIMES DAILY PRN
Qty: 30 CAPSULE | Refills: 0 | Status: SHIPPED | OUTPATIENT
Start: 2022-12-15

## 2022-12-15 RX ORDER — METOPROLOL SUCCINATE 100 MG/1
100 TABLET, EXTENDED RELEASE ORAL DAILY
Qty: 30 TABLET | Refills: 0 | Status: SHIPPED | OUTPATIENT
Start: 2022-12-16

## 2022-12-15 RX ORDER — PREDNISONE 10 MG/1
30 TABLET ORAL DAILY
Qty: 9 TABLET | Refills: 0 | Status: SHIPPED | OUTPATIENT
Start: 2022-12-16 | End: 2022-12-19

## 2022-12-15 RX ADMIN — LOPERAMIDE HYDROCHLORIDE 2 MG: 2 CAPSULE ORAL at 19:46

## 2022-12-15 RX ADMIN — FLUTICASONE PROPIONATE 1 SPRAY: 50 SPRAY, METERED NASAL at 09:19

## 2022-12-15 RX ADMIN — FUROSEMIDE 60 MG: 40 TABLET ORAL at 09:21

## 2022-12-15 RX ADMIN — LOPERAMIDE HYDROCHLORIDE 2 MG: 2 CAPSULE ORAL at 15:21

## 2022-12-15 RX ADMIN — APIXABAN 2.5 MG: 2.5 TABLET, FILM COATED ORAL at 17:11

## 2022-12-15 RX ADMIN — LOPERAMIDE HYDROCHLORIDE 2 MG: 2 CAPSULE ORAL at 06:41

## 2022-12-15 RX ADMIN — LOPERAMIDE HYDROCHLORIDE 2 MG: 2 CAPSULE ORAL at 02:17

## 2022-12-15 RX ADMIN — METOPROLOL SUCCINATE 100 MG: 100 TABLET, EXTENDED RELEASE ORAL at 09:20

## 2022-12-15 RX ADMIN — GUAIFENESIN 1200 MG: 600 TABLET ORAL at 09:22

## 2022-12-15 RX ADMIN — DILTIAZEM HYDROCHLORIDE 60 MG: 60 CAPSULE, EXTENDED RELEASE ORAL at 19:40

## 2022-12-15 RX ADMIN — Medication 1 G: at 09:23

## 2022-12-15 RX ADMIN — DILTIAZEM HYDROCHLORIDE 60 MG: 60 CAPSULE, EXTENDED RELEASE ORAL at 09:24

## 2022-12-15 RX ADMIN — PREDNISONE 40 MG: 20 TABLET ORAL at 09:21

## 2022-12-15 RX ADMIN — APIXABAN 2.5 MG: 2.5 TABLET, FILM COATED ORAL at 09:22

## 2022-12-15 RX ADMIN — GUAIFENESIN 1200 MG: 600 TABLET ORAL at 19:40

## 2022-12-15 NOTE — ASSESSMENT & PLAN NOTE
Blood Pressure: 153/83    Hypertensive urgency improved  · Continue on home Metoprolol and Cardizem with parameters  · Hold lasix in setting of hyponatremia and IVF

## 2022-12-15 NOTE — CASE MANAGEMENT
Case Management Discharge Planning Note    Patient name Cheri Atwood  Location /-72 MRN [id number]  : 1924 Date 12/15/2022       Current Admission Date: 2022  Current Admission Diagnosis:MARIA DEL CARMEN (acute kidney injury) [facility])   Patient Active Problem List    Diagnosis Date Noted   • Hypothermia 2022   • Acute metabolic encephalopathy    • Acute respiratory failure with hypoxia ([address] ) 2022   • Urinary obstruction 2022   • UTI (urinary tract infection) 2022   • Hyponatremia 2022   • Bilateral renal cysts 2022   • Nonrheumatic aortic valve stenosis 2022   • Dyspnea 2021   • Hypoxia 2021   • Acute respiratory distress 2021   • Severe pulmonary hypertension ([address] ) 2021   • Acute kidney injury superimposed on CKD ([facility] [address] ) 2021   • Chronic renal disease, stage IV ([address] ) 2021   • Lower GI bleed 2021   • Seasonal allergic rhinitis due to pollen 10/26/2021   • Iron deficiency anemia 07/15/2021   • Fecal occult blood test positive 07/15/2021   • Anemia 07/15/2021   • Acute on chronic diastolic (congestive) heart failure ([address] ) 2021   • Fracture of right orbital wall ([facility] [address] ) 2020   • Chronic kidney disease-mineral and bone disorder 2019   • Epistaxis    • Diastolic dysfunction 15/98/1096   • Anemia due to chronic kidney disease 10/18/2018   • Mitral valve insufficiency 2018   • Hypercholesterolemia 2018   • MARIA DEL CARMEN (acute kidney injury) ([address] ) 2017   • Paroxysmal atrial fibrillation ([facility] [address] ) 2017   • Long term current use of anticoagulant 2017   • Chronic kidney disease, stage III (moderate) ([address] ) 2017   • Essential hypertension 2017      LOS (days): 12  Geometric Mean LOS (GMLOS) (days): 3 10  Days to GMLOS:-9 1     OBJECTIVE:  Risk of Unplanned Readmission Score: 26 71         Current admission status: Inpatient   Preferred Pharmacy:   [facility]/pharmacy #[id number]- [address] [place] [address] [employer]  Phone: [phone] Fax: [phone]    Primary Care Provider: Kayley Miguel MD    Primary Insurance: MEDICARE  Secondary Insurance: [facility]    DISCHARGE DETAILS:           CM contacted Daughter about pt and [facility]  Daughter was very worried about mother going home  Daughter feels that it is unsafe and that hospital does not care about patients life  CM explained that the only way a [facility] can come and get mother to bring her home is if they can safely get to the hospital first  If road conditions are too bad, [facility] will not be able to come to get pt to bring pt home  Pt daughter calmed down and understood

## 2022-12-15 NOTE — ASSESSMENT & PLAN NOTE
Wt Readings from Last 3 Encounters:   12/14/22 60 1 kg (132 lb 7 9 oz)   12/02/22 59 5 kg (131 lb 3 2 oz)   10/20/22 55 4 kg (122 lb 3 2 oz)     · Echo in 2021 showed EF 60%  · Patient developed SOB with hypoxia requiring O2 supplement likely secondary to IV fluid and holding home diuretics for MARIA DEL CARMEN on admission   · Diuretic on hold in setting of hyponatremia and gentle hydration monitor closely for signs of volume overload

## 2022-12-15 NOTE — OCCUPATIONAL THERAPY NOTE
Occupational Therapy Cancellation     Patient Name: Xander Flores  CNIHZ'R Date: 12/15/2022  Problem List  Principal Problem:    MARIA DEL CARMEN (acute kidney injury) [facility])  Active Problems:    Paroxysmal atrial fibrillation (HCC)    Essential hypertension    Acute on chronic diastolic (congestive) heart failure (HCC)    UTI (urinary tract infection)    Hyponatremia    Urinary obstruction    Acute respiratory failure with hypoxia ([address] )    Hypothermia    Acute metabolic encephalopathy          12/15/22 0940   OT Last Visit   OT Visit Date 12/15/22   Note Type   Note Type Cancelled Session   Cancel Reasons Other  (Pt chart reviewed by this therapist  Upon arrival to room, pt reported significant fatigue and diarrhea at this time, therefore polietly declined participation in therapy session   Will reattempt as pt willing )     Chas Delcid OTR/L

## 2022-12-15 NOTE — ASSESSMENT & PLAN NOTE
Wt Readings from Last 3 Encounters:   12/15/22 59 7 kg (131 lb 9 8 oz)   12/02/22 59 5 kg (131 lb 3 2 oz)   10/20/22 55 4 kg (122 lb 3 2 oz)     · Echo in 2021 showed EF 60%  · Patient developed SOB with hypoxia requiring O2 supplement likely secondary to IV fluid and holding home diuretics for MARIA DEL CARMEN on admission   · Diuretic on hold in setting of hyponatremia and gentle hydration monitor closely for signs of volume overload

## 2022-12-15 NOTE — DISCHARGE SUMMARY
[address] [facility]  Discharge- [facility] 2/17/1924, 80 y o  female MRN: [id number]  Unit/Bed#: -01 Encounter: [id number]  Primary Care Provider: Roman Cristobal MD   Date and time admitted to hospital: 12/2/2022  1:18 PM    * MARIA DEL CARMEN (acute kidney injury) [facility])  Assessment & Plan  · Patient presented to the ED with complaints of difficulty urinating  · Patient was found to have a creatinine of 1 9 on admission with a baseline creatinine between 1 2-1 4  · Now improved s/p mahan and IVF and back to baseline  · Urology consulted, appreciate input  · CR stable currently     UTI (urinary tract infection)  Assessment & Plan  · Urine culture shows Citrobacter and Klebsiella  · Completed course of antibiotics  · Urinary symptoms resolved     Acute metabolic encephalopathy  Assessment & Plan  · She with altered mental status overnight suspect acute metabolic encephalopathy setting of hyponatremia and hypothermia  · Mentation appears back at baseline  · Treatment for hypothermia and hyponatremia as mentioned above    Hyponatremia  Assessment & Plan  · Patient with hyponatremia 128 sodium normal this admission  · Given AMS acute change  · Nephrology evaluated  · Sarted on sodium tablets  · Sodium has improved stable for discharge from their standpoint and follow-up outpatient    Hypothermia  Assessment & Plan  · Patient rapid response overnight due to AMS below baseline  · CT head negative  · Patient again with hypothermia this morning temperature 91 9 Emma hugger again in place  · TSH is normal    Acute respiratory failure with hypoxia (HCC)  Assessment & Plan  · Likely secondary to acute on chronic CHF and bronchospasm  · Status post IV diuresing  · Transition to po steroid taper   · Respiratory protocol  · Does not require O2 on discharge      · Stable on RA      Urinary obstruction  Assessment & Plan  · Patient came in with urinary obstruction  · Failed voiding trial initially and Mahan reinserted  Repeat voiding trial done again a week later and was successful repeat trial 12/13 appears to be successful  · Bladder scan yesterday early was prior to voiding obtained a PVR and patient had 0 mL per nursing no evidence of retention patient can continue without Hunter at this time  Acute on chronic diastolic (congestive) heart failure (HCC)  Assessment & Plan  Wt Readings from Last 3 Encounters:   12/15/22 59 7 kg (131 lb 9 8 oz)   12/02/22 59 5 kg (131 lb 3 2 oz)   10/20/22 55 4 kg (122 lb 3 2 oz)     · Echo in 2021 showed EF 60%  · Patient developed SOB with hypoxia requiring O2 supplement likely secondary to IV fluid and holding home diuretics for MARIA DEL CARMEN on admission   · Diuretic on hold in setting of hyponatremia and gentle hydration monitor closely for signs of volume overload    Essential hypertension  Assessment & Plan  Blood Pressure: 153/83  Hypertensive urgency improved  · Continue on home Metoprolol and Cardizem with parameters  · Hold lasix in setting of hyponatremia and IVF     Paroxysmal atrial fibrillation ([facility] [address] )  Assessment & Plan  · Rate controlled, C/w home toprol and diltiazem  · C w eliquis for anticoagulation       Discharging Physician / Practitioner: Waldemar Almazan, [facility]   PCP: Adryan Castellanos MD  Admission Date:   Admission Orders (From admission, onward)     Ordered        12/03/22 1106  Inpatient Admission  Once            12/02/22 1527  Place in Observation  Once                      Discharge Date: 12/15/22    Medical Problems     Resolved Problems  Date Reviewed: 12/13/2022   None         Consultations During Hospital Stay:  · IP CONSULT TO UROLOGY  · IP CONSULT TO NEPHROLOGY    Procedures Performed:   CT abdomen pelvis wo contrast    Result Date: 12/2/2022  Narrative: CT ABDOMEN AND PELVIS WITHOUT IV CONTRAST INDICATION:   Urinary retention, MARIA DEL CARMEN  COMPARISON:  Abdomen and pelvic CT from 7/11/2022   TECHNIQUE:  CT examination of the abdomen and pelvis was performed without intravenous contrast  Axial, sagittal, and coronal 2D reformatted images were created from the source data and submitted for interpretation  Radiation dose length product (DLP) for this visit:  656 mGy-cm   This examination, like all CT scans performed in the [facility], was performed utilizing techniques to minimize radiation dose exposure, including the use of iterative reconstruction and automated exposure control  Enteric contrast was not administered  FINDINGS: ABDOMEN LOWER CHEST:  Unchanged moderate cardiomegaly  Unchanged small right pleural effusion  Again seen is a right Bochdalek hernia containing fat only  LIVER/BILIARY TREE:  Small simple cyst in the left lobe of liver anteriorly  Otherwise unremarkable  GALLBLADDER:  No calcified gallstones  No pericholecystic inflammatory change  SPLEEN:  Unremarkable  PANCREAS:  Unremarkable  ADRENAL GLANDS:  Unremarkable  KIDNEYS/URETERS:  Multiple large simple cyst in both kidneys  There is also an unchanged exophytic hemorrhagic cyst in the right kidney upper pole  No hydronephrosis  STOMACH AND BOWEL:  There is colonic diverticulosis without evidence of acute diverticulitis  APPENDIX:  No findings to suggest appendicitis  ABDOMINOPELVIC CAVITY:  No ascites  No pneumoperitoneum  No lymphadenopathy  VESSELS:  Unremarkable for patient's age  PELVIS REPRODUCTIVE ORGANS:  Unremarkable for patient's age  URINARY BLADDER:  Urinary bladder decompressed by Hunter catheter  ABDOMINAL WALL/INGUINAL REGIONS:  Unremarkable  OSSEOUS STRUCTURES:  No acute fracture or destructive osseous lesion  Again seen is a left femoral intramedullary nail  Again seen is avascular necrosis of the left femoral head and severe left hip osteoarthritis  Impression: Hunter catheter decompresses the urinary bladder  Again seen are multiple benign renal cysts  There is no hydronephrosis  Colonic diverticulosis without diverticulitis   Workstation performed: [id number] XR chest portable    Result Date: 12/7/2022  Narrative: CHEST INDICATION:   SOB  COMPARISON:  Compared with 11/18/2021 EXAM PERFORMED/VIEWS:  XR CHEST PORTABLE FINDINGS: Mild cardiomegaly  Poor inspiration  Prominent vascular interstitial markings  Bibasilar haziness  Osseous structures appear within normal limits for patient age  Impression: Moderate congestive changes  Bilateral small effusions  Workstation performed: [id number]     CT head wo contrast    Result Date: 12/12/2022  Narrative: CT BRAIN - WITHOUT CONTRAST INDICATION:   Delirium AMS  COMPARISON:  None  TECHNIQUE:  CT examination of the brain was performed  In addition to axial images, sagittal and coronal 2D reformatted images were created and submitted for interpretation  Radiation dose length product (DLP) for this visit:  841 mGy-cm   This examination, like all CT scans performed in the [facility], was performed utilizing techniques to minimize radiation dose exposure, including the use of iterative reconstruction and automated exposure control  IMAGE QUALITY:  Diagnostic  FINDINGS: PARENCHYMA: Decreased attenuation is noted in periventricular and subcortical white matter demonstrating an appearance that is statistically most likely to represent moderate microangiopathic change  Calcification of basal ganglia  No CT signs of acute infarction  No intracranial mass, mass effect or midline shift  No acute parenchymal hemorrhage  VENTRICLES AND EXTRA-AXIAL SPACES:  Normal for the patient's age  VISUALIZED ORBITS AND PARANASAL SINUSES:  Bilateral lens implants  Probable small osteoma within a right ethmoid air cell  Otherwise visualized paranasal sinuses are clear  CALVARIUM AND EXTRACRANIAL SOFT TISSUES:  Normal      Impression: No acute intracranial abnormality  Chronic microangiopathic changes   Workstation performed: [id number]       Significant Findings / Test Results:   · See above    Incidental Findings:   · none Test Results Pending at Discharge (will require follow up):   · none     Outpatient Tests Requested:  · none    Complications:  none    Past Medical History:   Diagnosis Date   • Anemia    • Asteatotic eczema    • Chronic kidney disease    • Hypercholesterolemia    • Lichen sclerosus et atrophicus    • Mitral valve insufficiency    • Nosebleed    • Seborrheic keratoses    • Tricuspid regurgitation    • Vertigo        Reason for Admission: Difficulty Urinating (Pt arrives to ED via EMS from her PCP  Pt was being seen by her PCP today for difficulty urinating x2 days  Pt denies abdominal pain, burning, and vomiting  Pt is alert and oriented on arrival  )       Hospital Course:     Darrell Loco is a 80 y o  female patient with past medical history of see above who originally presented to the hospital on 12/2/2022 due to Difficulty Urinating (Pt arrives to ED via EMS from her PCP  Pt was being seen by her PCP today for difficulty urinating x2 days  Pt denies abdominal pain, burning, and vomiting  Pt is alert and oriented on arrival  )  Patient came in with urinary symptoms was found to have a UTI completed a course of antibiotics has some urinary retention that resolved with antibiotics and Hunter placement  Had an acute kidney injury which resolved with IV fluids and management of UTI MARIA DEL CARMEN also cause some hyponatremia which resulted in some encephalopathy which is all resolved with treatment of hyponatremia patient is feeling well and is ambulating in her room without any difficulty offers no complaints      Please see above list of diagnoses and related plan for additional information       Condition at Discharge: stable     Discharge Day Visit / Exam:     Subjective: Patient is ambulating feels well and is eager for discharge home  Vitals: Blood Pressure: 153/83 (12/15/22 0811)  Pulse: 72 (12/15/22 0811)  Temperature: 97 6 °F (36 4 °C) (12/14/22 2349)  Temp Source: Oral (12/13/22 2345)  Respirations: 18 (12/15/22 8105)  Weight - Scale: 59 7 kg (131 lb 9 8 oz) (12/15/22 1028)  SpO2: 92 % (12/15/22 0811)  Exam:   Physical Exam  Vitals reviewed  Cardiovascular:      Rate and Rhythm: Normal rate  Pulmonary:      Breath sounds: Normal breath sounds  Abdominal:      Palpations: Abdomen is soft  Skin:     General: Skin is warm  Neurological:      Mental Status: She is alert  Mental status is at baseline  Psychiatric:         Mood and Affect: Mood normal        Discussion with Family: not available    Discharge instructions/Information to patient and family:   See after visit summary for information provided to patient and family  Provisions for Follow-Up Care:  See after visit summary for information related to follow-up care and any pertinent home health orders  Disposition:     Home  Planned Readmission: no     Discharge Statement:  I spent 45 minutes discharging the patient  This time was spent on the day of discharge  I had direct contact with the patient on the day of discharge  Greater than 50% of the total time was spent examining patient, answering all patient questions, arranging and discussing plan of care with patient as well as directly providing post-discharge instructions  Additional time then spent on discharge activities  Discharge Medications:  See after visit summary for reconciled discharge medications provided to patient and family        ** Please Note: This note has been constructed using a voice recognition system **

## 2022-12-15 NOTE — PLAN OF CARE
Problem: Potential for Falls  Goal: Patient will remain free of falls  Description: INTERVENTIONS:  - Educate patient/family on patient safety including physical limitations  - Instruct patient to call for assistance with activity   - Consult OT/PT to assist with strengthening/mobility   - Keep Call bell within reach  - Keep bed low and locked with side rails adjusted as appropriate  - Keep care items and personal belongings within reach  - Initiate and maintain comfort rounds  - Make Fall Risk Sign visible to staff  - Offer Toileting every 2 Hours, in advance of need  - Initiate/Maintain bed/chair alarm  - Obtain necessary fall risk management equipment:   - Apply yellow socks and bracelet for high fall risk patients  - Consider moving patient to room near nurses station  Outcome: Progressing     Problem: SAFETY ADULT  Goal: Patient will remain free of falls  Description: INTERVENTIONS:  - Educate patient/family on patient safety including physical limitations  - Instruct patient to call for assistance with activity   - Consult OT/PT to assist with strengthening/mobility   - Keep Call bell within reach  - Keep bed low and locked with side rails adjusted as appropriate  - Keep care items and personal belongings within reach  - Initiate and maintain comfort rounds  - Make Fall Risk Sign visible to staff  - Offer Toileting every 2 Hours, in advance of need  - Initiate/Maintain bed/chairalarm  - Obtain necessary fall risk management equipment:   - Apply yellow socks and bracelet for high fall risk patients  - Consider moving patient to room near nurses station  Outcome: Progressing  Goal: Maintain or return to baseline ADL function  Description: INTERVENTIONS:  -  Assess patient's ability to carry out ADLs; assess patient's baseline for ADL function and identify physical deficits which impact ability to perform ADLs (bathing, care of mouth/teeth, toileting, grooming, dressing, etc )  - Assess/evaluate cause of self-care deficits   - Assess range of motion  - Assess patient's mobility; develop plan if impaired  - Assess patient's need for assistive devices and provide as appropriate  - Encourage maximum independence but intervene and supervise when necessary  - Involve family in performance of ADLs  - Assess for home care needs following discharge   - Consider OT consult to assist with ADL evaluation and planning for discharge  - Provide patient education as appropriate  Outcome: Progressing  Goal: Maintains/Returns to pre admission functional level  Description: INTERVENTIONS:  - Perform BMAT or MOVE assessment daily    - Set and communicate daily mobility goal to care team and patient/family/caregiver  - Collaborate with rehabilitation services on mobility goals if consulted  - Performs active Range of Motion   - Reposition patient every 2 hours    - Dangle patient   - Stand patient   - Ambulate patient 2 times a day  - Out of bed to chair    - Out of bed for meals 3 times a day  - Out of bed for toileting  - Record patient progress and toleration of activity level   Outcome: Progressing

## 2022-12-15 NOTE — ASSESSMENT & PLAN NOTE
· Patient with hyponatremia 128 sodium normal this admission  · Given AMS acute change  · Nephrology evaluated  · Sarted on sodium tablets  · Sodium has improved stable for discharge from their standpoint and follow-up outpatient

## 2022-12-15 NOTE — PROGRESS NOTES
[address] [facility]  Progress Note - Ginger Siemens 2/17/1924, 80 y o  female MRN: [id number]  Unit/Bed#: -01 Encounter: [id number]  Primary Care Provider: Carlos Merrill MD   Date and time admitted to hospital: 12/2/2022  1:18 PM    * MARIA DEL CARMEN (acute kidney injury) [facility])  Assessment & Plan  · Patient presented to the ED with complaints of difficulty urinating  · Patient was found to have a creatinine of 1 9 on admission with a baseline creatinine between 1 2-1 4  · Now improved s/p mahan and IVF and back to baseline  · Urology consulted, appreciate input  · CR stable currently     UTI (urinary tract infection)  Assessment & Plan  · Urine culture shows Citrobacter and Klebsiella  · Completed course of antibiotics  · Urinary symptoms resolved     Acute metabolic encephalopathy  Assessment & Plan  · She with altered mental status overnight suspect acute metabolic encephalopathy setting of hyponatremia and hypothermia  · Mentation appears back at baseline  · Treatment for hypothermia and hyponatremia as mentioned above    Hyponatremia  Assessment & Plan  · Patient with hyponatremia 128 sodium normal this admission  · Given AMS acute change  · Nephrology evaluated  · Sarted on sodium tablets  · Sodium has improved stable for discharge from their standpoint and follow-up outpatient    Hypothermia  Assessment & Plan  · Patient rapid response overnight due to AMS below baseline  · CT head negative  · Patient again with hypothermia this morning temperature 91 9 Emma hugger again in place  · TSH is normal    Acute respiratory failure with hypoxia (HCC)  Assessment & Plan  · Likely secondary to acute on chronic CHF and bronchospasm  · Status post IV diuresing  · Transition to po steroid taper   · Respiratory protocol  · Pulse O2 checked on ambulation today and patient maintained good saturation on room air therefore do does not require O2 on discharge          Urinary obstruction  Assessment & Plan  · Patient came in with urinary obstruction  · Failed voiding trial initially and Hunter reinserted  Repeat voiding trial done again a week later and was successful repeat trial 12/13 appears to be successful  · Bladder scan yesterday early was prior to voiding obtained a PVR and patient had 0 mL per nursing no evidence of retention patient can continue without Hunter at this time  Acute on chronic diastolic (congestive) heart failure (HCC)  Assessment & Plan  Wt Readings from Last 3 Encounters:   12/14/22 60 1 kg (132 lb 7 9 oz)   12/02/22 59 5 kg (131 lb 3 2 oz)   10/20/22 55 4 kg (122 lb 3 2 oz)     · Echo in 2021 showed EF 60%  · Patient developed SOB with hypoxia requiring O2 supplement likely secondary to IV fluid and holding home diuretics for MARIA DEL CARMEN on admission   · Diuretic on hold in setting of hyponatremia and gentle hydration monitor closely for signs of volume overload    Essential hypertension  Assessment & Plan  Blood Pressure: 153/83    Hypertensive urgency improved  · Continue on home Metoprolol and Cardizem with parameters  · Hold lasix in setting of hyponatremia and IVF     Paroxysmal atrial fibrillation (HCC)  Assessment & Plan  · Rate controlled, C/w home toprol and diltiazem  · C w eliquis for anticoagulation        VTE Pharmacologic Prophylaxis: VTE Score: 6 High Risk (Score >/= 5) - Pharmacological DVT Prophylaxis Ordered: apixaban (Eliquis)  Sequential Compression Devices Ordered  Patient Centered Rounds: I performed bedside rounds with nursing staff today  Discussions with Specialists or Other Care Team Provider: Reviewed notes discussed with nephrology    Education and Discussions with Family / Patient: Updated  (son) via phone  Time Spent for Care: 20 minutes  More than 50% of total time spent on counseling and coordination of care as described above      Current Length of Stay: 12 day(s)  Current Patient Status: Inpatient   Certification Statement: The patient will continue to require additional inpatient hospital stay due to moniotring diarrhea  Discharge Plan: Anticipate discharge tomorrow to home  Code Status: Level 1 - Full Code    Subjective:   Patient appears well offers no acute complaints he is refusing to leave because of diarrhea today emotional support provided    Objective:     Vitals:   Temp (24hrs), Av 4 °F (36 3 °C), Min:97 2 °F (36 2 °C), Max:97 6 °F (36 4 °C)    Temp:  [97 2 °F (36 2 °C)-97 6 °F (36 4 °C)] 97 6 °F (36 4 °C)  HR:  [69-72] 72  Resp:  [17-18] 18  BP: (145-153)/(79-83) 153/83  SpO2:  [92 %-93 %] 92 %  Body mass index is 26 58 kg/m²  Input and Output Summary (last 24 hours): Intake/Output Summary (Last 24 hours) at 12/15/2022 1235  Last data filed at 12/15/2022 0710  Gross per 24 hour   Intake 130 ml   Output 1000 ml   Net -870 ml       Physical Exam:   Physical Exam  Vitals reviewed  Constitutional:       General: She is not in acute distress  Appearance: She is not ill-appearing  Cardiovascular:      Rate and Rhythm: Normal rate  Pulmonary:      Effort: No respiratory distress  Skin:     General: Skin is warm  Coloration: Skin is pale  Neurological:      Mental Status: She is alert  Mental status is at baseline     Psychiatric:         Mood and Affect: Mood normal       Additional Data:     Labs:  Results from last 7 days   Lab Units 22  0604   WBC Thousand/uL 9 46   HEMOGLOBIN g/dL 10 3*   HEMATOCRIT % 34 1*   PLATELETS Thousands/uL 194     Results from last 7 days   Lab Units 12/15/22  0547   SODIUM mmol/L 135   POTASSIUM mmol/L 3 8   CHLORIDE mmol/L 94*   CO2 mmol/L 28   BUN mg/dL 38*   CREATININE mg/dL 1 05   ANION GAP mmol/L 13   CALCIUM mg/dL 8 6   GLUCOSE RANDOM mg/dL 92         Results from last 7 days   Lab Units 22  2054   POC GLUCOSE mg/dl 154*               Lines/Drains:  Invasive Devices     Peripheral Intravenous Line  Duration           Peripheral IV 22 Left;Ventral (anterior) Forearm 1 day          Drain  Duration           External Urinary Catheter 2 days                      Imaging: No pertinent imaging reviewed  Recent Cultures (last 7 days):         Last 24 Hours Medication List:   Current Facility-Administered Medications   Medication Dose Route Frequency Provider Last Rate   • acetaminophen  650 mg Oral Q6H PRN Darren Davis MD     • albuterol  2 5 mg Nebulization Q6H PRN Merrill Chahal MD     • apixaban  2 5 mg Oral BID Darren Davis MD     • diltiazem  60 mg Oral Q12H [facility] Darren Davis MD     • fluticasone  1 spray Each Nare Daily Merrill Chahal MD     • furosemide  60 mg Oral Daily GUNJAN Tovar     • guaiFENesin  1,200 mg Oral Q12H [facility] Merrill Chahal MD     • loperamide  2 mg Oral 4x Daily PRN Merrill Chahal MD     • metoprolol succinate  100 mg Oral Daily Darren Davis MD     • [START ON 12/16/2022] predniSONE  30 mg Oral Daily Merrill Chahal MD      Followed by   • [START ON 12/19/2022] predniSONE  20 mg Oral Daily Merrill Chahal MD      Followed by   • [START ON 12/22/2022] predniSONE  10 mg Oral Daily Merrill Chahal MD          Today, Patient Was Seen By: GUNJAN Sharif    **Please Note: This note may have been constructed using a voice recognition system  **

## 2022-12-15 NOTE — ASSESSMENT & PLAN NOTE
· Likely secondary to acute on chronic CHF and bronchospasm  · Status post IV diuresing  · Transition to po steroid taper   · Respiratory protocol  · Does not require O2 on discharge      · Stable on RA

## 2022-12-15 NOTE — PLAN OF CARE
Problem: Potential for Falls  Goal: Patient will remain free of falls  Description: INTERVENTIONS:  - Educate patient/family on patient safety including physical limitations  - Instruct patient to call for assistance with activity   - Consult OT/PT to assist with strengthening/mobility   - Keep Call bell within reach  - Keep bed low and locked with side rails adjusted as appropriate  - Keep care items and personal belongings within reach  - Initiate and maintain comfort rounds  - Make Fall Risk Sign visible to staff  - Offer Toileting every 2 Hours, in advance of need  - Initiate/Maintain bedalarm  - Obtain necessary fall risk management equipment:   - Apply yellow socks and bracelet for high fall risk patients  - Consider moving patient to room near nurses station  12/15/2022 1451 by Thang Jimenez LPN  Outcome: Adequate for Discharge  12/15/2022 0735 by Thang Jimenez LPN  Outcome: Progressing     Problem: PAIN - ADULT  Goal: Verbalizes/displays adequate comfort level or baseline comfort level  Description: Interventions:  - Encourage patient to monitor pain and request assistance  - Assess pain using appropriate pain scale  - Administer analgesics based on type and severity of pain and evaluate response  - Implement non-pharmacological measures as appropriate and evaluate response  - Consider cultural and social influences on pain and pain management  - Notify physician/advanced practitioner if interventions unsuccessful or patient reports new pain  12/15/2022 1451 by Thang Jimenez LPN  Outcome: Adequate for Discharge  12/15/2022 0735 by Thang Jimenez LPN  Outcome: Progressing     Problem: INFECTION - ADULT  Goal: Absence or prevention of progression during hospitalization  Description: INTERVENTIONS:  - Assess and monitor for signs and symptoms of infection  - Monitor lab/diagnostic results  - Monitor all insertion sites, i e  indwelling lines, tubes, and drains  - Monitor endotracheal if appropriate and nasal secretions for changes in amount and color  - [place] appropriate cooling/warming therapies per order  - Administer medications as ordered  - Instruct and encourage patient and family to use good hand hygiene technique  - Identify and instruct in appropriate isolation precautions for identified infection/condition  12/15/2022 1451 by Braden Adams LPN  Outcome: Adequate for Discharge  12/15/2022 0735 by Braden Adams LPN  Outcome: Progressing  Goal: Absence of fever/infection during neutropenic period  Description: INTERVENTIONS:  - Monitor WBC    12/15/2022 1451 by Braden Adams LPN  Outcome: Adequate for Discharge  12/15/2022 0735 by Braden Adams LPN  Outcome: Progressing     Problem: SAFETY ADULT  Goal: Patient will remain free of falls  Description: INTERVENTIONS:  - Educate patient/family on patient safety including physical limitations  - Instruct patient to call for assistance with activity   - Consult OT/PT to assist with strengthening/mobility   - Keep Call bell within reach  - Keep bed low and locked with side rails adjusted as appropriate  - Keep care items and personal belongings within reach  - Initiate and maintain comfort rounds  - Make Fall Risk Sign visible to staff  - Offer Toileting every 2 Hours, in advance of need  - Initiate/Maintain bedalarm  - Obtain necessary fall risk management equipment:   - Apply yellow socks and bracelet for high fall risk patients  - Consider moving patient to room near nurses station  12/15/2022 1451 by Braden Adams LPN  Outcome: Adequate for Discharge  12/15/2022 0735 by Braden Adams LPN  Outcome: Progressing  Goal: Maintain or return to baseline ADL function  Description: INTERVENTIONS:  -  Assess patient's ability to carry out ADLs; assess patient's baseline for ADL function and identify physical deficits which impact ability to perform ADLs (bathing, care of mouth/teeth, toileting, grooming, dressing, etc )  - Assess/evaluate cause of self-care deficits   - Assess range of motion  - Assess patient's mobility; develop plan if impaired  - Assess patient's need for assistive devices and provide as appropriate  - Encourage maximum independence but intervene and supervise when necessary  - Involve family in performance of ADLs  - Assess for home care needs following discharge   - Consider OT consult to assist with ADL evaluation and planning for discharge  - Provide patient education as appropriate  12/15/2022 1451 by Dinae Luna LPN  Outcome: Adequate for Discharge  12/15/2022 0735 by Diane Luna LPN  Outcome: Progressing  Goal: Maintains/Returns to pre admission functional level  Description: INTERVENTIONS:  - Perform BMAT or MOVE assessment daily    - Set and communicate daily mobility goal to care team and patient/family/caregiver  - Collaborate with rehabilitation services on mobility goals if consulted  - Perform Range of Motion   - Reposition patient every 2 hours    - Dangle patient   - Stand patient   - Ambulate patient   - Out of bed to chair    - Out of bed for meals 3 times a day  - Out of bed for toileting  - Record patient progress and toleration of activity level   12/15/2022 1451 by Diane Luna LPN  Outcome: Adequate for Discharge  12/15/2022 0735 by Diane Luna LPN  Outcome: Progressing     Problem: DISCHARGE PLANNING  Goal: Discharge to home or other facility with appropriate resources  Description: INTERVENTIONS:  - Identify barriers to discharge w/patient and caregiver  - Arrange for needed discharge resources and transportation as appropriate  - Identify discharge learning needs (meds, wound care, etc )  - Arrange for interpretive services to assist at discharge as needed  - Refer to Case Management Department for coordinating discharge planning if the patient needs post-hospital services based on physician/advanced practitioner order or complex needs related to functional status, cognitive ability, or social support system  12/15/2022 1451 by Karthik Toscano LPN  Outcome: Adequate for Discharge  12/15/2022 0735 by Karthik Toscano LPN  Outcome: Progressing     Problem: Knowledge Deficit  Goal: Patient/family/caregiver demonstrates understanding of disease process, treatment plan, medications, and discharge instructions  Description: Complete learning assessment and assess knowledge base  Interventions:  - Provide teaching at level of understanding  - Provide teaching via preferred learning methods  12/15/2022 1451 by Karthik Toscano LPN  Outcome: Adequate for Discharge  12/15/2022 0735 by Karthik Toscano LPN  Outcome: Progressing     Problem: MOBILITY - ADULT  Goal: Maintain or return to baseline ADL function  Description: INTERVENTIONS:  -  Assess patient's ability to carry out ADLs; assess patient's baseline for ADL function and identify physical deficits which impact ability to perform ADLs (bathing, care of mouth/teeth, toileting, grooming, dressing, etc )  - Assess/evaluate cause of self-care deficits   - Assess range of motion  - Assess patient's mobility; develop plan if impaired  - Assess patient's need for assistive devices and provide as appropriate  - Encourage maximum independence but intervene and supervise when necessary  - Involve family in performance of ADLs  - Assess for home care needs following discharge   - Consider OT consult to assist with ADL evaluation and planning for discharge  - Provide patient education as appropriate  12/15/2022 1451 by Karthik Toscano LPN  Outcome: Adequate for Discharge  12/15/2022 0735 by Karthik Toscano LPN  Outcome: Progressing  Goal: Maintains/Returns to pre admission functional level  Description: INTERVENTIONS:  - Perform BMAT or MOVE assessment daily    - Set and communicate daily mobility goal to care team and patient/family/caregiver  - Collaborate with rehabilitation services on mobility goals if consulted  - Perform Range of Motion   - Reposition patient every 2 hours    - Tinye patient   - Stand patient   - Ambulate patient   - Out of bed to chair    - Out of bed for meals 3 times a day  - Out of bed for toileting  - Record patient progress and toleration of activity level   12/15/2022 1451 by Pastor Ramon LPN  Outcome: Adequate for Discharge  12/15/2022 0735 by Pastor Ramon LPN  Outcome: Progressing     Problem: GENITOURINARY - ADULT  Goal: Maintains or returns to baseline urinary function  Description: INTERVENTIONS:  - Assess urinary function  - Encourage oral fluids to ensure adequate hydration if ordered  - Administer IV fluids as ordered to ensure adequate hydration  - Administer ordered medications as needed  - Offer frequent toileting  - Follow urinary retention protocol if ordered  12/15/2022 1451 by Pastor Ramon LPN  Outcome: Adequate for Discharge  12/15/2022 0735 by Pastor Ramon LPN  Outcome: Progressing  Goal: Absence of urinary retention  Description: INTERVENTIONS:  - Assess patient’s ability to void and empty bladder  - Monitor I/O  - Bladder scan as needed  - Discuss with physician/AP medications to alleviate retention as needed  - Discuss catheterization for long term situations as appropriate  12/15/2022 1451 by Pastor Ramon LPN  Outcome: Adequate for Discharge  12/15/2022 0735 by Pastor Ramon LPN  Outcome: Progressing  Goal: Urinary catheter remains patent  Description: INTERVENTIONS:  - Assess patency of urinary catheter  - If patient has a chronic mahan, consider changing catheter if non-functioning  - Follow guidelines for intermittent irrigation of non-functioning urinary catheter  12/15/2022 1451 by Pastor Ramon LPN  Outcome: Adequate for Discharge  12/15/2022 0735 by Pastor Ramon LPN  Outcome: Progressing     Problem: Nutrition/Hydration-ADULT  Goal: Nutrient/Hydration intake appropriate for improving, restoring or maintaining nutritional needs  Description: Monitor and assess patient's nutrition/hydration status for malnutrition  Collaborate with interdisciplinary team and initiate plan and interventions as ordered  Monitor patient's weight and dietary intake as ordered or per policy  Utilize nutrition screening tool and intervene as necessary  Determine patient's food preferences and provide high-protein, high-caloric foods as appropriate       INTERVENTIONS:  - Monitor oral intake, urinary output, labs, and treatment plans  - Assess nutrition and hydration status and recommend course of action  - Evaluate amount of meals eaten  - Assist patient with eating if necessary   - Allow adequate time for meals  - Recommend/ encourage appropriate diets, oral nutritional supplements, and vitamin/mineral supplements  - Order, calculate, and assess calorie counts as needed  - Recommend, monitor, and adjust tube feedings and TPN/PPN based on assessed needs  - Assess need for intravenous fluids  - Provide specific nutrition/hydration education as appropriate  - Include patient/family/caregiver in decisions related to nutrition  12/15/2022 1451 by Jon Chambers LPN  Outcome: Adequate for Discharge  12/15/2022 0735 by Jon Chambers LPN  Outcome: Progressing

## 2022-12-15 NOTE — PROGRESS NOTES
NEPHROLOGY PROGRESS NOTE    Patient: Nisha Goldberg               Sex: female          DOA: 12/2/2022  1:18 PM   YOB: 1924        Age:  80 y o         LOS:  LOS: 12 days       HPI     Patient with stage III CKD was also hyponatremic    SUBJECTIVE     Patient feeling quite well    Had episode of diarrhea yesterday    Feeling better    No abdomen pain    No nausea no vomiting    CURRENT MEDICATIONS       Current Facility-Administered Medications:   •  acetaminophen (TYLENOL) tablet 650 mg, 650 mg, Oral, Q6H PRN, Darren Davis MD  •  albuterol inhalation solution 2 5 mg, 2 5 mg, Nebulization, Q6H PRN, Avis Dolan MD, 2 5 mg at 12/09/22 2046  •  apixaban (ELIQUIS) tablet 2 5 mg, 2 5 mg, Oral, BID, Darren Davis MD, 2 5 mg at 12/15/22 7777  •  diltiazem (CARDIZEM SR) 12 hr capsule 60 mg, 60 mg, Oral, Q12H [facility], Darren Davis MD, 60 mg at 12/15/22 0924  •  fluticasone (FLONASE) 50 mcg/act nasal spray 1 spray, 1 spray, Each Nare, Daily, Avis Dolan MD, 1 spray at 12/15/22 0919  •  furosemide (LASIX) tablet 60 mg, 60 mg, Oral, Daily, GUNJAN Ramirez, 60 mg at 12/15/22 7414  •  guaiFENesin (MUCINEX) 12 hr tablet 1,200 mg, 1,200 mg, Oral, Q12H [facility], Avis Dolan MD, 1,200 mg at 12/15/22 1726  •  loperamide (IMODIUM) capsule 2 mg, 2 mg, Oral, 4x Daily PRN, Avsi Dolan MD, 2 mg at 12/15/22 8026  •  metoprolol succinate (TOPROL-XL) 24 hr tablet 100 mg, 100 mg, Oral, Daily, Darren Davis MD, 100 mg at 12/15/22 0920  •  [COMPLETED] predniSONE tablet 40 mg, 40 mg, Oral, Daily, 40 mg at 12/15/22 0921 **FOLLOWED BY** [START ON 12/16/2022] predniSONE tablet 30 mg, 30 mg, Oral, Daily **FOLLOWED BY** [START ON 12/19/2022] predniSONE tablet 20 mg, 20 mg, Oral, Daily **FOLLOWED BY** [START ON 12/22/2022] predniSONE tablet 10 mg, 10 mg, Oral, Daily, Avis Dolan MD    OBJECTIVE     Current Weight: Weight - Scale: 59 7 kg (131 lb 9 8 oz)  Vitals:    12/15/22 0811   BP: 153/83   Pulse: 72   Resp: 18 Temp:    SpO2: 92%       Intake/Output Summary (Last 24 hours) at 12/15/2022 1054  Last data filed at 12/14/2022 1532  Gross per 24 hour   Intake --   Output 1400 ml   Net -1400 ml       PHYSICAL EXAMINATION     Physical Exam  Constitutional:       General: She is not in acute distress  Appearance: She is well-developed  HENT:      Head: Normocephalic  Eyes:      General: No scleral icterus  Conjunctiva/sclera: Conjunctivae normal    Neck:      Vascular: No JVD  Cardiovascular:      Rate and Rhythm: Normal rate  Heart sounds: Normal heart sounds  Pulmonary:      Effort: Pulmonary effort is normal       Breath sounds: No wheezing  Abdominal:      Palpations: Abdomen is soft  Tenderness: There is no abdominal tenderness  Musculoskeletal:         General: Normal range of motion  Cervical back: Neck supple  Skin:     General: Skin is warm  Findings: No rash  Neurological:      Mental Status: She is alert and oriented to person, place, and time  Psychiatric:         Behavior: Behavior normal           LAB RESULTS     Results from last 7 days   Lab Units 12/15/22  0547 12/15/22  0004 12/14/22  1717 12/14/22  1327 12/14/22  0959 12/14/22  0045 12/13/22  1739 12/13/22  0621 12/12/22  0604   WBC Thousand/uL  --   --   --   --   --   --   --   --  9 46   HEMOGLOBIN g/dL  --   --   --   --   --   --   --   --  10 3*   HEMATOCRIT %  --   --   --   --   --   --   --   --  34 1*   PLATELETS Thousands/uL  --   --   --   --   --   --   --   --  194   POTASSIUM mmol/L 3 8 3 4* 3 6 3 8 3 7 3 9 4 0   < > 3 7   CHLORIDE mmol/L 94* 90* 86* 91* 88* 89* 87*   < > 98   CO2 mmol/L 28 31 30 27 28 30 30   < > 33*   BUN mg/dL 38* 39* 39* 40* 36* 41* 42*   < > 45*   CREATININE mg/dL 1 05 1 09 1 07 1 10 1 03 1 05 1 17   < > 1 18   EGFR ml/min/1 73sq m 44 42 43 41 45 44 38   < > 38   CALCIUM mg/dL 8 6 8 1* 8 3 8 1* 8 1* 8 4 8 2*   < > 8 5    < > = values in this interval not displayed  RADIOLOGY RESULTS      Results for orders placed during the hospital encounter of 12/02/22    XR chest portable    Narrative  CHEST    INDICATION:   SOB     COMPARISON:  Compared with 11/18/2021    EXAM PERFORMED/VIEWS:  XR CHEST PORTABLE      FINDINGS:    Mild cardiomegaly  Poor inspiration  Prominent vascular interstitial markings  Bibasilar haziness  Osseous structures appear within normal limits for patient age  Impression  Moderate congestive changes  Bilateral small effusions  Workstation performed: [id number]    Results for orders placed during the hospital encounter of 11/18/21    XR chest 2 views    Narrative  CHEST    INDICATION:   sob  COMPARISON:  11/11/2021; 3/29/2017    EXAM PERFORMED/VIEWS:  XR CHEST PA & LATERAL      FINDINGS:    Cardiomegaly is present  Aortic calcification is present  No infiltrate is identified  Pulmonary vascularity and interstitial markings have slightly increased and trace effusions are noted on the lateral view  No pneumothorax or pleural effusion  Osseous structures appear within normal limits for patient age  Impression  Evidence of trace edema and effusions  Workstation performed: [id number]    Results for orders placed during the hospital encounter of 03/20/17    CT chest wo contrast    Narrative  CT CHEST WITHOUT IV CONTRAST    INDICATION:  Acute respiratory illness    COMPARISON: 7/1/2016    TECHNIQUE: CT examination of the chest was performed without intravenous contrast   Reformatted images were created in axial, sagittal, and coronal planes  Radiation dose length product (DLP) for this visit:  377 mGy-cm -cm  This examination, like all CT scans performed in the [facility], was performed utilizing techniques to minimize radiation dose exposure, including the use of iterative  reconstruction and automated exposure control      FINDINGS:    LUNGS:  Bibasilar consolidation likely represents atelectasis  Subsegmental atelectasis also seen  No tracheal endobronchial lesions  PLEURA:  Small bilateral pleural effusions are present  HEART/GREAT VESSELS:  Unremarkable for patient's age  MEDIASTINUM AND MARCK:  Unremarkable  CHEST WALL AND LOWER NECK:  Unremarkable  VISUALIZED STRUCTURES IN THE UPPER ABDOMEN:  Left hepatic cysts appear stable  Large cyst arising from the left kidney is partially visualized  OSSEOUS STRUCTURES:  No acute fracture  No destructive osseous lesion  Impression  Small bilateral pleural effusions with likely bibasilar compressive atelectasis as well as scattered subsegmental atelectasis  Workstation performed: [id number]    No results found for this or any previous visit  Results for orders placed during the hospital encounter of 12/02/22    CT abdomen pelvis wo contrast    Narrative  CT ABDOMEN AND PELVIS WITHOUT IV CONTRAST    INDICATION:   Urinary retention, MARIA DEL CARMEN  COMPARISON:  Abdomen and pelvic CT from 7/11/2022  TECHNIQUE:  CT examination of the abdomen and pelvis was performed without intravenous contrast  Axial, sagittal, and coronal 2D reformatted images were created from the source data and submitted for interpretation  Radiation dose length product (DLP) for this visit:  656 mGy-cm   This examination, like all CT scans performed in the [facility], was performed utilizing techniques to minimize radiation dose exposure, including the use of iterative  reconstruction and automated exposure control  Enteric contrast was not administered  FINDINGS:    ABDOMEN    LOWER CHEST:  Unchanged moderate cardiomegaly  Unchanged small right pleural effusion  Again seen is a right Boch[place]k hernia containing fat only  LIVER/BILIARY TREE:  Small simple cyst in the left lobe of liver anteriorly  Otherwise unremarkable  GALLBLADDER:  No calcified gallstones  No pericholecystic inflammatory change      SPLEEN: Unremarkable  PANCREAS:  Unremarkable  ADRENAL GLANDS:  Unremarkable  KIDNEYS/URETERS:  Multiple large simple cyst in both kidneys  There is also an unchanged exophytic hemorrhagic cyst in the right kidney upper pole  No hydronephrosis  STOMACH AND BOWEL:  There is colonic diverticulosis without evidence of acute diverticulitis  APPENDIX:  No findings to suggest appendicitis  ABDOMINOPELVIC CAVITY:  No ascites  No pneumoperitoneum  No lymphadenopathy  VESSELS:  Unremarkable for patient's age  PELVIS    REPRODUCTIVE ORGANS:  Unremarkable for patient's age  URINARY BLADDER:  Urinary bladder decompressed by Hunter catheter  ABDOMINAL WALL/INGUINAL REGIONS:  Unremarkable  OSSEOUS STRUCTURES:  No acute fracture or destructive osseous lesion  Again seen is a left femoral intramedullary nail  Again seen is avascular necrosis of the left femoral head and severe left hip osteoarthritis  Impression  Hunter catheter decompresses the urinary bladder  Again seen are multiple benign renal cysts  There is no hydronephrosis  Colonic diverticulosis without diverticulitis  Workstation performed: [id number]    No results found for this or any previous visit  PLAN / RECOMMENDATIONS      CKD stage III: Stable at this point    Hyponatremia: Resolved with salt tablet regimen we will stop    Diarrhea: Better  May be related to salt tablet which I will restart    Disposition: Can be discharged renal point of view patient is advised to follow-up with me as outpatient    Jaison Lopes MD  Nephrology  12/15/2022        Portions of the record may have been created with voice recognition software  Occasional wrong word or "sound a like" substitutions may have occurred due to the inherent limitations of voice recognition software  Read the chart carefully and recognize, using context, where substitutions have occurred
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
## 2022-12-16 ENCOUNTER — TRANSITIONAL CARE MANAGEMENT (OUTPATIENT)
Dept: INTERNAL MEDICINE CLINIC | Facility: CLINIC | Age: 87
End: 2022-12-16

## 2022-12-16 VITALS
BODY MASS INDEX: 25.75 KG/M2 | SYSTOLIC BLOOD PRESSURE: 152 MMHG | HEART RATE: 77 BPM | WEIGHT: 127.5 LBS | DIASTOLIC BLOOD PRESSURE: 89 MMHG | RESPIRATION RATE: 18 BRPM | TEMPERATURE: 97.4 F | OXYGEN SATURATION: 92 %

## 2022-12-16 LAB
ANION GAP SERPL CALCULATED.3IONS-SCNC: 10 MMOL/L (ref 4–13)
ANION GAP SERPL CALCULATED.3IONS-SCNC: 8 MMOL/L (ref 4–13)
BUN SERPL-MCNC: 34 MG/DL (ref 5–25)
BUN SERPL-MCNC: 37 MG/DL (ref 5–25)
CALCIUM SERPL-MCNC: 8.1 MG/DL (ref 8.3–10.1)
CALCIUM SERPL-MCNC: 8.4 MG/DL (ref 8.3–10.1)
CHLORIDE SERPL-SCNC: 96 MMOL/L (ref 96–108)
CHLORIDE SERPL-SCNC: 97 MMOL/L (ref 96–108)
CO2 SERPL-SCNC: 32 MMOL/L (ref 21–32)
CO2 SERPL-SCNC: 32 MMOL/L (ref 21–32)
CREAT SERPL-MCNC: 1.07 MG/DL (ref 0.6–1.3)
CREAT SERPL-MCNC: 1.2 MG/DL (ref 0.6–1.3)
GFR SERPL CREATININE-BSD FRML MDRD: 37 ML/MIN/1.73SQ M
GFR SERPL CREATININE-BSD FRML MDRD: 43 ML/MIN/1.73SQ M
GLUCOSE SERPL-MCNC: 169 MG/DL (ref 65–140)
GLUCOSE SERPL-MCNC: 89 MG/DL (ref 65–140)
POTASSIUM SERPL-SCNC: 3.3 MMOL/L (ref 3.5–5.3)
POTASSIUM SERPL-SCNC: 3.3 MMOL/L (ref 3.5–5.3)
SODIUM SERPL-SCNC: 136 MMOL/L (ref 135–147)
SODIUM SERPL-SCNC: 139 MMOL/L (ref 135–147)

## 2022-12-16 RX ORDER — POTASSIUM CHLORIDE 20 MEQ/1
20 TABLET, EXTENDED RELEASE ORAL ONCE
Status: COMPLETED | OUTPATIENT
Start: 2022-12-16 | End: 2022-12-16

## 2022-12-16 RX ADMIN — FLUTICASONE PROPIONATE 1 SPRAY: 50 SPRAY, METERED NASAL at 08:58

## 2022-12-16 RX ADMIN — GUAIFENESIN 1200 MG: 600 TABLET ORAL at 08:59

## 2022-12-16 RX ADMIN — APIXABAN 2.5 MG: 2.5 TABLET, FILM COATED ORAL at 08:59

## 2022-12-16 RX ADMIN — METOPROLOL SUCCINATE 100 MG: 100 TABLET, EXTENDED RELEASE ORAL at 08:59

## 2022-12-16 RX ADMIN — PREDNISONE 30 MG: 20 TABLET ORAL at 08:59

## 2022-12-16 RX ADMIN — DILTIAZEM HYDROCHLORIDE 60 MG: 60 CAPSULE, EXTENDED RELEASE ORAL at 09:00

## 2022-12-16 RX ADMIN — POTASSIUM CHLORIDE 20 MEQ: 1500 TABLET, EXTENDED RELEASE ORAL at 09:03

## 2022-12-16 RX ADMIN — FUROSEMIDE 60 MG: 40 TABLET ORAL at 08:59

## 2022-12-16 NOTE — NURSING NOTE
Pt without IV access  Pt has anticipated discharge for tomorrow and currently does not require IV for any scheduled or prn medications  Contacted on call [facility] who said that they are ok with no IV access for pt

## 2022-12-16 NOTE — PLAN OF CARE
Problem: Potential for Falls  Goal: Patient will remain free of falls  Description: INTERVENTIONS:  - Educate patient/family on patient safety including physical limitations  - Instruct patient to call for assistance with activity   - Consult OT/PT to assist with strengthening/mobility   - Keep Call bell within reach  - Keep bed low and locked with side rails adjusted as appropriate  - Keep care items and personal belongings within reach  - Initiate and maintain comfort rounds  - Make Fall Risk Sign visible to staff  - Offer Toileting every 2 Hours, in advance of need  - Initiate/Maintain bedalarm  - Obtain necessary fall risk management equipment:   - Apply yellow socks and bracelet for high fall risk patients  - Consider moving patient to room near nurses station  Outcome: Adequate for Discharge     Problem: PAIN - ADULT  Goal: Verbalizes/displays adequate comfort level or baseline comfort level  Description: Interventions:  - Encourage patient to monitor pain and request assistance  - Assess pain using appropriate pain scale  - Administer analgesics based on type and severity of pain and evaluate response  - Implement non-pharmacological measures as appropriate and evaluate response  - Consider cultural and social influences on pain and pain management  - Notify physician/advanced practitioner if interventions unsuccessful or patient reports new pain  Outcome: Adequate for Discharge     Problem: INFECTION - ADULT  Goal: Absence or prevention of progression during hospitalization  Description: INTERVENTIONS:  - Assess and monitor for signs and symptoms of infection  - Monitor lab/diagnostic results  - Monitor all insertion sites, i e  indwelling lines, tubes, and drains  - Monitor endotracheal if appropriate and nasal secretions for changes in amount and color  - [place] appropriate cooling/warming therapies per order  - Administer medications as ordered  - Instruct and encourage patient and family to use good hand hygiene technique  - Identify and instruct in appropriate isolation precautions for identified infection/condition  Outcome: Adequate for Discharge  Goal: Absence of fever/infection during neutropenic period  Description: INTERVENTIONS:  - Monitor WBC    Outcome: Adequate for Discharge     Problem: SAFETY ADULT  Goal: Patient will remain free of falls  Description: INTERVENTIONS:  - Educate patient/family on patient safety including physical limitations  - Instruct patient to call for assistance with activity   - Consult OT/PT to assist with strengthening/mobility   - Keep Call bell within reach  - Keep bed low and locked with side rails adjusted as appropriate  - Keep care items and personal belongings within reach  - Initiate and maintain comfort rounds  - Make Fall Risk Sign visible to staff  - Offer Toileting every 2 Hours, in advance of need  - Initiate/Maintain bedalarm  - Obtain necessary fall risk management equipment:   - Apply yellow socks and bracelet for high fall risk patients  - Consider moving patient to room near nurses station  Outcome: Adequate for Discharge  Goal: Maintain or return to baseline ADL function  Description: INTERVENTIONS:  -  Assess patient's ability to carry out ADLs; assess patient's baseline for ADL function and identify physical deficits which impact ability to perform ADLs (bathing, care of mouth/teeth, toileting, grooming, dressing, etc )  - Assess/evaluate cause of self-care deficits   - Assess range of motion  - Assess patient's mobility; develop plan if impaired  - Assess patient's need for assistive devices and provide as appropriate  - Encourage maximum independence but intervene and supervise when necessary  - Involve family in performance of ADLs  - Assess for home care needs following discharge   - Consider OT consult to assist with ADL evaluation and planning for discharge  - Provide patient education as appropriate  Outcome: Adequate for Discharge  Goal: Maintains/Returns to pre admission functional level  Description: INTERVENTIONS:  - Perform BMAT or MOVE assessment daily    - Set and communicate daily mobility goal to care team and patient/family/caregiver  - Collaborate with rehabilitation services on mobility goals if consulted  - Perform Range of Motion   - Reposition patient every 2 hours  - Dangle patient   - Stand patient   - Ambulate patient   - Out of bed to chair    - Out of bed for meals   - Out of bed for toileting  - Record patient progress and toleration of activity level   Outcome: Adequate for Discharge     Problem: DISCHARGE PLANNING  Goal: Discharge to home or other facility with appropriate resources  Description: INTERVENTIONS:  - Identify barriers to discharge w/patient and caregiver  - Arrange for needed discharge resources and transportation as appropriate  - Identify discharge learning needs (meds, wound care, etc )  - Arrange for interpretive services to assist at discharge as needed  - Refer to Case Management Department for coordinating discharge planning if the patient needs post-hospital services based on physician/advanced practitioner order or complex needs related to functional status, cognitive ability, or social support system  Outcome: Adequate for Discharge     Problem: Knowledge Deficit  Goal: Patient/family/caregiver demonstrates understanding of disease process, treatment plan, medications, and discharge instructions  Description: Complete learning assessment and assess knowledge base    Interventions:  - Provide teaching at level of understanding  - Provide teaching via preferred learning methods  Outcome: Adequate for Discharge     Problem: MOBILITY - ADULT  Goal: Maintain or return to baseline ADL function  Description: INTERVENTIONS:  -  Assess patient's ability to carry out ADLs; assess patient's baseline for ADL function and identify physical deficits which impact ability to perform ADLs (bathing, care of mouth/teeth, toileting, grooming, dressing, etc )  - Assess/evaluate cause of self-care deficits   - Assess range of motion  - Assess patient's mobility; develop plan if impaired  - Assess patient's need for assistive devices and provide as appropriate  - Encourage maximum independence but intervene and supervise when necessary  - Involve family in performance of ADLs  - Assess for home care needs following discharge   - Consider OT consult to assist with ADL evaluation and planning for discharge  - Provide patient education as appropriate  Outcome: Adequate for Discharge  Goal: Maintains/Returns to pre admission functional level  Description: INTERVENTIONS:  - Perform BMAT or MOVE assessment daily    - Set and communicate daily mobility goal to care team and patient/family/caregiver  - Collaborate with rehabilitation services on mobility goals if consulted  - Perform Range of Motion    - Reposition patient every 2 hours    - Dangle patient   - Stand patient   - Ambulate patient   - Out of bed to chair    - Out of bed for meals   - Out of bed for toileting  - Record patient progress and toleration of activity level   Outcome: Adequate for Discharge     Problem: GENITOURINARY - ADULT  Goal: Maintains or returns to baseline urinary function  Description: INTERVENTIONS:  - Assess urinary function  - Encourage oral fluids to ensure adequate hydration if ordered  - Administer IV fluids as ordered to ensure adequate hydration  - Administer ordered medications as needed  - Offer frequent toileting  - Follow urinary retention protocol if ordered  Outcome: Adequate for Discharge  Goal: Absence of urinary retention  Description: INTERVENTIONS:  - Assess patient’s ability to void and empty bladder  - Monitor I/O  - Bladder scan as needed  - Discuss with physician/AP medications to alleviate retention as needed  - Discuss catheterization for long term situations as appropriate  Outcome: Adequate for Discharge  Goal: Urinary catheter remains patent  Description: INTERVENTIONS:  - Assess patency of urinary catheter  - If patient has a chronic mahan, consider changing catheter if non-functioning  - Follow guidelines for intermittent irrigation of non-functioning urinary catheter  Outcome: Adequate for Discharge     Problem: Nutrition/Hydration-ADULT  Goal: Nutrient/Hydration intake appropriate for improving, restoring or maintaining nutritional needs  Description: Monitor and assess patient's nutrition/hydration status for malnutrition  Collaborate with interdisciplinary team and initiate plan and interventions as ordered  Monitor patient's weight and dietary intake as ordered or per policy  Utilize nutrition screening tool and intervene as necessary  Determine patient's food preferences and provide high-protein, high-caloric foods as appropriate       INTERVENTIONS:  - Monitor oral intake, urinary output, labs, and treatment plans  - Assess nutrition and hydration status and recommend course of action  - Evaluate amount of meals eaten  - Assist patient with eating if necessary   - Allow adequate time for meals  - Recommend/ encourage appropriate diets, oral nutritional supplements, and vitamin/mineral supplements  - Order, calculate, and assess calorie counts as needed  - Recommend, monitor, and adjust tube feedings and TPN/PPN based on assessed needs  - Assess need for intravenous fluids  - Provide specific nutrition/hydration education as appropriate  - Include patient/family/caregiver in decisions related to nutrition  Outcome: Adequate for Discharge

## 2022-12-16 NOTE — PLAN OF CARE
Problem: Potential for Falls  Goal: Patient will remain free of falls  Description: INTERVENTIONS:  - Educate patient/family on patient safety including physical limitations  - Instruct patient to call for assistance with activity   - Consult OT/PT to assist with strengthening/mobility   - Keep Call bell within reach  - Keep bed low and locked with side rails adjusted as appropriate  - Keep care items and personal belongings within reach  - Initiate and maintain comfort rounds  - Make Fall Risk Sign visible to staff  - Offer Toileting every 2 Hours, in advance of need  - Apply yellow socks and bracelet for high fall risk patients  - Consider moving patient to room near nurses station  Outcome: Progressing     Problem: PAIN - ADULT  Goal: Verbalizes/displays adequate comfort level or baseline comfort level  Description: Interventions:  - Encourage patient to monitor pain and request assistance  - Assess pain using appropriate pain scale  - Administer analgesics based on type and severity of pain and evaluate response  - Implement non-pharmacological measures as appropriate and evaluate response  - Consider cultural and social influences on pain and pain management  - Notify physician/advanced practitioner if interventions unsuccessful or patient reports new pain  Outcome: Progressing     Problem: INFECTION - ADULT  Goal: Absence or prevention of progression during hospitalization  Description: INTERVENTIONS:  - Assess and monitor for signs and symptoms of infection  - Monitor lab/diagnostic results  - Monitor all insertion sites, i e  indwelling lines, tubes, and drains  - Monitor endotracheal if appropriate and nasal secretions for changes in amount and color  - [place] appropriate cooling/warming therapies per order  - Administer medications as ordered  - Instruct and encourage patient and family to use good hand hygiene technique  - Identify and instruct in appropriate isolation precautions for identified infection/condition  Outcome: Progressing  Goal: Absence of fever/infection during neutropenic period  Description: INTERVENTIONS:  - Monitor WBC    Outcome: Progressing     Problem: SAFETY ADULT  Goal: Patient will remain free of falls  Description: INTERVENTIONS:  - Educate patient/family on patient safety including physical limitations  - Instruct patient to call for assistance with activity   - Consult OT/PT to assist with strengthening/mobility   - Keep Call bell within reach  - Keep bed low and locked with side rails adjusted as appropriate  - Keep care items and personal belongings within reach  - Initiate and maintain comfort rounds  - Make Fall Risk Sign visible to staff  - Apply yellow socks and bracelet for high fall risk patients  - Consider moving patient to room near nurses station  Outcome: Progressing  Goal: Maintain or return to baseline ADL function  Description: INTERVENTIONS:  -  Assess patient's ability to carry out ADLs; assess patient's baseline for ADL function and identify physical deficits which impact ability to perform ADLs (bathing, care of mouth/teeth, toileting, grooming, dressing, etc )  - Assess/evaluate cause of self-care deficits   - Assess range of motion  - Assess patient's mobility; develop plan if impaired  - Assess patient's need for assistive devices and provide as appropriate  - Encourage maximum independence but intervene and supervise when necessary  - Involve family in performance of ADLs  - Assess for home care needs following discharge   - Consider OT consult to assist with ADL evaluation and planning for discharge  - Provide patient education as appropriate  Outcome: Progressing  Goal: Maintains/Returns to pre admission functional level  Description: INTERVENTIONS:  - Perform BMAT or MOVE assessment daily    - Set and communicate daily mobility goal to care team and patient/family/caregiver     - Collaborate with rehabilitation services on mobility goals if consulted  - Ambulate patient 3 times a day  - Out of bed for toileting  - Record patient progress and toleration of activity level   Outcome: Progressing     Problem: DISCHARGE PLANNING  Goal: Discharge to home or other facility with appropriate resources  Description: INTERVENTIONS:  - Identify barriers to discharge w/patient and caregiver  - Arrange for needed discharge resources and transportation as appropriate  - Identify discharge learning needs (meds, wound care, etc )  - Arrange for interpretive services to assist at discharge as needed  - Refer to Case Management Department for coordinating discharge planning if the patient needs post-hospital services based on physician/advanced practitioner order or complex needs related to functional status, cognitive ability, or social support system  Outcome: Progressing     Problem: Knowledge Deficit  Goal: Patient/family/caregiver demonstrates understanding of disease process, treatment plan, medications, and discharge instructions  Description: Complete learning assessment and assess knowledge base    Interventions:  - Provide teaching at level of understanding  - Provide teaching via preferred learning methods  Outcome: Progressing     Problem: MOBILITY - ADULT  Goal: Maintain or return to baseline ADL function  Description: INTERVENTIONS:  -  Assess patient's ability to carry out ADLs; assess patient's baseline for ADL function and identify physical deficits which impact ability to perform ADLs (bathing, care of mouth/teeth, toileting, grooming, dressing, etc )  - Assess/evaluate cause of self-care deficits   - Assess range of motion  - Assess patient's mobility; develop plan if impaired  - Assess patient's need for assistive devices and provide as appropriate  - Encourage maximum independence but intervene and supervise when necessary  - Involve family in performance of ADLs  - Assess for home care needs following discharge   - Consider OT consult to assist with ADL evaluation and planning for discharge  - Provide patient education as appropriate  Outcome: Progressing  Goal: Maintains/Returns to pre admission functional level  Description: INTERVENTIONS:  - Perform BMAT or MOVE assessment daily    - Set and communicate daily mobility goal to care team and patient/family/caregiver  - Collaborate with rehabilitation services on mobility goals if consulted  - Record patient progress and toleration of activity level   Outcome: Progressing     Problem: GENITOURINARY - ADULT  Goal: Maintains or returns to baseline urinary function  Description: INTERVENTIONS:  - Assess urinary function  - Encourage oral fluids to ensure adequate hydration if ordered  - Administer IV fluids as ordered to ensure adequate hydration  - Administer ordered medications as needed  - Offer frequent toileting  - Follow urinary retention protocol if ordered  Outcome: Progressing  Goal: Absence of urinary retention  Description: INTERVENTIONS:  - Assess patient’s ability to void and empty bladder  - Monitor I/O  - Bladder scan as needed  - Discuss with physician/AP medications to alleviate retention as needed  - Discuss catheterization for long term situations as appropriate  Outcome: Progressing  Goal: Urinary catheter remains patent  Description: INTERVENTIONS:  - Assess patency of urinary catheter  - If patient has a chronic mahan, consider changing catheter if non-functioning  - Follow guidelines for intermittent irrigation of non-functioning urinary catheter  Outcome: Progressing     Problem: Nutrition/Hydration-ADULT  Goal: Nutrient/Hydration intake appropriate for improving, restoring or maintaining nutritional needs  Description: Monitor and assess patient's nutrition/hydration status for malnutrition  Collaborate with interdisciplinary team and initiate plan and interventions as ordered  Monitor patient's weight and dietary intake as ordered or per policy   Utilize nutrition screening tool and intervene as necessary  Determine patient's food preferences and provide high-protein, high-caloric foods as appropriate       INTERVENTIONS:  - Monitor oral intake, urinary output, labs, and treatment plans  - Assess nutrition and hydration status and recommend course of action  - Evaluate amount of meals eaten  - Assist patient with eating if necessary   - Allow adequate time for meals  - Recommend/ encourage appropriate diets, oral nutritional supplements, and vitamin/mineral supplements  - Order, calculate, and assess calorie counts as needed  - Recommend, monitor, and adjust tube feedings and TPN/PPN based on assessed needs  - Assess need for intravenous fluids  - Provide specific nutrition/hydration education as appropriate  - Include patient/family/caregiver in decisions related to nutrition  Outcome: Progressing

## 2022-12-16 NOTE — CASE MANAGEMENT
Case Management Discharge Planning Note    Patient name Madhavi Baeza  Location /-94 MRN [id number]  : 1924 Date 2022       Current Admission Date: 2022  Current Admission Diagnosis:MARIA DEL CARMEN (acute kidney injury) [facility])   Patient Active Problem List    Diagnosis Date Noted   • Hypothermia 2022   • Acute metabolic encephalopathy    • Acute respiratory failure with hypoxia ([address] ) 2022   • Urinary obstruction 2022   • UTI (urinary tract infection) 2022   • Hyponatremia 2022   • Bilateral renal cysts 2022   • Nonrheumatic aortic valve stenosis 2022   • Dyspnea 2021   • Hypoxia 2021   • Acute respiratory distress 2021   • Severe pulmonary hypertension ([address] ) 2021   • Acute kidney injury superimposed on CKD ([facility] ) 2021   • Chronic renal disease, stage IV ([facility] ) 2021   • Lower GI bleed 2021   • Seasonal allergic rhinitis due to pollen 10/26/2021   • Iron deficiency anemia 07/15/2021   • Fecal occult blood test positive 07/15/2021   • Anemia 07/15/2021   • Acute on chronic diastolic (congestive) heart failure ([facility] ) 2021   • Fracture of right orbital wall ([facility] ) 2020   • Chronic kidney disease-mineral and bone disorder 2019   • Epistaxis    • Diastolic dysfunction    • Anemia due to chronic kidney disease 10/18/2018   • Mitral valve insufficiency 2018   • Hypercholesterolemia 2018   • MARIA DEL CARMEN (acute kidney injury) ([facility] ) 2017   • Paroxysmal atrial fibrillation ([facility] ) 2017   • Long term current use of anticoagulant 2017   • Chronic kidney disease, stage III (moderate) ([facility] ) 2017   • Essential hypertension 2017      LOS (days): 13  Geometric Mean LOS (GMLOS) (days): 3 10  Days to GMLOS:-9 9     OBJECTIVE:  Risk of Unplanned Readmission Score: 26 94         Current admission status: Inpatient   Preferred Pharmacy:   [facility]/pharmacy #[id number]- [address] [place] [address]  Phone: [phone] Fax: [phone]    Primary Care Provider: Eileen De La Torre MD    Primary Insurance: MEDICARE  Secondary Insurance: [facility]    DISCHARGE DETAILS:                                          Other Referral/Resources/Interventions Provided:  Referral Comments: Pt p/u scheduled for 1030 today  Nurse [place] and pt aware  T/C to daughter Doreen Severin to update;  she expressed appreciation  D/C paperwork sent to [place] [phone]           Treatment Team Recommendation: [facility] with [facility]  Discharge Destination Plan[de-identified] Home with [place] at Discharge : [facility]

## 2022-12-16 NOTE — PROGRESS NOTES
NEPHROLOGY PROGRESS NOTE    Patient: Niya Nelson               Sex: female          DOA: 12/2/2022  1:18 PM   YOB: 1924        Age: 80 y o          LOS:  LOS: 13 days   Encounter Date: 12/16/2022    REASON FOR THE CONSULTATION: Further management of hyponatremia and chronic kidney disease stage III    HPI     This is a 80 y o  female admitted for MARIA DEL CARMEN (acute kidney injury) ([facility] )     SUBJECTIVE     -Breathing is currently stable  Patient was seen earlier this morning  Patient denies nausea, vomiting, headache or dizziness today    - Reviewed last 24 hrs events     CURRENT MEDICATIONS       Current Facility-Administered Medications:   •  acetaminophen (TYLENOL) tablet 650 mg, 650 mg, Oral, Q6H PRN, Darren Davis MD  •  albuterol inhalation solution 2 5 mg, 2 5 mg, Nebulization, Q6H PRN, Feli Peña MD, 2 5 mg at 12/09/22 2046  •  apixaban (ELIQUIS) tablet 2 5 mg, 2 5 mg, Oral, BID, Darren Davis MD, 2 5 mg at 12/16/22 0859  •  diltiazem (CARDIZEM SR) 12 hr capsule 60 mg, 60 mg, Oral, Q12H [address], Darren Davis MD, 60 mg at 12/16/22 0900  •  fluticasone (FLONASE) 50 mcg/act nasal spray 1 spray, 1 spray, Each Nare, Daily, Feli Peña MD, 1 spray at 12/16/22 0858  •  furosemide (LASIX) tablet 60 mg, 60 mg, Oral, Daily, GUNJAN Owens, 60 mg at 12/16/22 0859  •  guaiFENesin (MUCINEX) 12 hr tablet 1,200 mg, 1,200 mg, Oral, Q12H [address], Feli Peña MD, 1,200 mg at 12/16/22 9655  •  loperamide (IMODIUM) capsule 2 mg, 2 mg, Oral, 4x Daily PRN, Feli Peña MD, 2 mg at 12/15/22 1946  •  metoprolol succinate (TOPROL-XL) 24 hr tablet 100 mg, 100 mg, Oral, Daily, Darren Davis MD, 100 mg at 12/16/22 0859  •  [COMPLETED] predniSONE tablet 40 mg, 40 mg, Oral, Daily, 40 mg at 12/15/22 0921 **FOLLOWED BY** predniSONE tablet 30 mg, 30 mg, Oral, Daily, 30 mg at 12/16/22 0859 **FOLLOWED BY** [START ON 12/19/2022] predniSONE tablet 20 mg, 20 mg, Oral, Daily **FOLLOWED BY** [START ON 12/22/2022] predniSONE tablet 10 mg, 10 mg, Oral, Daily, Lissa Harrison MD    Current Outpatient Medications:   •  apixaban (Eliquis) 2 5 mg, Take 1 tablet (2 5 mg total) by mouth 2 (two) times a day, Disp: 180 tablet, Rfl: 3  •  diltiazem (CARDIZEM SR) 60 mg 12 hr capsule, Take 1 capsule (60 mg total) by mouth every 12 (twelve) hours, Disp: 60 capsule, Rfl: 0  •  ferrous sulfate 324 (65 Fe) mg, TAKE 1 TABLET (324 MG TOTAL) BY MOUTH DAILY BEFORE BREAKFAST, Disp: 90 tablet, Rfl: 1  •  furosemide (LASIX) 20 mg tablet, Take 3 tablets (60 mg total) by mouth daily, Disp: 90 tablet, Rfl: 11  •  Klor-Con M10 10 MEQ tablet, TAKE 1 TABLET BY MOUTH EVERY DAY, Disp: 90 tablet, Rfl: 0  •  loperamide (IMODIUM) 2 mg capsule, Take 1 capsule (2 mg total) by mouth 4 (four) times a day as needed for diarrhea, Disp: 30 capsule, Rfl: 0  •  metoprolol succinate (TOPROL-XL) 100 mg 24 hr tablet, Take 1 tablet (100 mg total) by mouth daily Do not start before December 16, 2022 , Disp: 30 tablet, Rfl: 0  •  predniSONE 10 mg tablet, Take 3 tablets (30 mg total) by mouth daily for 3 doses Do not start before December 16, 2022 , Disp: 9 tablet, Rfl: 0  •  [START ON 12/22/2022] predniSONE 10 mg tablet, Take 1 tablet (10 mg total) by mouth daily for 3 doses Do not start before December 22, 2022 , Disp: 3 tablet, Rfl: 0  •  [START ON 12/19/2022] predniSONE 20 mg tablet, Take 1 tablet (20 mg total) by mouth daily for 3 doses Do not start before December 19, 2022 , Disp: 3 tablet, Rfl: 0    OBJECTIVE     Current Weight: Weight - Scale: 57 8 kg (127 lb 8 oz)  /89   Pulse 77   Temp (!) 97 4 °F (36 3 °C)   Resp 18   Wt 57 8 kg (127 lb 8 oz)   LMP  (LMP Unknown)   SpO2 92%   BMI 25 75 kg/m²   Vitals:    12/15/22 2221   BP: 152/89   Pulse: 77   Resp:    Temp: (!) 97 4 °F (36 3 °C)   SpO2: 92%     Body mass index is 25 75 kg/m²      Intake/Output Summary (Last 24 hours) at 12/16/2022 1211  Last data filed at 12/16/2022 0900  Gross per 24 hour   Intake 120 ml   Output 1600 ml   Net -1480 ml       PHYSICAL EXAMINATION     Physical Exam  Constitutional:       General: She is not in acute distress  HENT:      Right Ear: External ear normal    Eyes:      Conjunctiva/sclera:      Right eye: No hemorrhage  Neck:      Thyroid: No thyromegaly  Pulmonary:      Effort: No accessory muscle usage or respiratory distress  Abdominal:      General: There is no distension  Skin:     General: Skin is warm  Coloration: Skin is not jaundiced  Psychiatric:         Behavior: Behavior is not combative  LAB RESULTS     Results from last 7 days   Lab Units 12/16/22  0610 12/15/22  2357 12/15/22  1630 12/15/22  0547 12/15/22  0004 12/14/22  1717 12/14/22  1327 12/13/22  0621 12/12/22  0604   WBC Thousand/uL  --   --   --   --   --   --   --   --  9 46   HEMOGLOBIN g/dL  --   --   --   --   --   --   --   --  10 3*   HEMATOCRIT %  --   --   --   --   --   --   --   --  34 1*   PLATELETS Thousands/uL  --   --   --   --   --   --   --   --  194   SODIUM mmol/L 139 136 138  138 135 129* 126* 124*   < > 140   POTASSIUM mmol/L 3 3* 3 3* 3 5  3 4* 3 8 3 4* 3 6 3 8   < > 3 7   CHLORIDE mmol/L 97 96 94*  94* 94* 90* 86* 91*   < > 98   CO2 mmol/L 32 32 32  33* 28 31 30 27   < > 33*   BUN mg/dL 34* 37* 36*  35* 38* 39* 39* 40*   < > 45*   CREATININE mg/dL 1 07 1 20 1 07  1 10 1 05 1 09 1 07 1 10   < > 1 18   EGFR ml/min/1 73sq m 43 37 43  41 44 42 43 41   < > 38   CALCIUM mg/dL 8 4 8 1* 8 7  8 9 8 6 8 1* 8 3 8 1*   < > 8 5    < > = values in this interval not displayed  I have personally reviewed the old medical records and patient's previously known baseline creatinine level is ~ 1 1-1 2    RADIOLOGY RESULTS      CT head wo contrast   Final Result by Curtis Rey MD (12/12 2215)      No acute intracranial abnormality  Chronic microangiopathic changes                    Workstation performed: [id number]         XR chest portable   Final Result by Jennifer Vogel Fede Hurtado MD (12/07 1039)   Moderate congestive changes  Bilateral small effusions  Workstation performed: [id number]         CT abdomen pelvis wo contrast   Final Result by Richard Daly MD (12/02 1654)      Hunter catheter decompresses the urinary bladder  Again seen are multiple benign renal cysts  There is no hydronephrosis  Colonic diverticulosis without diverticulitis  Workstation performed: [id number]             PLAN / RECOMMENDATIONS      1  CKD stage III  Multifactorial, baseline creatinine seems to be around 1 1-1 2    Patient was admitted with MARIA DEL CARMEN and earlier had Hunter catheter and also received IV fluid  Renal function is improved during the hospital stay and Hunter catheter was also been removed  Current creatinine is 1 07 which is at baseline  Recommend monitoring renal function while in the hospital    2  Hyponatremia  Multifactorial, based on initial urine lites, hyponatremia was suspected due to low solute intake and earlier patient was started on salt tablet and patient sodium level has improved during the hospital stay  Salt tablet was discontinued more than 24 hours back and current sodium levels 139 which is at goal   Encourage patient to liberalize salt intake in the diet and recommend monitoring sodium level 1 in the hospital     Disposition: Stable from renal standpoint for discharge    Overall above mentioned plan was also d/w Gene Salcido MD  Nephrology  12/16/2022        Portions of the record may have been created with voice recognition software  Occasional wrong word or "sound a like" substitutions may have occurred due to the inherent limitations of voice recognition software  Read the chart carefully and recognize, using context, where substitutions have occurred

## 2022-12-19 ENCOUNTER — TELEPHONE (OUTPATIENT)
Dept: CARDIOLOGY CLINIC | Facility: CLINIC | Age: 87
End: 2022-12-19

## 2022-12-19 ENCOUNTER — TELEPHONE (OUTPATIENT)
Dept: INTERNAL MEDICINE CLINIC | Facility: CLINIC | Age: 87
End: 2022-12-19

## 2022-12-19 NOTE — TELEPHONE ENCOUNTER
Fax received from [address] [place] [address] for this patient  Form scanned into patient's chart and placed in Dr Rigo Mcneal folder to be addressed

## 2022-12-19 NOTE — TELEPHONE ENCOUNTER
Tanvi from [employer] include 234 beds at the [facility]   Can Dr Meenu Davis send a referral over to [facility] for a bedside commode ASAP  She is unable to use the home toilet  [facility] phone # is [phone]  Amanda Braun did not have their fax##

## 2022-12-19 NOTE — TELEPHONE ENCOUNTER
Form from [employer] regarding: Possible drug interaction between medications Diltiazem and Metoprolol

## 2022-12-22 DIAGNOSIS — R06.00 DYSPNEA: ICD-10-CM

## 2022-12-22 DIAGNOSIS — I50.9 CHF (CONGESTIVE HEART FAILURE) (HCC): ICD-10-CM

## 2022-12-22 RX ORDER — FUROSEMIDE 20 MG/1
TABLET ORAL
Qty: 270 TABLET | Refills: 3 | Status: ON HOLD | OUTPATIENT
Start: 2022-12-22 | End: 2022-12-28 | Stop reason: SDUPTHER

## 2022-12-23 ENCOUNTER — APPOINTMENT (OUTPATIENT)
Dept: CT IMAGING | Facility: HOSPITAL | Age: 87
End: 2022-12-23

## 2022-12-23 ENCOUNTER — HOSPITAL ENCOUNTER (INPATIENT)
Facility: HOSPITAL | Age: 87
LOS: 4 days | Discharge: HOME HEALTH | End: 2022-12-28
Attending: EMERGENCY MEDICINE | Admitting: INTERNAL MEDICINE

## 2022-12-23 DIAGNOSIS — S91.002A ANKLE WOUND, LEFT, INITIAL ENCOUNTER: ICD-10-CM

## 2022-12-23 DIAGNOSIS — E87.1 HYPONATREMIA: ICD-10-CM

## 2022-12-23 DIAGNOSIS — N17.9 AKI (ACUTE KIDNEY INJURY) (HCC): ICD-10-CM

## 2022-12-23 DIAGNOSIS — I50.9 CHF (CONGESTIVE HEART FAILURE) (HCC): ICD-10-CM

## 2022-12-23 DIAGNOSIS — R06.00 DYSPNEA: ICD-10-CM

## 2022-12-23 DIAGNOSIS — N39.0 URINARY TRACT INFECTION: Primary | ICD-10-CM

## 2022-12-23 DIAGNOSIS — R33.8 ACUTE URINARY RETENTION: ICD-10-CM

## 2022-12-23 LAB
ALBUMIN SERPL BCP-MCNC: 3.1 G/DL (ref 3.5–5)
ALP SERPL-CCNC: 85 U/L (ref 46–116)
ALT SERPL W P-5'-P-CCNC: 36 U/L (ref 12–78)
ANION GAP SERPL CALCULATED.3IONS-SCNC: 11 MMOL/L (ref 4–13)
ANION GAP SERPL CALCULATED.3IONS-SCNC: 8 MMOL/L (ref 4–13)
AST SERPL W P-5'-P-CCNC: 38 U/L (ref 5–45)
BACTERIA UR QL AUTO: ABNORMAL /HPF
BASOPHILS # BLD AUTO: 0.05 THOUSANDS/ÂΜL (ref 0–0.1)
BASOPHILS NFR BLD AUTO: 0 % (ref 0–1)
BILIRUB SERPL-MCNC: 0.92 MG/DL (ref 0.2–1)
BILIRUB UR QL STRIP: NEGATIVE
BUN SERPL-MCNC: 47 MG/DL (ref 5–25)
BUN SERPL-MCNC: 52 MG/DL (ref 5–25)
CALCIUM ALBUM COR SERPL-MCNC: 8 MG/DL (ref 8.3–10.1)
CALCIUM SERPL-MCNC: 7.3 MG/DL (ref 8.3–10.1)
CALCIUM SERPL-MCNC: 7.6 MG/DL (ref 8.3–10.1)
CHLORIDE SERPL-SCNC: 86 MMOL/L (ref 96–108)
CHLORIDE SERPL-SCNC: 91 MMOL/L (ref 96–108)
CLARITY UR: ABNORMAL
CO2 SERPL-SCNC: 27 MMOL/L (ref 21–32)
CO2 SERPL-SCNC: 28 MMOL/L (ref 21–32)
COLOR UR: YELLOW
CREAT SERPL-MCNC: 1.51 MG/DL (ref 0.6–1.3)
CREAT SERPL-MCNC: 1.73 MG/DL (ref 0.6–1.3)
DME PARACHUTE DELIVERY DATE REQUESTED: NORMAL
DME PARACHUTE ITEM DESCRIPTION: NORMAL
DME PARACHUTE ORDER STATUS: NORMAL
DME PARACHUTE SUPPLIER NAME: NORMAL
DME PARACHUTE SUPPLIER PHONE: NORMAL
EOSINOPHIL # BLD AUTO: 0.01 THOUSAND/ÂΜL (ref 0–0.61)
EOSINOPHIL NFR BLD AUTO: 0 % (ref 0–6)
ERYTHROCYTE [DISTWIDTH] IN BLOOD BY AUTOMATED COUNT: 17.2 % (ref 11.6–15.1)
GFR SERPL CREATININE-BSD FRML MDRD: 24 ML/MIN/1.73SQ M
GFR SERPL CREATININE-BSD FRML MDRD: 28 ML/MIN/1.73SQ M
GLUCOSE P FAST SERPL-MCNC: 88 MG/DL (ref 65–99)
GLUCOSE SERPL-MCNC: 143 MG/DL (ref 65–140)
GLUCOSE SERPL-MCNC: 88 MG/DL (ref 65–140)
GLUCOSE UR STRIP-MCNC: NEGATIVE MG/DL
HCT VFR BLD AUTO: 29.5 % (ref 34.8–46.1)
HGB BLD-MCNC: 9.4 G/DL (ref 11.5–15.4)
HGB UR QL STRIP.AUTO: ABNORMAL
HYALINE CASTS #/AREA URNS LPF: ABNORMAL /LPF
IMM GRANULOCYTES # BLD AUTO: >0.5 THOUSAND/UL (ref 0–0.2)
IMM GRANULOCYTES NFR BLD AUTO: 6 % (ref 0–2)
KETONES UR STRIP-MCNC: NEGATIVE MG/DL
LEUKOCYTE ESTERASE UR QL STRIP: ABNORMAL
LYMPHOCYTES # BLD AUTO: 0.37 THOUSANDS/ÂΜL (ref 0.6–4.47)
LYMPHOCYTES NFR BLD AUTO: 2 % (ref 14–44)
MCH RBC QN AUTO: 27 PG (ref 26.8–34.3)
MCHC RBC AUTO-ENTMCNC: 31.9 G/DL (ref 31.4–37.4)
MCV RBC AUTO: 85 FL (ref 82–98)
MONOCYTES # BLD AUTO: 0.73 THOUSAND/ÂΜL (ref 0.17–1.22)
MONOCYTES NFR BLD AUTO: 4 % (ref 4–12)
NEUTROPHILS # BLD AUTO: 18.46 THOUSANDS/ÂΜL (ref 1.85–7.62)
NEUTS SEG NFR BLD AUTO: 88 % (ref 43–75)
NITRITE UR QL STRIP: NEGATIVE
NON-SQ EPI CELLS URNS QL MICRO: ABNORMAL /HPF
NRBC BLD AUTO-RTO: 0 /100 WBCS
PH UR STRIP.AUTO: 6 [PH]
PLATELET # BLD AUTO: 234 THOUSANDS/UL (ref 149–390)
PMV BLD AUTO: 10.4 FL (ref 8.9–12.7)
POTASSIUM SERPL-SCNC: 4.6 MMOL/L (ref 3.5–5.3)
POTASSIUM SERPL-SCNC: 4.8 MMOL/L (ref 3.5–5.3)
PROT SERPL-MCNC: 6 G/DL (ref 6.4–8.4)
PROT UR STRIP-MCNC: ABNORMAL MG/DL
RBC # BLD AUTO: 3.48 MILLION/UL (ref 3.81–5.12)
RBC #/AREA URNS AUTO: ABNORMAL /HPF
SODIUM SERPL-SCNC: 124 MMOL/L (ref 135–147)
SODIUM SERPL-SCNC: 127 MMOL/L (ref 135–147)
SP GR UR STRIP.AUTO: 1.01 (ref 1–1.03)
UROBILINOGEN UR STRIP-ACNC: <2 MG/DL
WBC # BLD AUTO: 20.88 THOUSAND/UL (ref 4.31–10.16)
WBC #/AREA URNS AUTO: ABNORMAL /HPF
WBC CLUMPS # UR AUTO: PRESENT /UL

## 2022-12-23 PROCEDURE — 0T9B70Z DRAINAGE OF BLADDER WITH DRAINAGE DEVICE, VIA NATURAL OR ARTIFICIAL OPENING: ICD-10-PCS | Performed by: INTERNAL MEDICINE

## 2022-12-23 RX ORDER — LEVOFLOXACIN 5 MG/ML
750 INJECTION, SOLUTION INTRAVENOUS ONCE
Status: COMPLETED | OUTPATIENT
Start: 2022-12-23 | End: 2022-12-23

## 2022-12-23 RX ORDER — DILTIAZEM HYDROCHLORIDE 60 MG/1
60 CAPSULE, EXTENDED RELEASE ORAL EVERY 12 HOURS SCHEDULED
Status: DISCONTINUED | OUTPATIENT
Start: 2022-12-23 | End: 2022-12-28 | Stop reason: HOSPADM

## 2022-12-23 RX ORDER — SODIUM CHLORIDE, SODIUM LACTATE, POTASSIUM CHLORIDE, CALCIUM CHLORIDE 600; 310; 30; 20 MG/100ML; MG/100ML; MG/100ML; MG/100ML
75 INJECTION, SOLUTION INTRAVENOUS CONTINUOUS
Status: DISCONTINUED | OUTPATIENT
Start: 2022-12-23 | End: 2022-12-24

## 2022-12-23 RX ORDER — SODIUM CHLORIDE 1000 MG
1 TABLET, SOLUBLE MISCELLANEOUS
Status: DISCONTINUED | OUTPATIENT
Start: 2022-12-23 | End: 2022-12-28 | Stop reason: HOSPADM

## 2022-12-23 RX ORDER — METOPROLOL SUCCINATE 100 MG/1
100 TABLET, EXTENDED RELEASE ORAL DAILY
Status: DISCONTINUED | OUTPATIENT
Start: 2022-12-23 | End: 2022-12-28 | Stop reason: HOSPADM

## 2022-12-23 RX ORDER — PREDNISONE 10 MG/1
10 TABLET ORAL DAILY
Status: COMPLETED | OUTPATIENT
Start: 2022-12-23 | End: 2022-12-24

## 2022-12-23 RX ORDER — FERROUS SULFATE 325(65) MG
325 TABLET ORAL
Status: DISCONTINUED | OUTPATIENT
Start: 2022-12-23 | End: 2022-12-28 | Stop reason: HOSPADM

## 2022-12-23 RX ORDER — ACETAMINOPHEN 325 MG/1
650 TABLET ORAL EVERY 6 HOURS PRN
Status: DISCONTINUED | OUTPATIENT
Start: 2022-12-23 | End: 2022-12-28 | Stop reason: HOSPADM

## 2022-12-23 RX ADMIN — APIXABAN 2.5 MG: 2.5 TABLET, FILM COATED ORAL at 18:24

## 2022-12-23 RX ADMIN — SODIUM CHLORIDE, POTASSIUM CHLORIDE, SODIUM LACTATE AND CALCIUM CHLORIDE 75 ML/HR: 600; 310; 30; 20 INJECTION, SOLUTION INTRAVENOUS at 22:21

## 2022-12-23 RX ADMIN — LEVOFLOXACIN 750 MG: 750 INJECTION, SOLUTION INTRAVENOUS at 04:26

## 2022-12-23 RX ADMIN — PREDNISONE 10 MG: 10 TABLET ORAL at 08:02

## 2022-12-23 RX ADMIN — APIXABAN 2.5 MG: 2.5 TABLET, FILM COATED ORAL at 08:02

## 2022-12-23 RX ADMIN — DILTIAZEM HYDROCHLORIDE 60 MG: 60 CAPSULE, EXTENDED RELEASE ORAL at 08:13

## 2022-12-23 RX ADMIN — FERROUS SULFATE TAB 325 MG (65 MG ELEMENTAL FE) 325 MG: 325 (65 FE) TAB at 07:58

## 2022-12-23 RX ADMIN — Medication 1 G: at 08:13

## 2022-12-23 RX ADMIN — SODIUM CHLORIDE, POTASSIUM CHLORIDE, SODIUM LACTATE AND CALCIUM CHLORIDE 75 ML/HR: 600; 310; 30; 20 INJECTION, SOLUTION INTRAVENOUS at 11:32

## 2022-12-23 RX ADMIN — Medication 1 G: at 18:24

## 2022-12-23 RX ADMIN — Medication 1 G: at 11:32

## 2022-12-23 RX ADMIN — SODIUM CHLORIDE 1000 ML: 0.9 INJECTION, SOLUTION INTRAVENOUS at 04:27

## 2022-12-23 RX ADMIN — DILTIAZEM HYDROCHLORIDE 60 MG: 60 CAPSULE, EXTENDED RELEASE ORAL at 22:18

## 2022-12-23 RX ADMIN — METOPROLOL SUCCINATE 100 MG: 100 TABLET, EXTENDED RELEASE ORAL at 08:01

## 2022-12-23 NOTE — ASSESSMENT & PLAN NOTE
· Presenting with creatinine of 1 73, baseline around 1 1 most likely secondary to dehydration  · For now we will avoid nephrotoxic agents, will hold home Lasix 60 mg daily  · Even IV fluid bolus in the ED  · Monitor BMP

## 2022-12-23 NOTE — H&P
[facility]  H&P- Nancyann Holiday 2/17/1924, 80 y o  female MRN: [id number]  Unit/Bed#: ED 17 Encounter: [id number]  Primary Care Provider: Farzaneh Martell MD   Date and time admitted to hospital: 12/23/2022  2:13 AM    * Hyponatremia  Assessment & Plan  · Sodium decreased at 124 in setting of dehydration with UTI most likely  · For now will place on sodium chloride tabs 1 g 3 times daily with meals  · Given 1 L IV fluids in the ED  · Recheck BMP at 1324    Acute metabolic encephalopathy  Assessment & Plan  · Per ED provider, was alert and oriented to person and place on arrival, now patient alert and oriented to person, situation but is disoriented to place and does not answer question  · In setting of UTI/urinary retention, electrolyte abnormality, age, recent lengthy hospital stay most likely  · Fall precautions, delirium precautions with lights off at night, lights on during the day, attempt ambulation with assistance during the day  · Avoid sedating medications/narcotics  · Due to acuteness of altered mental status and patient on Eliquis will obtain CT head    UTI (urinary tract infection)  Assessment & Plan  · UTI positive for large leukocytes, negative for bacteria and nitrites  · History of MDRO  · Check urine culture, rule out colonization  · Patient recently hospitalized 12/2-12/16 for similar symptoms and UTI and finished antibiotic course  · Appreciate infectious disease consult in regards to antibiotics  · Received 1 dose of IV Levaquin 750 mg in ED this morning    Urinary obstruction  Assessment & Plan  · Unsure of how long patient has had any area, reports anywhere from 2 to 6 days at home?   · Bladder scan in  mL, Hunter catheter placed, but patient reports she does not want it anymore and wants to take it out, so Hunter DC'd at this time  · Will place on urinary retention protocol   · Intake and output monitoring    Paroxysmal atrial fibrillation ([address] )  Assessment & Plan  · Rate currently controlled  · Continue home Cardizem, metoprolol, Eliquis twice daily    MARIA DEL CARMEN (acute kidney injury) ([facility] )  Assessment & Plan  · Presenting with creatinine of 1 73, baseline around 1 1 most likely secondary to dehydration  · For now we will avoid nephrotoxic agents, will hold home Lasix 60 mg daily  · Even IV fluid bolus in the ED  · Monitor BMP      VTE Pharmacologic Prophylaxis: VTE Score: 5 High Risk (Score >/= 5) - Pharmacological DVT Prophylaxis Ordered: apixaban (Eliquis)  Sequential Compression Devices Ordered  Code Status: Level 1 - Full Code   Discussion with family: pt  Anticipated Length of Stay: Patient will be admitted on an observation basis with an anticipated length of stay of less than 2 midnights secondary to see above  Total Time for Visit, including Counseling / Coordination of Care: 60 minutes Greater than 50% of this total time spent on direct patient counseling and coordination of care  Chief Complaint:    Chief Complaint   Patient presents with   • Urinary Retention     Pt states she was recently discharged home from hospital and has had decreased urinary output since  Reports anuria for at least 2 days and has been increasing her water intake at home  History of Present Illness:  Jeff Evans is a 80 y o  female with a PMH of CKD, CHF, iron deficiency anemia, pulmonary hypertension who presents with complaint of anuria for possibly 2 to 6 days  Patient currently slightly confused  Initially per ED provider note patient is alert and oriented to person, place, situation  Now patient is only alert to person and situation  Disoriented to place  Patient's story has also changed and she is not sure how long she has not urinated for prior to arrival   She reports multiple times she does not want a Hunter catheter due to pain  She denies any abdominal pain, chest pain, difficulty breathing       Review of Systems:  Review of Systems   Constitutional: Negative for chills  Respiratory: Negative for shortness of breath  Cardiovascular: Negative for chest pain  Gastrointestinal: Negative for abdominal pain  Genitourinary: Positive for difficulty urinating  Neurological: Negative for headaches  Past Medical and Surgical History:   Past Medical History:   Diagnosis Date   • Anemia    • Asteatotic eczema    • Chronic kidney disease    • Hypercholesterolemia    • Lichen sclerosus et atrophicus    • Mitral valve insufficiency    • Nosebleed    • Seborrheic keratoses    • Tricuspid regurgitation    • Vertigo        Past Surgical History:   Procedure Laterality Date   • APPENDECTOMY  11/14/1939   • CATARACT EXTRACTION, BILATERAL  04/2019   • HYSTERECTOMY  11/14/1962   • ORIF HIP FRACTURE Left 2020   • NV COLONOSCOPY FLX DX W/COLLJ SPEC WHEN PFRMD N/A 5/19/2016    Procedure: EGD AND COLONOSCOPY;  Surgeon: Adela Guzman MD;  Location: [facility]; Service: Gastroenterology       Meds/Allergies:  Prior to Admission medications    Medication Sig Start Date End Date Taking?  Authorizing Provider   apixaban (Eliquis) 2 5 mg Take 1 tablet (2 5 mg total) by mouth 2 (two) times a day 11/26/21   Adela Crowe MD   diltiazem (CARDIZEM SR) 60 mg 12 hr capsule Take 1 capsule (60 mg total) by mouth every 12 (twelve) hours 12/15/22 1/14/23  GUNJAN Voss   ferrous sulfate 324 (65 Fe) mg TAKE 1 TABLET (324 MG TOTAL) BY MOUTH DAILY BEFORE BREAKFAST 4/26/22   Adela Crowe MD   furosemide (LASIX) 20 mg tablet TAKE 3 TABLETS BY MOUTH EVERY DAY 12/22/22   Adela Crowe MD   Klor-Con M10 10 MEQ tablet TAKE 1 TABLET BY MOUTH EVERY DAY 9/14/22   Adela Crowe MD   loperamide (IMODIUM) 2 mg capsule Take 1 capsule (2 mg total) by mouth 4 (four) times a day as needed for diarrhea 12/15/22   GUNJAN Voss   metoprolol succinate (TOPROL-XL) 100 mg 24 hr tablet Take 1 tablet (100 mg total) by mouth daily Do not start before December 16, 2022 12/16/22 GUNJAN Iqbal   predniSONE 10 mg tablet Take 1 tablet (10 mg total) by mouth daily for 3 doses Do not start before December 22, 2022 12/22/22 12/25/22  Yumi GUNJAN Fonseca   predniSONE 20 mg tablet Take 1 tablet (20 mg total) by mouth daily for 3 doses Do not start before December 19, 2022 12/19/22 12/22/22  Yumi GUNJAN Fonseca     I have reviewed her medications per review of chart  Allergies: Allergies   Allergen Reactions   • Lactose Intolerance (Gi) - Food Allergy    • Penicillins Other (See Comments)     unknown       Social History:  Marital Status:      Patient Pre-hospital Living Situation: Home  Patient Pre-hospital Level of Mobility: unable to be assessed at time of evaluation  Patient Pre-hospital Diet Restrictions: cardiac  Substance Use History:   Social History     Substance and Sexual Activity   Alcohol Use Not Currently     Social History     Tobacco Use   Smoking Status Never   Smokeless Tobacco Never     Social History     Substance and Sexual Activity   Drug Use No       Family History:  Family History   Problem Relation Age of Onset   • Heart disease Mother         Abnormality   • Heart disease Father         Abnormality   • No Known Problems Sister    • No Known Problems Brother        Physical Exam:     Vitals:   Blood Pressure: (!) 172/82 (12/23/22 0400)  Pulse: 68 (12/23/22 0400)  Temperature: 97 5 °F (36 4 °C) (12/23/22 0224)  Temp Source: Oral (12/23/22 0224)  Respirations: 18 (12/23/22 0400)  SpO2: 94 % (12/23/22 0400)    Physical Exam  Vitals and nursing note reviewed  Constitutional:       Appearance: Normal appearance  She is not toxic-appearing  Comments: Alert but slightly confused   HENT:      Head: Normocephalic  Cardiovascular:      Rate and Rhythm: Normal rate and regular rhythm  Heart sounds: Normal heart sounds  Pulmonary:      Effort: Pulmonary effort is normal  No respiratory distress  Breath sounds: Normal breath sounds  Abdominal:      General: Abdomen is flat  Bowel sounds are normal       Palpations: Abdomen is soft  Tenderness: There is no abdominal tenderness  There is no guarding  Musculoskeletal:         General: No tenderness  Right lower leg: No edema  Left lower leg: No edema  Skin:     General: Skin is warm and dry  Neurological:      Mental Status: She is disoriented  Comments: Alert and oriented to person, situation  Disoriented to place  Would not answer questions in regards to time  Psychiatric:      Comments: Ruminating thoughts about Hunter catheter          Additional Data:     Lab Results:  Results from last 7 days   Lab Units 12/23/22  0256   WBC Thousand/uL 20 88*   HEMOGLOBIN g/dL 9 4*   HEMATOCRIT % 29 5*   PLATELETS Thousands/uL 234   NEUTROS PCT % 88*   LYMPHS PCT % 2*   MONOS PCT % 4   EOS PCT % 0     Results from last 7 days   Lab Units 12/23/22  0256   SODIUM mmol/L 124*   POTASSIUM mmol/L 4 6   CHLORIDE mmol/L 86*   CO2 mmol/L 27   BUN mg/dL 52*   CREATININE mg/dL 1 73*   ANION GAP mmol/L 11   CALCIUM mg/dL 7 3*   ALBUMIN g/dL 3 1*   TOTAL BILIRUBIN mg/dL 0 92   ALK PHOS U/L 85   ALT U/L 36   AST U/L 38   GLUCOSE RANDOM mg/dL 143*                       Lines/Drains:  Invasive Devices     Peripheral Intravenous Line  Duration           Peripheral IV 12/23/22 Left Antecubital <1 day                    Imaging: No pertinent imaging reviewed  No orders to display       EKG and Other Studies Reviewed on Admission:   · EKG: No EKG obtained  ** Please Note: This note has been constructed using a voice recognition system   **

## 2022-12-23 NOTE — ASSESSMENT & PLAN NOTE
· Per ED provider, was alert and oriented to person and place on arrival, now patient alert and oriented to person, situation but is disoriented to place and does not answer question  · In setting of UTI/urinary retention, electrolyte abnormality, age, recent lengthy hospital stay most likely  · Fall precautions, delirium precautions with lights off at night, lights on during the day, attempt ambulation with assistance during the day  Due to acuteness of altered mental status and patient on Eliquis will obtain CT head, CT head negative  · Resolved at this time

## 2022-12-23 NOTE — CONSULTS
Consultation - Infectious Disease   Arelis Muir 80 y o  female MRN: [id number]  Unit/Bed#: ED 17 Encounter: [id number]      IMPRESSION & RECOMMENDATIONS:   1  Acute encephalopathy  Progressive confusion since presentation  CT of the head without acute findings on review  Multiple factors contributing including 2, 3, and other electrolyte abnormalities  Hemodynamically stable  Given 4 and patient's age would avoid drugs such as Levaquin  Additionally antibiotic dosing reviewed with clinical ID pharmacist given side effect associated with drug such as cefepime, if not dosed correctly  We will avoid Zosyn given 3  Antibiotics adjusted as below  Continue to trend fever curve/vitals  Repeat CBC and chemistry ordered for tomorrow  Follow-up pending cultures  Recommend imaging as below  Post void residuals as below  Monitor mental status  Low threshold for repeat imaging  Additional supportive care as per primary  Additional interventions pending clinical course    2  Complicated urinary tract infection and possible retention  Patient's UA on presentation abnormal and she presented with complaints of anuria  Was noted to have 300 cc in the bladder and Hunter catheter placed  Later removed as patient's request   Was noted to have retention on prior admission as well  She is confused and so unable to provide definitive symptoms  Would question if upper tract pathology present  Antibiotics adjusted given reasons listed above  We will switch to cefepime 1 g every 24, to start on 12/25  We will adjust timing and dosing of antibiotic tomorrow based on repeat labs  Repeat CBC and chemistry ordered for tomorrow  Continue to trend fever curve/vitals  Monitor abdominal exam  Every shift PVRs ordered  Recommend CT imaging of the abdomen without contrast  Consider urology evaluation  Follow-up pending urine culture  Duration of therapy pending clinical course    3  Acute kidney injury on chronic kidney disease    Acute elevation in creatinine noted from baseline and patient with reports of decreased urine output  Suspect some component of retention  Also noted with hyponatremia  Antibiotic dosing as above  Repeat chemistry ordered for tomorrow  We will further dose adjust antibiotic tomorrow  Fluid hydration as per primary  Consider nephrology evaluation    4  Valvulopathy and previous abnormal EKG  recent EKGs noted with fluctuating QTc interval   Given this and patient's age would avoid Levaquin if possible  Antibiotics adjusted as above  5  Leukocytosis  Abrupt elevation in white count noted  This is due to the above  Would attempt to rule out upper tract pathology  Repeat CBC tomorrow  Antibiotics as above  Imaging as above    Above plan discussed in detail with primary service and clinical ID pharmacist   Patient unable to participate in conversation of the above plan  ID consult service will continue to follow  HISTORY OF PRESENT ILLNESS:  Reason for Consult: Urinary retention and encephalopathy  HPI: Tico Lewis is a 80y o  year old female with chronic kidney disease, heart failure, iron deficiency anemia, pulmonary hypertension and valvular insufficiency  Previous surgical history reviewed below  Patient presented to the hospital with acute urinary retention  Unfortunately on my evaluation she is confused  Her speech is difficult to understand  She continues to state about not wanting a Hunter catheter  History therefore gathered from chart review  Patient reportedly had been having decreased urine output for about 2 days prior to presentation  There was no definitive urinary symptoms reported and no other localizing complaints  Recent discharge summary reviewed and patient was admitted from 12/2-12/17 for acute kidney injury  She was diagnosed with a urinary tract infection and cultures isolated Citrobacter and Klebsiella  Susceptibilities reviewed    Patient's kidney function improved with a Hunter catheter and IV fluids  She was seen by urology as well as nephrology  Towards the end of admission at the patient's request Hunter catheter was ultimately removed  No other outpatient visits noted since discharge  Patient's recent EKG reviewed  Previous drug allergies and drug administrations also reviewed  On presentation this time patient has worsening hyponatremia  Kidney function significantly increased along with BUN  LFTs are unremarkable  White count significantly elevated compared to prior  UA was grossly abnormal   Urine cultures are pending  She remains afebrile  CT of the head was done on presentation and personally reviewed and the imaging itself does not show any acute bleeding or changes compared to prior  Vitals are otherwise stable  Patient ultimately admitted for urinary tract infection and we are consulted at this time given her previous culture history with some degree of resistance  Extensive review of the medical records in epic including review of the notes, radiographs, and laboratory results  REVIEW OF SYSTEMS:  Unable to obtain as the patient speech is confused and garbled and she is confused  PAST MEDICAL HISTORY:  Past Medical History:   Diagnosis Date   • Anemia    • Asteatotic eczema    • Chronic kidney disease    • Hypercholesterolemia    • Lichen sclerosus et atrophicus    • Mitral valve insufficiency    • Nosebleed    • Seborrheic keratoses    • Tricuspid regurgitation    • Vertigo      Past Surgical History:   Procedure Laterality Date   • APPENDECTOMY  11/14/1939   • CATARACT EXTRACTION, BILATERAL  04/2019   • HYSTERECTOMY  11/14/1962   • ORIF HIP FRACTURE Left 2020   • NE COLONOSCOPY FLX DX W/COLLJ SPEC WHEN PFRMD N/A 5/19/2016    Procedure: EGD AND COLONOSCOPY;  Surgeon: Ridge Rahman MD;  Location: [facility];   Service: Gastroenterology       FAMILY HISTORY:  Non-contributory    SOCIAL HISTORY:  Social History   Social History Substance and Sexual Activity   Alcohol Use Not Currently     Social History     Substance and Sexual Activity   Drug Use No     Social History     Tobacco Use   Smoking Status Never   Smokeless Tobacco Never       ALLERGIES:  Allergies   Allergen Reactions   • Lactose Intolerance (Gi) - Food Allergy    • Penicillins Other (See Comments)     unknown       MEDICATIONS:  All current active medications have been reviewed  PHYSICAL EXAM:  Temp:  [97 5 °F (36 4 °C)] 97 5 °F (36 4 °C)  HR:  [68-70] 68  Resp:  [16-18] 18  BP: (133-172)/(67-82) 172/82  SpO2:  [94 %-95 %] 94 %  Temp (24hrs), Av 5 °F (36 4 °C), Min:97 5 °F (36 4 °C), Max:97 5 °F (36 4 °C)  Current: Temperature: 97 5 °F (36 4 °C)    Intake/Output Summary (Last 24 hours) at 2022 0851  Last data filed at 2022 3124  Gross per 24 hour   Intake 1000 ml   Output 600 ml   Net 400 ml       General Appearance:   Elderly, chronically ill-appearing, nontoxic, has confused and difficult to understand speech  Head:  Normocephalic, without obvious abnormality, atraumatic   Eyes:  Conjunctiva pink and sclera anicteric, both eyes   Nose: Nares normal, mucosa normal, no drainage   Throat: Oropharynx moist without lesions   Neck: Supple, symmetrical, no adenopathy, no tenderness/mass/nodules   Back:   Symmetric, no curvature, ROM limited to assessment as patient does not follow commands consistently, no CVA tenderness   Lungs:   Clear to auscultation bilaterally, respirations unlabored on room air   Chest Wall:  No tenderness or deformity   Heart:  RRR; systolic murmur noted, no rubs or gallops   Abdomen:   Soft, non-tender, non-distended, positive bowel sounds; no suprapubic tenderness appreciated  Hunter catheter previously placed in the ER has been removed  Patient with pure wick in place  Extremities: No cyanosis, clubbing or edema   Skin: No rashes or lesions  No draining wounds noted     Lymph nodes: Cervical, supraclavicular nodes normal Neurologic: Alert and oriented times 1, she was continuously move her upper and lower extremities  She does not consistently follow commands  Speech is confused and difficult to understand at times  LABS, IMAGING, & OTHER STUDIES:  Lab Results:  I have personally reviewed pertinent labs  Results from last 7 days   Lab Units 12/23/22  0256   WBC Thousand/uL 20 88*   HEMOGLOBIN g/dL 9 4*   PLATELETS Thousands/uL 234     Results from last 7 days   Lab Units 12/23/22  0256   POTASSIUM mmol/L 4 6   CHLORIDE mmol/L 86*   CO2 mmol/L 27   BUN mg/dL 52*   CREATININE mg/dL 1 73*   EGFR ml/min/1 73sq m 24   CALCIUM mg/dL 7 3*   AST U/L 38   ALT U/L 36   ALK PHOS U/L 85           Imaging Studies:   I have personally reviewed pertinent imaging study reports and images in PACS  Other Studies:   I have personally reviewed pertinent reports

## 2022-12-23 NOTE — ASSESSMENT & PLAN NOTE
· Sodium decreased at 124 in setting of dehydration with UTI most likely  · For now will place on sodium chloride tabs 1 g 3 times daily with meals  · Given 1 L IV fluids in the ED  · Recheck BMP at 1200

## 2022-12-23 NOTE — ED PROVIDER NOTES
History  Chief Complaint   Patient presents with   • Urinary Retention     Pt states she was recently discharged home from hospital and has had decreased urinary output since  Reports anuria for at least 2 days and has been increasing her water intake at home  Patient is a 57-year-old female with a past medical history significant for chronic kidney disease, hyperlipidemia, atrial fibrillation presenting to the emergency department for evaluation of urinary retention  Patient states that she was recently admitted to the hospital and discharged on December 16 for urinary retention/urinary tract infection/hyponatremia  Since coming home on the 16th, she was able to urinate 1 time  Since then, patient states that she has not been able to urinate  She states that she has been drinking a lot of water in an attempt to fill her bladder so she can urinate, however she has been unable to urinate for the last several days  She is not having any associated suprapubic pain  Denying fevers, chills, chest pain, difficulty breathing, abdominal pain, nausea, vomiting, diarrhea  No other complaints at this time  Prior to Admission Medications   Prescriptions Last Dose Informant Patient Reported? Taking?    Klor-Con M10 10 MEQ tablet   No No   Sig: TAKE 1 TABLET BY MOUTH EVERY DAY   apixaban (Eliquis) 2 5 mg   No No   Sig: Take 1 tablet (2 5 mg total) by mouth 2 (two) times a day   diltiazem (CARDIZEM SR) 60 mg 12 hr capsule   No No   Sig: Take 1 capsule (60 mg total) by mouth every 12 (twelve) hours   ferrous sulfate 324 (65 Fe) mg   No No   Sig: TAKE 1 TABLET (324 MG TOTAL) BY MOUTH DAILY BEFORE BREAKFAST   furosemide (LASIX) 20 mg tablet   No No   Sig: TAKE 3 TABLETS BY MOUTH EVERY DAY   loperamide (IMODIUM) 2 mg capsule   No No   Sig: Take 1 capsule (2 mg total) by mouth 4 (four) times a day as needed for diarrhea   metoprolol succinate (TOPROL-XL) 100 mg 24 hr tablet   No No   Sig: Take 1 tablet (100 mg total) by mouth daily Do not start before December 16, 2022  predniSONE 10 mg tablet   No No   Sig: Take 1 tablet (10 mg total) by mouth daily for 3 doses Do not start before December 22, 2022  predniSONE 20 mg tablet   No No   Sig: Take 1 tablet (20 mg total) by mouth daily for 3 doses Do not start before December 19, 2022  Facility-Administered Medications: None       Past Medical History:   Diagnosis Date   • Anemia    • Asteatotic eczema    • Chronic kidney disease    • Hypercholesterolemia    • Lichen sclerosus et atrophicus    • Mitral valve insufficiency    • Nosebleed    • Seborrheic keratoses    • Tricuspid regurgitation    • Vertigo        Past Surgical History:   Procedure Laterality Date   • APPENDECTOMY  11/14/1939   • CATARACT EXTRACTION, BILATERAL  04/2019   • HYSTERECTOMY  11/14/1962   • ORIF HIP FRACTURE Left 2020   • [place] COLONOSCOPY FLX DX W/COLLJ SPEC WHEN PFRMD N/A 5/19/2016    Procedure: EGD AND COLONOSCOPY;  Surgeon: jR Sidhu MD;  Location: [facility]; Service: Gastroenterology       Family History   Problem Relation Age of Onset   • Heart disease Mother         Abnormality   • Heart disease Father         Abnormality   • No Known Problems Sister    • No Known Problems Brother      I have reviewed and agree with the history as documented  E-Cigarette/Vaping   • E-Cigarette Use Never User      E-Cigarette/Vaping Substances   • Nicotine No    • THC No    • CBD No    • Flavoring No    • Other No    • Unknown No      Social History     Tobacco Use   • Smoking status: Never   • Smokeless tobacco: Never   Vaping Use   • Vaping Use: Never used   Substance Use Topics   • Alcohol use: Not Currently   • Drug use: No       Review of Systems   Constitutional: Negative for chills and fever  HENT: Negative for congestion, facial swelling, nosebleeds, sore throat and voice change  Eyes: Negative for pain and redness     Respiratory: Negative for cough, choking, chest tightness, shortness of breath and stridor  Cardiovascular: Negative for chest pain and palpitations  Gastrointestinal: Negative for abdominal pain, diarrhea, nausea and vomiting  Genitourinary: Positive for difficulty urinating  Musculoskeletal: Negative for arthralgias, back pain, myalgias, neck pain and neck stiffness  Skin: Negative for color change and rash  Neurological: Negative for dizziness, syncope, facial asymmetry, weakness, light-headedness, numbness and headaches  Psychiatric/Behavioral: Negative for confusion and suicidal ideas  All other systems reviewed and are negative  Physical Exam  Physical Exam  Vitals reviewed  Constitutional:       General: She is not in acute distress  Appearance: Normal appearance  She is not ill-appearing, toxic-appearing or diaphoretic  HENT:      Head: Normocephalic and atraumatic  Right Ear: External ear normal       Left Ear: External ear normal       Nose: Nose normal  No congestion or rhinorrhea  Mouth/Throat:      Mouth: Mucous membranes are moist       Pharynx: Oropharynx is clear  No oropharyngeal exudate or posterior oropharyngeal erythema  Eyes:      General: No scleral icterus  Right eye: No discharge  Left eye: No discharge  Extraocular Movements: Extraocular movements intact  Conjunctiva/sclera: Conjunctivae normal    Cardiovascular:      Rate and Rhythm: Normal rate and regular rhythm  Pulses: Normal pulses  Heart sounds: Normal heart sounds  No murmur heard  No friction rub  No gallop  Pulmonary:      Effort: Pulmonary effort is normal  No respiratory distress  Breath sounds: Normal breath sounds  No stridor  No wheezing, rhonchi or rales  Abdominal:      General: Abdomen is flat  Palpations: Abdomen is soft  Tenderness: There is no abdominal tenderness  There is no guarding or rebound  Musculoskeletal:      Cervical back: Normal range of motion and neck supple        Right lower leg: Edema present  Left lower leg: Edema present  Skin:     General: Skin is warm and dry  Capillary Refill: Capillary refill takes less than 2 seconds  Neurological:      General: No focal deficit present  Mental Status: She is alert and oriented to person, place, and time     Psychiatric:         Mood and Affect: Mood normal          Behavior: Behavior normal          Vital Signs  ED Triage Vitals [12/23/22 0224]   Temperature Pulse Respirations Blood Pressure SpO2   97 5 °F (36 4 °C) 70 16 134/71 95 %      Temp Source Heart Rate Source Patient Position - Orthostatic VS BP Location FiO2 (%)   Oral Monitor Lying Right arm --      Pain Score       No Pain           Vitals:    12/26/22 2308 12/27/22 0828 12/27/22 1542 12/27/22 2257   BP: (!) 174/89 150/89 141/68 144/69   Pulse: 63 83 70 57   Patient Position - Orthostatic VS:             Visual Acuity      ED Medications  Medications   apixaban (ELIQUIS) tablet 2 5 mg (2 5 mg Oral Given 12/27/22 1757)   diltiazem (CARDIZEM SR) 12 hr capsule 60 mg (60 mg Oral Given 12/28/22 0007)   ferrous sulfate tablet 325 mg (325 mg Oral Given 12/27/22 0855)   metoprolol succinate (TOPROL-XL) 24 hr tablet 100 mg (100 mg Oral Given 12/27/22 0855)   sodium chloride tablet 1 g (1 g Oral Given 12/27/22 1808)   acetaminophen (TYLENOL) tablet 650 mg (has no administration in time range)   lactated ringers infusion (0 mL/hr Intravenous Stopped 12/25/22 0824)   vancomycin (VANCOCIN) IVPB (premix in dextrose) 750 mg 150 mL (has no administration in time range)   levofloxacin (LEVAQUIN) IVPB (premix in dextrose) 750 mg 150 mL (0 mg Intravenous Stopped 12/23/22 0602)   sodium chloride 0 9 % bolus 1,000 mL (0 mL Intravenous Stopped 12/23/22 0527)   predniSONE tablet 10 mg (10 mg Oral Given 12/24/22 0908)   vancomycin (VANCOCIN) 1,250 mg in sodium chloride 0 9 % 250 mL IVPB (0 mg Intravenous Stopped 12/25/22 1400)   vancomycin (VANCOCIN) IVPB (premix in dextrose) 750 mg 150 mL (750 mg Intravenous New Bag 12/26/22 1433)   vancomycin (VANCOCIN) IVPB (premix in dextrose) 500 mg 100 mL (500 mg Intravenous New Bag 12/27/22 9492)       Diagnostic Studies  Results Reviewed     Procedure Component Value Units Date/Time    Urine culture [[id number]]  (Abnormal)  (Susceptibility) Collected: 12/23/22 0328    Lab Status: Final result Specimen: Urine, Straight Cath Updated: 12/26/22 1432     Urine Culture 60,000-69,000 cfu/ml Enterococcus faecalis    Susceptibility     Enterococcus faecalis (1)     Antibiotic Interpretation Microscan   Method Status    ZID Performed  Yes  MEÑO Final    Ampicillin ($$) Susceptible <=2 00 ug/ml MEÑO Final    Levofloxacin ($) Resistant >4 00 ug/ml MEÑO Final    Nitrofurantoin Susceptible <=32 ug/ml MEÑO Final    Tetracycline Susceptible <=2 ug/ml MEÑO Final    Vancomycin ($) Susceptible 1 00 ug/ml MEÑO Final                   CBC and differential [[id number]]  (Abnormal) Collected: 12/24/22 0545    Lab Status: Final result Specimen: Blood from Arm, Right Updated: 12/24/22 0928     WBC 14 14 Thousand/uL      RBC 3 39 Million/uL      Hemoglobin 8 9 g/dL      Hematocrit 27 8 %      MCV 82 fL      MCH 26 3 pg      MCHC 32 0 g/dL      RDW 17 2 %      MPV 10 8 fL      Platelets 683 Thousands/uL     Narrative: This is an appended report  These results have been appended to a previously verified report      Basic metabolic panel [[id number]]  (Abnormal) Collected: 12/24/22 0545    Lab Status: Final result Specimen: Blood from Arm, Right Updated: 12/24/22 1072     Sodium 130 mmol/L      Potassium 4 3 mmol/L      Chloride 95 mmol/L      CO2 27 mmol/L      ANION GAP 8 mmol/L      BUN 49 mg/dL      Creatinine 1 53 mg/dL      Glucose 74 mg/dL      Glucose, Fasting 74 mg/dL      Calcium 7 6 mg/dL      eGFR 28 ml/min/1 73sq m     Narrative:      [place] guidelines for Chronic Kidney Disease (CKD):   •  Stage 1 with normal or high GFR (GFR > 90 mL/min/1 73 square meters)  •  Stage 2 Mild CKD (GFR = 60-89 mL/min/1 73 square meters)  •  Stage 3A Moderate CKD (GFR = 45-59 mL/min/1 73 square meters)  •  Stage 3B Moderate CKD (GFR = 30-44 mL/min/1 73 square meters)  •  Stage 4 Severe CKD (GFR = 15-29 mL/min/1 73 square meters)  •  Stage 5 End Stage CKD (GFR <15 mL/min/1 73 square meters)  Note: GFR calculation is accurate only with a steady state creatinine    Basic metabolic panel [[id number]]  (Abnormal) Collected: 12/23/22 1132    Lab Status: Final result Specimen: Blood from Arm, Right Updated: 12/23/22 1206     Sodium 127 mmol/L      Potassium 4 8 mmol/L      Chloride 91 mmol/L      CO2 28 mmol/L      ANION GAP 8 mmol/L      BUN 47 mg/dL      Creatinine 1 51 mg/dL      Glucose 88 mg/dL      Glucose, Fasting 88 mg/dL      Calcium 7 6 mg/dL      eGFR 28 ml/min/1 73sq m     Narrative:      [place] guidelines for Chronic Kidney Disease (CKD):   •  Stage 1 with normal or high GFR (GFR > 90 mL/min/1 73 square meters)  •  Stage 2 Mild CKD (GFR = 60-89 mL/min/1 73 square meters)  •  Stage 3A Moderate CKD (GFR = 45-59 mL/min/1 73 square meters)  •  Stage 3B Moderate CKD (GFR = 30-44 mL/min/1 73 square meters)  •  Stage 4 Severe CKD (GFR = 15-29 mL/min/1 73 square meters)  •  Stage 5 End Stage CKD (GFR <15 mL/min/1 73 square meters)  Note: GFR calculation is accurate only with a steady state creatinine    Urine Microscopic [[id number]]  (Abnormal) Collected: 12/23/22 0328    Lab Status: Final result Specimen: Urine, Straight Cath Updated: 12/23/22 0350     RBC, UA 20-30 /hpf      WBC, UA Innumerable /hpf      Epithelial Cells Occasional /hpf      Bacteria, UA None Seen /hpf      Hyaline Casts, UA 3-5 /lpf      WBC Clumps Present    Comprehensive metabolic panel [[id number]]  (Abnormal) Collected: 12/23/22 0256    Lab Status: Final result Specimen: Blood Updated: 12/23/22 0347     Sodium 124 mmol/L      Potassium 4 6 mmol/L      Chloride 86 mmol/L CO2 27 mmol/L      ANION GAP 11 mmol/L      BUN 52 mg/dL      Creatinine 1 73 mg/dL      Glucose 143 mg/dL      Calcium 7 3 mg/dL      Corrected Calcium 8 0 mg/dL      AST 38 U/L      ALT 36 U/L      Alkaline Phosphatase 85 U/L      Total Protein 6 0 g/dL      Albumin 3 1 g/dL      Total Bilirubin 0 92 mg/dL      eGFR 24 ml/min/1 73sq m     Narrative:      National Kid[employer] guidelines for Chronic Kidney Disease (CKD):   •  Stage 1 with normal or high GFR (GFR > 90 mL/min/1 73 square meters)  •  Stage 2 Mild CKD (GFR = 60-89 mL/min/1 73 square meters)  •  Stage 3A Moderate CKD (GFR = 45-59 mL/min/1 73 square meters)  •  Stage 3B Moderate CKD (GFR = 30-44 mL/min/1 73 square meters)  •  Stage 4 Severe CKD (GFR = 15-29 mL/min/1 73 square meters)  •  Stage 5 End Stage CKD (GFR <15 mL/min/1 73 square meters)  Note: GFR calculation is accurate only with a steady state creatinine    UA w Reflex to Microscopic w Reflex to Culture [[id number]]  (Abnormal) Collected: 12/23/22 0328    Lab Status: Final result Specimen: Urine, Straight Cath Updated: 12/23/22 0336     Color, UA Yellow     Clarity, UA Extra Turbid     Specific Gravity, UA 1 009     pH, UA 6 0     Leukocytes, UA Large     Nitrite, UA Negative     Protein, UA 30 (1+) mg/dl      Glucose, UA Negative mg/dl      Ketones, UA Negative mg/dl      Urobilinogen, UA <2 0 mg/dl      Bilirubin, UA Negative     Occult Blood, UA Moderate    CBC and differential [[id number]]  (Abnormal) Collected: 12/23/22 0256    Lab Status: Final result Specimen: Blood Updated: 12/23/22 0315     WBC 20 88 Thousand/uL      RBC 3 48 Million/uL      Hemoglobin 9 4 g/dL      Hematocrit 29 5 %      MCV 85 fL      MCH 27 0 pg      MCHC 31 9 g/dL      RDW 17 2 %      MPV 10 4 fL      Platelets 393 Thousands/uL      nRBC 0 /100 WBCs      Neutrophils Relative 88 %      Immat GRANS % 6 %      Lymphocytes Relative 2 %      Monocytes Relative 4 %      Eosinophils Relative 0 %      Basophils Relative 0 %      Neutrophils Absolute 18 46 Thousands/µL      Immature Grans Absolute >0 50 Thousand/uL      Lymphocytes Absolute 0 37 Thousands/µL      Monocytes Absolute 0 73 Thousand/µL      Eosinophils Absolute 0 01 Thousand/µL      Basophils Absolute 0 05 Thousands/µL     Narrative: This is an appended report  These results have been appended to a previously verified report  CT abdomen pelvis wo contrast   Final Result by Albert Cartagena MD (12/23 1694)      1  Distended urinary bladder with nondependent pockets of gas, likely related to recent instrumentation  Correlate clinically  2   Small volume free pelvic fluid, slightly increased since prior study 12/2/2022       3   Indeterminate 2 1 cm lesion in the upper pole right kidney, stable since 11/5/2021  While this lesion may represent a proteinaceous/hemorrhagic renal cyst, definitive characterization with contrast-enhanced MRI abdomen within 3 months is    recommended, if clinically warranted  4   Stable small right pleural effusion  The study was marked in [facility] for immediate notification  Workstation performed: [id number]         CT head wo contrast   Final Result by Edith Pascual MD (12/23 9373)      No acute intracranial abnormality  Stable, nonemergent findings compared to 12/12/2022 above  Workstation performed: [id number]                    Procedures  Procedures         ED Course                                             MDM  Number of Diagnoses or Management Options  Acute urinary retention  MARIA DEL CARMEN (acute kidney injury) ([facility] [address] )  Hyponatremia  Urinary tract infection  Diagnosis management comments: Patient presenting for evaluation of urinary retention, anuria  Upon arrival, patient appears comfortable  She is not in any acute distress  Vital signs unremarkable  No concerning findings on physical examination  Specifically, no abdominal tenderness to palpation or abdominal distention  Patient with leukocytosis, hyponatremia, MARIA DEL CARMEN, urinary tract infection on lab evaluation  Given complicated UTI on last hospital admission with MDRO, patient was started on Levaquin her most recent urine culture shows susceptibility to Levaquin  She was admitted to medicine for anuria/UTI/hyponatremia  Currently in stable condition  Hyponatremia  Currently in stable condition  Amount and/or Complexity of Data Reviewed  Clinical lab tests: ordered and reviewed    Risk of Complications, Morbidity, and/or Mortality  Presenting problems: moderate  Diagnostic procedures: low  Management options: low        Disposition  Final diagnoses:   Acute urinary retention   Urinary tract infection   MARIA DEL CARMEN (acute kidney injury) ([place] [address] )   Hyponatremia     Time reflects when diagnosis was documented in both MDM as applicable and the Disposition within this note     Time User Action Codes Description Comment    12/23/2022  4:42 AM Marthena Lites Add [R33 8] Acute urinary retention     12/23/2022  4:42 AM Marthena Lites Add [N39 0] Urinary tract infection     12/23/2022  4:42 AM Marthena Lites Add [N17 9] MARIA DEL CARMEN (acute kidney injury) ([place] [address] )     12/23/2022  4:42 AM Marthena Lites Add [E87 1] Hyponatremia     12/23/2022  7:39 PM Arnoldo Donath, [address] [R33 8] Acute urinary retention     12/23/2022  7:39 PM Arnoldo Madsen, [place] Modify [N39 0] Urinary tract infection     12/23/2022  7:39 PM Arnoldo Donath, [address] [N17 9] MARIA DEL CARMEN (acute kidney injury) ([place] [address] )     12/23/2022  7:39 PM Arnoldo Donath, [address] [E87 1] Hyponatremia     12/26/2022  4:02 PM Arlin Shannon Add [S91 002A] Ankle wound, left, initial encounter       ED Disposition     ED Disposition   Admit    Condition   Stable    Date/Time   Fri Dec 23, 2022  4:42 AM    Comment   Case was discussed with RAS and the patient's admission status was agreed to be Admission Status: observation status to the service of Dr Prescilla Denver              Follow-up Information     Follow up With Specialties Details Why Contact Info    Emelia Shin MD Internal Medicine Schedule an appointment as soon as possible for a visit in 1 week(s)  [address] [facility] [address]  [id number] [place] [address] Urology, Nurse Practitioner Schedule an appointment as soon as possible for a visit in 1 week(s)  [address] [facility] [address] [facility]      [address] [facility] Follow up [address] [facility] [address] TO  Fax: [phone] [address] [facility]  [phone]            Current Discharge Medication List      CONTINUE these medications which have NOT CHANGED    Details   apixaban (Eliquis) 2 5 mg Take 1 tablet (2 5 mg total) by mouth 2 (two) times a day  Qty: 180 tablet, Refills: 3    Associated Diagnoses: Paroxysmal atrial fibrillation (HCC)      diltiazem (CARDIZEM SR) 60 mg 12 hr capsule Take 1 capsule (60 mg total) by mouth every 12 (twelve) hours  Qty: 60 capsule, Refills: 0    Associated Diagnoses: Paroxysmal atrial fibrillation (HCC)      ferrous sulfate 324 (65 Fe) mg TAKE 1 TABLET (324 MG TOTAL) BY MOUTH DAILY BEFORE BREAKFAST  Qty: 90 tablet, Refills: 1    Associated Diagnoses: Iron deficiency anemia, unspecified iron deficiency anemia type      furosemide (LASIX) 20 mg tablet TAKE 3 TABLETS BY MOUTH EVERY DAY  Qty: 270 tablet, Refills: 3    Associated Diagnoses: Dyspnea; CHF (congestive heart failure) (HCC)      Klor-Con M10 10 MEQ tablet TAKE 1 TABLET BY MOUTH EVERY DAY  Qty: 90 tablet, Refills: 0    Associated Diagnoses: Essential hypertension      loperamide (IMODIUM) 2 mg capsule Take 1 capsule (2 mg total) by mouth 4 (four) times a day as needed for diarrhea  Qty: 30 capsule, Refills: 0    Associated Diagnoses: Diarrhea      metoprolol succinate (TOPROL-XL) 100 mg 24 hr tablet Take 1 tablet (100 mg total) by mouth daily Do not start before December 16, 2022    Qty: 30 tablet, Refills: 0    Associated Diagnoses: Paroxysmal atrial fibrillation (HCC)         STOP taking these medications       predniSONE 10 mg tablet Comments:   Reason for Stopping:         predniSONE 20 mg tablet Comments:   Reason for Stopping:                   PDMP Review     None          ED Provider  Electronically Signed by           Darrian Parks PA-C  12/28/22 9245

## 2022-12-23 NOTE — ASSESSMENT & PLAN NOTE
· Per ED provider, was alert and oriented to person and place on arrival, now patient alert and oriented to person, situation but is disoriented to place and does not answer question  · In setting of UTI/urinary retention, electrolyte abnormality, age, recent lengthy hospital stay most likely  · Fall precautions, delirium precautions with lights off at night, lights on during the day, attempt ambulation with assistance during the day  · Avoid sedating medications/narcotics

## 2022-12-23 NOTE — ASSESSMENT & PLAN NOTE
· UTI positive for large leukocytes, negative for bacteria and nitrites  · History of MDRO  · Check urine culture, rule out colonization  · Patient recently hospitalized 12/2-12/16 for similar symptoms and UTI and finished antibiotic course  · Appreciate infectious disease consult in regards to antibiotics  · Received 1 dose of IV Levaquin 750 mg in ED this morning

## 2022-12-23 NOTE — ASSESSMENT & PLAN NOTE
· Unsure of how long patient has had any area, reports anywhere from 2 to 6 days at home?   · Bladder scan in  mL, Hunter catheter placed, but patient reports she does not want it anymore and wants to take it out, so Hunter DC'd at this time  · Will place on urinary retention protocol   · Intake and output monitoring

## 2022-12-23 NOTE — ED NOTES
Patient transported to [address] [place] [address] , [facility]  12/23/22 2797

## 2022-12-24 ENCOUNTER — TELEPHONE (OUTPATIENT)
Dept: OTHER | Facility: HOSPITAL | Age: 87
End: 2022-12-24

## 2022-12-24 LAB
ANION GAP SERPL CALCULATED.3IONS-SCNC: 8 MMOL/L (ref 4–13)
ANISOCYTOSIS BLD QL SMEAR: PRESENT
BASOPHILS # BLD MANUAL: 0 THOUSAND/UL (ref 0–0.1)
BASOPHILS NFR MAR MANUAL: 0 % (ref 0–1)
BUN SERPL-MCNC: 49 MG/DL (ref 5–25)
CALCIUM SERPL-MCNC: 7.6 MG/DL (ref 8.3–10.1)
CHLORIDE SERPL-SCNC: 95 MMOL/L (ref 96–108)
CO2 SERPL-SCNC: 27 MMOL/L (ref 21–32)
CREAT SERPL-MCNC: 1.53 MG/DL (ref 0.6–1.3)
EOSINOPHIL # BLD MANUAL: 0 THOUSAND/UL (ref 0–0.4)
EOSINOPHIL NFR BLD MANUAL: 0 % (ref 0–6)
ERYTHROCYTE [DISTWIDTH] IN BLOOD BY AUTOMATED COUNT: 17.2 % (ref 11.6–15.1)
GFR SERPL CREATININE-BSD FRML MDRD: 28 ML/MIN/1.73SQ M
GLUCOSE P FAST SERPL-MCNC: 74 MG/DL (ref 65–99)
GLUCOSE SERPL-MCNC: 74 MG/DL (ref 65–140)
HCT VFR BLD AUTO: 27.8 % (ref 34.8–46.1)
HGB BLD-MCNC: 8.9 G/DL (ref 11.5–15.4)
LYMPHOCYTES # BLD AUTO: 0.71 THOUSAND/UL (ref 0.6–4.47)
LYMPHOCYTES # BLD AUTO: 5 % (ref 14–44)
MCH RBC QN AUTO: 26.3 PG (ref 26.8–34.3)
MCHC RBC AUTO-ENTMCNC: 32 G/DL (ref 31.4–37.4)
MCV RBC AUTO: 82 FL (ref 82–98)
MONOCYTES # BLD AUTO: 0.85 THOUSAND/UL (ref 0–1.22)
MONOCYTES NFR BLD: 6 % (ref 4–12)
MYELOCYTES NFR BLD MANUAL: 2 % (ref 0–1)
NEUTROPHILS # BLD MANUAL: 12.3 THOUSAND/UL (ref 1.85–7.62)
NEUTS BAND NFR BLD MANUAL: 2 % (ref 0–8)
NEUTS SEG NFR BLD AUTO: 85 % (ref 43–75)
OVALOCYTES BLD QL SMEAR: PRESENT
PLATELET # BLD AUTO: 222 THOUSANDS/UL (ref 149–390)
PLATELET BLD QL SMEAR: ADEQUATE
PMV BLD AUTO: 10.8 FL (ref 8.9–12.7)
POTASSIUM SERPL-SCNC: 4.3 MMOL/L (ref 3.5–5.3)
RBC # BLD AUTO: 3.39 MILLION/UL (ref 3.81–5.12)
SODIUM SERPL-SCNC: 130 MMOL/L (ref 135–147)
WBC # BLD AUTO: 14.14 THOUSAND/UL (ref 4.31–10.16)

## 2022-12-24 RX ORDER — SODIUM CHLORIDE, SODIUM LACTATE, POTASSIUM CHLORIDE, CALCIUM CHLORIDE 600; 310; 30; 20 MG/100ML; MG/100ML; MG/100ML; MG/100ML
75 INJECTION, SOLUTION INTRAVENOUS CONTINUOUS
Status: DISPENSED | OUTPATIENT
Start: 2022-12-24 | End: 2022-12-25

## 2022-12-24 RX ADMIN — DILTIAZEM HYDROCHLORIDE 60 MG: 60 CAPSULE, EXTENDED RELEASE ORAL at 09:09

## 2022-12-24 RX ADMIN — FERROUS SULFATE TAB 325 MG (65 MG ELEMENTAL FE) 325 MG: 325 (65 FE) TAB at 09:08

## 2022-12-24 RX ADMIN — SODIUM CHLORIDE, POTASSIUM CHLORIDE, SODIUM LACTATE AND CALCIUM CHLORIDE 75 ML/HR: 600; 310; 30; 20 INJECTION, SOLUTION INTRAVENOUS at 06:54

## 2022-12-24 RX ADMIN — Medication 1 G: at 09:08

## 2022-12-24 RX ADMIN — Medication 1 G: at 12:56

## 2022-12-24 RX ADMIN — DILTIAZEM HYDROCHLORIDE 60 MG: 60 CAPSULE, EXTENDED RELEASE ORAL at 20:39

## 2022-12-24 RX ADMIN — SODIUM CHLORIDE, POTASSIUM CHLORIDE, SODIUM LACTATE AND CALCIUM CHLORIDE 75 ML/HR: 600; 310; 30; 20 INJECTION, SOLUTION INTRAVENOUS at 12:57

## 2022-12-24 RX ADMIN — APIXABAN 2.5 MG: 2.5 TABLET, FILM COATED ORAL at 17:51

## 2022-12-24 RX ADMIN — APIXABAN 2.5 MG: 2.5 TABLET, FILM COATED ORAL at 09:08

## 2022-12-24 RX ADMIN — PREDNISONE 10 MG: 10 TABLET ORAL at 09:08

## 2022-12-24 RX ADMIN — Medication 1 G: at 17:26

## 2022-12-24 RX ADMIN — METOPROLOL SUCCINATE 100 MG: 100 TABLET, EXTENDED RELEASE ORAL at 09:08

## 2022-12-24 NOTE — PLAN OF CARE
Problem: Potential for Falls  Goal: Patient will remain free of falls  Description: INTERVENTIONS:  - Educate patient/family on patient safety including physical limitations  - Instruct patient to call for assistance with activity   - Consult OT/PT to assist with strengthening/mobility   - Keep Call bell within reach  - Keep bed low and locked with side rails adjusted as appropriate  - Keep care items and personal belongings within reach  - Initiate and maintain comfort rounds  - Make Fall Risk Sign visible to staff  - Offer Toileting every 2 Hours, in advance of need  - Initiate/Maintain Bed alarm  - Obtain necessary fall risk management equipment: Bed alarm  - Apply yellow socks and bracelet for high fall risk patients  - Consider moving patient to room near nurses station  Outcome: Progressing     Problem: MOBILITY - ADULT  Goal: Maintain or return to baseline ADL function  Description: INTERVENTIONS:  -  Assess patient's ability to carry out ADLs; assess patient's baseline for ADL function and identify physical deficits which impact ability to perform ADLs (bathing, care of mouth/teeth, toileting, grooming, dressing, etc )  - Assess/evaluate cause of self-care deficits   - Assess range of motion  - Assess patient's mobility; develop plan if impaired  - Assess patient's need for assistive devices and provide as appropriate  - Encourage maximum independence but intervene and supervise when necessary  - Involve family in performance of ADLs  - Assess for home care needs following discharge   - Consider OT consult to assist with ADL evaluation and planning for discharge  - Provide patient education as appropriate  Outcome: Progressing  Goal: Maintains/Returns to pre admission functional level  Description: INTERVENTIONS:  - Perform BMAT or MOVE assessment daily    - Set and communicate daily mobility goal to care team and patient/family/caregiver     - Collaborate with rehabilitation services on mobility goals if consulted  - Perform Range of Motion 4 times a day  - Reposition patient every 2 hours    - Dangle patient 4 times a day  - Stand patient 4 times a day  - Ambulate patient 4 times a day  - Out of bed to chair 3 times a day   - Out of bed for meals 3 times a day  - Out of bed for toileting  - Record patient progress and toleration of activity level   Outcome: Progressing

## 2022-12-24 NOTE — PROGRESS NOTES
INTERNAL MEDICINE RESIDENCY PROGRESS NOTE     Name: José Miguel Ma   Age & Sex: 80 y o  female   MRN: [id number]  Unit/Bed#: -01   Encounter: [id number]    PATIENT INFORMATION     Name: José Miguel Ma   Age & Sex: 80 y o  female   MRN: [id number]  Hospital Stay Days: 0    ASSESSMENT/PLAN     Principal Problem:    Hyponatremia  Active Problems:    MARIA DEL CARMEN (acute kidney injury) ([facility] [address] )    Paroxysmal atrial fibrillation (HCC)    Urinary tract infection    Urinary obstruction    Acute metabolic encephalopathy      Acute metabolic encephalopathy  Assessment & Plan  · Per ED provider, was alert and oriented to person and place on arrival, now patient alert and oriented to person, situation but is disoriented to place and does not answer question  · In setting of UTI/urinary retention, electrolyte abnormality, age, recent lengthy hospital stay most likely  · Fall precautions, delirium precautions with lights off at night, lights on during the day, attempt ambulation with assistance during the day  Due to acuteness of altered mental status and patient on Eliquis will obtain CT head, CT head negative  · Currently mental status has improved  Patient seems oriented to me  · Avoid sedating medications/narcotics    Urinary obstruction  Assessment & Plan  · Unsure of how long patient has had any area, reports anywhere from 2 to 6 days at home? · Bladder scan in  mL, Mahan catheter placed, but patient reports she does not want it anymore and wants to take it out, so Mahan DC'd at this time  Now mahan catheter has been reinserted  · CT revealed Distended urinary bladder  · Urology consulted and recommend keep the mahan in and outpatient follow up with urology    · Will place on urinary retention protocol   · Intake and output monitoring    Urinary tract infection  Assessment & Plan  · UTI positive for large leukocytes, negative for bacteria and nitrites  · History of MDRO  · Check urine culture, rule out colonization  · Patient recently hospitalized - for similar symptoms and UTI and finished antibiotic course  · Appreciate infectious disease consult in regards to antibiotics  · Received 1 dose of IV Levaquin 750 mg in ED, will switch to cefepime per Id tomorrow    Paroxysmal atrial fibrillation ([facility] [address] )  Assessment & Plan  · Rate currently controlled  · Continue home Cardizem, metoprolol, Eliquis twice daily    MARIA DEL CARMEN (acute kidney injury) ([facility] [address] )  Assessment & Plan  · Presenting with creatinine of 1 73, baseline around 1 1 most likely secondary to dehydration  · For now we will avoid nephrotoxic agents, will hold home Lasix 60 mg daily  · Cr improved to 1 5 after IV fluids  · Will continue gentle Iv fluids  · Monitor BMP    * Hyponatremia  Assessment & Plan  · Sodium decreased at 124 in setting of dehydration with UTI most likely  · For now will place on sodium chloride tabs 1 g 3 times daily with meals  · Given 1 L IV fluids in the ED  · Recheck BMP at 1200        Disposition: to be determined     SUBJECTIVE     Patient seen and examined  No acute events overnight  Patient seems oriented  OBJECTIVE     Vitals:    22 1352 22 2215 22 2218 22 0721   BP: 121/67 123/66 123/66 124/66   BP Location:       Pulse: 73 69 70 66   Resp: 16 15     Temp: (!) 97 3 °F (36 3 °C) (!) 97 1 °F (36 2 °C) (!) 97 1 °F (36 2 °C) 97 7 °F (36 5 °C)   TempSrc:       SpO2: 91% 95% 94% 94%      Temperature:   Temp (24hrs), Av 3 °F (36 3 °C), Min:97 1 °F (36 2 °C), Max:97 7 °F (36 5 °C)    Temperature: 97 7 °F (36 5 °C)  Intake & Output:  I/O        0701   0700  0701   0700  0701   0700    P  O    360    I V   1750     IV Piggyback 1000      Total Intake 1000 1750 360    Urine 600 600 550    Total Output 600 600 550    Net +400 +1150 -190               Weights: There is no height or weight on file to calculate BMI    Weight (last 2 days)     None        Physical Exam  Vitals and nursing note reviewed  Constitutional:       Appearance: Normal appearance  She is not toxic-appearing  HENT:      Head: Normocephalic  Cardiovascular:      Rate and Rhythm: Normal rate and regular rhythm  Heart sounds: Normal heart sounds  Pulmonary:      Effort: Pulmonary effort is normal  No respiratory distress  Breath sounds: Normal breath sounds  Abdominal:      General: Abdomen is flat  Bowel sounds are normal       Palpations: Abdomen is soft  Tenderness: There is no abdominal tenderness  There is no guarding  Musculoskeletal:         General: No tenderness  Right lower leg: No edema  Left lower leg: No edema  Skin:     General: Skin is warm  Neurological:      Mental Status: She is oriented to person, place, and time  Comments:      Psychiatric:      Comments: Ruminating thoughts about Hunter catheter       LABORATORY DATA     Labs: I have personally reviewed pertinent reports  Results from last 7 days   Lab Units 12/24/22  0545 12/23/22  0256   WBC Thousand/uL 14 14* 20 88*   HEMOGLOBIN g/dL 8 9* 9 4*   HEMATOCRIT % 27 8* 29 5*   PLATELETS Thousands/uL 222 234   NEUTROS PCT %  --  88*   MONOS PCT %  --  4   MONO PCT % 6  --       Results from last 7 days   Lab Units 12/24/22  0545 12/23/22  1132 12/23/22  0256   POTASSIUM mmol/L 4 3 4 8 4 6   CHLORIDE mmol/L 95* 91* 86*   CO2 mmol/L 27 28 27   BUN mg/dL 49* 47* 52*   CREATININE mg/dL 1 53* 1 51* 1 73*   CALCIUM mg/dL 7 6* 7 6* 7 3*   ALK PHOS U/L  --   --  85   ALT U/L  --   --  36   AST U/L  --   --  38                            IMAGING & DIAGNOSTIC TESTING     Radiology Results: I have personally reviewed pertinent reports  CT abdomen pelvis wo contrast    Result Date: 12/23/2022  Impression: 1  Distended urinary bladder with nondependent pockets of gas, likely related to recent instrumentation  Correlate clinically  2   Small volume free pelvic fluid, slightly increased since prior study 12/2/2022  3   Indeterminate 2 1 cm lesion in the upper pole right kidney, stable since 11/5/2021  While this lesion may represent a proteinaceous/hemorrhagic renal cyst, definitive characterization with contrast-enhanced MRI abdomen within 3 months is recommended, if clinically warranted  4   Stable small right pleural effusion  The study was marked in [facility] for immediate notification  Workstation performed: [id number]     CT head wo contrast    Result Date: 12/23/2022  Impression: No acute intracranial abnormality  Stable, nonemergent findings compared to 12/12/2022 above  Workstation performed: [id number]     Other Diagnostic Testing: I have personally reviewed pertinent reports  ACTIVE MEDICATIONS     Current Facility-Administered Medications   Medication Dose Route Frequency   • acetaminophen (TYLENOL) tablet 650 mg  650 mg Oral Q6H PRN   • apixaban (ELIQUIS) tablet 2 5 mg  2 5 mg Oral BID   • [START ON 12/25/2022] cefepime (MAXIPIME) 1 g/50 mL dextrose IVPB  1,000 mg Intravenous Q24H   • diltiazem (CARDIZEM SR) 12 hr capsule 60 mg  60 mg Oral Q12H MARICRUZ   • ferrous sulfate tablet 325 mg  325 mg Oral Daily With Breakfast   • lactated ringers infusion  75 mL/hr Intravenous Continuous   • metoprolol succinate (TOPROL-XL) 24 hr tablet 100 mg  100 mg Oral Daily   • sodium chloride tablet 1 g  1 g Oral TID With Meals       VTE Pharmacologic Prophylaxis:Eliquis  VTE Mechanical Prophylaxis: sequential compression device    Portions of the record may have been created with voice recognition software  Occasional wrong word or "sound a like" substitutions may have occurred due to the inherent limitations of voice recognition software  Read the chart carefully and recognize, using context, where substitutions have occurred    ==  Barry Mcintosh MD  [address] [place] [address]  Internal Medicine Residency PGY-3

## 2022-12-24 NOTE — ASSESSMENT & PLAN NOTE
· Presenting with creatinine of 1 73, baseline around 1 1 most likely secondary to dehydration  · For now we will avoid nephrotoxic agents, will hold home Lasix 60 mg daily  · Urology recommending Hunter on discharge  · Creatinine continues to improve  · Continue to monitor

## 2022-12-24 NOTE — CONSULTS
CONSULT    Patient Name: Arelis Muir  Patient MRN: [id number]  Date of Service: 12/24/2022   Date / Time Note Created: 12/24/2022 8:32 AM   Referring Provider: Job Daley DO    Provider Creating Note: GUNJAN Rasheed    PCP: Indigo Brandt  Attending Provider:  Job Daley DO    Reason for Consult: Urinary Retention    HPI --Arelis Muir is a pleasant 42-year-old female admitted for hyponatremia and acute metabolic encephalopathy  She was seen in neurologic consultation per previous admission earlier this month 12/5 for urinary retention and UTI  She underwent trial of void for which patient passed and was kept catheter free  Patient was treated for Klebsiella and Citrobacter on recent urine culture--course completed  Services consulted regarding new CT finding of over distended bladder  This is not accompanied by upper tract obstruction, perinephric bleeding, obstructing nephroureterolithiasis, abscess, phlegmon or discrete fluid collection, suspicious masses or lesions, bladder calculi or bladder hematoma  Patient is afebrile, hemodynamically stable and without complaints of flank, abdominal or suprapubic pain  Hunter catheter was reinserted for mild urinary retention  Prior to this, patient was voiding passively and nursing staff were utilizing pure wick device  Urine analysis was positive for large volumes of leukocytes without evidence of nitrites  WBC count 20 K on presentation, trending downward to 14 K today  Serum creatinine is 1 7--1 53 today  Repeat urine culture is pending  Urology consultation requested for further management recommendations        Source:chart review and the patient           Patient Active Problem List   Diagnosis   • MARIA DEL CARMEN (acute kidney injury) ([facility] [address] )   • Paroxysmal atrial fibrillation (HCC)   • Long term current use of anticoagulant   • Chronic kidney disease, stage III (moderate) (HCC)   • Essential hypertension   • Mitral valve insufficiency • Hypercholesterolemia   • Anemia due to chronic kidney disease   • Diastolic dysfunction   • Epistaxis   • Chronic kidney disease-mineral and bone disorder   • Fracture of right orbital wall (HCC)   • Acute on chronic diastolic (congestive) heart failure (HCC)   • Iron deficiency anemia   • Fecal occult blood test positive   • Anemia   • Seasonal allergic rhinitis due to pollen   • Lower GI bleed   • Chronic renal disease, stage IV (HCC)   • Acute kidney injury superimposed on CKD (HCC)   • Dyspnea   • Hypoxia   • Acute respiratory distress   • Severe pulmonary hypertension (HCC)   • Nonrheumatic aortic valve stenosis   • Urinary tract infection   • Hyponatremia   • Bilateral renal cysts   • Urinary obstruction   • Acute respiratory failure with hypoxia (HCC)   • Hypothermia   • Acute metabolic encephalopathy   • Acute urinary retention             Impressions   Urinary Retention--likely chronic and multifactorial, secondary to advanced age and postmenopausal status  Overflow incontinence--patient already regulates with passive voiding in between  Recurrent UTI-secondary to previous as well as atrophic vaginitis, impaired mobility, constipation, and low estrogen levels  Recommendations  · Continue medical optimization and empiric antibiosis per ID; appreciated  · Maintain Hunter catheter to straight drainage  · Difficult clinical situation in advanced elderly  Literature suggests chronic Hunter catheters are associated with poor quality of life, increased morbidity, and frequent hospital admission  · Urinary retention is not associated with hydronephrosis/upper tract obstruction  Patient's renal function has plateaued at 1 5  Therefore retention is not cause for acute kidney injury  Renal function is the same with or without Hunter catheter    · Furthermore, although initially, bladder drainage helps we will eliminate bacterial stasis, from urine residual, ultimately, catheter will increase risk for CAUTI   · Therefore, patient will continue to develop UTI with or without catheter, unless patient and/or caregiver can learn clean intermittent catheterization to manage bladder residual   · Additional interventions include, adequate hydration, bowel regimen, physical activity, urinary antisepsis, topical estrogens  · Discharge patient with Hunter catheter  In office follow-up, we will discuss long-term management  Past Medical History:   Diagnosis Date   • Anemia    • Asteatotic eczema    • Chronic kidney disease    • Hypercholesterolemia    • Lichen sclerosus et atrophicus    • Mitral valve insufficiency    • Nosebleed    • Seborrheic keratoses    • Tricuspid regurgitation    • Vertigo        Past Surgical History:   Procedure Laterality Date   • APPENDECTOMY  11/14/1939   • CATARACT EXTRACTION, BILATERAL  04/2019   • HYSTERECTOMY  11/14/1962   • ORIF HIP FRACTURE Left 2020   • KS COLONOSCOPY FLX DX W/COLLJ SPEC WHEN PFRMD N/A 5/19/2016    Procedure: EGD AND COLONOSCOPY;  Surgeon: Jeannette Mendoza MD;  Location: [facility]; Service: Gastroenterology       Family History   Problem Relation Age of Onset   • Heart disease Mother         Abnormality   • Heart disease Father         Abnormality   • No Known Problems Sister    • No Known Problems Brother        Social History     Socioeconomic History   • Marital status:       Spouse name: Not on file   • Number of children: Not on file   • Years of education: Not on file   • Highest education level: Not on file   Occupational History   • Not on file   Tobacco Use   • Smoking status: Never   • Smokeless tobacco: Never   Vaping Use   • Vaping Use: Never used   Substance and Sexual Activity   • Alcohol use: Not Currently   • Drug use: No   • Sexual activity: Not Currently     Partners: Male   Other Topics Concern   • Not on file   Social History Narrative    Advance directive declined by patient     Social Determinants of Health     Financial Resource Strain: Not on file   Food Insecurity: No Food Insecurity   • Worried About [address] in the Last Year: Never true   • Ran Out of Food in the Last Year: Never true   Transportation Needs: No Transportation Needs   • Lack of Transportation (Medical): No   • Lack of Transportation (Non-Medical):  No   Physical Activity: Not on file   Stress: No Stress Concern Present   • Feeling of Stress : Not at all   Social Connections: Not on file   Intimate Partner Violence: Not on file   Housing Stability: Low Risk    • Unable to Pay for Housing in the Last Year: No   • Number of Places Lived in the Last Year: 1   • Unstable Housing in the Last Year: No       Allergies   Allergen Reactions   • Lactose Intolerance (Gi) - Food Allergy    • Penicillins Other (See Comments)     unknown       Review of Systems  Review of Systems - General ROS: negative for - chills or fever  Respiratory ROS: no cough, shortness of breath, or wheezing  Cardiovascular ROS: no chest pain or dyspnea on exertion  Gastrointestinal ROS: no abdominal pain, change in bowel habits, or black or bloody stools  Genito-Urinary ROS: no dysuria, trouble voiding, or hematuria  Neurological ROS: no TIA or stroke symptoms    Chart Review   Allergies, current medications, history, problem list    Vital Signs & Physical Exam  General appearance: alert and oriented, in no acute distress, appears stated age, cooperative and no distress  Head: Normocephalic, without obvious abnormality, atraumatic  Neck: no adenopathy, no carotid bruit, no JVD, supple, symmetrical, trachea midline and thyroid not enlarged, symmetric, no tenderness/mass/nodules  Lungs: clear to auscultation bilaterally  Heart: regular rate and rhythm, S1, S2 normal, no murmur, click, rub or gallop  Abdomen: soft, non-tender; bowel sounds normal; no masses,  no organomegaly  Extremities: extremities normal, warm and well-perfused; no cyanosis, clubbing, or edema  Pulses: 2+ and symmetric  Neurologic: Grossly normal  Hunter clear yellow urine     Laboratory Studies  Lab Results   Component Value Date    HGBA1C 5 7 05/29/2018     10/26/2015    K 4 3 12/24/2022    K 4 4 10/26/2015    CL 95 (L) 12/24/2022     10/26/2015    CO2 27 12/24/2022    CO2 30 10/26/2015    GLUCOSE 87 10/26/2015    CREATININE 1 53 (H) 12/24/2022    CREATININE 1 54 (H) 10/26/2015    BUN 49 (H) 12/24/2022    BUN 30 (H) 10/26/2015    MG 2 2 11/14/2021    PHOS 3 3 10/13/2022               Imaging and Other Studies  )CT abdomen pelvis wo contrast    Result Date: 12/23/2022  Narrative: CT ABDOMEN AND PELVIS WITHOUT IV CONTRAST INDICATION:   Urinary retention  COMPARISON:  CT abdomen pelvis 12/2/2022  TECHNIQUE:  CT examination of the abdomen and pelvis was performed without intravenous contrast  Axial, sagittal, and coronal 2D reformatted images were created from the source data and submitted for interpretation  Radiation dose length product (DLP) for this visit:  569 mGy-cm   This examination, like all CT scans performed in the [facility], was performed utilizing techniques to minimize radiation dose exposure, including the use of iterative reconstruction and automated exposure control  Enteric contrast was administered  FINDINGS: ABDOMEN LOWER CHEST:  The heart is enlarged, stable since prior study  Coronary artery calcifications  Mitral annulus calcifications  Stable small right pleural effusion  Stable right Bochdalek hernia containing fat  LIVER/BILIARY TREE:  There are one or more hepatic simple cyst(s) present  No CT evidence of suspicious solid hepatic mass  Normal hepatic contours  No biliary dilatation  GALLBLADDER:  No calcified gallstones  No pericholecystic inflammatory change  SPLEEN:  Unremarkable  PANCREAS:  Unremarkable  ADRENAL GLANDS:  Unremarkable  KIDNEYS/URETERS: A 2 1 cm lesion in the upper pole right kidney measuring 48 Hounsfield units (2/32), stable since 11/5/2021    Stable bilateral renal cysts and a hemorrhagic/proteinaceous renal cyst in the upper pole right kidney  No hydronephrosis  STOMACH AND BOWEL:  There is colonic diverticulosis without evidence of acute diverticulitis  APPENDIX:  There are expected postoperative changes of appendectomy  ABDOMINOPELVIC CAVITY:  Small volume free pelvic fluid, increased since prior study  No pneumoperitoneum  No lymphadenopathy  VESSELS:  Atherosclerotic changes are present  No evidence of aneurysm  PELVIS REPRODUCTIVE ORGANS:  Surgical changes of prior hysterectomy  URINARY BLADDER:  Distended with nondependent pockets of gas  ABDOMINAL WALL/INGUINAL REGIONS:  Small bilateral fat-containing inguinal hernias and small fat-containing umbilical hernia  Mild body wall edema  OSSEOUS STRUCTURES: Diffuse osteopenia  No acute fracture or destructive osseous lesion  Unchanged left hip hardware an avascular necrosis of the left femoral head  Unchanged advanced degenerative disc disease of the visualized spine with grade 1 anterolisthesis of L4 on L5 and L5 on S1  Impression: 1  Distended urinary bladder with nondependent pockets of gas, likely related to recent instrumentation  Correlate clinically  2   Small volume free pelvic fluid, slightly increased since prior study 12/2/2022  3   Indeterminate 2 1 cm lesion in the upper pole right kidney, stable since 11/5/2021  While this lesion may represent a proteinaceous/hemorrhagic renal cyst, definitive characterization with contrast-enhanced MRI abdomen within 3 months is recommended, if clinically warranted  4   Stable small right pleural effusion  The study was marked in [facility] for immediate notification  Workstation performed: [id number]     CT abdomen pelvis wo contrast    Result Date: 12/2/2022  Narrative: CT ABDOMEN AND PELVIS WITHOUT IV CONTRAST INDICATION:   Urinary retention, MARIA DEL CARMEN  COMPARISON:  Abdomen and pelvic CT from 7/11/2022   TECHNIQUE:  CT examination of the abdomen and pelvis was performed without intravenous contrast  Axial, sagittal, and coronal 2D reformatted images were created from the source data and submitted for interpretation  Radiation dose length product (DLP) for this visit:  656 mGy-cm   This examination, like all CT scans performed in the [facility], was performed utilizing techniques to minimize radiation dose exposure, including the use of iterative reconstruction and automated exposure control  Enteric contrast was not administered  FINDINGS: ABDOMEN LOWER CHEST:  Unchanged moderate cardiomegaly  Unchanged small right pleural effusion  Again seen is a right Bochdalek hernia containing fat only  LIVER/BILIARY TREE:  Small simple cyst in the left lobe of liver anteriorly  Otherwise unremarkable  GALLBLADDER:  No calcified gallstones  No pericholecystic inflammatory change  SPLEEN:  Unremarkable  PANCREAS:  Unremarkable  ADRENAL GLANDS:  Unremarkable  KIDNEYS/URETERS:  Multiple large simple cyst in both kidneys  There is also an unchanged exophytic hemorrhagic cyst in the right kidney upper pole  No hydronephrosis  STOMACH AND BOWEL:  There is colonic diverticulosis without evidence of acute diverticulitis  APPENDIX:  No findings to suggest appendicitis  ABDOMINOPELVIC CAVITY:  No ascites  No pneumoperitoneum  No lymphadenopathy  VESSELS:  Unremarkable for patient's age  PELVIS REPRODUCTIVE ORGANS:  Unremarkable for patient's age  URINARY BLADDER:  Urinary bladder decompressed by Hunter catheter  ABDOMINAL WALL/INGUINAL REGIONS:  Unremarkable  OSSEOUS STRUCTURES:  No acute fracture or destructive osseous lesion  Again seen is a left femoral intramedullary nail  Again seen is avascular necrosis of the left femoral head and severe left hip osteoarthritis  Impression: Hunter catheter decompresses the urinary bladder  Again seen are multiple benign renal cysts  There is no hydronephrosis  Colonic diverticulosis without diverticulitis   Workstation performed: [id number]     XR chest portable    Result Date: 12/7/2022  Narrative: CHEST INDICATION:   SOB  COMPARISON:  Compared with 11/18/2021 EXAM PERFORMED/VIEWS:  XR CHEST PORTABLE FINDINGS: Mild cardiomegaly  Poor inspiration  Prominent vascular interstitial markings  Bibasilar haziness  Osseous structures appear within normal limits for patient age  Impression: Moderate congestive changes  Bilateral small effusions  Workstation performed: [id number]     CT head wo contrast    Result Date: 12/23/2022  Narrative: CT BRAIN - WITHOUT CONTRAST INDICATION:   Delirium AMS  COMPARISON:  12/12/2022 TECHNIQUE:  CT examination of the brain was performed  In addition to axial images, sagittal and coronal 2D reformatted images were created and submitted for interpretation  Radiation dose length product (DLP) for this visit:  846 mGy-cm   This examination, like all CT scans performed in the [facility], was performed utilizing techniques to minimize radiation dose exposure, including the use of iterative reconstruction and automated exposure control  IMAGE QUALITY:  Diagnostic  FINDINGS: PARENCHYMA: No hemorrhage, extra-axial fluid collection, mass effect or midline shift  Gray-white matter differentiation preserved  Nonspecific, similar patchy periventricular and subcortical white matter lucencies most commonly related to changes of microangiopathy  Vascular calcification  VENTRICLES AND EXTRA-AXIAL SPACES:  Stable size and configuration  Volume loss, within normal limits for age  VISUALIZED ORBITS AND PARANASAL SINUSES: Mild sinus mucosal thickening  No fluid levels  Globes and orbits are within normal limits  CALVARIUM AND EXTRACRANIAL SOFT TISSUES:  No acute findings  Impression: No acute intracranial abnormality  Stable, nonemergent findings compared to 12/12/2022 above    Workstation performed: [id number]     CT head wo contrast    Result Date: 12/12/2022  Narrative: CT BRAIN - WITHOUT CONTRAST INDICATION:   Delirium AMS  COMPARISON:  None  TECHNIQUE:  CT examination of the brain was performed  In addition to axial images, sagittal and coronal 2D reformatted images were created and submitted for interpretation  Radiation dose length product (DLP) for this visit:  841 mGy-cm   This examination, like all CT scans performed in the [facility], was performed utilizing techniques to minimize radiation dose exposure, including the use of iterative reconstruction and automated exposure control  IMAGE QUALITY:  Diagnostic  FINDINGS: PARENCHYMA: Decreased attenuation is noted in periventricular and subcortical white matter demonstrating an appearance that is statistically most likely to represent moderate microangiopathic change  Calcification of basal ganglia  No CT signs of acute infarction  No intracranial mass, mass effect or midline shift  No acute parenchymal hemorrhage  VENTRICLES AND EXTRA-AXIAL SPACES:  Normal for the patient's age  VISUALIZED ORBITS AND PARANASAL SINUSES:  Bilateral lens implants  Probable small osteoma within a right ethmoid air cell  Otherwise visualized paranasal sinuses are clear  CALVARIUM AND EXTRACRANIAL SOFT TISSUES:  Normal      Impression: No acute intracranial abnormality  Chronic microangiopathic changes   Workstation performed: [id number]         Medications   Scheduled Meds:  Current Facility-Administered Medications   Medication Dose Route Frequency Provider Last Rate   • acetaminophen  650 mg Oral Q6H PRN Karen Polanco PA-C     • apixaban  2 5 mg Oral BID Karen Polanco PA-C     • [START ON 12/25/2022] cefepime  1,000 mg Intravenous Q24H Gera Singh MD     • diltiazem  60 mg Oral Q12H [address] Karen Polanco PA-C     • ferrous sulfate  325 mg Oral Daily With Breakfast Karen Polanco PA-C     • lactated ringers  75 mL/hr Intravenous Continuous Karel June MD 75 mL/hr (12/24/22 8559)   • metoprolol succinate  100 mg Oral Daily Stacy Valdez PA-C     • predniSONE  10 mg Oral Daily Stacy Valdez PA-C     • sodium chloride  1 g Oral TID With Meals Stacy Valdez PA-C       Continuous Infusions:lactated ringers, 75 mL/hr, Last Rate: 75 mL/hr (12/24/22 0654)      PRN Meds: •  acetaminophen          )GUNJAN Rasheed

## 2022-12-24 NOTE — ASSESSMENT & PLAN NOTE
· Improving at this time  · For now will place on sodium chloride tabs 1 g 3 times daily with meals  · Continue to monitor

## 2022-12-24 NOTE — PLAN OF CARE
Problem: SKIN/TISSUE INTEGRITY - ADULT  Goal: Incision(s), wounds(s) or drain site(s) healing without S/S of infection  Description: INTERVENTIONS  - Assess and document dressing, incision, wound bed, drain sites and surrounding tissue  - Provide patient and family education  - Perform skin care/dressing changes as per orders  Outcome: Progressing

## 2022-12-24 NOTE — TELEPHONE ENCOUNTER
Jeneane Siemens is a 71-year-old female seen in consultation 12/5 for UTI and urinary retention status post Hunter catheter insertion  Patient's Hunter was removed--now readmitted and seen again in urologic consultation  Refractory urinary retention, status post second Hunter catheter insertion  Patient will require office visit to discuss long-term management  Alternative clean-catch-clean intermittent catheterization by patient or teachable caregiver versus chronic Hunter  Additional interventions such as urinary antisepsis, topical estrogen to be discussed in office for long-term planning  Please contact patient/caregiver with hospital follow-up in approximately 3 weeks before catheter is needs changing  Thank you

## 2022-12-24 NOTE — PROGRESS NOTES
Progress Note - Infectious Disease   Darrell Loco 80 y o  female MRN: [id number]  Unit/Bed#: -01 Encounter: [id number]      Impression/Plan:  1  Acute encephalopathy  Progressive confusion since presentation  CT of the head without acute findings on review  Multiple factors contributing including 2, 3, and other electrolyte abnormalities  Hemodynamically stable  Given 4 and patient's age would avoid drugs such as Levaquin  Additionally antibiotic dosing reviewed with clinical ID pharmacist given side effect associated with drug such as cefepime, if not dosed correctly  We will avoid Zosyn given 3  Mental status improving today  Antibiotics as below  Continue to trend fever curve/vitals  Repeat CBC and chemistry ordered for tomorrow  Follow-up pending cultures  Urology evaluation appreciated, maintain Hunter  Monitor mental status  Low threshold for repeat imaging  Additional supportive care as per primary  Additional interventions pending clinical course     2  Complicated urinary tract infection and possible retention  Patient's UA on presentation abnormal and she presented with complaints of anuria  Was noted to have 300 cc in the bladder and Hunter catheter placed  Later removed at patient's request   Was noted to have retention on prior admission as well  CT imaging was done and showed a distended bladder but no upper tract pathology  Uhnter catheter replaced  Creatinine slowly improving  Creatinine clearance calculated  Plan to start cefepime 1 g every 24 tomorrow  Levaquin currently still in the patient's system  Repeat CBC and chemistry ordered for tomorrow  We will further dose adjust antibiotic tomorrow  Continue to trend fever curve/vitals  Monitor abdominal exam  Urology evaluation appreciated, maintain Hunter  Follow-up pending urine culture  Anticipate 3 to 5 days of antibiotic for this issue     3  Acute kidney injury on chronic kidney disease    Acute elevation in creatinine noted from baseline and patient with reports of decreased urine output  Suspect some component of retention  Also noted with hyponatremia  Antibiotic dosing as above  Repeat chemistry ordered for tomorrow  We will further dose adjust antibiotic tomorrow  Fluid hydration as per primary  Urology evaluation appreciated     4  Valvulopathy and previous abnormal EKG  recent EKGs noted with fluctuating QTc interval   Given this and patient's age would avoid Levaquin if possible  Antibiotics adjusted as above      5  Leukocytosis  Abrupt elevation in white count noted  Downtrending  Repeat CBC tomorrow  Antibiotics as above    Above plan discussed in detail with the patient at bedside    ID consult service will continue to follow  Antibiotics:   Levaquin 2  Cefepime ordered to start tomorrow    Subjective:  Patient is much more awake today and interactive  Her speech is more understandable  She denies having any nausea, vomiting, chest pain or shortness of breath  Denies having any abdominal discomfort  We did discuss the possible need to maintain Hunter catheter  She recalls about 1 to 2 days of difficulty with urination prior to presentation  Objective:  Vitals:  Temp:  [97 1 °F (36 2 °C)-97 7 °F (36 5 °C)] 97 7 °F (36 5 °C)  HR:  [66-86] 66  Resp:  [15-18] 15  BP: (121-143)/(66-82) 124/66  SpO2:  [91 %-95 %] 94 %  Temp (24hrs), Av 3 °F (36 3 °C), Min:97 1 °F (36 2 °C), Max:97 7 °F (36 5 °C)  Current: Temperature: 97 7 °F (36 5 °C)    Physical Exam:   General Appearance:  Alert, interactive, nontoxic, no acute distress  Throat: Oropharynx moist without lesions  Lungs:   Clear to auscultation bilaterally; no wheezes, rhonchi or rales; respirations unlabored on room air   Heart:  RRR; no murmur, rub or gallop appreciated   Abdomen:   Soft, non-tender, non-distended, positive bowel sounds  Hunter catheter draining clear urine  Specifically no suprapubic discomfort     Extremities: No clubbing, cyanosis or edema   Skin: No new rashes or lesions  No new draining wounds noted  Labs, Imaging, & Other studies:   All pertinent labs and imaging studies were personally reviewed  Results from last 7 days   Lab Units 12/24/22  0545 12/23/22  0256   WBC Thousand/uL 14 14* 20 88*   HEMOGLOBIN g/dL 8 9* 9 4*   PLATELETS Thousands/uL 222 234     Results from last 7 days   Lab Units 12/24/22  0545 12/23/22  1132 12/23/22  0256   POTASSIUM mmol/L 4 3   < > 4 6   CHLORIDE mmol/L 95*   < > 86*   CO2 mmol/L 27   < > 27   BUN mg/dL 49*   < > 52*   CREATININE mg/dL 1 53*   < > 1 73*   EGFR ml/min/1 73sq m 28   < > 24   CALCIUM mg/dL 7 6*   < > 7 3*   AST U/L  --   --  38   ALT U/L  --   --  36   ALK PHOS U/L  --   --  85    < > = values in this interval not displayed  Results from last 7 days   Lab Units 12/23/22  0328   URINE CULTURE  Culture results to follow

## 2022-12-24 NOTE — ASSESSMENT & PLAN NOTE
· Unsure of how long patient has had any area, reports anywhere from 2 to 6 days at home? · Bladder scan in  mL, Mahan catheter placed, but patient reports she does not want it anymore and wants to take it out, so Mahan DC'd at this time  Now mahan catheter has been reinserted  · CT revealed Distended urinary bladder  · Urology consulted and recommend keep the mahan in and outpatient follow up with urology

## 2022-12-24 NOTE — ASSESSMENT & PLAN NOTE
· UTI positive for large leukocytes, negative for bacteria and nitrites  · History of MDRO  · Patient recently hospitalized 12/2-12/16 for similar symptoms and UTI and finished antibiotic course  · Follow-up urine culture  · Antibiotics switched to vancomycin  · Infectious disease following

## 2022-12-25 PROBLEM — G93.41 ACUTE METABOLIC ENCEPHALOPATHY: Status: RESOLVED | Noted: 2022-01-01 | Resolved: 2022-01-01

## 2022-12-25 LAB
ALBUMIN SERPL BCP-MCNC: 3.1 G/DL (ref 3.5–5)
ALP SERPL-CCNC: 110 U/L (ref 46–116)
ALT SERPL W P-5'-P-CCNC: 31 U/L (ref 12–78)
ANION GAP SERPL CALCULATED.3IONS-SCNC: 10 MMOL/L (ref 4–13)
AST SERPL W P-5'-P-CCNC: 34 U/L (ref 5–45)
BILIRUB SERPL-MCNC: 0.71 MG/DL (ref 0.2–1)
BUN SERPL-MCNC: 41 MG/DL (ref 5–25)
CALCIUM ALBUM COR SERPL-MCNC: 8.9 MG/DL (ref 8.3–10.1)
CALCIUM SERPL-MCNC: 8.2 MG/DL (ref 8.3–10.1)
CHLORIDE SERPL-SCNC: 98 MMOL/L (ref 96–108)
CO2 SERPL-SCNC: 26 MMOL/L (ref 21–32)
CREAT SERPL-MCNC: 1.23 MG/DL (ref 0.6–1.3)
ERYTHROCYTE [DISTWIDTH] IN BLOOD BY AUTOMATED COUNT: 17.6 % (ref 11.6–15.1)
GFR SERPL CREATININE-BSD FRML MDRD: 36 ML/MIN/1.73SQ M
GLUCOSE SERPL-MCNC: 81 MG/DL (ref 65–140)
HCT VFR BLD AUTO: 32.3 % (ref 34.8–46.1)
HGB BLD-MCNC: 9.8 G/DL (ref 11.5–15.4)
MCH RBC QN AUTO: 25.9 PG (ref 26.8–34.3)
MCHC RBC AUTO-ENTMCNC: 30.3 G/DL (ref 31.4–37.4)
MCV RBC AUTO: 85 FL (ref 82–98)
PLATELET # BLD AUTO: 252 THOUSANDS/UL (ref 149–390)
PMV BLD AUTO: 10.8 FL (ref 8.9–12.7)
POTASSIUM SERPL-SCNC: 4.3 MMOL/L (ref 3.5–5.3)
PROT SERPL-MCNC: 6 G/DL (ref 6.4–8.4)
RBC # BLD AUTO: 3.78 MILLION/UL (ref 3.81–5.12)
SODIUM SERPL-SCNC: 134 MMOL/L (ref 135–147)
WBC # BLD AUTO: 14.34 THOUSAND/UL (ref 4.31–10.16)

## 2022-12-25 RX ORDER — VANCOMYCIN HYDROCHLORIDE 500 MG/100ML
500 INJECTION, SOLUTION INTRAVENOUS EVERY 24 HOURS
Status: DISCONTINUED | OUTPATIENT
Start: 2022-12-26 | End: 2022-12-26

## 2022-12-25 RX ADMIN — METOPROLOL SUCCINATE 100 MG: 100 TABLET, EXTENDED RELEASE ORAL at 08:25

## 2022-12-25 RX ADMIN — APIXABAN 2.5 MG: 2.5 TABLET, FILM COATED ORAL at 08:25

## 2022-12-25 RX ADMIN — Medication 1 G: at 16:11

## 2022-12-25 RX ADMIN — FERROUS SULFATE TAB 325 MG (65 MG ELEMENTAL FE) 325 MG: 325 (65 FE) TAB at 08:25

## 2022-12-25 RX ADMIN — DILTIAZEM HYDROCHLORIDE 60 MG: 60 CAPSULE, EXTENDED RELEASE ORAL at 08:27

## 2022-12-25 RX ADMIN — Medication 1 G: at 11:56

## 2022-12-25 RX ADMIN — APIXABAN 2.5 MG: 2.5 TABLET, FILM COATED ORAL at 17:42

## 2022-12-25 RX ADMIN — Medication 1 G: at 08:25

## 2022-12-25 RX ADMIN — DILTIAZEM HYDROCHLORIDE 60 MG: 60 CAPSULE, EXTENDED RELEASE ORAL at 21:53

## 2022-12-25 RX ADMIN — VANCOMYCIN HYDROCHLORIDE 1250 MG: 5 INJECTION, POWDER, LYOPHILIZED, FOR SOLUTION INTRAVENOUS at 12:00

## 2022-12-25 NOTE — PLAN OF CARE
Problem: Potential for Falls  Goal: Patient will remain free of falls  Description: INTERVENTIONS:  - Educate patient/family on patient safety including physical limitations  - Instruct patient to call for assistance with activity   - Consult OT/PT to assist with strengthening/mobility   - Keep Call bell within reach  - Keep bed low and locked with side rails adjusted as appropriate  - Keep care items and personal belongings within reach  - Initiate and maintain comfort rounds  - Make Fall Risk Sign visible to staff  - Offer Toileting every 2 Hours, in advance of need  - Initiate/Maintain Bed alarm  - Obtain necessary fall risk management equipment: Bed   - Apply yellow socks and bracelet for high fall risk patients  - Consider moving patient to room near nurses station  Outcome: Progressing     Problem: MOBILITY - ADULT  Goal: Maintain or return to baseline ADL function  Description: INTERVENTIONS:  -  Assess patient's ability to carry out ADLs; assess patient's baseline for ADL function and identify physical deficits which impact ability to perform ADLs (bathing, care of mouth/teeth, toileting, grooming, dressing, etc )  - Assess/evaluate cause of self-care deficits   - Assess range of motion  - Assess patient's mobility; develop plan if impaired  - Assess patient's need for assistive devices and provide as appropriate  - Encourage maximum independence but intervene and supervise when necessary  - Involve family in performance of ADLs  - Assess for home care needs following discharge   - Consider OT consult to assist with ADL evaluation and planning for discharge  - Provide patient education as appropriate  Outcome: Progressing  Goal: Maintains/Returns to pre admission functional level  Description: INTERVENTIONS:  - Perform BMAT or MOVE assessment daily    - Set and communicate daily mobility goal to care team and patient/family/caregiver     - Collaborate with rehabilitation services on mobility goals if consulted  - Perform Range of Motion 3 times a day  - Reposition patient every 2 hours    - Dangle patient 4 times a day  - Stand patient 4 times a day  - Ambulate patient 4 times a day  - Out of bed to chair 3 times a day   - Out of bed for meals 3 times a day  - Out of bed for toileting  - Record patient progress and toleration of activity level   Outcome: Progressing     Problem: SKIN/TISSUE INTEGRITY - ADULT  Goal: Incision(s), wounds(s) or drain site(s) healing without S/S of infection  Description: INTERVENTIONS  - Assess and document dressing, incision, wound bed, drain sites and surrounding tissue  - Provide patient and family education  - Perform skin care/dressing changes every shift  Outcome: Progressing

## 2022-12-25 NOTE — PROGRESS NOTES
Niko Sandoval is a 80 y o  female who is currently ordered Vancomycin IV with management by the Pharmacy Consult service  Relevant clinical data and objective / subjective history reviewed  Vancomycin Assessment:  Indication and Goal AUC/Trough: Urinary tract infection (goal -600, trough >10)  Clinical Status:  new (not critically ill)  Micro:     Renal Function:  SCr: 1 23 mg/dL  CrCl: 19 4 mL/min  Renal replacement: Not on dialysis  Days of Therapy: 1  Current Dose: 1250 mg x1  Vancomycin Plan:  New Dosin mg Q 24H  Estimated AUC: 412 mcg*hr/mL  Estimated Trough: 14 1 mcg/mL  Next Level:  @ 0600  Renal Function Monitoring: Daily BMP and [place] will continue to follow closely for s/sx of nephrotoxicity, infusion reactions and appropriateness of therapy  BMP and CBC will be ordered per protocol  We will continue to follow the patient’s culture results and clinical progress daily      Karthikeyan Schwartz, Pharmacist

## 2022-12-25 NOTE — PROGRESS NOTES
[facility]  Progress Note - Miguelangel Boucher 2/17/1924, 80 y o  female MRN: [id number]  Unit/Bed#: -Susan Encounter: [id number]  Primary Care Provider: Lula Ahmadi MD   Date and time admitted to hospital: 12/23/2022  2:13 AM    * Urinary tract infection  Assessment & Plan  · UTI positive for large leukocytes, negative for bacteria and nitrites  · History of MDRO  · Patient recently hospitalized 12/2-12/16 for similar symptoms and UTI and finished antibiotic course  · Follow-up urine culture  · Antibiotics switched to vancomycin  · Infectious disease following    Hyponatremia  Assessment & Plan  · Improving at this time  · For now will place on sodium chloride tabs 1 g 3 times daily with meals  · Continue to monitor    Urinary obstruction  Assessment & Plan  · Unsure of how long patient has had any area, reports anywhere from 2 to 6 days at home? · Bladder scan in  mL, Mahan catheter placed, but patient reports she does not want it anymore and wants to take it out, so Mahan DC'd at this time  Now mahan catheter has been reinserted  · CT revealed Distended urinary bladder  · Urology consulted and recommend keep the mahan in and outpatient follow up with urology      Paroxysmal atrial fibrillation (HCC)  Assessment & Plan  · Rate currently controlled  · Continue home Cardizem, metoprolol, Eliquis twice daily    MARIA DEL CARMEN (acute kidney injury) ([facility] [address] )  Assessment & Plan  · Presenting with creatinine of 1 73, baseline around 1 1 most likely secondary to dehydration  · For now we will avoid nephrotoxic agents, will hold home Lasix 60 mg daily  · Urology recommending Mahan on discharge  · Creatinine continues to improve  · Continue to monitor    Acute metabolic encephalopathy-resolved as of 12/25/2022  Assessment & Plan  · Per ED provider, was alert and oriented to person and place on arrival, now patient alert and oriented to person, situation but is disoriented to place and does not answer question  · In setting of UTI/urinary retention, electrolyte abnormality, age, recent lengthy hospital stay most likely  · Fall precautions, delirium precautions with lights off at night, lights on during the day, attempt ambulation with assistance during the day  Due to acuteness of altered mental status and patient on Eliquis will obtain CT head, CT head negative  · Resolved at this time        VTE Pharmacologic Prophylaxis: VTE Score: 5 High Risk (Score >/= 5) - Pharmacological DVT Prophylaxis Ordered: apixaban (Eliquis)  Sequential Compression Devices Ordered  Patient Centered Rounds: I performed bedside rounds with nursing staff today  Discussions with Specialists or Other Care Team Provider: Case management, infectious disease    Education and Discussions with Family / Patient: Attempted to update  (daughter) via phone  Left voicemail  Time Spent for Care: 35 minutes  More than 50% of total time spent on counseling and coordination of care as described above  Current Length of Stay: 1 day(s)  Current Patient Status: Inpatient   Certification Statement: The patient will continue to require additional inpatient hospital stay due to Urinary tract infection  Discharge Plan: Anticipate discharge in 24-48 hrs to home  Code Status: Level 1 - Full Code    Subjective:   Patient resting comfortably on examination  Patient had no overnight events or complaints on exam this morning  Objective:     Vitals:   Temp (24hrs), Av 3 °F (36 3 °C), Min:97 3 °F (36 3 °C), Max:97 3 °F (36 3 °C)    Temp:  [97 3 °F (36 3 °C)] 97 3 °F (36 3 °C)  HR:  [62-79] 71  Resp:  [15-17] 15  BP: (137-152)/(77-80) 137/77  SpO2:  [91 %-97 %] 97 %  Body mass index is 25 74 kg/m²  Input and Output Summary (last 24 hours):      Intake/Output Summary (Last 24 hours) at 2022 1047  Last data filed at 2022 1023  Gross per 24 hour   Intake 2118 75 ml   Output 1025 ml   Net 1093 75 ml       Physical Exam: Physical Exam  Vitals and nursing note reviewed  Constitutional:       General: She is not in acute distress  Appearance: She is well-developed  HENT:      Head: Normocephalic and atraumatic  Eyes:      General: No scleral icterus  Conjunctiva/sclera: Conjunctivae normal    Cardiovascular:      Rate and Rhythm: Normal rate and regular rhythm  Heart sounds: Normal heart sounds  No murmur heard  No friction rub  No gallop  Pulmonary:      Effort: Pulmonary effort is normal  No respiratory distress  Breath sounds: Normal breath sounds  No wheezing or rales  Abdominal:      General: Bowel sounds are normal  There is no distension  Palpations: Abdomen is soft  Tenderness: There is no abdominal tenderness  Genitourinary:     Comments: Hunter in place  Musculoskeletal:         General: Normal range of motion  Skin:     General: Skin is warm  Findings: No rash  Neurological:      Mental Status: She is alert and oriented to person, place, and time            Additional Data:     Labs:  Results from last 7 days   Lab Units 12/25/22  0613 12/24/22  0545 12/23/22  0256   WBC Thousand/uL 14 34* 14 14* 20 88*   HEMOGLOBIN g/dL 9 8* 8 9* 9 4*   HEMATOCRIT % 32 3* 27 8* 29 5*   PLATELETS Thousands/uL 252 222 234   BANDS PCT %  --  2  --    NEUTROS PCT %  --   --  88*   LYMPHS PCT %  --   --  2*   LYMPHO PCT %  --  5*  --    MONOS PCT %  --   --  4   MONO PCT %  --  6  --    EOS PCT %  --  0 0     Results from last 7 days   Lab Units 12/25/22  0613   SODIUM mmol/L 134*   POTASSIUM mmol/L 4 3   CHLORIDE mmol/L 98   CO2 mmol/L 26   BUN mg/dL 41*   CREATININE mg/dL 1 23   ANION GAP mmol/L 10   CALCIUM mg/dL 8 2*   ALBUMIN g/dL 3 1*   TOTAL BILIRUBIN mg/dL 0 71   ALK PHOS U/L 110   ALT U/L 31   AST U/L 34   GLUCOSE RANDOM mg/dL 81                       Lines/Drains:  Invasive Devices     Peripheral Intravenous Line  Duration           Peripheral IV 12/23/22 Left Antecubital 2 days Drain  Duration           Urethral Catheter 16 Fr  1 day              Urinary Catheter:  Goal for removal: N/A- Discharging with Hunter               Imaging: No pertinent imaging reviewed  Recent Cultures (last 7 days):   Results from last 7 days   Lab Units 12/23/22  0328   URINE CULTURE  60,000-69,000 cfu/ml Enterococcus faecalis*       Last 24 Hours Medication List:   Current Facility-Administered Medications   Medication Dose Route Frequency Provider Last Rate   • acetaminophen  650 mg Oral Q6H PRN Schuyler BlotSOCRATES godoy-C     • apixaban  2 5 mg Oral BID Schuyler Blotwalt PA-C     • diltiazem  60 mg Oral Q12H [address] Schuyler Blotter, PA-C     • ferrous sulfate  325 mg Oral Daily With Breakfast Schuyler BlotORA godoyC     • metoprolol succinate  100 mg Oral Daily Schuyler Blotwalt PASabraC     • sodium chloride  1 g Oral TID With Meals Schuyler Diego PA-C     • vancomycin  25 mg/kg (Adjusted) Intravenous Once Kassidy Boss MD     • vancomycin  15 mg/kg Intravenous Q24H Kassidy Boss MD          Today, Patient Was Seen By: Dustin Parsons DO    **Please Note: This note may have been constructed using a voice recognition system  **

## 2022-12-25 NOTE — PROGRESS NOTES
Progress Note - Infectious Disease   Jag Theodore 80 y o  female MRN: [id number]  Unit/Bed#: -01 Encounter: [id number]      Impression/Plan:  1  Acute encephalopathy   Progressive confusion   CT of the head without acute findings on review   Multiple factors contributing including 2, 3, and other electrolyte abnormalities  Hemodynamically stable   Given 4 and patient's age would avoid drugs such as Levaquin  We will avoid Zosyn given 3  Mental status improved  Antibiotics adjusted as below  Continue to trend fever curve/vitals  Repeat CBC and chemistry ordered for tomorrow  Follow-up pending cultures  Urology evaluation appreciated, maintain Hunter  Monitor mental status  Low threshold for repeat imaging  Additional supportive care as per primary  Additional interventions pending clinical course     2  Complicated urinary tract infection and retention   Patient's UA on presentation abnormal and she presented with complaints of anuria   Was noted to have 300 cc in the bladder and Hunter catheter placed  Timmy Michaud removed at patient's request   Was noted to have retention on prior admission as well  CT imaging was done and showed a distended bladder but no upper tract pathology  Hunter catheter replaced  Creatinine significantly improved  Urine culture so far with Enterococcus  Switch to vancomycin  Pharmacy consult for vancomycin monitoring  Repeat CBC and chemistry ordered for tomorrow  We will further dose adjust antibiotic tomorrow  Continue to trend fever curve/vitals  Monitor abdominal exam  Urology evaluation appreciated, maintain Hunter  Follow-up pending urine culture  Anticipate 3 to 5 days of antibiotic for this issue     3  Acute kidney injury on chronic kidney disease   Acute elevation in creatinine noted from baseline and patient with reports of decreased urine output   Suspect some component of retention  Mckenzie Tucker noted with hyponatremia    Antibiotic dosing as above  Repeat chemistry ordered for tomorrow  We will further dose adjust antibiotic tomorrow  Fluid hydration as per primary  Urology evaluation appreciated     4  Valvulopathy and previous abnormal EKG  recent EKGs noted with fluctuating QTc interval   Given this and patient's age would avoid Levaquin if possible   Antibiotics adjusted as above      5  Leukocytosis   Abrupt elevation in white count noted    Downtrending and stable today but patient had also received and was being tapered off of steroids on presentation which may be contributing  Repeat CBC tomorrow  Antibiotics as above     Above plan discussed in detail with the patient and with primary service    ID consult service will continue to follow      Antibiotics:  Vancomycin 1  Total antibiotic 3    Subjective:  Patient seen at bedside and she denied having any nausea, vomiting, chest pain or shortness of breath  Reviewed penicillin allergy and she recalls difficulty breathing and throat swelling  Uncertain how reliable historian she is  Urine cultures reviewed thus far and antibiotics adjusted  Objective:  Vitals:  Temp:  [97 3 °F (36 3 °C)] 97 3 °F (36 3 °C)  HR:  [62-79] 71  Resp:  [15-17] 15  BP: (137-152)/(77-80) 137/77  SpO2:  [91 %-97 %] 97 %  Temp (24hrs), Av 3 °F (36 3 °C), Min:97 3 °F (36 3 °C), Max:97 3 °F (36 3 °C)  Current: Temperature: (!) 97 3 °F (36 3 °C)    Physical Exam:   General Appearance:  Alert, interactive, nontoxic, no acute distress  Throat: Oropharynx moist without lesions  Lungs:   Clear to auscultation bilaterally; no wheezes, rhonchi or rales; respirations unlabored on room air   Heart:  RRR; no murmur, rub or gallop appreciated   Abdomen:   Soft, non-tender, non-distended, positive bowel sounds  No suprapubic discomfort and no CVA tenderness   Extremities: No clubbing, cyanosis or edema   Skin: No new rashes or lesions  No new draining wounds noted         Labs, Imaging, & Other studies:   All pertinent labs and imaging studies were personally reviewed  Results from last 7 days   Lab Units 12/25/22  0613 12/24/22  0545 12/23/22  0256   WBC Thousand/uL 14 34* 14 14* 20 88*   HEMOGLOBIN g/dL 9 8* 8 9* 9 4*   PLATELETS Thousands/uL 252 222 234     Results from last 7 days   Lab Units 12/25/22  0613 12/23/22  1132 12/23/22  0256   POTASSIUM mmol/L 4 3   < > 4 6   CHLORIDE mmol/L 98   < > 86*   CO2 mmol/L 26   < > 27   BUN mg/dL 41*   < > 52*   CREATININE mg/dL 1 23   < > 1 73*   EGFR ml/min/1 73sq m 36   < > 24   CALCIUM mg/dL 8 2*   < > 7 3*   AST U/L 34  --  38   ALT U/L 31  --  36   ALK PHOS U/L 110  --  85    < > = values in this interval not displayed       Results from last 7 days   Lab Units 12/23/22  0328   URINE CULTURE  60,000-69,000 cfu/ml Enterococcus faecalis*

## 2022-12-26 PROBLEM — S91.002A ANKLE WOUND, LEFT, INITIAL ENCOUNTER: Status: ACTIVE | Noted: 2022-01-01

## 2022-12-26 LAB
ALBUMIN SERPL BCP-MCNC: 3 G/DL (ref 3.5–5)
ALP SERPL-CCNC: 112 U/L (ref 46–116)
ALT SERPL W P-5'-P-CCNC: 28 U/L (ref 12–78)
ANION GAP SERPL CALCULATED.3IONS-SCNC: 8 MMOL/L (ref 4–13)
AST SERPL W P-5'-P-CCNC: 30 U/L (ref 5–45)
BACTERIA UR CULT: ABNORMAL
BILIRUB SERPL-MCNC: 0.67 MG/DL (ref 0.2–1)
BUN SERPL-MCNC: 31 MG/DL (ref 5–25)
CALCIUM ALBUM COR SERPL-MCNC: 9 MG/DL (ref 8.3–10.1)
CALCIUM SERPL-MCNC: 8.2 MG/DL (ref 8.3–10.1)
CHLORIDE SERPL-SCNC: 100 MMOL/L (ref 96–108)
CO2 SERPL-SCNC: 27 MMOL/L (ref 21–32)
CREAT SERPL-MCNC: 1.13 MG/DL (ref 0.6–1.3)
ERYTHROCYTE [DISTWIDTH] IN BLOOD BY AUTOMATED COUNT: 17.6 % (ref 11.6–15.1)
GFR SERPL CREATININE-BSD FRML MDRD: 40 ML/MIN/1.73SQ M
GLUCOSE SERPL-MCNC: 81 MG/DL (ref 65–140)
HCT VFR BLD AUTO: 31.1 % (ref 34.8–46.1)
HGB BLD-MCNC: 9.4 G/DL (ref 11.5–15.4)
MCH RBC QN AUTO: 26 PG (ref 26.8–34.3)
MCHC RBC AUTO-ENTMCNC: 30.2 G/DL (ref 31.4–37.4)
MCV RBC AUTO: 86 FL (ref 82–98)
PLATELET # BLD AUTO: 256 THOUSANDS/UL (ref 149–390)
PMV BLD AUTO: 10.8 FL (ref 8.9–12.7)
POTASSIUM SERPL-SCNC: 3.9 MMOL/L (ref 3.5–5.3)
PROT SERPL-MCNC: 5.8 G/DL (ref 6.4–8.4)
RBC # BLD AUTO: 3.61 MILLION/UL (ref 3.81–5.12)
SODIUM SERPL-SCNC: 135 MMOL/L (ref 135–147)
VANCOMYCIN SERPL-MCNC: 10.5 UG/ML (ref 10–20)
WBC # BLD AUTO: 10.48 THOUSAND/UL (ref 4.31–10.16)

## 2022-12-26 RX ADMIN — DILTIAZEM HYDROCHLORIDE 60 MG: 60 CAPSULE, EXTENDED RELEASE ORAL at 09:26

## 2022-12-26 RX ADMIN — VANCOMYCIN HYDROCHLORIDE 500 MG: 500 INJECTION, SOLUTION INTRAVENOUS at 09:32

## 2022-12-26 RX ADMIN — METOPROLOL SUCCINATE 100 MG: 100 TABLET, EXTENDED RELEASE ORAL at 09:20

## 2022-12-26 RX ADMIN — APIXABAN 2.5 MG: 2.5 TABLET, FILM COATED ORAL at 17:35

## 2022-12-26 RX ADMIN — VANCOMYCIN HYDROCHLORIDE 750 MG: 750 INJECTION, SOLUTION INTRAVENOUS at 17:44

## 2022-12-26 RX ADMIN — APIXABAN 2.5 MG: 2.5 TABLET, FILM COATED ORAL at 09:20

## 2022-12-26 RX ADMIN — Medication 1 G: at 17:35

## 2022-12-26 RX ADMIN — DILTIAZEM HYDROCHLORIDE 60 MG: 60 CAPSULE, EXTENDED RELEASE ORAL at 23:09

## 2022-12-26 RX ADMIN — Medication 1 G: at 09:20

## 2022-12-26 RX ADMIN — FERROUS SULFATE TAB 325 MG (65 MG ELEMENTAL FE) 325 MG: 325 (65 FE) TAB at 09:20

## 2022-12-26 RX ADMIN — Medication 1 G: at 12:20

## 2022-12-26 NOTE — PLAN OF CARE
Problem: Potential for Falls  Goal: Patient will remain free of falls  Description: INTERVENTIONS:  - Educate patient/family on patient safety including physical limitations  - Instruct patient to call for assistance with activity   - Consult OT/PT to assist with strengthening/mobility   - Keep Call bell within reach  - Keep bed low and locked with side rails adjusted as appropriate  - Keep care items and personal belongings within reach  - Initiate and maintain comfort rounds  - Make Fall Risk Sign visible to staff  - A x 1 with rolling walker for ambulation  - Apply yellow socks and bracelet for high fall risk patients  - Consider moving patient to room near nurses station  Outcome: Progressing     Problem: MOBILITY - ADULT  Goal: Maintain or return to baseline ADL function  Description: INTERVENTIONS:  -  Assess patient's ability to carry out ADLs; assess patient's baseline for ADL function and identify physical deficits which impact ability to perform ADLs (bathing, care of mouth/teeth, toileting, grooming, dressing, etc )  - Assess/evaluate cause of self-care deficits   - Assess range of motion  - Assess patient's mobility; develop plan if impaired  - Assess patient's need for assistive devices and provide as appropriate  - Encourage maximum independence but intervene and supervise when necessary  - Involve family in performance of ADLs  - Assess for home care needs following discharge   - Consider OT consult to assist with ADL evaluation and planning for discharge  - Provide patient education as appropriate  Outcome: Progressing     Problem: CARDIOVASCULAR - ADULT  Goal: Maintains optimal cardiac output and hemodynamic stability  Description: INTERVENTIONS:  - Monitor I/O, vital signs and rhythm  - Monitor for S/S and trends of decreased cardiac output  - Administer and titrate ordered vasoactive medications to optimize hemodynamic stability  - Assess quality of pulses, skin color and temperature  - Assess for signs of decreased coronary artery perfusion  - Instruct patient to report change in severity of symptoms  Outcome: Progressing

## 2022-12-26 NOTE — PROGRESS NOTES
Progress Note - Infectious Disease   Gumaro Pringle 80 y o  female MRN: [id number]  Unit/Bed#: -01 Encounter: [id number]      Impression/Plan:  1  Acute encephalopathy   Progressive confusion   CT of the head without acute findings on review   Multiple factors contributing including 2, 3, and other electrolyte abnormalities  Hemodynamically stable   Given 4 and patient's age would avoid drugs such as Levaquin  We will avoid Zosyn given 3   Mental status improved  Antibiotics as below  Continue to trend fever curve/vitals  Repeat CBC and chemistry ordered for tomorrow  Follow-up pending cultures  Urology evaluation appreciated, maintain Hunter  Monitor mental status  Low threshold for repeat imaging  Additional supportive care as per primary     2  Complicated urinary tract infection and retention   Patient's UA on presentation abnormal and she presented with complaints of anuria   Was noted to have 300 cc in the bladder and Hunter catheter placed   Later removed at patient's request   Was noted to have retention on prior admission as well   CT imaging was done and showed a distended bladder but no upper tract pathology   Hunter catheter replaced  Creatinine significantly improved  Urine culture so far with Enterococcus  She received Levaquin in the ER  Continue vancomycin given penicillin allergy  Pharmacy consult for vancomycin monitoring  Plan to complete total 5 days of therapy through tomorrow  Last doses of antibiotics ordered  Repeat CBC and chemistry ordered for tomorrow  Continue to trend fever curve/vitals  Monitor abdominal exam  Urology evaluation appreciated, maintain Hunter  Follow-up pending urine culture susceptibilities for completeness     3  Acute kidney injury on chronic kidney disease   Acute elevation in creatinine noted from baseline and patient with reports of decreased urine output   Suspect some component of retention  Neomia Erm noted with hyponatremia    Antibiotic dosing as above  Repeat chemistry ordered for tomorrow  We will further dose adjust antibiotic tomorrow  Fluid hydration as per primary  Urology evaluation appreciated     4  Valvulopathy and previous abnormal EKG  recent EKGs noted with fluctuating QTc interval   Given this and patient's age would avoid Levaquin if possible   Antibiotics adjusted as above      5  Leukocytosis   Abrupt elevation in white count noted    Downtrending and stable today but patient had also received and was being tapered off of steroids on presentation which may be contributing  Repeat CBC tomorrow  Antibiotics as above     Above plan discussed in detail with patient and with primary service    ID consult service will continue to follow      Antibiotics:  Vancomycin 2  Total antibiotic 4     Subjective:  Patient seen at bedside and she denied having any nausea, vomiting, chest pain or shortness of breath  Tolerating antibiotic without itching or rash  The patient points out a lesion on her ankle which she reports happened when she was putting on her shoes to come to the hospital on the left  Otherwise afebrile  White blood cell count 10 4  Culture susceptibilities pending  No new images and vitals are stable  Objective:  Vitals:  Temp:  [97 6 °F (36 4 °C)] 97 6 °F (36 4 °C)  HR:  [63-64] 64  Resp:  [17] 17  BP: (142-145)/(76-77) 145/77  SpO2:  [94 %] 94 %  Temp (24hrs), Av 6 °F (36 4 °C), Min:97 6 °F (36 4 °C), Max:97 6 °F (36 4 °C)  Current: Temperature: 97 6 °F (36 4 °C)    Physical Exam:   General Appearance:  Alert, interactive, nontoxic, no acute distress  Throat: Oropharynx moist without lesions  Lungs:   Clear to auscultation bilaterally; no wheezes, rhonchi or rales; respirations unlabored on room air   Heart:  RRR; no murmur, rub or gallop appreciated   Abdomen:   Soft, non-tender, non-distended, positive bowel sounds  Extremities: No clubbing, cyanosis or edema   Skin: No new rashes or lesions  No new draining wounds noted  Evaluated the patient's ankle abrasion  There is a small blister present  No significant drainage  No significant surrounding cellulitic changes  Prior clinical images reviewed  Labs, Imaging, & Other studies:   All pertinent labs and imaging studies were personally reviewed  Results from last 7 days   Lab Units 12/26/22  0449 12/25/22  0613 12/24/22  0545   WBC Thousand/uL 10 48* 14 34* 14 14*   HEMOGLOBIN g/dL 9 4* 9 8* 8 9*   PLATELETS Thousands/uL 256 252 222     Results from last 7 days   Lab Units 12/26/22  0449 12/25/22  0613 12/23/22  1132 12/23/22  0256   POTASSIUM mmol/L 3 9 4 3   < > 4 6   CHLORIDE mmol/L 100 98   < > 86*   CO2 mmol/L 27 26   < > 27   BUN mg/dL 31* 41*   < > 52*   CREATININE mg/dL 1 13 1 23   < > 1 73*   EGFR ml/min/1 73sq m 40 36   < > 24   CALCIUM mg/dL 8 2* 8 2*   < > 7 3*   AST U/L 30 34  --  38   ALT U/L 28 31  --  36   ALK PHOS U/L 112 110  --  85    < > = values in this interval not displayed       Results from last 7 days   Lab Units 12/23/22  0328   URINE CULTURE  60,000-69,000 cfu/ml Enterococcus faecalis*

## 2022-12-26 NOTE — PLAN OF CARE
Problem: PHYSICAL THERAPY ADULT  Goal: Performs mobility at highest level of function for planned discharge setting  See evaluation for individualized goals  Description: Treatment/Interventions: Functional transfer training, LE strengthening/ROM, Therapeutic exercise, Endurance training, Patient/family training, Bed mobility, Gait training, Spoke to MD          See flowsheet documentation for full assessment, interventions and recommendations  Note: Prognosis: Good  Problem List: Decreased strength, Decreased endurance, Decreased mobility, Impaired balance, Pain  Assessment: Pt is 80year old female seen for PT evaluation s/p admit to [facility] on 12/23/2022 with Urinary tract infection  PT consulted to assess pt's functional mobility and d/c needs  Order placed for PT eval and tx, with up with assist order  Comorbidities affecting pt's physical performance at time of assessment include MARIA DEL CARMEN, paroxysmal atrial fibrillation, hyponatremia, and urinary obstruction  PTA, pt was independent with all functional mobility without an AD  Pt ambulates household and community distances  Pt resides alone in a one level house with no steps to enter  Personal factors affecting pt at time of IE include ambulating with an assistive device, inability to ambulate household distances, inability to navigate community distances, inability to navigate level surfaces without external assistance, unable to perform dynamic tasks in community, limited home support, inability to perform IADLs, and difficulty performing ADLs  Please find objective findings from PT assessment regarding body systems outlined above with impairments and limitations including weakness, impaired balance, decreased endurance, gait deviations, pain, decreased activity tolerance, decreased functional mobility tolerance, and fall risk   The following objective measures performed on IE also reveal limitations: Barthel Index: 55/100, Modified Jered: 4 (moderate/severe disability) and AM-PAC 6-Clicks: 89/41  Pt's clinical presentation is currently evolving seen in pt's presentation of need for ongoing medical management/monitoring, pt is a fall risk, pt is currently requiring use of RW for safe ambulation, pt currently has pain limiting functional mobility, and pt requires cues and assist for safety with functional mobility  Pt to benefit from continued PT tx to address deficits as defined above and maximize level of functional independent mobility and consistency  From PT/mobility standpoint, recommendation at time of d/c would be Home PT with family support pending progress in order to facilitate return to PLOF  Barriers to Discharge: Decreased caregiver support     PT Discharge Recommendation: Home with home health rehabilitation    See flowsheet documentation for full assessment

## 2022-12-26 NOTE — ASSESSMENT & PLAN NOTE
Patient has a wound at left medial ankle, result from shoe   The left feet and ankle are warm and erythematous  Patient is currently on Vanco  Consulted wound care and recommendation appreciated  Outpatient follow up wound care

## 2022-12-26 NOTE — ASSESSMENT & PLAN NOTE
· Improving at this time  · For now will place on sodium chloride tabs 1 g 3 times daily with meals  · Continue to monitor with BMP

## 2022-12-26 NOTE — PROGRESS NOTES
INTERNAL MEDICINE RESIDENCY PROGRESS NOTE     Name: Andrés Jin   Age & Sex: 80 y o  female   MRN: [id number]  Unit/Bed#: -01   Encounter: [id number]    PATIENT INFORMATION     Name: Andrés Jin   Age & Sex: 80 y o  female   MRN: [id number]  Hospital Stay Days: 2    ASSESSMENT/PLAN     Principal Problem:    Urinary tract infection  Active Problems:    MARIA DEL CARMEN (acute kidney injury) ([facility] )    Paroxysmal atrial fibrillation (HCC)    Hyponatremia    Urinary obstruction    Ankle wound, left, initial encounter      Ankle wound, left, initial encounter  Assessment & Plan  Patient has a wound at left medial ankle, result from shoe   The left feet and ankle are warm and erythematous  Patient is currently on Vanco  Will consult wound care    Urinary obstruction  Assessment & Plan  · Unsure of how long patient has had any area, reports anywhere from 2 to 6 days at home? · Bladder scan in  mL, Mahan catheter placed, but patient reports she does not want it anymore and wants to take it out, so Mahan DC'd at this time  Now mahan catheter has been reinserted  · CT revealed Distended urinary bladder  · Urology consulted and recommend keep the mahan in and outpatient follow up with urology  Hyponatremia  Assessment & Plan  · Improving at this time  · For now will place on sodium chloride tabs 1 g 3 times daily with meals  · Continue to monitor    Paroxysmal atrial fibrillation (HCC)  Assessment & Plan  · Rate currently controlled  · Continue home Cardizem, metoprolol, Eliquis twice daily    MARIA DEL CARMEN (acute kidney injury) ([facility] )  Assessment & Plan  · Presenting with creatinine of 1 73, baseline around 1 1 most likely secondary to dehydration  · For now we will avoid nephrotoxic agents, will hold home Lasix 60 mg daily  · Urology recommending Mahan on discharge  · Creatinine improved to baseline   MARIA DEL CARMEN has resolved  · Continue to monitor    * Urinary tract infection  Assessment & Plan  · UTI positive for large leukocytes, negative for bacteria and nitrites  · History of MDRO  · Patient recently hospitalized - for similar symptoms and UTI and finished antibiotic course  · Follow-up urine culture  · Antibiotics switched to vancomycin  · Follow up with sensitivity  · Infectious disease following  · PT/OT recommends [facility]        Disposition: home    SUBJECTIVE     Patient seen and examined  No acute events overnight  OBJECTIVE     Vitals:    22 0754 22 1517 22 2151 22 0925   BP: 137/77 142/76 145/77 134/63   BP Location:    Right arm   Pulse: 71 63 64 77   Resp: 15 17  17   Temp:  97 6 °F (36 4 °C)  (!) 97 4 °F (36 3 °C)   TempSrc:    Oral   SpO2: 97% 94% 94%    Weight:       Height:          Temperature:   Temp (24hrs), Av 5 °F (36 4 °C), Min:97 4 °F (36 3 °C), Max:97 6 °F (36 4 °C)    Temperature: (!) 97 4 °F (36 3 °C)  Intake & Output:  I/O        0701   0700  0701   0700  0701   0700    P  O  360 1200     I V  (mL/kg)  1458 8 (25 2)     IV Piggyback  250     Total Intake(mL/kg) 360 (6 2) 2908 8 (50 3)     Urine (mL/kg/hr) 1575 (1 1) 2200 (1 6)     Total Output 1575 2200     Net -1215 +708 8                Weights:        Body mass index is 25 74 kg/m²  Weight (last 2 days)     Date/Time Weight    22 1500 57 8 (127 43)        Physical Exam  Vitals and nursing note reviewed  Constitutional:       Appearance: Normal appearance  She is not toxic-appearing  HENT:      Head: Normocephalic  Cardiovascular:      Rate and Rhythm: Normal rate and regular rhythm  Heart sounds: Normal heart sounds  Pulmonary:      Effort: Pulmonary effort is normal  No respiratory distress  Breath sounds: Normal breath sounds  Abdominal:      General: Abdomen is flat  Bowel sounds are normal       Palpations: Abdomen is soft  Tenderness: There is no abdominal tenderness  There is no guarding     Genitourinary:     Comments: Hunter catheter in place  Musculoskeletal: General: No tenderness  Right lower leg: No edema  Left lower leg: No edema  Skin:     General: Skin is warm  Findings: Lesion (left medial ankle) present  Neurological:      Mental Status: She is oriented to person, place, and time  Comments:          LABORATORY DATA     Labs: I have personally reviewed pertinent reports  Results from last 7 days   Lab Units 12/26/22 0449 12/25/22  0613 12/24/22  0545 12/23/22  0256   WBC Thousand/uL 10 48* 14 34* 14 14* 20 88*   HEMOGLOBIN g/dL 9 4* 9 8* 8 9* 9 4*   HEMATOCRIT % 31 1* 32 3* 27 8* 29 5*   PLATELETS Thousands/uL 256 252 222 234   NEUTROS PCT %  --   --   --  88*   MONOS PCT %  --   --   --  4   MONO PCT %  --   --  6  --       Results from last 7 days   Lab Units 12/26/22 0449 12/25/22  0613 12/24/22  0545 12/23/22  1132 12/23/22  0256   POTASSIUM mmol/L 3 9 4 3 4 3   < > 4 6   CHLORIDE mmol/L 100 98 95*   < > 86*   CO2 mmol/L 27 26 27   < > 27   BUN mg/dL 31* 41* 49*   < > 52*   CREATININE mg/dL 1 13 1 23 1 53*   < > 1 73*   CALCIUM mg/dL 8 2* 8 2* 7 6*   < > 7 3*   ALK PHOS U/L 112 110  --   --  85   ALT U/L 28 31  --   --  36   AST U/L 30 34  --   --  38    < > = values in this interval not displayed  IMAGING & DIAGNOSTIC TESTING     Radiology Results: I have personally reviewed pertinent reports  CT abdomen pelvis wo contrast    Result Date: 12/23/2022  Impression: 1  Distended urinary bladder with nondependent pockets of gas, likely related to recent instrumentation  Correlate clinically  2   Small volume free pelvic fluid, slightly increased since prior study 12/2/2022  3   Indeterminate 2 1 cm lesion in the upper pole right kidney, stable since 11/5/2021  While this lesion may represent a proteinaceous/hemorrhagic renal cyst, definitive characterization with contrast-enhanced MRI abdomen within 3 months is recommended, if clinically warranted  4   Stable small right pleural effusion    The study was marked in [facility] for immediate notification  Workstation performed: [id number]     CT head wo contrast    Result Date: 12/23/2022  Impression: No acute intracranial abnormality  Stable, nonemergent findings compared to 12/12/2022 above  Workstation performed: [id number]     Other Diagnostic Testing: I have personally reviewed pertinent reports  ACTIVE MEDICATIONS     Current Facility-Administered Medications   Medication Dose Route Frequency   • acetaminophen (TYLENOL) tablet 650 mg  650 mg Oral Q6H PRN   • apixaban (ELIQUIS) tablet 2 5 mg  2 5 mg Oral BID   • diltiazem (CARDIZEM SR) 12 hr capsule 60 mg  60 mg Oral Q12H MARICRUZ   • ferrous sulfate tablet 325 mg  325 mg Oral Daily With Breakfast   • metoprolol succinate (TOPROL-XL) 24 hr tablet 100 mg  100 mg Oral Daily   • sodium chloride tablet 1 g  1 g Oral TID With Meals   • vancomycin (VANCOCIN) IVPB (premix in dextrose) 500 mg 100 mL  500 mg Intravenous Q24H       VTE Pharmacologic Prophylaxis: Eliquis  VTE Mechanical Prophylaxis: sequential compression device    Portions of the record may have been created with voice recognition software  Occasional wrong word or "sound a like" substitutions may have occurred due to the inherent limitations of voice recognition software  Read the chart carefully and recognize, using context, where substitutions have occurred    ==  James Daniel MD  [address]  Internal Medicine Residency PGY-3

## 2022-12-26 NOTE — PROGRESS NOTES
Andrés Jin is a 80 y o  female who is currently ordered Vancomycin IV with management by the Pharmacy Consult service  Relevant clinical data and objective / subjective history reviewed  Vancomycin Assessment:  Indication and Goal AUC/Trough: Urinary tract infection (goal -600, trough >10), -600, trough >10  Clinical Status: stable  Micro:     Renal Function:  SCr: 1 13 mg/dL  CrCl: 19 1 mL/min  Renal replacement: Not on dialysis  Days of Therapy: 2  Current Dose: 500mg IV Q24H  Vancomycin Plan:  New Dosinmg IV daily prn vanco level <15  Estimated AUC: N/A switching to pulse dosing  Estimated Trough: N/A switching to pulse dosing  Next Level:  with AM labs  Renal Function Monitoring: Daily BMP and [place] will continue to follow closely for s/sx of nephrotoxicity, infusion reactions and appropriateness of therapy  BMP and CBC will be ordered per protocol  We will continue to follow the patient’s culture results and clinical progress daily      Nancy Xiong, Pharmacist

## 2022-12-26 NOTE — WOUND OSTOMY CARE
Progress Note - Wound   Morris Noss 80 y o  female MRN: [id number]  Unit/Bed#: -01 Encounter: [id number]      Assessment:  Wound care consulted for assessment of lower extremity wounds  Admitted with UTI  History of - a-fib, CKD, CHF, anemia, and pulmonary HTN  Patient seen Minh Reyes in recliner chair sitting on EHOB waffle cushion  Patient is alert and oriented x 4, cooperative and agreeable for the assessment  Minimal assist of one with standing for the assessment using rolling walker  Assist with care  Hunter catheter in place  No fecal incontinence present at time of the assessment  B/l heels, sacrum and buttocks are intact without redness or wounds  No maceration noted in the sacral intergluteal crease  1  R posterior lower extremity - suspect related to venous stasis  Wound is irregular oval shaped, partial thickness, 100% moist pink/red tissue  Edges fragile and attached without maceration  Cristina-wound is dry and intact pink  2  L medial lower extremity - suspect related to venous stasis versus edema  Presents as 2 open partial thickness wounds, and one intact serous filled blister  All aspects measured together  Open wound beds are approx: 20% moist pink/red tissue and 80% moist yellow slough  Edges fragile and attached without maceration  Cristina-wound is dry and intact with noted hemosiderin staining      -will recommend dressing to manage the drainage  Educated patient on the importance of frequent offloading of pressure via turning, repositioning and weight-shifting  Verbalized understanding of plan of care  No induration, fluctuance, odor, warmth/temperature differences, redness, or purulence noted to the above noted wounds and skin areas assessed  New dressings applied  Patient tolerated assessment well- denies pain and no s/s of non-verbal pain or discomfort observed during the encounter  Primary nurse aware of plan of care  See flow sheets for more detailed assessment findings   Will follow along  Plan:   1  Apply hydraguard to b/l heels, buttocks and sacrum BID and PRN for prevention and protection  2  Apply skin nourishing cream the entire skin daily for moisture  3  Turn and reposition patient every  2 hours   4  Elevate heels off of bed with pillows to offload pressure   5  Apply EHOB waffle cushion to chair when OOB, if able  6  Cleanse R posterior and L medial lower extremity wounds with NSS, pat dry, and apply adaptic to the wound beds  Cover with maxorb and top with ABD pads  Secure the dressings with esther wrap  Change every other day and as needed for soilage/dislodgement  7  Follow-up with the [facility] wound care center as an outpatient - please call [phone] for an appointment  (Ambulatory referral placed)  Objective:    Vitals: Blood pressure 162/72, pulse 65, temperature 97 7 °F (36 5 °C), resp  rate 22, height 4' 11" (1 499 m), weight 57 8 kg (127 lb 6 8 oz), SpO2 93 %  ,Body mass index is 25 74 kg/m²  Wound 12/23/22 Venous Ulcer Tibial Posterior;Right;Lateral (Active)   Wound Image   12/26/22 1302   Wound Description Beefy red;[place] 12/26/22 1302   Cristina-wound Assessment Dry; Intact; [place] 12/26/22 1302   Wound Length (cm) 1 4 cm 12/26/22 1302   Wound Width (cm) 1 cm 12/26/22 1302   Wound Depth (cm) 0 1 cm 12/26/22 1302   Wound Surface Area (cm^2) 1 4 cm^2 12/26/22 1302   Wound Volume (cm^3) 0 14 cm^3 12/26/22 1302   Calculated Wound Volume (cm^3) 0 14 cm^3 12/26/22 1302   Tunneling 0 cm 12/26/22 1302   Tunneling in depth located at 0 12/26/22 1302   Undermining 0 12/26/22 1302   Undermining is depth extending from 0 12/26/22 1302   Drainage Amount Small 12/26/22 1302   Drainage Description Serosanguineous 12/26/22 1302   Non-staged Wound Description Partial thickness 12/26/22 1302   Treatments Cleansed 12/26/22 1302   Dressing Calcium Alginate;ABD 12/26/22 1302   Wound packed?  No 12/26/22 1302   Packing- # removed 0 12/26/22 1302   Packing- # inserted 0 12/26/22 [id number] [facility] [place] 12/26/22 1302   Patient Tolerance Tolerated well 12/26/22 1302   Dressing Status Clean;Dry; Intact 12/26/22 1302       Wound 12/23/22 Venous Ulcer Tibial Left;Medial (Active)   Wound Image   12/26/22 1301   Wound Description Beefy red;Yellow;Slough;Pink 12/26/22 1301   Cristina-wound Assessment Intact;Fragile;Dry;Yellow-brown 12/26/22 1301   Wound Length (cm) 7 cm 12/26/22 1301   Wound Width (cm) 4 5 cm 12/26/22 1301   Wound Depth (cm) 0 1 cm 12/26/22 1301   Wound Surface Area (cm^2) 31 5 cm^2 12/26/22 1301   Wound Volume (cm^3) 3 15 cm^3 12/26/22 1301   Calculated Wound Volume (cm^3) 3 15 cm^3 12/26/22 1301   Tunneling 0 cm 12/26/22 1301   Tunneling in depth located at 0 12/26/22 1301   Undermining 0 12/26/22 1301   Undermining is depth extending from 0 12/26/22 1301   Drainage Amount Moderate 12/26/22 1301   Drainage Description Serosanguineous 12/26/22 1301   Non-staged Wound Description Partial thickness 12/26/22 1301   Treatments Cleansed 12/26/22 1301   Dressing Calcium Alginate;ABD 12/26/22 1301   Wound packed? No 12/26/22 1301   Packing- # removed 0 12/26/22 1301   Packing- # inserted 0 12/26/22 1301   Dressing Changed New 12/26/22 1301   Patient Tolerance Tolerated well 12/26/22 1301   Dressing Status Clean; Intact;Dry 12/26/22 1301         [place] text with questions or concerns  Wound care will follow along weekly  AVS updated    Ana Mccormack RN MSN [facility]

## 2022-12-26 NOTE — PLAN OF CARE
Problem: SKIN/TISSUE INTEGRITY - ADULT  Goal: Incision(s), wounds(s) or drain site(s) healing without S/S of infection  Description: INTERVENTIONS  - Assess and document dressing, incision, wound bed, drain sites and surrounding tissue  - Provide patient and family education  - Perform skin care/dressing changes daily  Outcome: Progressing     Problem: MOBILITY - ADULT  Goal: Maintain or return to baseline ADL function  Description: INTERVENTIONS:  -  Assess patient's ability to carry out ADLs; assess patient's baseline for ADL function and identify physical deficits which impact ability to perform ADLs (bathing, care of mouth/teeth, toileting, grooming, dressing, etc )  - Assess/evaluate cause of self-care deficits   - Assess range of motion  - Assess patient's mobility; develop plan if impaired  - Assess patient's need for assistive devices and provide as appropriate  - Encourage maximum independence but intervene and supervise when necessary  - Involve family in performance of ADLs  - Assess for home care needs following discharge   - Consider OT consult to assist with ADL evaluation and planning for discharge  - Provide patient education as appropriate  Outcome: Progressing     Problem: NEUROSENSORY - ADULT  Goal: Achieves stable or improved neurological status  Description: INTERVENTIONS  - Monitor and report changes in neurological status  - Monitor vital signs such as temperature, blood pressure, glucose, and any other labs ordered   - Initiate measures to prevent increased intracranial pressure  - Monitor for seizure activity and implement precautions if appropriate      Outcome: Progressing     Problem: CARDIOVASCULAR - ADULT  Goal: Maintains optimal cardiac output and hemodynamic stability  Description: INTERVENTIONS:  - Monitor I/O, vital signs and rhythm  - Monitor for S/S and trends of decreased cardiac output  - Administer and titrate ordered vasoactive medications to optimize hemodynamic stability  - Assess quality of pulses, skin color and temperature  - Assess for signs of decreased coronary artery perfusion  - Instruct patient to report change in severity of symptoms  Outcome: Progressing

## 2022-12-26 NOTE — ASSESSMENT & PLAN NOTE
· UTI positive for large leukocytes, negative for bacteria and nitrites  · History of MDRO  · Patient recently hospitalized 12/2-12/16 for similar symptoms and UTI and finished antibiotic course  · Follow-up urine culture  · Antibiotics switched to vancomycin  · Infectious disease following and recommended 5 days of antibiotics   Will complete today  · PT/OT recommends HHC with home PT  · Talk to patient's daughter and plan to discharge patient home today with home PT

## 2022-12-26 NOTE — PHYSICAL THERAPY NOTE
Physical Therapy Evaluation     Patient's Name: Darrell Loco    Admitting Diagnosis  Urinary retention [R33 9]  Hyponatremia [E87 1]  Urinary tract infection [N39 0]  MARIA DEL CARMEN (acute kidney injury) ([facility] ) [N17 9]  Acute urinary retention [R33 8]    Problem List  Patient Active Problem List   Diagnosis    MARIA DEL CARMEN (acute kidney injury) ([facility] )    Paroxysmal atrial fibrillation ([facility] )    Long term current use of anticoagulant    Chronic kidney disease, stage III (moderate) ([facility] )    Essential hypertension    Mitral valve insufficiency    Hypercholesterolemia    Anemia due to chronic kidney disease    Diastolic dysfunction    Epistaxis    Chronic kidney disease-mineral and bone disorder    Fracture of right orbital wall (HCC)    Acute on chronic diastolic (congestive) heart failure (HCC)    Iron deficiency anemia    Fecal occult blood test positive    Anemia    Seasonal allergic rhinitis due to pollen    Lower GI bleed    Chronic renal disease, stage IV (HCC)    Acute kidney injury superimposed on CKD (HCC)    Dyspnea    Hypoxia    Acute respiratory distress    Severe pulmonary hypertension (HCC)    Nonrheumatic aortic valve stenosis    Urinary tract infection    Hyponatremia    Bilateral renal cysts    Urinary obstruction    Acute respiratory failure with hypoxia ([facility] )    Hypothermia    Acute urinary retention     Past Medical History  Past Medical History:   Diagnosis Date    Anemia     Asteatotic eczema     Chronic kidney disease     Hypercholesterolemia     Lichen sclerosus et atrophicus     Mitral valve insufficiency     Nosebleed     Seborrheic keratoses     Tricuspid regurgitation     Vertigo      Past Surgical History  Past Surgical History:   Procedure Laterality Date    APPENDECTOMY  11/14/1939    CATARACT EXTRACTION, BILATERAL  04/2019    HYSTERECTOMY  11/14/1962    ORIF HIP FRACTURE Left 2020    ME COLONOSCOPY FLX DX W/COLLJ SPEC WHEN PFRMD N/A 5/19/2016    Procedure: EGD AND COLONOSCOPY;  Surgeon: Estuardo Beck MD;  Location: [facility]; Service: Gastroenterology      12/26/22 0744   PT Last Visit   PT Visit Date 12/26/22   Note Type   Note type Evaluation and Treatment   Pain Assessment   Pain Assessment Tool 0-10   Pain Score   (0/10 at rest; 7/10 with activity)   Pain Location/Orientation Orientation: Left; Location: Foot  (medial ankle)   Pain Onset/Description Frequency: Intermittent   Patient's Stated Pain Goal No pain   Hospital Pain Intervention(s) Repositioned;MD notified (Comment); Ambulation/increased activity; Elevated   Restrictions/Precautions   Weight Bearing Precautions Per Order No   Braces or Orthoses Other (Comment)  (none per patient)   Other Precautions Contact/isolation; Chair Alarm; Bed Alarm;Multiple lines; Fall Risk   Home Living   Type of [address] [place] [address] One level; Able to live on main level with bedroom/bathroom  (No ABA)   Bathroom Shower/Tub Walk-in shower   Bathroom Toilet Standard   Bathroom Equipment Grab bars in shower;Grab bars around toilet;Commode; Shower chair   Bathroom Accessibility Accessible   Home Equipment Walker;Cane   Additional Comments Pt ambulates without an AD  Prior Function   Level of [place] Independent with functional mobility; Independent with ADLs; Needs assistance with IADLS  (assist with showers)   Lives With Alone   Receives Help From Family;Home health  (nurse; therapy)   IADLs Family/Friend/Other provides transportation; Independent with meal prep; Independent with medication management   Falls in the last 6 months 0   Vocational Retired   General   Family/Caregiver Present No   Cognition   Overall Cognitive Status WFL   Arousal/Participation Alert   Orientation Level Oriented X4   Memory Within functional limits   Following Commands Follows all commands and directions without difficulty   Comments Pt agreeable to PT     Subjective   Subjective "I feel fatigued "   RLE Assessment   RLE Assessment X   Strength RLE   RLE Overall Strength 4-/5   LLE Assessment LLE Assessment X   Strength LLE   LLE Overall Strength 4-/5   Light Touch   RLE Light Touch Grossly intact   LLE Light Touch Grossly intact   Bed Mobility   Supine to Sit 5  Supervision   Additional items Assist x 1;HOB elevated; Bedrails; Increased time required;Verbal cues   Transfers   Sit to Stand   (close supervision/CG assist)   Additional items Assist x 1; Increased time required;Verbal cues   Stand to Sit   (close supervision/CG assist)   Additional items Assist x 1; Increased time required;Armrests; Verbal cues   Ambulation/Elevation   Gait pattern Decreased toe off;Decreased heel strike;Decreased hip extension; Excessively slow; Short stride;Narrow JOAN   Gait Assistance   (CG assist)   Additional items Assist x 1;Verbal cues   Assistive Device Rolling walker   Distance 3 feet x 1 trial; 10 fee x 1 trial   Ambulation/Elevation Additional Comments Pt's ambulation distance limited secondary to complaints of pain on left medial ankle, Dr Casa Ochoa and Dr Korey Edwards made aware   Balance   Static Sitting Good   Dynamic Sitting Fair +   Static Standing Fair   Dynamic Standing Fair -   Ambulatory Fair -   Endurance Deficit   Endurance Deficit Yes   Endurance Deficit Description decreased activity tolerance   Activity Tolerance   Activity Tolerance Patient tolerated treatment well   Assessment   Prognosis Good   Problem List Decreased strength;Decreased endurance;Decreased mobility; Impaired balance;Pain   Assessment Pt is 80year old female seen for PT evaluation s/p admit to [facility] on 12/23/2022 with Urinary tract infection  PT consulted to assess pt's functional mobility and d/c needs  Order placed for PT eval and tx, with up with assist order  Comorbidities affecting pt's physical performance at time of assessment include MARIA DEL CARMEN, paroxysmal atrial fibrillation, hyponatremia, and urinary obstruction  PTA, pt was independent with all functional mobility without an AD  Pt ambulates household and community distances  Pt resides alone in a one level house with no steps to enter  Personal factors affecting pt at time of IE include ambulating with an assistive device, inability to ambulate household distances, inability to navigate community distances, inability to navigate level surfaces without external assistance, unable to perform dynamic tasks in community, limited home support, inability to perform IADLs, and difficulty performing ADLs  Please find objective findings from PT assessment regarding body systems outlined above with impairments and limitations including weakness, impaired balance, decreased endurance, gait deviations, pain, decreased activity tolerance, decreased functional mobility tolerance, and fall risk  The following objective measures performed on IE also reveal limitations: Barthel Index: 55/100, Modified Jered: 4 (moderate/severe disability) and AM-PAC 6-Clicks: 41/46  Pt's clinical presentation is currently evolving seen in pt's presentation of need for ongoing medical management/monitoring, pt is a fall risk, pt is currently requiring use of RW for safe ambulation, pt currently has pain limiting functional mobility, and pt requires cues and assist for safety with functional mobility  Pt to benefit from continued PT tx to address deficits as defined above and maximize level of functional independent mobility and consistency  From PT/mobility standpoint, recommendation at time of d/c would be Home PT with family support pending progress in order to facilitate return to PLOF     Barriers to Discharge Decreased caregiver support   Goals   STG Expiration Date 01/05/23   Short Term Goal #1 In 10 days: Increase bilateral LE strength 1/2 grade to facilitate independent mobility, Perform all bed mobility tasks modified independent to decrease caregiver burden, Perform all transfers modified independent to improve independence, Ambulate > 150 ft  with least restrictive assistive device modified independent w/o LOB and w/ normalized gait pattern 100% of the time and Increase all balance 1/2 grade to decrease risk for falls   Plan   Treatment/Interventions Functional transfer training;LE strengthening/ROM; Therapeutic exercise; Endurance training;Patient/family training;Bed mobility;Gait training;Spoke to MD   PT Frequency 2-3x/wk   Recommendation   PT Discharge Recommendation Home with home health rehabilitation   AM-PAC Basic Mobility Inpatient   Turning in Bed Without Bedrails 3   Lying on Back to Sitting on Edge of Flat Bed 3   Moving Bed to Chair 3   Standing Up From Chair 3   Walk in Room 3   Climb 3-5 Stairs 3   Basic Mobility Inpatient Raw Score 18   Basic Mobility Standardized Score 41 05   Highest Level Of Mobility   -HL Goal 6: Walk 10 steps or more   -HLM Achieved 6: Walk 10 steps or more   Modified Lee Scale   Modified Lee Scale 4   Barthel Index   Feeding 10   Bathing 0   Grooming Score 5   Dressing Score 5   Bladder Score 10   Bowels Score 10   Toilet Use Score 5   Transfers (Bed/Chair) Score 10   Mobility (Level Surface) Score 0   Stairs Score 0   Barthel Index Score 55   Additional Treatment Session   Start Time 0758   End Time 0810   Treatment Assessment Pt agreeable to PT treatment session following PT evaluation  Pt performed seated therapeutic exercise as indicated below  Pt required verbal cues and occaisonal tactile cues for correct technique and form  Pt tolerated therapeutic exercise well without complaints of pain  Pt continues to exhibit decreased lower extremity strength, impaired balance, decreased endurance, gait deviations, and decreased functional mobility  PT to continue to recommend home PT with family support  PT to continue to follow and treat as appropriate  Exercises   Hip Flexion Sitting;10 reps;AROM; Bilateral   Hip Abduction Sitting;20 reps;AROM; Bilateral  (long sitting)   Hip Adduction Sitting;20 reps;AROM; Bilateral   Knee AROM Long Arc Quad Sitting;20 reps;AROM; Bilateral Ankle Pumps Sitting;10 reps;AROM; Right   End of Consult   Patient Position at End of Consult Bedside chair; All needs within reach  (PCA aware)     PT Evaluation Time: 0606-8972    PT Treatment Time: 1024-2333  12 minutes  Ros Zhao, PT, DPT

## 2022-12-26 NOTE — ASSESSMENT & PLAN NOTE
· Presenting with creatinine of 1 73, baseline around 1 1 most likely secondary to dehydration  · For now we will avoid nephrotoxic agents, will hold home Lasix 60 mg daily  · Urology recommending Hunter on discharge  · Creatinine improved to baseline  MARIA DEL CARMEN has resolved  · Will discharge patient with lasix 20 mg po qd     · Advise patient to outpatient follow up with nephrology

## 2022-12-26 NOTE — DISCHARGE INSTR - OTHER ORDERS
1  Apply hydraguard to b/l heels, buttocks and sacrum BID and PRN for prevention and protection  2  Apply skin nourishing cream the entire skin daily for moisture  3  Turn and reposition patient every  2 hours   4  Elevate heels off of bed with pillows to offload pressure   5  Apply EHOB waffle cushion to chair when OOB, if able  6  Cleanse R posterior and L medial lower extremity wounds with NSS, pat dry, and apply adaptic to the wound beds  Cover with maxorb and top with ABD pads  Secure the dressings with esther wrap  Change every other day and as needed for soilage/dislodgement  7  Follow-up with the [facility] wound care center as an outpatient - please call [phone] for an appointment  (Ambulatory referral placed)

## 2022-12-27 LAB
ALBUMIN SERPL BCP-MCNC: 3 G/DL (ref 3.5–5)
ALP SERPL-CCNC: 108 U/L (ref 46–116)
ALT SERPL W P-5'-P-CCNC: 27 U/L (ref 12–78)
ANION GAP SERPL CALCULATED.3IONS-SCNC: 8 MMOL/L (ref 4–13)
AST SERPL W P-5'-P-CCNC: 30 U/L (ref 5–45)
BASOPHILS # BLD AUTO: 0.02 THOUSANDS/ÂΜL (ref 0–0.1)
BASOPHILS NFR BLD AUTO: 0 % (ref 0–1)
BILIRUB SERPL-MCNC: 0.77 MG/DL (ref 0.2–1)
BUN SERPL-MCNC: 25 MG/DL (ref 5–25)
CALCIUM ALBUM COR SERPL-MCNC: 9 MG/DL (ref 8.3–10.1)
CALCIUM SERPL-MCNC: 8.2 MG/DL (ref 8.3–10.1)
CHLORIDE SERPL-SCNC: 103 MMOL/L (ref 96–108)
CO2 SERPL-SCNC: 26 MMOL/L (ref 21–32)
CREAT SERPL-MCNC: 1.03 MG/DL (ref 0.6–1.3)
EOSINOPHIL # BLD AUTO: 0.16 THOUSAND/ÂΜL (ref 0–0.61)
EOSINOPHIL NFR BLD AUTO: 2 % (ref 0–6)
ERYTHROCYTE [DISTWIDTH] IN BLOOD BY AUTOMATED COUNT: 18.1 % (ref 11.6–15.1)
GFR SERPL CREATININE-BSD FRML MDRD: 45 ML/MIN/1.73SQ M
GLUCOSE SERPL-MCNC: 86 MG/DL (ref 65–140)
HCT VFR BLD AUTO: 31.5 % (ref 34.8–46.1)
HGB BLD-MCNC: 9.4 G/DL (ref 11.5–15.4)
IMM GRANULOCYTES # BLD AUTO: 0.15 THOUSAND/UL (ref 0–0.2)
IMM GRANULOCYTES NFR BLD AUTO: 2 % (ref 0–2)
LYMPHOCYTES # BLD AUTO: 0.45 THOUSANDS/ÂΜL (ref 0.6–4.47)
LYMPHOCYTES NFR BLD AUTO: 5 % (ref 14–44)
MCH RBC QN AUTO: 26 PG (ref 26.8–34.3)
MCHC RBC AUTO-ENTMCNC: 29.8 G/DL (ref 31.4–37.4)
MCV RBC AUTO: 87 FL (ref 82–98)
MONOCYTES # BLD AUTO: 0.88 THOUSAND/ÂΜL (ref 0.17–1.22)
MONOCYTES NFR BLD AUTO: 9 % (ref 4–12)
NEUTROPHILS # BLD AUTO: 8.39 THOUSANDS/ÂΜL (ref 1.85–7.62)
NEUTS SEG NFR BLD AUTO: 82 % (ref 43–75)
NRBC BLD AUTO-RTO: 0 /100 WBCS
PLATELET # BLD AUTO: 248 THOUSANDS/UL (ref 149–390)
PMV BLD AUTO: 10.7 FL (ref 8.9–12.7)
POTASSIUM SERPL-SCNC: 4.1 MMOL/L (ref 3.5–5.3)
PROT SERPL-MCNC: 5.8 G/DL (ref 6.4–8.4)
RBC # BLD AUTO: 3.61 MILLION/UL (ref 3.81–5.12)
SODIUM SERPL-SCNC: 137 MMOL/L (ref 135–147)
VANCOMYCIN SERPL-MCNC: 19.3 UG/ML (ref 10–20)
WBC # BLD AUTO: 10.05 THOUSAND/UL (ref 4.31–10.16)

## 2022-12-27 RX ORDER — VANCOMYCIN HYDROCHLORIDE 500 MG/100ML
500 INJECTION, SOLUTION INTRAVENOUS ONCE
Status: COMPLETED | OUTPATIENT
Start: 2022-12-27 | End: 2022-12-28

## 2022-12-27 RX ADMIN — VANCOMYCIN HYDROCHLORIDE 500 MG: 500 INJECTION, SOLUTION INTRAVENOUS at 18:09

## 2022-12-27 RX ADMIN — Medication 1 G: at 18:08

## 2022-12-27 RX ADMIN — METOPROLOL SUCCINATE 100 MG: 100 TABLET, EXTENDED RELEASE ORAL at 08:55

## 2022-12-27 RX ADMIN — Medication 1 G: at 13:13

## 2022-12-27 RX ADMIN — FERROUS SULFATE TAB 325 MG (65 MG ELEMENTAL FE) 325 MG: 325 (65 FE) TAB at 08:55

## 2022-12-27 RX ADMIN — APIXABAN 2.5 MG: 2.5 TABLET, FILM COATED ORAL at 17:57

## 2022-12-27 RX ADMIN — DILTIAZEM HYDROCHLORIDE 60 MG: 60 CAPSULE, EXTENDED RELEASE ORAL at 11:45

## 2022-12-27 RX ADMIN — APIXABAN 2.5 MG: 2.5 TABLET, FILM COATED ORAL at 08:55

## 2022-12-27 RX ADMIN — Medication 1 G: at 08:55

## 2022-12-27 NOTE — INCIDENTAL FINDINGS
The following findings require follow up:  Radiographic finding   Finding: Indeterminate 2 1 cm lesion in the upper pole right kidney, stable since 11/5/202   Follow up required: yes, with MRI for definitive characterization   Follow up should be done within 3 month(s)    Please notify the following clinician to assist with the follow up:   PCP and urology

## 2022-12-27 NOTE — DISCHARGE INSTR - AVS FIRST PAGE
Please outpatient follow up with primary care doctor in a week  Please outpatient follow up with urology for the management of Hunter catheter  Please outpatient follow up with nephrology for the manage of diuresis and hyponatremia  Please Follow[facility]up with [facility] as an outpatient - please call [phone] for an appointment

## 2022-12-27 NOTE — PROGRESS NOTES
Niko Sandoval is a 80 y o  female who is currently ordered Vancomycin IV with management by the Pharmacy Consult service  Relevant clinical data and objective / subjective history reviewed  Vancomycin Assessment:  Indication and Goal AUC/Trough: Urinary tract infection (goal -600, trough >10), -600, trough >10  Clinical Status: stable  Micro: No change  Renal Function:  SCr: 1 09 mg/dL  CrCl: 23 1 mL/min  Renal replacement: Not on dialysis  Days of Therapy: 3  Current Dose: 750mg IV daily PRN when level <15  22 level 19 3  Vancomycin Plan:  New Dosinmg IV once today to complete 5 day therapy  Per ID note, regimen to be completed today after last dose  Renal Function Monitoring: Daily BMP and [place] will continue to follow closely for s/sx of nephrotoxicity, infusion reactions and appropriateness of therapy  BMP and CBC will be ordered per protocol  We will continue to follow the patient’s culture results and clinical progress daily      Dominik Adkins, Pharmacist

## 2022-12-27 NOTE — PLAN OF CARE
Problem: SKIN/TISSUE INTEGRITY - ADULT  Goal: Incision(s), wounds(s) or drain site(s) healing without S/S of infection  Description: INTERVENTIONS  - Assess and document dressing, incision, wound bed, drain sites and surrounding tissue  - Provide patient and family education  - Perform skin care/dressing changes daily  Outcome: Progressing     Problem: NEUROSENSORY - ADULT  Goal: Achieves stable or improved neurological status  Description: INTERVENTIONS  - Monitor and report changes in neurological status  - Monitor vital signs such as temperature, blood pressure, glucose, and any other labs ordered   - Initiate measures to prevent increased intracranial pressure  - Monitor for seizure activity and implement precautions if appropriate      Outcome: Progressing     Problem: CARDIOVASCULAR - ADULT  Goal: Maintains optimal cardiac output and hemodynamic stability  Description: INTERVENTIONS:  - Monitor I/O, vital signs and rhythm  - Monitor for S/S and trends of decreased cardiac output  - Administer and titrate ordered vasoactive medications to optimize hemodynamic stability  - Assess quality of pulses, skin color and temperature  - Assess for signs of decreased coronary artery perfusion  - Instruct patient to report change in severity of symptoms  Outcome: Progressing     Problem: METABOLIC, FLUID AND ELECTROLYTES - ADULT  Goal: Electrolytes maintained within normal limits  Description: INTERVENTIONS:  - Monitor labs and assess patient for signs and symptoms of electrolyte imbalances  - Administer electrolyte replacement as ordered  - Monitor response to electrolyte replacements, including repeat lab results as appropriate  - Instruct patient on fluid and nutrition as appropriate  Outcome: Progressing

## 2022-12-27 NOTE — PROGRESS NOTES
INTERNAL MEDICINE RESIDENCY PROGRESS NOTE     Name: Jonny Nguyen   Age & Sex: 80 y o  female   MRN: [id number]  Unit/Bed#: -01   Encounter: [id number]    PATIENT INFORMATION     Name: Jonny Nguyen   Age & Sex: 80 y o  female   MRN: [id number]  Hospital Stay Days: 3    ASSESSMENT/PLAN     Principal Problem:    Urinary tract infection  Active Problems:    MARIA DEL CARMEN [facility]) [facility]    Paroxysmal atrial fibrillation (HCC)    Hyponatremia    Urinary obstruction    Ankle wound, left, initial encounter      Ankle wound, left, initial encounter  Assessment & Plan  Patient has a wound at left medial ankle, result from shoe   The left feet and ankle are warm and erythematous  Patient is currently on Vanco  Consulted wound care and recommendation appreciated  Outpatient follow up wound care    Urinary obstruction  Assessment & Plan  · Unsure of how long patient has had any area, reports anywhere from 2 to 6 days at home? · Bladder scan in  mL, Mahan catheter placed, but patient reports she does not want it anymore and wants to take it out, so Mahan DC'd at this time  Now mahan catheter has been reinserted  · CT revealed Distended urinary bladder  · Urology consulted and recommend keep the mahan in and outpatient follow up with urology  Hyponatremia  Assessment & Plan  · Improving at this time  · For now will place on sodium chloride tabs 1 g 3 times daily with meals  · Continue to monitor    Paroxysmal atrial fibrillation (HCC)  Assessment & Plan  · Rate currently controlled  · Continue home Cardizem, metoprolol, Eliquis twice daily    MARIA DEL CARMEN (acute kidney injury) ([facility] )  Assessment & Plan  · Presenting with creatinine of 1 73, baseline around 1 1 most likely secondary to dehydration  · For now we will avoid nephrotoxic agents, will hold home Lasix 60 mg daily  · Urology recommending Mahan on discharge  · Creatinine improved to baseline   MARIA DEL CARMEN has resolved  · Continue to monitor    * Urinary tract infection  Assessment & Plan  · UTI positive for large leukocytes, negative for bacteria and nitrites  · History of MDRO  · Patient recently hospitalized - for similar symptoms and UTI and finished antibiotic course  · Follow-up urine culture  · Antibiotics switched to vancomycin  · Infectious disease following and recommended 5 days of antibiotics  Will complete today  · PT/OT recommends HHC with home PT  · Talk to patient's daughter and plan to discharge patient home tomorrow         Disposition: Home    SUBJECTIVE     Patient seen and examined  No acute events overnight  Patient complained of a sore on her wound at lower extremities    OBJECTIVE     Vitals:    22 1522 22 2301 22 2308 22 0828   BP: 162/72  (!) 174/89 150/89   BP Location:       Pulse: 65  63 83   Resp: 22 18     Temp: 97 7 °F (36 5 °C) (!) 97 °F (36 1 °C) (!) 97 2 °F (36 2 °C) (!) 97 2 °F (36 2 °C)   TempSrc:       SpO2: 93% 92% 93% 90%   Weight:       Height:          Temperature:   Temp (24hrs), Av 3 °F (36 3 °C), Min:97 °F (36 1 °C), Max:97 7 °F (36 5 °C)    Temperature: (!) 97 2 °F (36 2 °C)  Intake & Output:  I/O        0701   0700  0701   0700  0701   0700    P  O  1200 960     I V  (mL/kg) 1458 8 (25 2)      IV Piggyback 250 150     Total Intake(mL/kg) 2908 8 (50 3) 1110 (19 2)     Urine (mL/kg/hr) 2200 (1 6) 1350 (1)     Total Output 2200 1350     Net +708 8 -240                Weights:        Body mass index is 25 74 kg/m²  Weight (last 2 days)     None        Physical Exam  Vitals and nursing note reviewed  Constitutional:       Appearance: Normal appearance  She is not toxic-appearing  HENT:      Head: Normocephalic  Cardiovascular:      Rate and Rhythm: Normal rate and regular rhythm  Heart sounds: Normal heart sounds  Pulmonary:      Effort: Pulmonary effort is normal  No respiratory distress  Breath sounds: Normal breath sounds     Abdominal: General: Abdomen is flat  Bowel sounds are normal       Palpations: Abdomen is soft  Tenderness: There is no abdominal tenderness  There is no guarding  Genitourinary:     Comments: Hunter catheter in place  Musculoskeletal:         General: No tenderness  Right lower leg: No edema  Left lower leg: No edema  Skin:     General: Skin is warm  Findings: Lesion (left medial ankle) present  Neurological:      Mental Status: She is oriented to person, place, and time  Comments:          LABORATORY DATA     Labs: I have personally reviewed pertinent reports  Results from last 7 days   Lab Units 12/27/22 0446 12/26/22 0449 12/25/22  0613 12/24/22  0545 12/23/22  0256   WBC Thousand/uL 10 05 10 48* 14 34* 14 14* 20 88*   HEMOGLOBIN g/dL 9 4* 9 4* 9 8* 8 9* 9 4*   HEMATOCRIT % 31 5* 31 1* 32 3* 27 8* 29 5*   PLATELETS Thousands/uL 248 256 252 222 234   NEUTROS PCT % 82*  --   --   --  88*   MONOS PCT % 9  --   --   --  4   MONO PCT %  --   --   --  6  --       Results from last 7 days   Lab Units 12/27/22 0446 12/26/22 0449 12/25/22  0613   POTASSIUM mmol/L 4 1 3 9 4 3   CHLORIDE mmol/L 103 100 98   CO2 mmol/L 26 27 26   BUN mg/dL 25 31* 41*   CREATININE mg/dL 1 03 1 13 1 23   CALCIUM mg/dL 8 2* 8 2* 8 2*   ALK PHOS U/L 108 112 110   ALT U/L 27 28 31   AST U/L 30 30 34                            IMAGING & DIAGNOSTIC TESTING     Radiology Results: I have personally reviewed pertinent reports  CT abdomen pelvis wo contrast    Result Date: 12/23/2022  Impression: 1  Distended urinary bladder with nondependent pockets of gas, likely related to recent instrumentation  Correlate clinically  2   Small volume free pelvic fluid, slightly increased since prior study 12/2/2022  3   Indeterminate 2 1 cm lesion in the upper pole right kidney, stable since 11/5/2021    While this lesion may represent a proteinaceous/hemorrhagic renal cyst, definitive characterization with contrast-enhanced MRI abdomen within 3 months is recommended, if clinically warranted  4   Stable small right pleural effusion  The study was marked in [facility] for immediate notification  Workstation performed: [id number]     CT head wo contrast    Result Date: 12/23/2022  Impression: No acute intracranial abnormality  Stable, nonemergent findings compared to 12/12/2022 above  Workstation performed: [id number]     Other Diagnostic Testing: I have personally reviewed pertinent reports  ACTIVE MEDICATIONS     Current Facility-Administered Medications   Medication Dose Route Frequency   • acetaminophen (TYLENOL) tablet 650 mg  650 mg Oral Q6H PRN   • apixaban (ELIQUIS) tablet 2 5 mg  2 5 mg Oral BID   • diltiazem (CARDIZEM SR) 12 hr capsule 60 mg  60 mg Oral Q12H MARICRUZ   • ferrous sulfate tablet 325 mg  325 mg Oral Daily With Breakfast   • metoprolol succinate (TOPROL-XL) 24 hr tablet 100 mg  100 mg Oral Daily   • sodium chloride tablet 1 g  1 g Oral TID With Meals   • vancomycin (VANCOCIN) IVPB (premix in dextrose) 500 mg 100 mL  500 mg Intravenous Once   • vancomycin (VANCOCIN) IVPB (premix in dextrose) 750 mg 150 mL  15 mg/kg Intravenous Daily PRN       VTE Pharmacologic Prophylaxis: Eliquis  VTE Mechanical Prophylaxis: sequential compression device    Portions of the record may have been created with voice recognition software  Occasional wrong word or "sound a like" substitutions may have occurred due to the inherent limitations of voice recognition software  Read the chart carefully and recognize, using context, where substitutions have occurred    ==  Eddie Fam MD  [facility]  Internal Medicine Residency PGY-3

## 2022-12-27 NOTE — PLAN OF CARE
Problem: Potential for Falls  Goal: Patient will remain free of falls  Description: INTERVENTIONS:  - Educate patient/family on patient safety including physical limitations  - Instruct patient to call for assistance with activity   - Consult OT/PT to assist with strengthening/mobility   - Keep Call bell within reach  - Keep bed low and locked with side rails adjusted as appropriate  - Keep care items and personal belongings within reach  - Initiate and maintain comfort rounds  - Make Fall Risk Sign visible to staff  - A x 1 with rolling walker for ambulation  - Apply yellow socks and bracelet for high fall risk patients  - Consider moving patient to room near nurses station  Outcome: Progressing     Problem: MOBILITY - ADULT  Goal: Maintain or return to baseline ADL function  Description: INTERVENTIONS:  -  Assess patient's ability to carry out ADLs; assess patient's baseline for ADL function and identify physical deficits which impact ability to perform ADLs (bathing, care of mouth/teeth, toileting, grooming, dressing, etc )  - Assess/evaluate cause of self-care deficits   - Assess range of motion  - Assess patient's mobility; develop plan if impaired  - Assess patient's need for assistive devices and provide as appropriate  - Encourage maximum independence but intervene and supervise when necessary  - Involve family in performance of ADLs  - Assess for home care needs following discharge   - Consider OT consult to assist with ADL evaluation and planning for discharge  - Provide patient education as appropriate  Outcome: Progressing  Goal: Maintains/Returns to pre admission functional level  Description: INTERVENTIONS:  - Perform BMAT or MOVE assessment daily    - Set and communicate daily mobility goal to care team and patient/family/caregiver     - Collaborate with rehabilitation services on mobility goals if consulted  - Stand patient 3 times a day  - Ambulate patient 3 times a day  - Out of bed to chair 3 times a day   - Out of bed for meals 3 times a day  - Out of bed for toileting  - Record patient progress and toleration of activity level   Outcome: Progressing     Problem: SKIN/TISSUE INTEGRITY - ADULT  Goal: Incision(s), wounds(s) or drain site(s) healing without S/S of infection  Description: INTERVENTIONS  - Assess and document dressing, incision, wound bed, drain sites and surrounding tissue  - Provide patient and family education  - Perform skin care/dressing changes daily  Outcome: Progressing     Problem: NEUROSENSORY - ADULT  Goal: Achieves stable or improved neurological status  Description: INTERVENTIONS  - Monitor and report changes in neurological status  - Monitor vital signs such as temperature, blood pressure, glucose, and any other labs ordered   - Initiate measures to prevent increased intracranial pressure  - Monitor for seizure activity and implement precautions if appropriate      Outcome: Progressing     Problem: CARDIOVASCULAR - ADULT  Goal: Maintains optimal cardiac output and hemodynamic stability  Description: INTERVENTIONS:  - Monitor I/O, vital signs and rhythm  - Monitor for S/S and trends of decreased cardiac output  - Administer and titrate ordered vasoactive medications to optimize hemodynamic stability  - Assess quality of pulses, skin color and temperature  - Assess for signs of decreased coronary artery perfusion  - Instruct patient to report change in severity of symptoms  Outcome: Progressing     Problem: METABOLIC, FLUID AND ELECTROLYTES - ADULT  Goal: Electrolytes maintained within normal limits  Description: INTERVENTIONS:  - Monitor labs and assess patient for signs and symptoms of electrolyte imbalances  - Administer electrolyte replacement as ordered  - Monitor response to electrolyte replacements, including repeat lab results as appropriate  - Instruct patient on fluid and nutrition as appropriate  Outcome: Progressing  Goal: Fluid balance maintained  Description: INTERVENTIONS:  - Monitor labs   - Monitor I/O and WT  - Instruct patient on fluid and nutrition as appropriate  - Assess for signs & symptoms of volume excess or deficit  Outcome: Progressing

## 2022-12-27 NOTE — PROGRESS NOTES
Progress Note - Infectious Disease   Citlalli Delgado 80 y o  female MRN: [id number]  Unit/Bed#: -01 Encounter: [id number]      Impression/Plan:  1  Acute encephalopathy   Progressive confusion   CT of the head without acute findings on review   Multiple factors contributing including 2, 3, and other electrolyte abnormalities  Hemodynamically stable   Given 4 and patient's age would avoid drugs such as Levaquin  We will avoid Zosyn given 3   Mental status improved  Will complete antibiotics today  Continue to trend fever curve/WBC  Urology evaluation appreciated, maintain Hunter  Monitor mental status while admitted  Additional supportive care/discharge as per primary     2   Urinary retention and possible complicated UTI   Patient's UA on presentation abnormal and she presented with complaints of anuria   Was noted to have 300 cc in the bladder and Hunter catheter placed  Sandre Fell removed at patient's request   Was noted to have retention on prior admission as well   CT imaging was done and showed a distended bladder but no upper tract pathology   Hunter catheter replaced   Creatinine significantly improved   Urine culture so far with Enterococcus  She received Levaquin in the ER, but isolate now noted to be resistant  Patient now on vancomycin and white count has normalized  Will complete course of therapy today  Definitive diagnosis of UTI is uncertain  Complete vancomycin course today, total 3 days  Last doses of antibiotics ordered  Trend fever curve/WBC while admitted  Monitor abdominal exam while admitted  Urology evaluation appreciated, maintain Hunter  Additional supportive care/discharge as per primary     3  Acute kidney injury on chronic kidney disease   Acute elevation in creatinine noted from baseline and patient with reports of decreased urine output   Suspect some component of retention  Kira Sorensen noted with hyponatremia    Antibiotic completion as above  Monitor chemistry while admitted  Fluid hydration as per primary  Urology evaluation appreciated     4  Valvulopathy and previous abnormal EKG  recent EKGs noted with fluctuating QTc interval   Given this and patient's age would avoid Levaquin if possible  Antibiotic course completion as above      5  Leukocytosis   Abrupt elevation in white count noted    Downtrending and stable today but patient had also received and was being tapered off of steroids on presentation which may be contributing  Normalized today  Monitor CBC while admitted  Antibiotic completion as above     Above plan discussed with the patient and with primary service  Given completion of antibiotic course and plans for discharge tomorrow, ID consult service will sign off at this time  Please call if questions      Antibiotics:  Vancomycin 3  Total antibiotic 5    Subjective:  Patient seen at bedside this morning and she denied having any nausea, vomiting, chest pain or shortness of breath  Tolerating current antibiotic without issue  White count normalized  Susceptibilities reviewed and patient to complete antibiotic today  Objective:  Vitals:  Temp:  [97 °F (36 1 °C)-97 7 °F (36 5 °C)] 97 2 °F (36 2 °C)  HR:  [63-83] 83  Resp:  [17-22] 18  BP: (134-174)/(63-89) 150/89  SpO2:  [90 %-93 %] 90 %  Temp (24hrs), Av 3 °F (36 3 °C), Min:97 °F (36 1 °C), Max:97 7 °F (36 5 °C)  Current: Temperature: (!) 97 2 °F (36 2 °C)    Physical Exam:   General Appearance:  Alert, interactive, nontoxic, no acute distress  Throat: Oropharynx moist without lesions  Lungs:   Clear to auscultation bilaterally; no wheezes, rhonchi or rales; respirations unlabored on room air   Heart:  RRR; no murmur, rub or gallop appreciated   Abdomen:   Soft, non-tender, non-distended, positive bowel sounds  Hunter catheter draining clear urine   Extremities: No clubbing, cyanosis or edema   Skin: No new rashes or lesions  No new draining wounds noted         Labs, Imaging, & Other studies:   All pertinent labs and imaging studies were personally reviewed  Results from last 7 days   Lab Units 12/27/22  0446 12/26/22  0449 12/25/22  0613   WBC Thousand/uL 10 05 10 48* 14 34*   HEMOGLOBIN g/dL 9 4* 9 4* 9 8*   PLATELETS Thousands/uL 248 256 252     Results from last 7 days   Lab Units 12/27/22  0446 12/26/22  0449 12/25/22  0613   POTASSIUM mmol/L 4 1 3 9 4 3   CHLORIDE mmol/L 103 100 98   CO2 mmol/L 26 27 26   BUN mg/dL 25 31* 41*   CREATININE mg/dL 1 03 1 13 1 23   EGFR ml/min/1 73sq m 45 40 36   CALCIUM mg/dL 8 2* 8 2* 8 2*   AST U/L 30 30 34   ALT U/L 27 28 31   ALK PHOS U/L 108 112 110     Results from last 7 days   Lab Units 12/23/22  0328   URINE CULTURE  60,000-69,000 cfu/ml Enterococcus faecalis*

## 2022-12-28 ENCOUNTER — TRANSITIONAL CARE MANAGEMENT (OUTPATIENT)
Dept: INTERNAL MEDICINE CLINIC | Facility: CLINIC | Age: 87
End: 2022-12-28

## 2022-12-28 VITALS
TEMPERATURE: 97.3 F | WEIGHT: 127.43 LBS | HEIGHT: 59 IN | SYSTOLIC BLOOD PRESSURE: 132 MMHG | DIASTOLIC BLOOD PRESSURE: 71 MMHG | OXYGEN SATURATION: 93 % | HEART RATE: 62 BPM | BODY MASS INDEX: 25.69 KG/M2 | RESPIRATION RATE: 17 BRPM

## 2022-12-28 LAB
ANION GAP SERPL CALCULATED.3IONS-SCNC: 9 MMOL/L (ref 4–13)
BUN SERPL-MCNC: 19 MG/DL (ref 5–25)
CALCIUM SERPL-MCNC: 8.2 MG/DL (ref 8.3–10.1)
CHLORIDE SERPL-SCNC: 103 MMOL/L (ref 96–108)
CO2 SERPL-SCNC: 23 MMOL/L (ref 21–32)
CREAT SERPL-MCNC: 0.86 MG/DL (ref 0.6–1.3)
GFR SERPL CREATININE-BSD FRML MDRD: 56 ML/MIN/1.73SQ M
GLUCOSE SERPL-MCNC: 78 MG/DL (ref 65–140)
POTASSIUM SERPL-SCNC: 4.2 MMOL/L (ref 3.5–5.3)
SODIUM SERPL-SCNC: 135 MMOL/L (ref 135–147)

## 2022-12-28 RX ORDER — SODIUM CHLORIDE 1000 MG
1 TABLET, SOLUBLE MISCELLANEOUS
Qty: 90 TABLET | Refills: 0 | Status: ON HOLD | OUTPATIENT
Start: 2022-12-28 | End: 2023-01-27

## 2022-12-28 RX ORDER — FUROSEMIDE 20 MG/1
20 TABLET ORAL DAILY
Qty: 30 TABLET | Refills: 0 | Status: ON HOLD | OUTPATIENT
Start: 2022-12-28 | End: 2023-01-27

## 2022-12-28 RX ADMIN — METOPROLOL SUCCINATE 100 MG: 100 TABLET, EXTENDED RELEASE ORAL at 09:32

## 2022-12-28 RX ADMIN — APIXABAN 2.5 MG: 2.5 TABLET, FILM COATED ORAL at 09:32

## 2022-12-28 RX ADMIN — Medication 1 G: at 09:49

## 2022-12-28 RX ADMIN — FERROUS SULFATE TAB 325 MG (65 MG ELEMENTAL FE) 325 MG: 325 (65 FE) TAB at 09:49

## 2022-12-28 RX ADMIN — DILTIAZEM HYDROCHLORIDE 60 MG: 60 CAPSULE, EXTENDED RELEASE ORAL at 09:38

## 2022-12-28 RX ADMIN — DILTIAZEM HYDROCHLORIDE 60 MG: 60 CAPSULE, EXTENDED RELEASE ORAL at 00:07

## 2022-12-28 NOTE — DISCHARGE SUMMARY
[address]  Discharge- Gladys Moe 2/17/1924, 80 y o  female MRN: [id number]  Unit/Bed#: -Susan Encounter: [id number]  Primary Care Provider: Samaria Iraheta MD   Date and time admitted to hospital: 12/23/2022  2:13 AM    Ankle wound, left, initial encounter  Assessment & Plan  Patient has a wound at left medial ankle, result from shoe   The left feet and ankle are warm and erythematous  Patient is currently on Vanco  Consulted wound care and recommendation appreciated  Outpatient follow up wound care    Urinary obstruction  Assessment & Plan  · Unsure of how long patient has had any area, reports anywhere from 2 to 6 days at home? · Bladder scan in  mL, Mahan catheter placed, but patient reports she does not want it anymore and wants to take it out, so Mahan DC'd at this time  Now mahan catheter has been reinserted  · CT revealed Distended urinary bladder  · Urology consulted and recommend keep the mahan in and outpatient follow up with urology  Hyponatremia  Assessment & Plan  · Improving at this time  · For now will place on sodium chloride tabs 1 g 3 times daily with meals  · Continue to monitor with BMP     Paroxysmal atrial fibrillation (HCC)  Assessment & Plan  · Rate currently controlled  · Continue home Cardizem, metoprolol, Eliquis twice daily    MARIA DEL CARMEN (acute kidney injury) ([facility] [address] )  Assessment & Plan  · Presenting with creatinine of 1 73, baseline around 1 1 most likely secondary to dehydration  · For now we will avoid nephrotoxic agents, will hold home Lasix 60 mg daily  · Urology recommending Mahan on discharge  · Creatinine improved to baseline  MARIA DEL CARMEN has resolved  · Will discharge patient with lasix 20 mg po qd     · Advise patient to outpatient follow up with nephrology      * Urinary tract infection  Assessment & Plan  · UTI positive for large leukocytes, negative for bacteria and nitrites  · History of MDRO  · Patient recently hospitalized 12/2-12/16 for similar symptoms and UTI and finished antibiotic course  · Follow-up urine culture  · Antibiotics switched to vancomycin  · Infectious disease following and recommended 5 days of antibiotics  Will complete today  · PT/OT recommends HHC with home PT  · Talk to patient's daughter and plan to discharge patient home today with home PT      Discharging Resident Physician: James Daniel MD  Attending: Jarred Burleson MD  PCP: Michelina Meckel, MD  Admission Date: 12/23/2022  Discharge Date: 12/28/22        Disposition:     Home with [facility] Services (Reminder: Complete face to face encounter)    Reason for Admission: Anuria    Consultations During Hospital Stay:  · Infectious disease    Procedures Performed:     · None    Significant Findings / Test Results:     CT abdomen pelvis wo contrast    Result Date: 12/23/2022  Impression: 1  Distended urinary bladder with nondependent pockets of gas, likely related to recent instrumentation  Correlate clinically  2   Small volume free pelvic fluid, slightly increased since prior study 12/2/2022  3   Indeterminate 2 1 cm lesion in the upper pole right kidney, stable since 11/5/2021  While this lesion may represent a proteinaceous/hemorrhagic renal cyst, definitive characterization with contrast-enhanced MRI abdomen within 3 months is recommended, if clinically warranted  4   Stable small right pleural effusion  The study was marked in [facility] for immediate notification  Workstation performed: [id number]     CT head wo contrast    Result Date: 12/23/2022  Impression: No acute intracranial abnormality  Stable, nonemergent findings compared to 12/12/2022 above  Workstation performed: [id number]       Incidental Findings:   · none    Test Results Pending at Discharge (will require follow up):    · None     Outpatient Tests Requested:  · BMP in a week    Complications:  none    Hospital Course:     José Miguel Ma is a 80 y o  female patient who originally presented to the hospital on 12/23/2022 due to anuria  Patient was found to have urinary tract infection and retention and MARIA DEL CARMEN  Hunter catheter was inserted  Urology was consulted and they recommended discharge with Hunter cath and outpatient follow-up with urology to discuss  Due to repeated urinary tract infection, infectious disease was consulted  Urine culture grows Enterococcus faecalis  Patient was treated with 1 dose of Levaquin added 3 days of vancomycin  Currently completed 5 days of treatment  Patient was also found to have wound on lower extremities  Wound care was consulted  Advised patient outpatient follow-up with wound care center  Patient had multiple episodes of MARIA DEL CARMEN in the past months  Diuretics was held during hospitalization, currently creatinine has improved to 0 8  Patient was on Lasix 60 mg p o  daily at home, will discharge patient with Lasix 20 mg p o  daily  Patient was also found to have hyponatremia on admission  Salt tablet was added and continued on discharge  Advised patient outpatient to follow-up with nephrology  Advised patient outpatient follow-up with PCP with BMP in a week  Advised patient outpatient follow-up with urology  Advised patient outpatient follow-up with wound care center  Condition at Discharge: good     Discharge Day Visit / Exam:     Subjective: Patient feels okay  No acute complaints  Vitals: Blood Pressure: 132/71 (12/28/22 0712)  Pulse: 62 (12/28/22 0712)  Temperature: (!) 97 3 °F (36 3 °C) (12/28/22 0712)  Temp Source: Oral (12/26/22 0925)  Respirations: 17 (12/27/22 2257)  Height: 4' 11" (149 9 cm) (12/24/22 1500)  Weight - Scale: 57 8 kg (127 lb 6 8 oz) (12/24/22 1500)  SpO2: 93 % (12/28/22 5854)  Exam:   Physical Exam  Constitutional:       Appearance: Normal appearance  She is not toxic-appearing  HENT:      Head: Normocephalic  Cardiovascular:      Rate and Rhythm: Normal rate and regular rhythm  Heart sounds: Normal heart sounds     Pulmonary:      Effort: Pulmonary effort is normal  No respiratory distress  Breath sounds: Normal breath sounds  Abdominal:      General: Abdomen is flat  Bowel sounds are normal       Palpations: Abdomen is soft  Tenderness: There is no abdominal tenderness  There is no guarding  Genitourinary:     Comments: Hunter catheter in place  Musculoskeletal:         General: No tenderness  Right lower leg: No edema  Left lower leg: No edema  Skin:     General: Skin is warm  Findings: Lesion (left medial ankle) present  Neurological:      Mental Status: She is oriented to person, place, and time  Discussion with Family: With her daughter    Discharge instructions/Information to patient and family:   See after visit summary for information provided to patient and family  Provisions for Follow-Up Care:  See after visit summary for information related to follow-up care and any pertinent home health orders  Planned Readmission: NO     Discharge Medications:  See after visit summary for reconciled discharge medications provided to patient and family        ** Please Note: This note has been constructed using a voice recognition system **

## 2022-12-28 NOTE — CONSULTS
Vancomycin IV Pharmacy-to-Dose Consultation    Christiano Lockhart is a 80 y o  female who was receiving Vancomycin IV with management by the Pharmacy Consult service for treatment of Urinary tract infection (goal -600, trough >10)  The patient's Vancomycin therapy has been completed / discontinued  Thank you for allowing us to take part in this patient's care  Pharmacy will sign-off now; please call or re-consult if there are any questions        Delia Bravo

## 2022-12-28 NOTE — CASE MANAGEMENT
Case Management Assessment & Discharge Planning Note    Patient name Madhavi Killer  Location /-52 MRN [id number]  : 1924 Date 2022       Current Admission Date: 2022  Current Admission Diagnosis:Urinary tract infection   Patient Active Problem List    Diagnosis Date Noted   • Ankle wound, left, initial encounter 2022   • Acute urinary retention    • Hypothermia 2022   • Acute respiratory failure with hypoxia ([address] ) 2022   • Urinary obstruction 2022   • Urinary tract infection 2022   • Hyponatremia 2022   • Bilateral renal cysts 2022   • Nonrheumatic aortic valve stenosis 2022   • Dyspnea 2021   • Hypoxia 2021   • Acute respiratory distress 2021   • Severe pulmonary hypertension ([address] ) 2021   • Acute kidney injury superimposed on CKD ([facility] [address] ) 2021   • Chronic renal disease, stage IV ([facility] [address] ) 2021   • Lower GI bleed 2021   • Seasonal allergic rhinitis due to pollen 10/26/2021   • Iron deficiency anemia 07/15/2021   • Fecal occult blood test positive 07/15/2021   • Anemia 07/15/2021   • Acute on chronic diastolic (congestive) heart failure ([address] ) 2021   • Fracture of right orbital wall ([facility] [address] ) 2020   • Chronic kidney disease-mineral and bone disorder 2019   • Epistaxis    • Diastolic dysfunction 15/76/0275   • Anemia due to chronic kidney disease 10/18/2018   • Mitral valve insufficiency 2018   • Hypercholesterolemia 2018   • MARIA DEL CARMEN (acute kidney injury) ([facility] [address] ) 2017   • Paroxysmal atrial fibrillation ([facility] [address] ) 2017   • Long term current use of anticoagulant 2017   • Chronic kidney disease, stage III (moderate) ([facility] [address] ) 2017   • Essential hypertension 2017      LOS (days): 4  Geometric Mean LOS (GMLOS) (days): 4 30  Days to GMLOS:0 3     OBJECTIVE:  PATIENT READMITTED TO HOSPITAL  Risk of Unplanned Readmission Score: 28 14         Current admission status: Inpatient       Preferred Pharmacy:   [facility]/pharmacy #[id number], [address]  Phone: [phone] Fax: [phone]    Primary Care Provider: Christina Mac MD    Primary Insurance: MEDICARE  Secondary Insurance: [facility]    ASSESSMENT:  [address] [facility], [place] [address]   Primary Phone: [phone] (Home)               Advance Directives  Does patient have a [address]?: Yes  Does patient have Advance Directives?: Yes         Readmission Root Cause  30 Day Readmission: Yes  Who directed you to return to the hospital?: Self  Did you understand whom to contact if you had questions or problems?: Yes  Did you get your prescriptions before you left the hospital?: No  Reason[de-identified] Preference for own pharmacy  Were you able to get your prescriptions filled when you left the hospital?: Yes  Did you take your medications as prescribed?: Yes    Patient Information  Admitted from[de-identified] Home  Mental Status: Confused  During Assessment patient was accompanied by: Daughter  Assessment information provided by[de-identified] Daughter  Primary Caregiver: Self  Support Systems: Children, Family members  [place] of Residence: [address] [place] [address] do you live in?: [address] access options   Select all that apply : No steps to enter home  Do the steps have railings?: No  In the last 12 months, was there a time when you were not able to pay the mortgage or rent on time?: No  In the last 12 months, how many places have you lived?: 1  In the last 12 months, was there a time when you did not have a steady place to sleep or slept in a shelter (including now)?: No  Homeless/housing insecurity resource given?: N/A  Living Arrangements: Lives Alone    Activities of Daily Living Prior to Admission  Functional Status: Independent  Completes ADLs independently?: Yes  Ambulates independently?: Yes  Does patient use assisted devices?: No  Does patient currently own DME?: No  Does patient have a history of Outpatient Therapy (PT/OT)?: No  Does the patient have a history of Short-Term Rehab?: No  Does patient have a history of HHC?: Yes  Does patient currently have [address]?: Yes    Current Home Health Care  Type of Current Home Care Services: Nurse visit, [address][de-identified] Other (please enter name in comment)  [address] [facility] Provider[de-identified] PCP    Patient Information Continued  Income Source: SSI/SSD  Does patient have prescription coverage?: Yes  Within the past 12 months, you worried that your food would run out before you got the money to buy more : Never true  Within the past 12 months, the food you bought just didn't last and you didn't have money to get more : Never true  Food insecurity resource given?: N/A  Does patient receive dialysis treatments?: No  Does patient have a history of substance abuse?: No  Does patient have a history of Mental Health Diagnosis?: No    PHQ 2/9 Screening   Reviewed PHQ 2/9 Depression Screening Score?: No    Means of Transportation  Means of Transport to Appts[de-identified] Family transport  In the past 12 months, has lack of transportation kept you from medical appointments or from getting medications?: No  In the past 12 months, has lack of transportation kept you from meetings, work, or from getting things needed for daily living?: No  Was application for public transport provided?: N/A        DISCHARGE DETAILS:    Discharge planning discussed with[de-identified] Daughter  Freedom of Choice: Yes  Comments - Freedom of Choice: CM discussed freedom of choice as it pertains to discharge planning  [facility]  Patients daughter asked for a thai to naeem to be submitted   Patients daughter also asked for wound care, CM contacted resident Carol Iniguez to add to [address] order  CM contacted family/caregiver?: Yes  Were Treatment Team discharge recommendations reviewed with patient/caregiver?: Yes  Did patient/caregiver verbalize understanding of patient care needs?: Yes  Were patient/caregiver advised of the risks associated with not following Treatment Team discharge recommendations?: Yes         Requested [address]         Is the patient interested in [facility] at discharge?: Yes         Other Referral/Resources/Interventions Provided:  Interventions: DME  Referral Comments: CM sent referral for DME and [facility]    Would you like to participate in our [address] service program?  : No - Declined    Treatment Team Recommendation: Home with [address]  Discharge Destination Plan[de-identified] Home with [place] at Discharge : Family

## 2022-12-28 NOTE — TELEPHONE ENCOUNTER
Pt was in the hospital and was discharged with a mahan  Pt's daughter called and stated that the patient is to be seen in one week for the discussion of the mahan for long term management  Pt's daughter Giovanni Marquez can be reached at [phone]    Pt's daughter stated [place] and she needs to have the appointment early next week

## 2022-12-29 ENCOUNTER — TELEPHONE (OUTPATIENT)
Dept: INTERNAL MEDICINE CLINIC | Facility: CLINIC | Age: 87
End: 2022-12-29

## 2022-12-29 NOTE — TELEPHONE ENCOUNTER
Incorrect what nurse told her   She was dehydrated and had acute kidney injury so her dose of furosemide (lasix) was reduced to 20mg daily

## 2022-12-29 NOTE — TELEPHONE ENCOUNTER
Left message with patient for daughter to RTRN call and schedule F/U with AP for 3 weeks per instructions from hospital discharge

## 2022-12-29 NOTE — TELEPHONE ENCOUNTER
Spoke with Peggy Gentile, pt's daughter and Arnold Dewitt, [address] [place] [address] Jon Cameron nurse  They stated that prior to hospitalization, pt was on Lasix 20 mg 3 per day  Pt's hospital AVS states "CHANGE how you take these medications-furosemide 20 mg tablet, Take 1 tablet (20 mg total) by mouth daily"  Upon discharge, nurse instructed patient to take the three 20mg tablets like before her hospitalization AND the extra 20mg that is on her AVS   Total of 80mg daily  Peggy Gentile is questioning if this is correct      How many mg of lasix should pt take daily  Please call Peggy Gentile at [phone]

## 2022-12-29 NOTE — TELEPHONE ENCOUNTER
----- Message from Valli Ganser sent at 12/29/2022  8:59 AM EST -----  Patient is being discharged from their inpatient hospitalization today  Patients unplanned readmission score is red or yellow (meaning that they are at high risk of readmission) and require a TCM appointment within 3-5 days of discharge  Please contact this patient to schedule appointment      Thank you    Inpatient Care Management

## 2022-12-29 NOTE — TELEPHONE ENCOUNTER
Pallavi fay/patient's daughter  She stated pt has bed sores on her feet and was recommended to go to wound care center for treatment    Daughter stated pt is very weak and it's very difficult to transport pt  Requesting Order for home wound care

## 2022-12-30 ENCOUNTER — TELEPHONE (OUTPATIENT)
Dept: INTERNAL MEDICINE CLINIC | Facility: CLINIC | Age: 87
End: 2022-12-30

## 2022-12-30 LAB
DME PARACHUTE DELIVERY DATE REQUESTED: NORMAL
DME PARACHUTE ITEM DESCRIPTION: NORMAL
DME PARACHUTE ORDER STATUS: NORMAL
DME PARACHUTE SUPPLIER NAME: NORMAL
DME PARACHUTE SUPPLIER PHONE: NORMAL

## 2022-12-30 NOTE — TELEPHONE ENCOUNTER
Mayra Moreau from [address] care after her hospital stay yesterday  However, the patient felt that she's not safe at home and she should have gone to a skilled nursing facility  We worked with her family and got her a bed at [facility] in [place]  The patient will be transferred there on Monday  They have a bed available Monday  We're going to continue to see the patient through the weekend  Monday she will go there for rehab and then we'll resume home care when she comes home      Mayra Moreau # [phone]

## 2023-01-01 ENCOUNTER — HOSPITAL ENCOUNTER (INPATIENT)
Facility: HOSPITAL | Age: 88
LOS: 2 days | End: 2023-01-08
Attending: EMERGENCY MEDICINE | Admitting: HOSPITALIST

## 2023-01-01 ENCOUNTER — APPOINTMENT (EMERGENCY)
Dept: RADIOLOGY | Facility: HOSPITAL | Age: 88
End: 2023-01-01

## 2023-01-01 ENCOUNTER — TRANSITIONAL CARE MANAGEMENT (OUTPATIENT)
Dept: INTERNAL MEDICINE CLINIC | Facility: CLINIC | Age: 88
End: 2023-01-01

## 2023-01-01 ENCOUNTER — HOSPICE ADMISSION (OUTPATIENT)
Dept: HOME HOSPICE | Facility: HOSPICE | Age: 88
End: 2023-01-01

## 2023-01-01 ENCOUNTER — HOME CARE VISIT (OUTPATIENT)
Dept: HOME HEALTH SERVICES | Facility: HOME HEALTHCARE | Age: 88
End: 2023-01-01

## 2023-01-01 VITALS
RESPIRATION RATE: 17 BRPM | OXYGEN SATURATION: 95 % | HEART RATE: 73 BPM | SYSTOLIC BLOOD PRESSURE: 89 MMHG | TEMPERATURE: 97.5 F | DIASTOLIC BLOOD PRESSURE: 55 MMHG

## 2023-01-01 DIAGNOSIS — N17.9 ACUTE RENAL FAILURE (HCC): ICD-10-CM

## 2023-01-01 DIAGNOSIS — I50.9 CHF EXACERBATION (HCC): Primary | ICD-10-CM

## 2023-01-01 DIAGNOSIS — E87.5 HYPERKALEMIA: ICD-10-CM

## 2023-01-01 DIAGNOSIS — I50.33 ACUTE ON CHRONIC DIASTOLIC (CONGESTIVE) HEART FAILURE (HCC): ICD-10-CM

## 2023-01-01 DIAGNOSIS — E87.1 HYPONATREMIA: ICD-10-CM

## 2023-01-01 DIAGNOSIS — Z51.5 COMFORT MEASURES ONLY STATUS: ICD-10-CM

## 2023-01-01 LAB
2HR DELTA HS TROPONIN: 0 NG/L
4HR DELTA HS TROPONIN: 3 NG/L
ANION GAP SERPL CALCULATED.3IONS-SCNC: 13 MMOL/L (ref 4–13)
ANION GAP SERPL CALCULATED.3IONS-SCNC: 16 MMOL/L (ref 4–13)
ANION GAP SERPL CALCULATED.3IONS-SCNC: 18 MMOL/L (ref 4–13)
ANION GAP SERPL CALCULATED.3IONS-SCNC: 18 MMOL/L (ref 4–13)
ATRIAL RATE: 326 BPM
BASE EX.OXY STD BLDV CALC-SCNC: 73.7 % (ref 60–80)
BASE EXCESS BLDV CALC-SCNC: -12.1 MMOL/L
BASOPHILS # BLD AUTO: 0.02 THOUSANDS/ÂΜL (ref 0–0.1)
BASOPHILS NFR BLD AUTO: 0 % (ref 0–1)
BUN SERPL-MCNC: 44 MG/DL (ref 5–25)
BUN SERPL-MCNC: 45 MG/DL (ref 5–25)
BUN SERPL-MCNC: 46 MG/DL (ref 5–25)
BUN SERPL-MCNC: 46 MG/DL (ref 5–25)
CALCIUM SERPL-MCNC: 8.7 MG/DL (ref 8.3–10.1)
CALCIUM SERPL-MCNC: 8.8 MG/DL (ref 8.3–10.1)
CALCIUM SERPL-MCNC: 8.8 MG/DL (ref 8.3–10.1)
CALCIUM SERPL-MCNC: 8.9 MG/DL (ref 8.3–10.1)
CARDIAC TROPONIN I PNL SERPL HS: 28 NG/L
CARDIAC TROPONIN I PNL SERPL HS: 28 NG/L
CARDIAC TROPONIN I PNL SERPL HS: 31 NG/L
CHLORIDE SERPL-SCNC: 89 MMOL/L (ref 96–108)
CHLORIDE SERPL-SCNC: 89 MMOL/L (ref 96–108)
CHLORIDE SERPL-SCNC: 90 MMOL/L (ref 96–108)
CHLORIDE SERPL-SCNC: 90 MMOL/L (ref 96–108)
CO2 SERPL-SCNC: 14 MMOL/L (ref 21–32)
CO2 SERPL-SCNC: 16 MMOL/L (ref 21–32)
CO2 SERPL-SCNC: 16 MMOL/L (ref 21–32)
CO2 SERPL-SCNC: 18 MMOL/L (ref 21–32)
CREAT SERPL-MCNC: 2.41 MG/DL (ref 0.6–1.3)
CREAT SERPL-MCNC: 2.55 MG/DL (ref 0.6–1.3)
CREAT SERPL-MCNC: 2.77 MG/DL (ref 0.6–1.3)
CREAT SERPL-MCNC: 2.78 MG/DL (ref 0.6–1.3)
EOSINOPHIL # BLD AUTO: 0.01 THOUSAND/ÂΜL (ref 0–0.61)
EOSINOPHIL NFR BLD AUTO: 0 % (ref 0–6)
ERYTHROCYTE [DISTWIDTH] IN BLOOD BY AUTOMATED COUNT: 17.8 % (ref 11.6–15.1)
GFR SERPL CREATININE-BSD FRML MDRD: 13 ML/MIN/1.73SQ M
GFR SERPL CREATININE-BSD FRML MDRD: 13 ML/MIN/1.73SQ M
GFR SERPL CREATININE-BSD FRML MDRD: 15 ML/MIN/1.73SQ M
GFR SERPL CREATININE-BSD FRML MDRD: 16 ML/MIN/1.73SQ M
GLUCOSE SERPL-MCNC: 175 MG/DL (ref 65–140)
GLUCOSE SERPL-MCNC: 190 MG/DL (ref 65–140)
GLUCOSE SERPL-MCNC: 260 MG/DL (ref 65–140)
GLUCOSE SERPL-MCNC: 41 MG/DL (ref 65–140)
GLUCOSE SERPL-MCNC: 65 MG/DL (ref 65–140)
GLUCOSE SERPL-MCNC: <20 MG/DL (ref 65–140)
HCO3 BLDV-SCNC: 16.6 MMOL/L (ref 24–30)
HCT VFR BLD AUTO: 34.8 % (ref 34.8–46.1)
HGB BLD-MCNC: 10.3 G/DL (ref 11.5–15.4)
IMM GRANULOCYTES # BLD AUTO: 0.16 THOUSAND/UL (ref 0–0.2)
IMM GRANULOCYTES NFR BLD AUTO: 2 % (ref 0–2)
LACTATE SERPL-SCNC: 8.8 MMOL/L (ref 0.5–2)
LYMPHOCYTES # BLD AUTO: 0.29 THOUSANDS/ÂΜL (ref 0.6–4.47)
LYMPHOCYTES NFR BLD AUTO: 3 % (ref 14–44)
MCH RBC QN AUTO: 25.6 PG (ref 26.8–34.3)
MCHC RBC AUTO-ENTMCNC: 29.6 G/DL (ref 31.4–37.4)
MCV RBC AUTO: 87 FL (ref 82–98)
MONOCYTES # BLD AUTO: 1.07 THOUSAND/ÂΜL (ref 0.17–1.22)
MONOCYTES NFR BLD AUTO: 11 % (ref 4–12)
NEUTROPHILS # BLD AUTO: 8.29 THOUSANDS/ÂΜL (ref 1.85–7.62)
NEUTS SEG NFR BLD AUTO: 84 % (ref 43–75)
NRBC BLD AUTO-RTO: 4 /100 WBCS
NT-PROBNP SERPL-MCNC: 6491 PG/ML
O2 CT BLDV-SCNC: 10.7 ML/DL
PCO2 BLDV: 50.8 MM HG (ref 42–50)
PH BLDV: 7.13 [PH] (ref 7.3–7.4)
PLATELET # BLD AUTO: 297 THOUSANDS/UL (ref 149–390)
PMV BLD AUTO: 10.4 FL (ref 8.9–12.7)
PO2 BLDV: 52.5 MM HG (ref 35–45)
POTASSIUM SERPL-SCNC: 6 MMOL/L (ref 3.5–5.3)
POTASSIUM SERPL-SCNC: 6.1 MMOL/L (ref 3.5–5.3)
POTASSIUM SERPL-SCNC: 6.2 MMOL/L (ref 3.5–5.3)
POTASSIUM SERPL-SCNC: 6.3 MMOL/L (ref 3.5–5.3)
QRS AXIS: 138 DEGREES
QRSD INTERVAL: 82 MS
QT INTERVAL: 472 MS
QTC INTERVAL: 417 MS
RBC # BLD AUTO: 4.02 MILLION/UL (ref 3.81–5.12)
SODIUM SERPL-SCNC: 121 MMOL/L (ref 135–147)
SODIUM SERPL-SCNC: 121 MMOL/L (ref 135–147)
SODIUM SERPL-SCNC: 122 MMOL/L (ref 135–147)
SODIUM SERPL-SCNC: 123 MMOL/L (ref 135–147)
T WAVE AXIS: -28 DEGREES
VENTRICULAR RATE: 47 BPM
WBC # BLD AUTO: 9.84 THOUSAND/UL (ref 4.31–10.16)

## 2023-01-01 RX ORDER — DEXTROSE MONOHYDRATE 25 G/50ML
50 INJECTION, SOLUTION INTRAVENOUS ONCE
Status: COMPLETED | OUTPATIENT
Start: 2023-01-01 | End: 2023-01-01

## 2023-01-01 RX ORDER — FUROSEMIDE 10 MG/ML
20 INJECTION INTRAMUSCULAR; INTRAVENOUS ONCE
Status: COMPLETED | OUTPATIENT
Start: 2023-01-01 | End: 2023-01-01

## 2023-01-01 RX ORDER — DILTIAZEM HYDROCHLORIDE 60 MG/1
60 CAPSULE, EXTENDED RELEASE ORAL EVERY 12 HOURS SCHEDULED
Status: DISCONTINUED | OUTPATIENT
Start: 2023-01-01 | End: 2023-01-01

## 2023-01-01 RX ORDER — HALOPERIDOL 5 MG/ML
0.5 INJECTION INTRAMUSCULAR EVERY 2 HOUR PRN
Status: DISCONTINUED | OUTPATIENT
Start: 2023-01-01 | End: 2023-01-01 | Stop reason: HOSPADM

## 2023-01-01 RX ORDER — SCOLOPAMINE TRANSDERMAL SYSTEM 1 MG/1
1 PATCH, EXTENDED RELEASE TRANSDERMAL
Status: DISCONTINUED | OUTPATIENT
Start: 2023-01-01 | End: 2023-01-01 | Stop reason: HOSPADM

## 2023-01-01 RX ORDER — FUROSEMIDE 10 MG/ML
40 INJECTION INTRAMUSCULAR; INTRAVENOUS
Status: DISCONTINUED | OUTPATIENT
Start: 2023-01-01 | End: 2023-01-01

## 2023-01-01 RX ORDER — BUMETANIDE 0.25 MG/ML
1 INJECTION INTRAMUSCULAR; INTRAVENOUS CONTINUOUS
Status: DISCONTINUED | OUTPATIENT
Start: 2023-01-01 | End: 2023-01-01

## 2023-01-01 RX ORDER — LORAZEPAM 2 MG/ML
1 INJECTION INTRAMUSCULAR
Status: DISCONTINUED | OUTPATIENT
Start: 2023-01-01 | End: 2023-01-01 | Stop reason: HOSPADM

## 2023-01-01 RX ORDER — ALBUTEROL SULFATE 2.5 MG/3ML
2.5 SOLUTION RESPIRATORY (INHALATION) ONCE
Status: DISCONTINUED | OUTPATIENT
Start: 2023-01-01 | End: 2023-01-01

## 2023-01-01 RX ORDER — GLYCOPYRROLATE 0.2 MG/ML
0.1 INJECTION INTRAMUSCULAR; INTRAVENOUS EVERY 4 HOURS PRN
Status: DISCONTINUED | OUTPATIENT
Start: 2023-01-01 | End: 2023-01-01 | Stop reason: HOSPADM

## 2023-01-01 RX ORDER — FENTANYL CITRATE 50 UG/ML
25 INJECTION, SOLUTION INTRAMUSCULAR; INTRAVENOUS EVERY 2 HOUR PRN
Status: DISCONTINUED | OUTPATIENT
Start: 2023-01-01 | End: 2023-01-01 | Stop reason: HOSPADM

## 2023-01-01 RX ORDER — BUMETANIDE 0.25 MG/ML
4 INJECTION, SOLUTION INTRAMUSCULAR; INTRAVENOUS ONCE
Status: DISCONTINUED | OUTPATIENT
Start: 2023-01-01 | End: 2023-01-01

## 2023-01-01 RX ORDER — BISACODYL 10 MG
10 SUPPOSITORY, RECTAL RECTAL DAILY PRN
Status: DISCONTINUED | OUTPATIENT
Start: 2023-01-01 | End: 2023-01-01 | Stop reason: HOSPADM

## 2023-01-01 RX ORDER — BUMETANIDE 0.25 MG/ML
4 INJECTION, SOLUTION INTRAMUSCULAR; INTRAVENOUS ONCE
Status: COMPLETED | OUTPATIENT
Start: 2023-01-01 | End: 2023-01-01

## 2023-01-01 RX ORDER — DEXTROSE MONOHYDRATE 25 G/50ML
INJECTION, SOLUTION INTRAVENOUS
Status: COMPLETED
Start: 2023-01-01 | End: 2023-01-01

## 2023-01-01 RX ORDER — FERROUS SULFATE 325(65) MG
325 TABLET ORAL
Status: DISCONTINUED | OUTPATIENT
Start: 2023-01-01 | End: 2023-01-01

## 2023-01-01 RX ORDER — FUROSEMIDE 10 MG/ML
80 INJECTION INTRAMUSCULAR; INTRAVENOUS
Status: DISCONTINUED | OUTPATIENT
Start: 2023-01-01 | End: 2023-01-01

## 2023-01-01 RX ORDER — BENZONATATE 100 MG/1
100 CAPSULE ORAL 3 TIMES DAILY PRN
Status: DISCONTINUED | OUTPATIENT
Start: 2023-01-01 | End: 2023-01-01 | Stop reason: HOSPADM

## 2023-01-01 RX ORDER — CALCIUM GLUCONATE 20 MG/ML
1 INJECTION, SOLUTION INTRAVENOUS ONCE
Status: COMPLETED | OUTPATIENT
Start: 2023-01-01 | End: 2023-01-01

## 2023-01-01 RX ORDER — LOPERAMIDE HYDROCHLORIDE 2 MG/1
4 CAPSULE ORAL ONCE
Status: COMPLETED | OUTPATIENT
Start: 2023-01-01 | End: 2023-01-01

## 2023-01-01 RX ORDER — METOPROLOL SUCCINATE 100 MG/1
100 TABLET, EXTENDED RELEASE ORAL DAILY
Status: DISCONTINUED | OUTPATIENT
Start: 2023-01-01 | End: 2023-01-01

## 2023-01-01 RX ADMIN — DEXTROSE MONOHYDRATE 50 ML: 25 INJECTION, SOLUTION INTRAVENOUS at 18:51

## 2023-01-01 RX ADMIN — GLYCOPYRROLATE 0.1 MG: 0.2 INJECTION, SOLUTION INTRAMUSCULAR; INTRAVENOUS at 09:06

## 2023-01-01 RX ADMIN — BENZONATATE 100 MG: 100 CAPSULE ORAL at 11:39

## 2023-01-01 RX ADMIN — FUROSEMIDE 80 MG: 10 INJECTION, SOLUTION INTRAMUSCULAR; INTRAVENOUS at 16:19

## 2023-01-01 RX ADMIN — GLYCOPYRROLATE 0.1 MG: 0.2 INJECTION, SOLUTION INTRAMUSCULAR; INTRAVENOUS at 13:22

## 2023-01-01 RX ADMIN — FUROSEMIDE 20 MG: 10 INJECTION, SOLUTION INTRAMUSCULAR; INTRAVENOUS at 11:18

## 2023-01-01 RX ADMIN — LOPERAMIDE HYDROCHLORIDE 4 MG: 2 CAPSULE ORAL at 09:36

## 2023-01-01 RX ADMIN — SCOPALAMINE 1 PATCH: 1 PATCH, EXTENDED RELEASE TRANSDERMAL at 09:37

## 2023-01-01 RX ADMIN — CALCIUM GLUCONATE 1 G: 20 INJECTION, SOLUTION INTRAVENOUS at 18:32

## 2023-01-01 RX ADMIN — FUROSEMIDE 20 MG: 10 INJECTION, SOLUTION INTRAMUSCULAR; INTRAVENOUS at 12:43

## 2023-01-01 RX ADMIN — DEXTROSE MONOHYDRATE 50 ML: 25 INJECTION, SOLUTION INTRAVENOUS at 18:24

## 2023-01-01 RX ADMIN — LORAZEPAM 1 MG: 2 INJECTION INTRAMUSCULAR; INTRAVENOUS at 16:12

## 2023-01-01 RX ADMIN — BUMETANIDE 4 MG: 0.25 INJECTION INTRAMUSCULAR; INTRAVENOUS at 20:57

## 2023-01-01 RX ADMIN — SODIUM BICARBONATE 25 MEQ: 84 INJECTION INTRAVENOUS at 18:26

## 2023-01-04 NOTE — TELEPHONE ENCOUNTER
Called daughter and she is in rehab since Sunday at [facility] in [place] she will be there for 14-17 days or till 75% and able to come home

## 2023-01-06 PROBLEM — E87.5 HYPERKALEMIA: Status: ACTIVE | Noted: 2023-01-01

## 2023-01-06 NOTE — ASSESSMENT & PLAN NOTE
· K = 6  · suspect 2/2 MARIA DEL CARMEN, 2/2 CHF  · No EKG changes  · Will f/u repeat BMP after lasix dosing - if no improvement will treat it

## 2023-01-06 NOTE — ASSESSMENT & PLAN NOTE
· To be having some hyponatremia since previous hospitalization last month, was being treated with salt tablets 1 g 3 times daily  · Worse hyponatremia today 121  · At this time suspect secondary to hypervolemia  · Monitor response to IV Lasix  · While diuresing, will hold off on salt tablets to avoid rapid correction as well as salt tablets would worsen CHF

## 2023-01-06 NOTE — ASSESSMENT & PLAN NOTE
· 2/2 CHF  Was placed on bipap in ED due to pulm edema due to hypotension  · Will trial off Bipap & see  · Use supp O2 to keep sats > 90%

## 2023-01-06 NOTE — ED PROCEDURE NOTE
PROCEDURE  CriticalCare Time  Performed by: Lazaro Cano DO  Authorized by:  Lazaro Cano DO     Critical care provider statement:     Critical care time (minutes):  38    Critical care time was exclusive of:  Separately billable procedures and treating other patients and teaching time    Critical care was necessary to treat or prevent imminent or life-threatening deterioration of the following conditions:  Respiratory failure    Critical care was time spent personally by me on the following activities:  Blood draw for specimens, obtaining history from patient or surrogate, development of treatment plan with patient or surrogate, discussions with consultants, evaluation of patient's response to treatment, examination of patient, review of old charts, re-evaluation of patient's condition, ordering and review of radiographic studies, ordering and review of laboratory studies and ordering and performing treatments and interventions    I assumed direction of critical care for this patient from another provider in my specialty: shasha Cano DO  01/06/23 1213

## 2023-01-06 NOTE — ASSESSMENT & PLAN NOTE
· EKG shows Afib with slow VR  · Home regimen -resume 60 mg twice daily, Toprol- mg daily  · Given acute CHF with unknown EF, will hold off on the Cardizem as well as heart rates 40-50  · Continue Toprol-XL  · Continue Eliquis at a lower dose 2 5 twice daily

## 2023-01-06 NOTE — ED ATTENDING ATTESTATION
1/6/2023  Nael TAM, DO, saw and evaluated the patient  I have discussed the patient with the resident/non-physician practitioner and agree with the resident's/non-physician practitioner's findings, Plan of Care, and MDM as documented in the resident's/non-physician practitioner's note, except where noted  All available labs and Radiology studies were reviewed  I was present for key portions of any procedure(s) performed by the resident/non-physician practitioner and I was immediately available to provide assistance  At this point I agree with the current assessment done in the Emergency Department  I have conducted an independent evaluation of this patient a history and physical is as follows:    Patient is a 26-year-old female with a history of CHF, chronic indwelling Hunter catheter, transferred from [facility]  The patient says for the last 1 week she been having some increasing dyspnea on exertion, feeling like she is holding onto more fluid, feeling more weak and fatigued, today was unable to get up because of generalized weakness  She has no chest pain, no palpitations, no fever, no chills, no myalgias or arthralgias  EMS indicates that she is hypoxic in the 80% on room air, they placed her on 4 L nasal cannula  Accompanying paperwork includes a POLST dated January 2, 2023 showing DNR, DNI, limited interventions  Discussed this with the patient and she indicates that is correct  Accompanying transfer paperwork also indicates the patient had an outpatient chest x-ray yesterday showing CHF with bilateral pleural effusions      General:  Patient is well-appearing  Head:  Atraumatic  Eyes:  Conjunctiva pink  ENT:  Mucous membranes are moist  Neck:  Supple  Cardiac:  S1-S2, without murmurs  Lungs: Diffuse rales  Abdomen:  Soft, nontender, normal bowel sounds, no CVA tenderness, no tympany, no rigidity, no guarding, urinary catheter draining yellow urine  Extremities: Diffuse pitting pedal edema, no calf asymmetry  Neurologic:  Awake, fluent speech, normal comprehension  AAOx3  Skin:  Pink warm and dry  Psychiatric:  Alert, pleasant, cooperative    ED Course  ED Course as of 01/06/23 1011   Fri Jan 06, 2023   1006 ECG interpreted by me,Atrial fibrillation, rate of 47, slow ventricular response, right axis deviation, no acute ischemic or infarctive changes, previous ECG from December 13, 2022 shows sinus rhythm       XR chest 1 view portable   ED Interpretation   Abnormal   Chest x-ray interpreted me shows evidence of pulmonary vascular congestion,          Labs Reviewed   CBC AND DIFFERENTIAL - Abnormal       Result Value Ref Range Status    WBC 9 84  4 31 - 10 16 Thousand/uL Final    RBC 4 02  3 81 - 5 12 Million/uL Final    Hemoglobin 10 3 (*) 11 5 - 15 4 g/dL Final    Hematocrit 34 8  34 8 - 46 1 % Final    MCV 87  82 - 98 fL Final    MCH 25 6 (*) 26 8 - 34 3 pg Final    MCHC 29 6 (*) 31 4 - 37 4 g/dL Final    RDW 17 8 (*) 11 6 - 15 1 % Final    MPV 10 4  8 9 - 12 7 fL Final    Platelets 818  740 - 390 Thousands/uL Final    nRBC 4  /100 WBCs Final    Neutrophils Relative 84 (*) 43 - 75 % Final    Immat GRANS % 2  0 - 2 % Final    Lymphocytes Relative 3 (*) 14 - 44 % Final    Monocytes Relative 11  4 - 12 % Final    Eosinophils Relative 0  0 - 6 % Final    Basophils Relative 0  0 - 1 % Final    Neutrophils Absolute 8 29 (*) 1 85 - 7 62 Thousands/µL Final    Immature Grans Absolute 0 16  0 00 - 0 20 Thousand/uL Final    Lymphocytes Absolute 0 29 (*) 0 60 - 4 47 Thousands/µL Final    Monocytes Absolute 1 07  0 17 - 1 22 Thousand/µL Final    Eosinophils Absolute 0 01  0 00 - 0 61 Thousand/µL Final    Basophils Absolute 0 02  0 00 - 0 10 Thousands/µL Final   BASIC METABOLIC PANEL - Abnormal    Sodium 121 (*) 135 - 147 mmol/L Final    Potassium 6 0 (*) 3 5 - 5 3 mmol/L Final    Chloride 90 (*) 96 - 108 mmol/L Final    CO2 18 (*) 21 - 32 mmol/L Final    ANION GAP 13  4 - 13 mmol/L Final BUN 44 (*) 5 - 25 mg/dL Final    Creatinine 2 41 (*) 0 60 - 1 30 mg/dL Final    Comment: Standardized to IDMS reference method    Glucose 65  65 - 140 mg/dL Final    Comment: If the patient is fasting, the ADA then defines impaired fasting glucose as > 100 mg/dL and diabetes as > or equal to 123 mg/dL  Specimen collection should occur prior to Sulfasalazine administration due to the potential for falsely depressed results  Specimen collection should occur prior to Sulfapyridine administration due to the potential for falsely elevated results  Calcium 8 7  8 3 - 10 1 mg/dL Final    eGFR 16  ml/min/1 73sq m Final    Narrative:     [place] guidelines for Chronic Kidney Disease (CKD):   •  Stage 1 with normal or high GFR (GFR > 90 mL/min/1 73 square meters)  •  Stage 2 Mild CKD (GFR = 60-89 mL/min/1 73 square meters)  •  Stage 3A Moderate CKD (GFR = 45-59 mL/min/1 73 square meters)  •  Stage 3B Moderate CKD (GFR = 30-44 mL/min/1 73 square meters)  •  Stage 4 Severe CKD (GFR = 15-29 mL/min/1 73 square meters)  •  Stage 5 End Stage CKD (GFR <15 mL/min/1 73 square meters)  Note: GFR calculation is accurate only with a steady state creatinine   NT-BNP PRO (BRAIN NATRIURETIC PEPTIDE) - Abnormal    NT-proBNP 6,491 (*) <450 pg/mL Final   HS TROPONIN I 0HR - Normal    hs TnI 0hr 28  "Refer to ACS Flowchart"- see link ng/L Final    Comment:                                              Initial (time 0) result  If >=50 ng/L, Myocardial injury suggested ;  Type of myocardial injury and treatment strategy  to be determined  If 5-49 ng/L, a delta result at 2 hours and or 4 hours will be needed to further evaluate  If <4 ng/L, and chest pain has been >3 hours since onset, patient may qualify for discharge based on the HEART score in the ED  If <5 ng/L and <3hours since onset of chest pain, a delta result at 2 hours will be needed to further evaluate      HS Troponin 99th Percentile URL of a Health Population=12 ng/L with a 95% Confidence Interval of 8-18 ng/L  Second Troponin (time 2 hours)  If calculated delta >= 20 ng/L,  Myocardial injury suggested ; Type of myocardial injury and treatment strategy to be determined  If 5-49 ng/L and the calculated delta is 5-19 ng/L, consult medical service for evaluation  Continue evaluation for ischemia on ecg and other possible etiology and repeat hs troponin at 4 hours  If delta is <5 ng/L at 2 hours, consider discharge based on risk stratification via the HEART score (if in ED), or ASHKAN risk score in IP/Observation  HS Troponin 99th Percentile URL of a Health Population=12 ng/L with a 95% Confidence Interval of 8-18 ng/L    HS TROPONIN I 2HR   HS TROPONIN I 4HR         Patient presented in significant respiratory distress, placed on noninvasive ventilation  On reassessment she said she was feeling better  Labs demonstrated new acute kidney injury  Potassium elevated 6 0, she does have a history of chronic atrial fibrillation, no significant hyperkalemia ECG changes, and at this point will defer treatment of the hyperkalemia in the emergency department       MEDICAL DECISION MAKING CODING    AMOUNT AND/OR COMPLEXITY OF DATA REVIEWED:  Review of non-ED record: Reviewed transfer records    Tests reviewed personally by me:  ECG: See ED course if applicable  Labs: As noted above  Imaging: Chest x-ray interpreted by me    Chronic conditions affecting care: Chronic CHF    Discussion with other providers: Admitting team      Critical Care Time  Procedures    38 minutes: See separate procedure note for details

## 2023-01-06 NOTE — RESPIRATORY THERAPY NOTE
RT Protocol Note  Brian Prajapati 80 y o  female MRN: [id number]  Unit/Bed#: ED 18 Encounter: [id number]    Assessment    Active Problems:    * No active hospital problems  *      Home Pulmonary Medications:  none       Past Medical History:   Diagnosis Date    Anemia     Asteatotic eczema     Chronic kidney disease     Hypercholesterolemia     Lichen sclerosus et atrophicus     Mitral valve insufficiency     Nosebleed     Seborrheic keratoses     Tricuspid regurgitation     Vertigo      Social History     Socioeconomic History    Marital status:      Spouse name: None    Number of children: None    Years of education: None    Highest education level: None   Occupational History    None   Tobacco Use    Smoking status: Never    Smokeless tobacco: Never   Vaping Use    Vaping Use: Never used   Substance and Sexual Activity    Alcohol use: Not Currently    Drug use: No    Sexual activity: Not Currently     Partners: Male   Other Topics Concern    None   Social History Narrative    Advance directive declined by patient     Social Determinants of Health     Financial Resource Strain: Not on file   Food Insecurity: No Food Insecurity    Worried About Running Out of Food in the Last Year: Never true    Ran Out of Food in the Last Year: Never true   Transportation Needs: No Transportation Needs    Lack of Transportation (Medical): No    Lack of Transportation (Non-Medical):  No   Physical Activity: Not on file   Stress: No Stress Concern Present    Feeling of Stress : Not at all   Social Connections: Not on file   Intimate Partner Violence: Not on file   Housing Stability: Low Risk     Unable to Pay for Housing in the Last Year: No    Number of Places Lived in the Last Year: 1    Unstable Housing in the Last Year: No       Subjective         Objective    Physical Exam:   Assessment Type: (P) Assess only  General Appearance: (P) Awake, Alert  Respiratory Pattern: (P) Labored  Chest Assessment: (P) Chest expansion symmetrical  Bilateral Breath Sounds: (P) Crackles    Vitals:  Blood pressure 98/55, pulse (!) 48, temperature 97 5 °F (36 4 °C), temperature source Rectal, resp  rate 21, SpO2 97 %  Imaging and other studies: I have personally reviewed pertinent reports  Plan    Respiratory Plan: (P) Vent/NIV/HFNC        Resp Comments: (P) pt ordered respiratory  protocol for BiPAP management  t has no pulmonary hx and does not take any rspiratroy meds @ home   once pt no longer needs BiPAP can dc protocol

## 2023-01-06 NOTE — CONSULTS
Consultation - Electrophysiology - Cardiology  Liset Cruz 80 y o  female MRN: [id number]  Unit/Bed#: ED 25 Encounter: [id number]      Inpatient consult to Cardiology  Consult performed by: Anabell Guzman MD  Consult ordered by: Eladia Collazo MD          History of Present Illness   Physician Requesting Consult: Eladia Collazo MD  Reason for Consult / Principal Problem: CHF  Dr Ginna Anguiano- primary cardiologist    Assessment/Plan   Assessment/Plan:  Liset Cruz is a 80y o  year old female with chronic HFpEF, severe low-flow low gradient aortic stenosis, moderate to severe TR, pulm hypertension, chronic atrial fibrillation, hypertension, and CKD 3 with fatigue and edema presenting with CHF  #HFpEF  #severe AS- low flow, low gradient  #moderate to severe TR  #Hypoxemic respiratory failure  Patient presenting with congestive heart failure in the setting of severe low-flow low gradient aortic stenosis  As an outpatient was evaluated by CT surgery and is not a candidate for valve procedure  CHF Likely precipitated recently decreased diuretic dose and the addition of salt tabs  Unfortunately has not responded to 40 of IV Lasix-likely from MARIA DEL CARMEN  - IV Lasix 80 mg  -f/u TTE  -IO, daily weights    #chronic afib  Has A  fib with slow ventricular response  Bradycardia is not helping with CHF symptoms  Will hold home metoprolol 100 mg daily and diltiazem 60mg q12hr and see what underlying HR is    -continue apixaban 2 5mg BID  -hold home metoprolol and diltiazem  #hyponatremia  Patient was hyponatremic on previous hospitalization and started on salt tabs  Suspect the hyponatremia was from CHF  Salt tabs likely contributing to current volume overload  -Stop home salt tabs and do not restart  -Monitor sodium with diuresis    #MARIA DEL CARMEN  Creatinine 2 4 from 0 86 just over a week ago  Patient is oliguric in the ED  She has a Hunter catheter and from previous admission for urinary retention    Will rule out obstructive uropathy with bladder scan to make sure Mahan is functioning properly  Suspect MARIA DEL CARMEN in the setting of volume overload and is due to cardiorenal syndrome   -monitor Cr with diuresis  -RN in ED to check if mahan is draining properly  HPI: Radonna Lundborg is a 80y o  year old female with chronic HFpEF, severe low-flow low gradient aortic stenosis, moderate to severe TR, pulm hypertension, chronic atrial fibrillation, hypertension, and CKD 3 with fatigue and edema presenting with CHF  Patient has been at rehab since a recent hospitalization for urinary tract infection with urinary retention requiring Mahan catheter placement  She was discharged on a lower dose of Lasix after she developed an MARIA DEL CARMEN  Was discharged on 20 mg daily instead of 60 mg daily  She was also hyponatremic and started on salt tabs (1g TID) for hyponatremia  In the ED blood pressure 109/51, heart rate 47, satting 93% on 3 liters nasal cannula  Sodium 121, potassium 6 0, bicarbonate 18 creatinine 2 41 up from baseline 0 86  High sensitive troponin is normal, N-terminal proBNP is 6491    EKG shows atrial fibrillation with slow ventricular response, right axis deviation and cannot rule out anteroseptal infarct  Chest x-ray shows right greater than left pulmonary edema  She was transiently on BiPAP for this of breath  Meds: She was given Lasix 20 mg IV followed 40 mg IV- she had made minimal urine on this       Historical Information   Past Medical History:   Diagnosis Date   • Anemia    • Asteatotic eczema    • Chronic kidney disease    • Hypercholesterolemia    • Lichen sclerosus et atrophicus    • Mitral valve insufficiency    • Nosebleed    • Seborrheic keratoses    • Tricuspid regurgitation    • Vertigo      Past Surgical History:   Procedure Laterality Date   • APPENDECTOMY  11/14/1939   • CATARACT EXTRACTION, BILATERAL  04/2019   • HYSTERECTOMY  11/14/1962   • ORIF HIP FRACTURE Left 2020   • SD COLONOSCOPY FLX DX W/COLLJ SPEC WHEN PFRMD N/A 5/19/2016    Procedure: EGD AND COLONOSCOPY;  Surgeon: Olegario Enamorado MD;  Location: [facility]; Service: Gastroenterology     Social History     Substance and Sexual Activity   Alcohol Use Not Currently     Social History     Substance and Sexual Activity   Drug Use No     Social History     Tobacco Use   Smoking Status Never   Smokeless Tobacco Never     Family History: non-contributory    Meds/Allergies   Hospital Medications:   Current Facility-Administered Medications   Medication Dose Route Frequency   • apixaban (ELIQUIS) tablet 2 5 mg  2 5 mg Oral BID   • [START ON 1/7/2023] ferrous sulfate tablet 325 mg  325 mg Oral Daily With Breakfast   • furosemide (LASIX) injection 40 mg  40 mg Intravenous TID (diuretic)   • metoprolol succinate (TOPROL-XL) 24 hr tablet 100 mg  100 mg Oral Daily     Home Medications: (Not in a hospital admission)      Allergies   Allergen Reactions   • Lactose Intolerance (Gi) - Food Allergy    • Penicillins Other (See Comments)     unknown       Objective   Vitals: Blood pressure 101/56, pulse (!) 52, temperature 97 5 °F (36 4 °C), temperature source Rectal, resp  rate (!) 24, SpO2 97 %  Orthostatic Blood Pressures    Flowsheet Row Most Recent Value   Blood Pressure 101/56 filed at 01/06/2023 1300   Patient Position - Orthostatic VS Sitting filed at 01/06/2023 1200          No intake or output data in the 24 hours ending 01/06/23 1433    Invasive Devices     Peripheral Intravenous Line  Duration           Peripheral IV 01/06/23 Right Antecubital <1 day          Drain  Duration           Urethral Catheter 16 Fr  13 days                Review of Systems:  ROS  ROS as noted above, otherwise 12 point review of systems was performed and is negative  Physical Exam:   Physical Exam  Constitutional:       General: She is not in acute distress  HENT:      Head: Normocephalic  Eyes:      Extraocular Movements: Extraocular movements intact  Cardiovascular:      Rate and Rhythm: Bradycardia present  Rhythm irregular  Heart sounds: No murmur heard  Pulmonary:      Effort: Pulmonary effort is normal  No respiratory distress  Comments: Decreased breath sounds at bases bilaterally with crackles  Abdominal:      General: There is no distension  Musculoskeletal:         General: No swelling  Cervical back: Normal range of motion  Skin:     General: Skin is warm and dry  Coloration: Skin is not jaundiced  Neurological:      General: No focal deficit present  Mental Status: She is alert  Lab Results: I have personally reviewed pertinent lab results      Results from last 7 days   Lab Units 01/06/23  1108   WBC Thousand/uL 9 84   HEMOGLOBIN g/dL 10 3*   HEMATOCRIT % 34 8   PLATELETS Thousands/uL 297     Results from last 7 days   Lab Units 01/06/23  1108   POTASSIUM mmol/L 6 0*   CHLORIDE mmol/L 90*   CO2 mmol/L 18*   BUN mg/dL 44*   CREATININE mg/dL 2 41*   CALCIUM mg/dL 8 7

## 2023-01-06 NOTE — ED PROVIDER NOTES
History  Chief Complaint   Patient presents with   • Respiratory Distress     80s on RA - placed 4L NC by EMS with increased to 97     HPI    Prior to Admission Medications   Prescriptions Last Dose Informant Patient Reported? Taking? Klor-Con M10 10 MEQ tablet   No No   Sig: TAKE 1 TABLET BY MOUTH EVERY DAY   apixaban (Eliquis) 2 5 mg   No No   Sig: Take 1 tablet (2 5 mg total) by mouth 2 (two) times a day   diltiazem (CARDIZEM SR) 60 mg 12 hr capsule   No No   Sig: Take 1 capsule (60 mg total) by mouth every 12 (twelve) hours   ferrous sulfate 324 (65 Fe) mg   No No   Sig: TAKE 1 TABLET (324 MG TOTAL) BY MOUTH DAILY BEFORE BREAKFAST   furosemide (LASIX) 20 mg tablet   No No   Sig: Take 1 tablet (20 mg total) by mouth daily   loperamide (IMODIUM) 2 mg capsule   No No   Sig: Take 1 capsule (2 mg total) by mouth 4 (four) times a day as needed for diarrhea   metoprolol succinate (TOPROL-XL) 100 mg 24 hr tablet   No No   Sig: Take 1 tablet (100 mg total) by mouth daily Do not start before December 16, 2022  sodium chloride 1 g tablet   No No   Sig: Take 1 tablet (1 g total) by mouth 3 (three) times a day with meals      Facility-Administered Medications: None       Past Medical History:   Diagnosis Date   • Anemia    • Asteatotic eczema    • Chronic kidney disease    • Hypercholesterolemia    • Lichen sclerosus et atrophicus    • Mitral valve insufficiency    • Nosebleed    • Seborrheic keratoses    • Tricuspid regurgitation    • Vertigo        Past Surgical History:   Procedure Laterality Date   • APPENDECTOMY  11/14/1939   • CATARACT EXTRACTION, BILATERAL  04/2019   • HYSTERECTOMY  11/14/1962   • ORIF HIP FRACTURE Left 2020   • [place] COLONOSCOPY FLX DX W/COLLJ SPEC WHEN PFRMD N/A 5/19/2016    Procedure: EGD AND COLONOSCOPY;  Surgeon: Davina Berry MD;  Location: [facility];   Service: Gastroenterology       Family History   Problem Relation Age of Onset   • Heart disease Mother         Abnormality   • Heart disease Father         Abnormality   • No Known Problems Sister    • No Known Problems Brother      I have reviewed and agree with the history as documented  E-Cigarette/Vaping   • E-Cigarette Use Never User      E-Cigarette/Vaping Substances   • Nicotine No    • THC No    • CBD No    • Flavoring No    • Other No    • Unknown No      Social History     Tobacco Use   • Smoking status: Never   • Smokeless tobacco: Never   Vaping Use   • Vaping Use: Never used   Substance Use Topics   • Alcohol use: Not Currently   • Drug use: No        Review of Systems   Constitutional: Positive for fatigue  Negative for fever  HENT: Negative  Respiratory: Positive for shortness of breath  Cardiovascular: Positive for leg swelling  Gastrointestinal: Negative  Genitourinary: Negative  Musculoskeletal: Negative  Skin: Negative  Neurological: Positive for weakness  All other systems reviewed and are negative  Physical Exam  ED Triage Vitals [01/06/23 0938]   Temperature Pulse Respirations Blood Pressure SpO2   (!) 96 3 °F (35 7 °C) (!) 47 20 109/51 93 %      Temp Source Heart Rate Source Patient Position - Orthostatic VS BP Location FiO2 (%)   Tympanic Monitor Sitting Right arm --      Pain Score       No Pain             Orthostatic Vital Signs  Vitals:    01/06/23 0938 01/06/23 1112 01/06/23 1200   BP: 109/51 98/55 103/51   Pulse: (!) 47 (!) 48 (!) 50   Patient Position - Orthostatic VS: Sitting Sitting Sitting       Physical Exam  Vitals and nursing note reviewed  Constitutional:       General: She is awake  She is not in acute distress  Appearance: She is ill-appearing  She is not toxic-appearing  HENT:      Head: Normocephalic and atraumatic  Eyes:      General: Vision grossly intact  Gaze aligned appropriately  Cardiovascular:      Rate and Rhythm: Regular rhythm  Bradycardia present  Heart sounds: Normal heart sounds     Pulmonary:      Effort: Pulmonary effort is normal  Prolonged expiration present  Comments: Rales noted in bilateral lung bases  Abdominal:      Palpations: Abdomen is soft  Tenderness: There is no abdominal tenderness  Genitourinary:     Comments: Hunter catheter in place with minimal urine noted in bag  Musculoskeletal:      Cervical back: Full passive range of motion without pain and neck supple  Right lower le+ Pitting Edema present  Left lower le+ Pitting Edema present  Skin:     General: Skin is warm and dry  Neurological:      General: No focal deficit present  Mental Status: She is alert and oriented to person, place, and time           ED Medications  Medications   furosemide (LASIX) injection 20 mg (20 mg Intravenous Given 23 1118)   furosemide (LASIX) injection 20 mg (20 mg Intravenous Given 23 1243)       Diagnostic Studies  Results Reviewed     Procedure Component Value Units Date/Time    HS Troponin I 4hr [[id number]]     Lab Status: No result Specimen: Blood     Basic metabolic panel [[id number]]  (Abnormal) Collected: 23 1108    Lab Status: Final result Specimen: Blood from Arm, Right Updated: 23 1147     Sodium 121 mmol/L      Potassium 6 0 mmol/L      Chloride 90 mmol/L      CO2 18 mmol/L      ANION GAP 13 mmol/L      BUN 44 mg/dL      Creatinine 2 41 mg/dL      Glucose 65 mg/dL      Calcium 8 7 mg/dL      eGFR 16 ml/min/1 73sq m     Narrative:      [place] guidelines for Chronic Kidney Disease (CKD):   •  Stage 1 with normal or high GFR (GFR > 90 mL/min/1 73 square meters)  •  Stage 2 Mild CKD (GFR = 60-89 mL/min/1 73 square meters)  •  Stage 3A Moderate CKD (GFR = 45-59 mL/min/1 73 square meters)  •  Stage 3B Moderate CKD (GFR = 30-44 mL/min/1 73 square meters)  •  Stage 4 Severe CKD (GFR = 15-29 mL/min/1 73 square meters)  •  Stage 5 End Stage CKD (GFR <15 mL/min/1 73 square meters)  Note: GFR calculation is accurate only with a steady state creatinine    NT-BNP PRO [[id number]]  (Abnormal) Collected: 01/06/23 1108    Lab Status: Final result Specimen: Blood from Arm, Right Updated: 01/06/23 1147     NT-proBNP 6,491 pg/mL     HS Troponin 0hr (reflex protocol) [[id number]]  (Normal) Collected: 01/06/23 1108    Lab Status: Final result Specimen: Blood from Arm, Right Updated: 01/06/23 1145     hs TnI 0hr 28 ng/L     HS Troponin I 2hr [[id number]]     Lab Status: No result Specimen: Blood     CBC and differential [[id number]]  (Abnormal) Collected: 01/06/23 1108    Lab Status: Final result Specimen: Blood from Arm, Right Updated: 01/06/23 1121     WBC 9 84 Thousand/uL      RBC 4 02 Million/uL      Hemoglobin 10 3 g/dL      Hematocrit 34 8 %      MCV 87 fL      MCH 25 6 pg      MCHC 29 6 g/dL      RDW 17 8 %      MPV 10 4 fL      Platelets 761 Thousands/uL      nRBC 4 /100 WBCs      Neutrophils Relative 84 %      Immat GRANS % 2 %      Lymphocytes Relative 3 %      Monocytes Relative 11 %      Eosinophils Relative 0 %      Basophils Relative 0 %      Neutrophils Absolute 8 29 Thousands/µL      Immature Grans Absolute 0 16 Thousand/uL      Lymphocytes Absolute 0 29 Thousands/µL      Monocytes Absolute 1 07 Thousand/µL      Eosinophils Absolute 0 01 Thousand/µL      Basophils Absolute 0 02 Thousands/µL                  XR chest 1 view portable   ED Interpretation by Cesario Chamberlain DO (01/06 1214)   Abnormal   Chest x-ray interpreted me shows evidence of pulmonary vascular congestion,            Procedures  ECG 12 Lead Documentation Only    Date/Time: 1/6/2023 10:07 AM  Performed by: Jamia Eller DO  Authorized by: Jamia Eller DO     Indications / Diagnosis:  SOB  ECG reviewed by me, the ED Provider: yes    Patient location:  ED  Previous ECG:     Previous ECG:  Compared to current    Similarity:  Changes noted    Comparison to cardiac monitor: Yes    Interpretation:     Interpretation: abnormal    Rate:     ECG rate:  47    ECG rate assessment: bradycardic    Rhythm: Rhythm: atrial fibrillation    Ectopy:     Ectopy: none    QRS:     QRS axis:  Right    QRS intervals:  Normal  Conduction:     Conduction: normal    ST segments:     ST segments:  Normal  T waves:     T waves: inverted      Inverted:  III          ED Course  ED Course as of 01/06/23 1250   Fri Jan 06, 2023   1149 NT-proBNP(!): 6,491                                       MDM      Disposition  Final diagnoses:   CHF exacerbation ([facility] )   Acute renal failure (HCC)   Hyponatremia   Hyperkalemia     Time reflects when diagnosis was documented in both MDM as applicable and the Disposition within this note     Time User Action Codes Description Comment    1/6/2023 12:10 PM Ruth Peaks Add [I50 9] CHF exacerbation ([facility] )     1/6/2023 12:10 PM Ruth Peaks Add [N17 9] Acute renal failure ([facility] )     1/6/2023 12:10 PM Ruth Peaks Add [E87 1] Hyponatremia     1/6/2023 12:11 PM Ruth Peaks Add [E87 5] Hyperkalemia       ED Disposition     ED Disposition   Admit    Condition   Stable    Date/Time   Fri Jan 6, 2023 12:49 PM    Comment   Case was discussed with RAS and the patient's admission status was agreed to be Admission Status: inpatient status to the service of Dr Trey Fernandez  Follow-up Information    None         Patient's Medications   Discharge Prescriptions    No medications on file     No discharge procedures on file  PDMP Review     None           ED Provider  Attending physically available and evaluated Yudi Braun  I managed the patient along with the ED Attending      Electronically Signed by concerning for bilateral pulmonary edema, fitting with acute CHF exacerbation  Patient given IV lasix for further treatment  Patient's BMP concerning for acute hyponatremia, hyperkalemia, and MARIA DEL CARMEN  Patient's EKG without signs of hyperkalemia, so no further treatment was initiated for this at this time  Given patient's oxygen requirements, as well as multiple metabolic derangements, decision was made to admit patient for further evaluation and treatment  Patient did confirm that she is DNR/DNI and that she does not want any heroic measures  Spoke with medicine attending who agreed to admit patient to their service  Acute renal failure [facility]): acute illness or injury  CHF exacerbation ([place] [address] ): acute illness or injury  Hyperkalemia: self-limited or minor problem  Hyponatremia: acute illness or injury  Amount and/or Complexity of Data Reviewed  Labs: ordered  Decision-making details documented in ED Course  Radiology: ordered and independent interpretation performed  ECG/medicine tests: ordered and independent interpretation performed  Risk  Prescription drug management  Decision regarding hospitalization  Disposition  Final diagnoses:   CHF exacerbation ([place] [address] )   Acute renal failure ([place] [address] )   Hyponatremia   Hyperkalemia     Time reflects when diagnosis was documented in both MDM as applicable and the Disposition within this note     Time User Action Codes Description Comment    1/6/2023 12:10 PM Viann Minus Add [I50 9] CHF exacerbation ([place] [address] )     1/6/2023 12:10 PM Viann Minus Add [N17 9] Acute renal failure ([facility] 75 )     1/6/2023 12:10 PM Viann Minus Add [E87 1] Hyponatremia     1/6/2023 12:11 PM Viann Minus Add [E87 5] Hyperkalemia       ED Disposition     ED Disposition   Admit    Condition   Stable    Date/Time   Fri Jan 6, 2023 12:49 PM    Comment   Case was discussed with RAS and the patient's admission status was agreed to be Admission Status: inpatient status to the service of Dr Earley Libman  Follow-up Information    None         Patient's Medications   Discharge Prescriptions    No medications on file     No discharge procedures on file  PDMP Review     None           ED Provider  Attending physically available and evaluated Bailey Morales I managed the patient along with the ED Attending      Electronically Signed by         Lili Carreno DO  01/23/23 3175

## 2023-01-06 NOTE — ASSESSMENT & PLAN NOTE
Lab Results   Component Value Date    EGFR 16 01/06/2023    EGFR 56 12/28/2022    EGFR 45 12/27/2022    CREATININE 2 41 (H) 01/06/2023    CREATININE 0 86 12/28/2022    CREATININE 1 03 12/27/2022     ·   · Creatinine seems to be 0 8-1 0  · At previous hospitalization she was up to 1 7, suspected to have dehydration, received fluids and discharged on lower dose of Lasix  · Creatinine today 2 4  · At present suspect cardiorenal syndrome  · Hunter already in place due to history of retention  · Monitor urine output with IV Lasix  · Repeat BMP at 3 PM and in a m  · Discussed with daughter at bedside, would not be interested in dialysis if needed    She would want patient to be comfortable

## 2023-01-06 NOTE — H&P
[address] [facility]  H&P- Octavia Keen 2/17/1924, 80 y o  female MRN: [id number]  Unit/Bed#: ED 18 Encounter: [id number]  Primary Care Provider: Constantin Esteves MD   Date and time admitted to hospital: 1/6/2023  9:37 AM    Addendum at 6:30 PM -patient has had no urine output, received total of 40 IV Lasix earlier in the day and then 80 mg IV within the last hour  Continues to have no urine output  And repeat BMP creatinine is up to 2 5 from 2 4, potassium up to 6 3 from 6, bicarb 16 from 18  Discussed with cardiology fellow, will attempt giving a dose of IV Bumex, cardiology fellow will try to do a bedside echocardiogram as well to determine LVEF and we decided to check VBG and lactic acid level  Discussed with son and daughter at bedside, they definitely would not want dialysis, if medical management is not helping her they would mainly want to focus on her comfort  We discussed about attempting stronger dose of diuretics, using BiPAP in between as needed if she has trouble breathing but if she is not improving or showing any improvement then comfort only measures can be opted for and family agreed  Given no improvement in all the blood work abnormalities, case was discussed with critical care attending patient would be appropriate for at least level 1 stepdown for more closer monitoring they do not think , patient is appropriate for that  Will continue as SD 2    * Acute on chronic diastolic (congestive) heart failure (HCC)  Assessment & Plan  Wt Readings from Last 3 Encounters:   12/24/22 57 8 kg (127 lb 6 8 oz)   12/16/22 57 8 kg (127 lb 8 oz)   12/02/22 59 5 kg (131 lb 3 2 oz)     · Patient presenting with fatigue, SOB, wheezing   CXR s/o pulmonary edema, possible left effusion (read pending), BNP 6k, MARIA DEL CARMEN, hyponatremia  · Was placed on Bipap in ED due to hypotension, to help pulm edema  · Current home diuretic: 20 mg QD, used to be on 60 mg QD in past -after recent hospitalization where she had MARIA DEL CARMEN thought to be secondary to dehydration, Lasix was held, IV fluids given followed by which discharged on 20 mg daily  · S/p 20 IV lasix in Ed, no UO, gave additional 20 IV - total 40 IV  · Will plan to place on 40 IV TID  · Monitor UO, has mahan for retention  · Daily wts  · check updated Echo (last echo 2021 - LVEF 60%, grade 2 DD, moderate AS, mod to severe TR, severe Pulm HTN)  · Cardio consult        Acute respiratory failure with hypoxia ([address] )  Assessment & Plan  · 2/2 CHF  Was placed on bipap in ED due to pulm edema due to hypotension  · Will trial off Bipap & see  · Use supp O2 to keep sats > 90%    Hyperkalemia  Assessment & Plan  · K = 6  · suspect 2/2 MARIA DEL CARMEN, 2/2 CHF  · No EKG changes  · Will f/u repeat BMP after lasix dosing - if no improvement will treat it    Ankle wound, left, initial encounter  Assessment & Plan  · Has b/l LE wounds since last hospitalization from shoe injury & swelling  · Consult wound care    Acute urinary retention  Assessment & Plan  · Noted on previous hospitalization  · Has had Mahan in place since    Hyponatremia  Assessment & Plan  · To be having some hyponatremia since previous hospitalization last month, was being treated with salt tablets 1 g 3 times daily  · Worse hyponatremia today 121  · At this time suspect secondary to hypervolemia  · Monitor response to IV Lasix  · While diuresing, will hold off on salt tablets to avoid rapid correction as well as salt tablets would worsen CHF    Severe pulmonary hypertension ([facility] [address] )  Assessment & Plan  · Noted in 2D echocardiogram in 2021  · Follow-up updated 2D echo    Acute kidney injury superimposed on CKD [facility])  Assessment & Plan  Lab Results   Component Value Date    EGFR 16 01/06/2023    EGFR 56 12/28/2022    EGFR 45 12/27/2022    CREATININE 2 41 (H) 01/06/2023    CREATININE 0 86 12/28/2022    CREATININE 1 03 12/27/2022     ·   · Creatinine seems to be 0 8-1 0  · At previous hospitalization she was up to 1 7, suspected to have dehydration, received fluids and discharged on lower dose of Lasix  · Creatinine today 2 4  · At present suspect cardiorenal syndrome  · Hunter already in place due to history of retention  · Monitor urine output with IV Lasix  · Repeat BMP at 3 PM and in a m  · Discussed with daughter at bedside, would not be interested in dialysis if needed  She would want patient to be comfortable    Paroxysmal atrial fibrillation (HCC)  Assessment & Plan  · EKG shows Afib with slow VR  · Home regimen -resume 60 mg twice daily, Toprol- mg daily  · Given acute CHF with unknown EF, will hold off on the Cardizem as well as heart rates 40-50  · Continue Toprol-XL  · Continue Eliquis at a lower dose 2 5 twice daily    VTE Pharmacologic Prophylaxis:   Moderate Risk (Score 3-4) - Pharmacological DVT Prophylaxis Ordered: apixaban (Eliquis)  Code Status: Level 3 - DNAR and DNI   Discussion with family: Updated  (daughter) at bedside  Anticipated Length of Stay: Patient will be admitted on an inpatient basis with an anticipated length of stay of greater than 2 midnights secondary to CHF, MARIA DEL CARMEN, need for IV diuresis  Total Time for Visit, including Counseling / Coordination of Care: 60 minutes Greater than 50% of this total time spent on direct patient counseling and coordination of care  Chief Complaint: worsening fatigue, SOB    History of Present Illness:  Mark Max is a 80 y o  female with a PMH of A  fib, chronic diastolic CHF, CKD, hypertension, TR who presents from [facility] rehab where she was discharged after her recent hospitalization  She was hospitalized recently 2 times for UTI, urinary retention, has had Hunter catheter placed since  Treated with antibiotics  She had developed MARIA DEL CARMEN due to which her Lasix was held she was given IV fluids and once creatinine normalized she was discharged on Lasix 20 mg daily instead of her previous dose of 60 mg daily    She was also found to have hyponatremia and was started on salt tablets  She was doing good couple of days at the rehab subsequently she has been feeling more and more fatigued, unable to participate in activities, unable to get up, patient denies any shortness of breath but patient as well as daughter at bedside noted sounds of breathing in the lungs yesterday as well as today  He does have generalized edema as well, leg swelling, abdominal distention  Denies any chest pain, has had dizziness  Information is obtained from patient as well as daughter and son at bedside  Initially in the ED she was not in a lot of respiratory distress but given hypotension and clinically evident CHF they placed her on BiPAP to help the pulmonary edema  As per the daughter, patient was independent at home before her recent hospitalization    Review of Systems:  Review of Systems   Constitutional: Positive for fatigue  Negative for activity change, appetite change, chills and fever  HENT: Negative for rhinorrhea  Respiratory: Positive for cough, shortness of breath and wheezing  Cardiovascular: Positive for leg swelling  Negative for chest pain and palpitations  Gastrointestinal: Positive for abdominal distention  Negative for constipation, diarrhea, nausea and vomiting  Genitourinary: Negative for difficulty urinating  Neurological: Positive for dizziness  All other systems reviewed and are negative        Past Medical and Surgical History:   Past Medical History:   Diagnosis Date   • Anemia    • Asteatotic eczema    • Chronic kidney disease    • Hypercholesterolemia    • Lichen sclerosus et atrophicus    • Mitral valve insufficiency    • Nosebleed    • Seborrheic keratoses    • Tricuspid regurgitation    • Vertigo        Past Surgical History:   Procedure Laterality Date   • APPENDECTOMY  11/14/1939   • CATARACT EXTRACTION, BILATERAL  04/2019   • HYSTERECTOMY  11/14/1962   • ORIF HIP FRACTURE Left 2020   • IL COLONOSCOPY FLX DX W/COLLJ SPEC WHEN PFRMD N/A 5/19/2016    Procedure: EGD AND COLONOSCOPY;  Surgeon: Renata Quick MD;  Location: [facility]; Service: Gastroenterology       Meds/Allergies:  Prior to Admission medications    Medication Sig Start Date End Date Taking? Authorizing Provider   apixaban (Eliquis) 2 5 mg Take 1 tablet (2 5 mg total) by mouth 2 (two) times a day 11/26/21   Stefano Villanueva MD   diltiazem (CARDIZEM SR) 60 mg 12 hr capsule Take 1 capsule (60 mg total) by mouth every 12 (twelve) hours 12/15/22 1/14/23  GUNJAN Bolden   ferrous sulfate 324 (65 Fe) mg TAKE 1 TABLET (324 MG TOTAL) BY MOUTH DAILY BEFORE BREAKFAST 4/26/22   Stefano Villanueva MD   furosemide (LASIX) 20 mg tablet Take 1 tablet (20 mg total) by mouth daily 12/28/22 1/27/23  Kirti Roach MD   Klor-Con M10 10 MEQ tablet TAKE 1 TABLET BY MOUTH EVERY DAY 9/14/22   Stefano Villanueva MD   loperamide (IMODIUM) 2 mg capsule Take 1 capsule (2 mg total) by mouth 4 (four) times a day as needed for diarrhea 12/15/22   GUNJAN Bolden   metoprolol succinate (TOPROL-XL) 100 mg 24 hr tablet Take 1 tablet (100 mg total) by mouth daily Do not start before December 16, 2022 12/16/22   GUNJAN Bolden   sodium chloride 1 g tablet Take 1 tablet (1 g total) by mouth 3 (three) times a day with meals 12/28/22 1/27/23  Kirti Roach MD     I have reviewed home medications with a medical source (PCP, Pharmacy, other)  Allergies: Allergies   Allergen Reactions   • Lactose Intolerance (Gi) - Food Allergy    • Penicillins Other (See Comments)     unknown       Social History:  Marital Status:     Patient Pre-hospital Living Situation: [facility]: [facility]  Patient Pre-hospital Level of Mobility: Decreased ambulation  Patient Pre-hospital Diet Restrictions:   Substance Use History:   Social History     Substance and Sexual Activity   Alcohol Use Not Currently     Social History     Tobacco Use   Smoking Status Never   Smokeless Tobacco Never     Social History     Substance and Sexual Activity   Drug Use No       Family History:  Family History   Problem Relation Age of Onset   • Heart disease Mother         Abnormality   • Heart disease Father         Abnormality   • No Known Problems Sister    • No Known Problems Brother        Physical Exam:     Vitals:   Blood Pressure: 101/56 (01/06/23 1300)  Pulse: (!) 52 (01/06/23 1300)  Temperature: 97 5 °F (36 4 °C) (01/06/23 0953)  Temp Source: Rectal (01/06/23 0953)  Respirations: (!) 24 (01/06/23 1300)  SpO2: 97 % (01/06/23 1300)    Physical Exam  Vitals reviewed  HENT:      Head: Normocephalic and atraumatic  Mouth/Throat:      Mouth: Mucous membranes are moist    Eyes:      Conjunctiva/sclera: Conjunctivae normal    Cardiovascular:      Rate and Rhythm: Normal rate and regular rhythm  Heart sounds: Normal heart sounds  Pulmonary:      Effort: Pulmonary effort is normal  No respiratory distress  Breath sounds: No wheezing  Comments: Diminished at bases, RLL crackles  Abdominal:      General: There is distension  Palpations: Abdomen is soft  Tenderness: There is no abdominal tenderness  Musculoskeletal:      Right lower leg: Edema present  Left lower leg: Edema present  Neurological:      Mental Status: She is alert and oriented to person, place, and time            Additional Data:     Lab Results:  Results from last 7 days   Lab Units 01/06/23  1108   WBC Thousand/uL 9 84   HEMOGLOBIN g/dL 10 3*   HEMATOCRIT % 34 8   PLATELETS Thousands/uL 297   NEUTROS PCT % 84*   LYMPHS PCT % 3*   MONOS PCT % 11   EOS PCT % 0     Results from last 7 days   Lab Units 01/06/23  1108   SODIUM mmol/L 121*   POTASSIUM mmol/L 6 0*   CHLORIDE mmol/L 90*   CO2 mmol/L 18*   BUN mg/dL 44*   CREATININE mg/dL 2 41*   ANION GAP mmol/L 13   CALCIUM mg/dL 8 7   GLUCOSE RANDOM mg/dL 65                       Lines/Drains:  Invasive Devices     Peripheral Intravenous Line  Duration           Peripheral IV 01/06/23 Right Antecubital <1 day          Drain  Duration           Urethral Catheter 16 Fr  13 days              Urinary Catheter:  Goal for removal: N/A - Chronic Hunter             Imaging: Reviewed radiology reports from this admission including: chest xray and Personally reviewed the following imaging: chest xray  XR chest 1 view portable   ED Interpretation by Nael Torres DO (01/06 1214)   Abnormal   Chest x-ray interpreted me shows evidence of pulmonary vascular congestion,          EKG and Other Studies Reviewed on Admission:   · EKG: reviewed , afib wtih slow VR     ** Please Note: This note has been constructed using a voice recognition system   **

## 2023-01-06 NOTE — ASSESSMENT & PLAN NOTE
Wt Readings from Last 3 Encounters:   12/24/22 57 8 kg (127 lb 6 8 oz)   12/16/22 57 8 kg (127 lb 8 oz)   12/02/22 59 5 kg (131 lb 3 2 oz)     · Patient presenting with fatigue, SOB, wheezing   CXR s/o pulmonary edema, possible left effusion (read pending), BNP 6k, MARIA DEL CARMEN, hyponatremia  · Was placed on Bipap in ED due to hypotension, to help pulm edema  · Current home diuretic: 20 mg QD, used to be on 60 mg QD in past -after recent hospitalization where she had MARIA DEL CARMEN thought to be secondary to dehydration, Lasix was held, IV fluids given followed by which discharged on 20 mg daily  · S/p 20 IV lasix in Ed, no UO, gave additional 20 IV - total 40 IV  · Will plan to place on 40 IV TID  · Monitor UO, has mahan for retention  · Daily wts  · check updated Echo (last echo 2021 - LVEF 60%, grade 2 DD, moderate AS, mod to severe TR, severe Pulm HTN)  · Cardio consult

## 2023-01-07 ENCOUNTER — TRANSCRIBE ORDERS (OUTPATIENT)
Dept: HOME HEALTH SERVICES | Facility: HOME HEALTHCARE | Age: 88
End: 2023-01-07

## 2023-01-07 DIAGNOSIS — N17.9 ACUTE RENAL FAILURE, UNSPECIFIED ACUTE RENAL FAILURE TYPE (HCC): Primary | ICD-10-CM

## 2023-01-07 PROBLEM — Z71.89 GOALS OF CARE, COUNSELING/DISCUSSION: Status: ACTIVE | Noted: 2023-01-01

## 2023-01-07 NOTE — ASSESSMENT & PLAN NOTE
· Patient and family would like to pursue hospice  · Case management consult placed and hospice referral made    · Continue with supportive measures  · Level 4 code only comfort measures

## 2023-01-07 NOTE — PROGRESS NOTES
Pastoral Care Progress Note    2023  Patient: Bart Hwang : 1924  Admission Date & Time: 2023 4967  MRN: [id number] CSN: [id number]      On call gwyn Price initiated visit with daughter of pt Zaheer Atkinson at request of bedside RN at 2130  Zaheer Atkinson shared about her peace with the decision to move to comfort care  Zaheer Atkinson shared that she is choosing to remain bedside this evening, and that she is hoping that the pt passes quietly and without pain  It is Destiny's impression that the pt is indeed pain free and " seems very peaceful"  Zaheer Atkinson requested anointing by the on call   This  called Fr Vipul Santiago at [phone] and left a vm with the details of the request    Tana Braden returned to room and she and Zaheer Atkinson prayed a [address] and Zaheer China anointed her mother, the pt   provided tea and companionship while daughter Zaheer Atkinson shared about the pt, and the decision today to move to comfort care  Claire Can is calm and expectant of the pt's imminent death, happy that her mother lived a full life  Zaheer Atkinson expressed no regrets regarding her relationship or unfinished business with the pt                 Chaplaincy Interventions Utilized:   Empowerment: Encouraged focus on present and Encouraged self-care    Exploration: Explored spiritual needs & resources and Facilitated life review    Collaboration: Consulted with interdisciplinary team and Facilitated respect for spiritual/cultural practice during hospitalization    Relationship Building: Listened empathically and Hospitality    Ritual: Provided prayer      Chaplaincy Outcomes Achieved:  Expressed peace    Spiritual Coping Strategies Utilized:   Spiritual comfort and Spiritual community       23 2300   Clinical Encounter Type   Visited With Patient and family together  (daughter Zaheer Atkinson bedside)   Crisis Visit Patient actively dying   Referral From Nurse   Referral To    Hoahaoism Encounters   Hoahaoism Needs Prayer Sacramental Encounters   Sacrament of Sick-Anointing Family requested anointing  ( left vm for on call )

## 2023-01-07 NOTE — ASSESSMENT & PLAN NOTE
· K = 6  · suspect 2/2 MARIA DEL CARMEN, 2/2 CHF  · No EKG changes  · Family pursuing hospice no further lab draws necessary or interventions

## 2023-01-07 NOTE — ASSESSMENT & PLAN NOTE
· EKG shows Afib with slow VR  · Home regimen -resume 60 mg twice daily, Toprol- mg daily  · Meds on hold as patient is pursuing hospice

## 2023-01-07 NOTE — PROGRESS NOTES
Received from ed along with family members   family did  not want vitals taken on arrival  just to continue with comfort measures

## 2023-01-07 NOTE — ED NOTES
Pastoral care is at bedside discussing plans with family        [facility] [place]  01/06/23 6399

## 2023-01-07 NOTE — ASSESSMENT & PLAN NOTE
· 2/2 CHF  · Was placed on bipap in ED due to pulm edema due to hypotension  · Family decided to pursue hospice patient is currently on nasal cannula

## 2023-01-07 NOTE — QUICK NOTE
Received sign out on pt and went to evaluate for tonight's baseline assessment  Pt resting comfortably, daughter Jigna Morales at bedside  I asked Jigna Morales how she was feeling, she stated "emotional, I just want her to be comfortable, it's time for her to go home to God "  With that, we discussed the results of the repeat lab work, the lack of urine production despite IV diuretics, the pt's cold extremities and pt's new delirium  Pt's daughter was very agreeable to transitioning her mother to comfort measures only, again stating that she just wants her to be comfortable  Jigna Morales called her brother, Margene Claude, while I was present at bedside to finalize decision of comfort care  Everyone was on the same page, medication orders and hospice consult were placed, code status changed to comfort measures only

## 2023-01-07 NOTE — ASSESSMENT & PLAN NOTE
Wt Readings from Last 3 Encounters:   12/24/22 57 8 kg (127 lb 6 8 oz)   12/16/22 57 8 kg (127 lb 8 oz)   12/02/22 59 5 kg (131 lb 3 2 oz)     · Patient presenting with fatigue, SOB, wheezing   CXR s/o pulmonary edema, possible left effusion (read pending), BNP 6k, MARIA DEL CARMEN, hyponatremia  · Was placed on Bipap in ED due to hypotension, to help pulm edema  · Echo (last echo 2021 - LVEF 60%, grade 2 DD, moderate AS, mod to severe TR, severe Pulm HTN)  · Cardio consult appreciated  · Patient was trialed on IV diuretics with no improvement  · Plan for hospice care

## 2023-01-07 NOTE — PROGRESS NOTES
[address] [facility]  Progress Note - Blane Nava 2/17/1924, 80 y o  female MRN: [id number]  Unit/Bed#: [facility] 526-01 Encounter: [id number]  Primary Care Provider: Marzena Gomes MD   Date and time admitted to hospital: 1/6/2023  9:37 AM    Goals of care, counseling/discussion  Assessment & Plan  · Patient and family would like to pursue hospice  · Case management consult placed and hospice referral made  · Continue with supportive measures  · Level 4 code only comfort measures    * Acute on chronic diastolic (congestive) heart failure (HCC)  Assessment & Plan  Wt Readings from Last 3 Encounters:   12/24/22 57 8 kg (127 lb 6 8 oz)   12/16/22 57 8 kg (127 lb 8 oz)   12/02/22 59 5 kg (131 lb 3 2 oz)     · Patient presenting with fatigue, SOB, wheezing   CXR s/o pulmonary edema, possible left effusion (read pending), BNP 6k, MARIA DEL CARMEN, hyponatremia  · Was placed on Bipap in ED due to hypotension, to help pulm edema  · Echo (last echo 2021 - LVEF 60%, grade 2 DD, moderate AS, mod to severe TR, severe Pulm HTN)  · Cardio consult appreciated  · Patient was trialed on IV diuretics with no improvement  · Plan for hospice care        Acute respiratory failure with hypoxia ([address] )  Assessment & Plan  · 2/2 CHF  · Was placed on bipap in ED due to pulm edema due to hypotension  · Family decided to pursue hospice patient is currently on nasal cannula    Hyperkalemia  Assessment & Plan  · K = 6  · suspect 2/2 MARIA DEL CARMEN, 2/2 CHF  · No EKG changes  · Family pursuing hospice no further lab draws necessary or interventions    Ankle wound, left, initial encounter  Assessment & Plan  · Has b/l LE wounds since last hospitalization from shoe injury & swelling  · Noted    Acute urinary retention  Assessment & Plan  · Noted on previous hospitalization  · Has had Hunter in place since    Hyponatremia  Assessment & Plan  · Noted    Severe pulmonary hypertension ([address] )  Assessment & Plan  · Noted in 2D echocardiogram in 2021    Acute kidney injury superimposed on CKD [facility])  Assessment & Plan  Lab Results   Component Value Date    EGFR 13 01/06/2023    EGFR 13 01/06/2023    EGFR 15 01/06/2023    CREATININE 2 77 (H) 01/06/2023    CREATININE 2 78 (H) 01/06/2023    CREATININE 2 55 (H) 01/06/2023   Creatinine baseline 0 8-1 0  · At previous hospitalization she was up to 1 7, suspected to have dehydration, received fluids and discharged on lower dose of Lasix  · Creatinine today 2 77  · suspect cardiorenal syndrome  · Hunter already in place due to history of retention  · Patient and daughter are not interested in dialysis and have opted for comfort care measures no further lab draws necessary    Paroxysmal atrial fibrillation (HCC)  Assessment & Plan  · EKG shows Afib with slow VR  · Home regimen -resume 60 mg twice daily, Toprol- mg daily  · Meds on hold as patient is pursuing hospice        VTE Pharmacologic Prophylaxis: VTE Score: 3 Moderate Risk (Score 3-4) - Pharmacological DVT Prophylaxis Contraindicated  Sequential Compression Devices Ordered  Patient Centered Rounds: I performed bedside rounds with nursing staff today  Discussions with Specialists or Other Care Team Provider: d/w RN d/w CM     Education and Discussions with Family / Patient: Updated  (daughter) at bedside  Time Spent for Care: 45 minutes  More than 50% of total time spent on counseling and coordination of care as described above  Current Length of Stay: 1 day(s)  Current Patient Status: Inpatient   Certification Statement: The patient will continue to require additional inpatient hospital stay due to pending hospice eval   Discharge Plan: Anticipate discharge in 24-48 hrs to pending hospice eval     Code Status: Level 4 - Comfort Care    Subjective:   Pt lying in bed  Reports she feels ok just has a cough   Pt asks provider "ok I'm ready, when do you think I will go?" per daughter at bedside pt just received her last rights from the  and that she is come to peace with her impending death  Patient is reporting diarrhea as well requesting Imodium  Objective:     Vitals:   No data recorded  HR:  [46-73] 73  Resp:  [17-34] 17  BP: ()/(49-59) 90/52  SpO2:  [90 %-95 %] 95 %  There is no height or weight on file to calculate BMI  Input and Output Summary (last 24 hours): Intake/Output Summary (Last 24 hours) at 2023 1518  Last data filed at 2023  Gross per 24 hour   Intake 100 ml   Output --   Net 100 ml       Physical Exam:   Physical Exam  Vitals and nursing note reviewed  Constitutional:       General: She is in acute distress  Appearance: She is ill-appearing  HENT:      Head: Normocephalic and atraumatic  Eyes:      Conjunctiva/sclera: Conjunctivae normal    Neck:      Vascular: JVD present  Cardiovascular:      Rate and Rhythm: Bradycardia present  Rhythm irregular  Heart sounds: Murmur heard  Pulmonary:      Effort: Tachypnea, accessory muscle usage and respiratory distress present  Breath sounds: Examination of the right-upper field reveals rales  Examination of the left-upper field reveals rales  Examination of the right-middle field reveals rales  Examination of the left-middle field reveals rales  Examination of the right-lower field reveals rales  Examination of the left-lower field reveals rales  Rhonchi and rales present  Abdominal:      General: Bowel sounds are normal  There is no distension  Palpations: Abdomen is soft  Tenderness: There is no abdominal tenderness  Musculoskeletal:         General: Swelling present  Right lower le+ Pitting Edema present  Left lower le+ Pitting Edema present  Skin:     Coloration: Skin is pale  Comments: Cool extremities    Neurological:      General: No focal deficit present  Mental Status: She is alert     Psychiatric:         Mood and Affect: Mood normal           Additional Data: Labs:  Results from last 7 days   Lab Units 01/06/23  1108   WBC Thousand/uL 9 84   HEMOGLOBIN g/dL 10 3*   HEMATOCRIT % 34 8   PLATELETS Thousands/uL 297   NEUTROS PCT % 84*   LYMPHS PCT % 3*   MONOS PCT % 11   EOS PCT % 0     Results from last 7 days   Lab Units 01/06/23  2057   SODIUM mmol/L 122*   POTASSIUM mmol/L 6 1*   CHLORIDE mmol/L 90*   CO2 mmol/L 16*   BUN mg/dL 46*   CREATININE mg/dL 2 77*   ANION GAP mmol/L 16*   CALCIUM mg/dL 8 8   GLUCOSE RANDOM mg/dL 175*         Results from last 7 days   Lab Units 01/06/23  1945 01/06/23  1820   POC GLUCOSE mg/dl 260* <20*         Results from last 7 days   Lab Units 01/06/23  1831   LACTIC ACID mmol/L 8 8*       Lines/Drains:  Invasive Devices     Peripheral Intravenous Line  Duration           Peripheral IV 01/06/23 Right Antecubital 1 day          Drain  Duration           Urethral Catheter 16 Fr  14 days              Urinary Catheter:  Goal for removal: N/A- Discharging with Hunter               Imaging: Reviewed radiology reports from this admission including: chest xray    Recent Cultures (last 7 days):         Last 24 Hours Medication List:   Current Facility-Administered Medications   Medication Dose Route Frequency Provider Last Rate   • benzonatate  100 mg Oral TID PRN GUNJAN Mcclure     • bisacodyl  10 mg Rectal Daily PRN ORA WhittingtonC     • fentanyl citrate (PF)  25 mcg Intravenous Q2H PRN Lesley Shah PA-C     • glycopyrrolate  0 1 mg Intravenous Q4H PRN Lesley Shah PA-C     • haloperidol lactate  0 5 mg Intravenous Q2H PRN Lesley Shah PA-C     • LORazepam  1 mg Intravenous Q10 Min PRN Lesley Shah, PA-C     • phenol  1 spray Mouth/Throat Q2H PRN GUNJAN Mcclure     • scopolamine  1 patch Transdermal Q72H GUNJAN Mcclure          Today, Patient Was Seen By: GUNJAN Mcclure    **Please Note: This note may have been constructed using a voice recognition system  **

## 2023-01-07 NOTE — CASE MANAGEMENT
Case Management Assessment & Discharge Planning Note    Patient name Von Pratt  Location ED 18/ED 18 MRN [id number]  : 1924 Date 2023       Current Admission Date: 2023  Current Admission Diagnosis:Acute on chronic diastolic (congestive) heart failure [facility])   Patient Active Problem List    Diagnosis Date Noted   • Hyperkalemia 2023   • Ankle wound, left, initial encounter 2022   • Acute urinary retention    • Hypothermia 2022   • Acute respiratory failure with hypoxia ([facility] [address] [address] ) 2022   • Urinary obstruction 2022   • Urinary tract infection 2022   • Hyponatremia 2022   • Bilateral renal cysts 2022   • Nonrheumatic aortic valve stenosis 2022   • Dyspnea 2021   • Hypoxia 2021   • Acute respiratory distress 2021   • Severe pulmonary hypertension ([facility] ) 2021   • Acute kidney injury superimposed on CKD ([facility] [address] ) 2021   • Chronic renal disease, stage IV ([facility] [address] [facility] ) 2021   • Lower GI bleed 2021   • Seasonal allergic rhinitis due to pollen 10/26/2021   • Iron deficiency anemia 07/15/2021   • Fecal occult blood test positive 07/15/2021   • Anemia 07/15/2021   • Acute on chronic diastolic (congestive) heart failure ([facility] [address] ) 2021   • Fracture of right orbital wall ([facility] [address] ) 2020   • Chronic kidney disease-mineral and bone disorder 2019   • Epistaxis    • Diastolic dysfunction    • Anemia due to chronic kidney disease 10/18/2018   • Mitral valve insufficiency 2018   • Hypercholesterolemia 2018   • MARIA DEL CARMEN (acute kidney injury) ([facility] [address] ) 2017   • Paroxysmal atrial fibrillation ([facility] ) 2017   • Long term current use of anticoagulant 2017   • Chronic kidney disease, stage III (moderate) ([facility] [address] ) 2017   • Essential hypertension 2017      LOS (days): 1  Geometric Mean LOS (GMLOS) (days):   Days to GMLOS:     OBJECTIVE:  PATIENT READMITTED TO HOSPITAL  Risk of Unplanned Readmission Score: 37 29         Current admission status: Inpatient       Preferred Pharmacy:   [id number] Moustapha Jimenez  Phone: [phone] Fax: [phone]    Primary Care Provider: Arletha Barthel, MD    Primary Insurance: MEDICARE  Secondary Insurance: [facility]    ASSESSMENT:  [address], Derek Soto - Son   Primary Phone: [phone] (Home)               Patient Information  Mental Status: Confused  During Assessment patient was accompanied by: Son, Daughter  Assessment information provided by[de-identified] Daughter  Primary Caregiver: Self  Support Systems: Daughter, Son  Home entry access options   Select all that apply : No steps to enter home  Type of Current Residence: [facility]  Living Arrangements: Lives Alone  Activities of Daily Living Prior to Admission  Functional Status: Independent  Completes ADLs independently?: Yes  Ambulates independently?: Yes  Does patient use assisted devices?: Yes  Assisted Devices (DME) used: Raymona Aase, Bedside Commode  Does patient currently own DME?: Yes  What DME does the patient currently own?: Bedside Commode, Shower Chair, Walker  Patient Information Continued  Income Source: Pension/snf  Food insecurity resource given?: N/A  Does patient receive dialysis treatments?: No  Means of Transportation  Means of Transport to Appts[de-identified] Family transport  Was application for public transport provided?: N/A    DISCHARGE DETAILS:    Discharge planning discussed with[de-identified] brenda Strickland of Choice: Yes  Comments - Freedom of Choice: cm discussed hospice referral  CM contacted family/caregiver?: No- see comments  Were Treatment Team discharge recommendations reviewed with patient/caregiver?: Yes  Did patient/caregiver verbalize understanding of patient care needs?: Yes  Were patient/caregiver advised of the risks associated with not following Treatment Team discharge recommendations?: Yes    Contacts  Contact Method:  In Person  Reason/Outcome: Discharge Planning  Treatment Team Recommendation: Hospice        Daughter Fouzia Carbone gave CM permission to send hospice referral  Family wishes for pt to pass at the hospital

## 2023-01-07 NOTE — ASSESSMENT & PLAN NOTE
Lab Results   Component Value Date    EGFR 13 01/06/2023    EGFR 13 01/06/2023    EGFR 15 01/06/2023    CREATININE 2 77 (H) 01/06/2023    CREATININE 2 78 (H) 01/06/2023    CREATININE 2 55 (H) 01/06/2023   Creatinine baseline 0 8-1 0  · At previous hospitalization she was up to 1 7, suspected to have dehydration, received fluids and discharged on lower dose of Lasix  · Creatinine today 2 77  · suspect cardiorenal syndrome  · Hunter already in place due to history of retention  · Patient and daughter are not interested in dialysis and have opted for comfort care measures no further lab draws necessary

## 2023-01-07 NOTE — PROGRESS NOTES
Cardiology Progress Note - Yudi Hair 80 y o  female MRN: [id number]    Unit/Bed#: ED 18 Encounter: [id number]      Assessment/Recommendations:  1  Acute on chronic heart failure with preserved ejection fraction: With significant volume overload and anasarca  Minimal to no response with IV Lasix and no urine output  Now back on maintenance diuretic dosing  2   Mild to moderate aortic stenosis: Likely worse than as reported on echocardiogram with low flow  3   Chronic atrial fibrillation: Continued on Cardizem for rate control and Eliquis for anticoagulation  4   Hyponatremia: Was started on salt tabs for this as an outpatient, would not resume  5   Acute kidney injury: Creatinine remains significantly elevated  6   Prognosis is guarded, patient now on comfort measures  Please call with questions  Subjective:   Patient seen and examined  Patient noted to be on comfort care  lower extremity edema, ; pertinent negatives - chest pain, irregular heart beat and palpitations  Objective:     Vitals: Blood pressure 95/53, pulse 64, temperature 97 5 °F (36 4 °C), temperature source Rectal, resp  rate (!) 33, SpO2 95 %  , There is no height or weight on file to calculate BMI ,   Orthostatic Blood Pressures    Flowsheet Row Most Recent Value   Blood Pressure 95/53 filed at 01/07/2023 1866   Patient Position - Orthostatic VS Lying filed at 01/06/2023 2115            Intake/Output Summary (Last 24 hours) at 1/7/2023 3995  Last data filed at 1/6/2023 2007  Gross per 24 hour   Intake 100 ml   Output --   Net 100 ml         Physical Exam:    GEN: Yudi Hair appears ill   HEENT: pupils equal, round, and reactive to light; extraocular muscles intact  NECK: supple, no carotid bruits, elevated JVP  HEART: Irregular rhythm, normal S1 and S2, + systolic murmur, no clicks, gallops or rubs   LUNGS: Bilateral rales appreciated  ABDOMEN: normal bowel sounds, soft, no tenderness, + distention  EXTREMITIES: peripheral pulses normal; no clubbing, cyanosis, +edema/anasarca  NEURO: no focal findings   SKIN: normal without suspicious lesions on exposed skin    Medications:      Current Facility-Administered Medications:   •  bisacodyl (DULCOLAX) rectal suppository 10 mg, 10 mg, Rectal, Daily PRN, Lesley Shah PA-C  •  fentanyl citrate (PF) 100 MCG/2ML 25 mcg, 25 mcg, Intravenous, Q2H PRN, Lesley Shah PA-C  •  glycopyrrolate (ROBINUL) injection 0 1 mg, 0 1 mg, Intravenous, Q4H PRN, Lesley Shah PA-C  •  haloperidol lactate (HALDOL) injection 0 5 mg, 0 5 mg, Intravenous, Q2H PRN, Marely Spencer PA-C  •  LORazepam (ATIVAN) injection 1 mg, 1 mg, Intravenous, Q10 Min PRN, Lesley Shah PA-C    Current Outpatient Medications:   •  apixaban (Eliquis) 2 5 mg, Take 1 tablet (2 5 mg total) by mouth 2 (two) times a day, Disp: 180 tablet, Rfl: 3  •  diltiazem (CARDIZEM SR) 60 mg 12 hr capsule, Take 1 capsule (60 mg total) by mouth every 12 (twelve) hours, Disp: 60 capsule, Rfl: 0  •  ferrous sulfate 324 (65 Fe) mg, TAKE 1 TABLET (324 MG TOTAL) BY MOUTH DAILY BEFORE BREAKFAST, Disp: 90 tablet, Rfl: 1  •  furosemide (LASIX) 20 mg tablet, Take 1 tablet (20 mg total) by mouth daily, Disp: 30 tablet, Rfl: 0  •  Klor-Con M10 10 MEQ tablet, TAKE 1 TABLET BY MOUTH EVERY DAY, Disp: 90 tablet, Rfl: 0  •  loperamide (IMODIUM) 2 mg capsule, Take 1 capsule (2 mg total) by mouth 4 (four) times a day as needed for diarrhea, Disp: 30 capsule, Rfl: 0  •  metoprolol succinate (TOPROL-XL) 100 mg 24 hr tablet, Take 1 tablet (100 mg total) by mouth daily Do not start before December 16, 2022 , Disp: 30 tablet, Rfl: 0  •  sodium chloride 1 g tablet, Take 1 tablet (1 g total) by mouth 3 (three) times a day with meals, Disp: 90 tablet, Rfl: 0     Labs & Results:        Results from last 7 days   Lab Units 01/06/23  1108   WBC Thousand/uL 9 84   HEMOGLOBIN g/dL 10 3*   HEMATOCRIT % 34 8   PLATELETS Thousands/uL 297         Results from last 7 days Lab Units 01/06/23 2057 01/06/23 2013 01/06/23  1520   POTASSIUM mmol/L 6 1* 6 2* 6 3*   CHLORIDE mmol/L 90* 89* 89*   CO2 mmol/L 16* 14* 16*   BUN mg/dL 46* 46* 45*   CREATININE mg/dL 2 77* 2 78* 2 55*   CALCIUM mg/dL 8 8 8 8 8 9               Echo: personally reviewed -LVEF normal, grade 2 diastolic dysfunction  Mildly dilated RV with reduced function  Biatrial enlargement  Mild to moderate aortic stenosis  Mild MR  Moderate to severe tricuspid regurgitation  Severe pulmonary hypertension      EKG personally reviewed by Lizett Muro MD

## 2023-01-08 NOTE — PROGRESS NOTES
[facility] Progress Note  Patient: Maria L Del Toro 80 y o  female   MRN: [id number]  PCP: Syed Das MD  Unit/Bed#: [facility] 526-01 Encounter: [id number]  Date Of Visit: 01/08/23    Assessment:    Principal Problem:    Acute on chronic diastolic (congestive) heart failure (HCC)  Active Problems:    Paroxysmal atrial fibrillation ([facility] [address] )    Acute kidney injury superimposed on CKD ([facility] [address] )    Severe pulmonary hypertension (HCC)    Hyponatremia    Ac[facility] respiratory failure with hypoxia [facility] )    Acute urinary retention    Ankle wound, left, initial encounter    Hyperkalemia    Goals of care, counseling/discussion      Plan:    · Continue current comfort care measures  · Case management for discharge planning  Mechanical VTE Prophylaxis in Place: No    Patient Centered Rounds: I have performed bedside rounds with nursing staff today  Discussions with Specialists or Other Care Team Provider: nurse, CM    Education and Discussions with Family / Patient: Updated  (daughter) at bedside  Time Spent for Care: 15 minutes  More than 50% of total time spent on counseling and coordination of care as described above  Current Length of Stay: 2 day(s)  Current Patient Status: Inpatient     Certification Statement: The patient will continue to require additional inpatient hospital stay due to discharge planning    Discharge Plan / Estimated Discharge Date: pending hospice    Code Status: Level 4 - Comfort Care      Subjective:   Limited due to acuity of condition    Objective:     Vitals:   No data recorded  BP: (89)/(55) 89/55  There is no height or weight on file to calculate BMI  Input and Output Summary (last 24 hours):        Intake/Output Summary (Last 24 hours) at 1/8/2023 1351  Last data filed at 1/8/2023 0601  Gross per 24 hour   Intake --   Output 100 ml   Net -100 ml       Physical Exam:   Physical Exam  Constitutional:       Comments: obtunded   HENT:      Head: Normocephalic and atraumatic  Nose: Nose normal    Cardiovascular:      Rate and Rhythm: Normal rate and regular rhythm  Pulmonary:      Effort: Pulmonary effort is normal    Skin:     Comments: anasarca          Additional Data:     Labs:  Results from last 7 days   Lab Units 01/06/23  1108   WBC Thousand/uL 9 84   HEMOGLOBIN g/dL 10 3*   HEMATOCRIT % 34 8   PLATELETS Thousands/uL 297   NEUTROS PCT % 84*   LYMPHS PCT % 3*   MONOS PCT % 11   EOS PCT % 0     Results from last 7 days   Lab Units 01/06/23  2057   SODIUM mmol/L 122*   POTASSIUM mmol/L 6 1*   CHLORIDE mmol/L 90*   CO2 mmol/L 16*   BUN mg/dL 46*   CREATININE mg/dL 2 77*   ANION GAP mmol/L 16*   CALCIUM mg/dL 8 8   GLUCOSE RANDOM mg/dL 175*         Results from last 7 days   Lab Units 01/06/23  1945 01/06/23  1820   POC GLUCOSE mg/dl 260* <20*         Results from last 7 days   Lab Units 01/06/23  1831   LACTIC ACID mmol/L 8 8*       Imaging: No pertinent imaging reviewed      Recent Cultures (last 7 days):           Lines/Drains:  Invasive Devices     Peripheral Intravenous Line  Duration           Peripheral IV 01/06/23 Right Antecubital 2 days          Drain  Duration           Urethral Catheter 16 Fr  15 days                Telemetry:        Last 24 Hours Medication List:   Current Facility-Administered Medications   Medication Dose Route Frequency Provider Last Rate   • benzonatate  100 mg Oral TID PRN BEULAH JeffNP     • bisacodyl  10 mg Rectal Daily PRN Noé MANSI Lunsford     • fentanyl citrate (PF)  25 mcg Intravenous Q2H PRN Noé MANSI Lunsford     • glycopyrrolate  0 1 mg Intravenous Q4H PRN Noé MANSI Lunsford     • haloperidol lactate  0 5 mg Intravenous Q2H PRN Noé MANSI Lunsford     • LORazepam  1 mg Intravenous Q10 Min PRN Noé MANSI Lunsford     • phenol  1 spray Mouth/Throat Q2H PRN GUNJAN Jeff     • scopolamine  1 patch Transdermal Q72H GUNJAN Jeff          Today, Patient Was Seen By: Lewis Roman MD    ** Please Note: This note has been constructed using a voice recognition system   **

## 2023-01-08 NOTE — DISCHARGE SUMMARY
[address] [facility]  Discharge- Sonido Whittington 2/17/1924, 80 y o  female MRN: [id number]  [facility] Encounter: [id number]  Primary Care Provider: Yair Street MD   Date and time admitted to hospital: 1/6/2023  9:37 AM    No new Assessment & Plan notes have been filed under this hospital service since the last note was generated  Service: Hospitalist      Medical Problems     Resolved Problems  Date Reviewed: 1/7/2023   None       Discharging Physician / Practitioner: Jewels Gonzalez MD  PCP: Yair Street MD  Admission Date:   Admission Orders (From admission, onward)     Ordered        01/06/23 1250  INPATIENT ADMISSION  Once                      Discharge Date: 01/08/23    Disposition:    Other: inpatient hospice facilty    Reason for Admission: CHF    Discharge Diagnoses:   Please see assessment and plan section above for further details regarding discharge diagnoses  Consultations During Hospital Stay:  · cardiology    Procedures Performed:   · None    Significant Findings / Test Results:   · Acidemia  · Lactic acidosis  · hyperkalemia    Incidental Findings:   · None    Test Results Pending at Discharge (will require follow up): · None     Outpatient Tests Requested:  · None    Complications:  See hospital course    Hospital Course:      Sonido Whittington is a 80 y o  female patient who originally presented to the hospital on 1/6/2023 due to shortness of breath  She was evaluated in the ED and found to be volume overloaded in acute heart failure with acute on chronic renal failure  She was admitted for treatment but shortly after her family decided on hospice and comfort care  She was discharged to inpatient hospice      Condition at Discharge: terminal    Discharge Day Visit / Exam:   See previous progress note    Discussion with Family: daughter at bedside    Medication Adjustments and Discharge Medications:  · Discharge Medication List: See after visit summary for reconciled discharge medications  · Medication Dosing Tapers - Please refer to Discharge Medication List for details on any medication dosing tapers (if applicable to patient)  · Summary of Medication Adjustments made as a result of this hospitalization: n/a  · Medications being temporarily held (include recommended restart time): n/a    Wound Care Recommendations:  When applicable, please see wound care section of After Visit Summary  Instructions for any Catheters / Lines Present at Discharge (including removal date, if applicable): none    Diet Recommendations at Discharge:  Diet -        Diet Orders   (From admission, onward)             Start     Ordered    01/06/23 2145  Diet Regular; Pleasure Feed  Diet effective now        References:    Nutrtion Support Algorithm Enteral vs  Parenteral   Question Answer Comment   Diet Type Regular    Regular Pleasure Feed    RD to adjust diet per protocol? No        01/06/23 2145                Goals of Care Discussions:  · Code Status at Discharge: Level 4 - Comfort Care  · Do not rehospitalize  Discharge instructions/Information to patient and family:   See after visit summary section titled Discharge Instructions for information provided to patient and family  Planned Readmission: yes to inpatient hospice     Discharge Statement:  I spent 45 minutes discharging the patient  This time was spent on the day of discharge  I had direct contact with the patient on the day of discharge  Greater than 50% of the total time was spent examining patient, answering all patient questions, arranging and discussing plan of care with patient as well as directly providing post-discharge instructions  Additional time then spent on discharge activities  **Please Note: This note may have been constructed using a voice recognition system  **

## 2023-01-08 NOTE — CASE MANAGEMENT
Case Management Assessment & Discharge Planning Note    Patient name Irma Search  Location [facility] 933-88 MRN [id number]  : 1924 Date 2023       Current Admission Date: 2023  Current Admission Diagnosis:Acute on chronic diastolic (congestive) heart failure [facility])   Patient Active Problem List    Diagnosis Date Noted   • Goals of care, counseling/discussion 2023   • Hyperkalemia 2023   • Ankle wound, left, initial encounter 2022   • Acute urinary retention    • Hypothermia 2022   • Acute respiratory failure with hypoxia ([address] ) 2022   • Urinary obstruction 2022   • Urinary tract infection 2022   • Hyponatremia 2022   • Bilateral renal cysts 2022   • Nonrheumatic aortic valve stenosis 2022   • Dyspnea 2021   • Hypoxia 2021   • Acute respiratory distress 2021   • Severe pulmonary hypertension ([address] ) 2021   • Acute kidney injury superimposed on CKD ([address] ) 2021   • Chronic renal disease, stage IV ([address] ) 2021   • Lower GI bleed 2021   • Seasonal allergic rhinitis due to pollen 10/26/2021   • Iron deficiency anemia 07/15/2021   • Fecal occult blood test positive 07/15/2021   • Anemia 07/15/2021   • Acute on chronic diastolic (congestive) heart failure ([address] ) 2021   • Fracture of right orbital wall ([facility] [address] ) 2020   • Chronic kidney disease-mineral and bone disorder 2019   • Epistaxis    • Diastolic dysfunction    • Anemia due to chronic kidney disease 10/18/2018   • Mitral valve insufficiency 2018   • Hypercholesterolemia 2018   • MARIA DEL CARMEN (acute kidney injury) ([facility] [address] ) 2017   • Paroxysmal atrial fibrillation ([address] ) 2017   • Long term current use of anticoagulant 2017   • Chronic kidney disease, stage III (moderate) ([address] ) 2017   • Essential hypertension 2017      LOS (days): 2  Geometric Mean LOS (GMLOS) (days):   Days to GMLOS: OBJECTIVE:  PATIENT READMITTED TO HOSPITAL  Risk of Unplanned Readmission Score: 37 7         Current admission status: Inpatient       Preferred Pharmacy:   [id number] [address]  Phone: [phone] Fax: [phone]    Primary Care Provider: Doyle Peter MD    Primary Insurance: MEDICARE  Secondary Insurance: [facility]    ASSESSMENT:  [address] [facility] [place] [address] [facility]   Primary Phone: [phone] (Home)                                                                DISCHARGE DETAILS:    Discharge planning discussed with[de-identified] Cm rec'd TT from hospic liaison, reported pt is appropriate for IPU and requested for transport  Cm contact pt's dtr updated on same and made aware of transportation requested  transportation set up via roundtrip  awaiting confirmation    Freedom of Choice: Yes     CM contacted family/caregiver?: Yes  Were Treatment Team discharge recommendations reviewed with patient/caregiver?: Yes          Contacts  Relationship to Patient[de-identified] Family  Contact Method: Phone  Reason/Outcome: Discharge Planning              Other Referral/Resources/Interventions Provided:  Interventions: Hospice         Treatment Team Recommendation: Hospice  Discharge Destination Plan[de-identified] Hospice                                   IMM Given to[de-identified] Patient

## 2023-01-08 NOTE — HOSPICE NOTE
Hospice referral received    TPC to daughter Marylen Estelle and will plan to meet at noon on Sunday to discuss hospice medicare benefit

## 2023-01-09 NOTE — PROGRESS NOTES
-- Patient: Sandra Christensen  -- MRN: [id number]  -- Aidin Request ID: [id number]  -- Level of care reserved: [address] [place] [address]  -- Partner Reserved: [facility]- [facility], [address], [address] [facility] [phone]  -- Clinical needs requested:  -- Geography searched: [phone]  -- Start of Service:  -- Request sent: 1:34pm EST on 1/7/2023 by Ramana Do  -- Partner reserved: 9:17am EST on 1/9/2023 by Sai Schmitz  -- Choice list shared: 9:17am EST on 1/9/2023 by Sai Schmitz

## 2023-01-10 ENCOUNTER — TELEPHONE (OUTPATIENT)
Dept: INTERNAL MEDICINE CLINIC | Facility: CLINIC | Age: 88
End: 2023-01-10

## 2023-01-10 NOTE — TELEPHONE ENCOUNTER
NAILA Moffett from [facility] in [place] called to inform Dr Gladys Ramirez that his patient Ene Olvera passed away yesterday

## 2023-01-13 ENCOUNTER — HOME CARE VISIT (OUTPATIENT)
Dept: HOME HOSPICE | Facility: HOSPICE | Age: 88
End: 2023-01-13

## 2023-01-13 NOTE — CASE COMMUNICATION
Raissa Pitt, Bereavement Final IDG 23 (1LR) Due: 23  : 1924  SOC: 23  DOD: 23, <24hrs  Diagnosis: Respiratory Failure, CHF  Primary: Daughter - Ruchi Muhammad was a 80year old woman at the [facility] less than 24 hours  Daughter Sandra Puentes  Son Costa Browne  Sandra Puentes reported her mother had a rally in the hospital  She had spoken to many family members and joked about how dying was not so bad  Sandra Puentes and Costa Browne reported as the y were leaving the [facility] that they were "at peace" if their mother were to die that night before they returned in the morning  They shared she was "ready to go to [facility] " Costa Browne declines bereavement follow-up  Sandra Puentes to be followed at [facility]  TOD: Costa Browne was called and informed of his mother's death  He was relieved she got her wish and was now in [facility]  Costa Browne felt he and Sandra Puentes would come to the [facility]  Sandra Puentes called  Voiced knowing her mother did  not want them there when she   She stated they would not be coming to the [facility] as her mother would not want them driving late at night  Call Assignments:  Ishaan Alexandre to reassess daughter Berna Starks at [facility]   (Due: 23)